# Patient Record
Sex: MALE | Race: WHITE | NOT HISPANIC OR LATINO | Employment: FULL TIME | URBAN - METROPOLITAN AREA
[De-identification: names, ages, dates, MRNs, and addresses within clinical notes are randomized per-mention and may not be internally consistent; named-entity substitution may affect disease eponyms.]

---

## 2017-03-14 RX ORDER — LEVOTHYROXINE SODIUM 112 UG/1
TABLET ORAL
Qty: 30 TABLET | Refills: 0 | Status: SHIPPED | OUTPATIENT
Start: 2017-03-14 | End: 2017-04-15 | Stop reason: SDUPTHER

## 2017-04-15 RX ORDER — LEVOTHYROXINE SODIUM 112 UG/1
TABLET ORAL
Qty: 30 TABLET | Refills: 1 | Status: SHIPPED | OUTPATIENT
Start: 2017-04-15 | End: 2017-05-09 | Stop reason: SDUPTHER

## 2017-05-09 ENCOUNTER — OFFICE VISIT (OUTPATIENT)
Dept: INTERNAL MEDICINE | Facility: CLINIC | Age: 47
End: 2017-05-09
Payer: COMMERCIAL

## 2017-05-09 VITALS
HEART RATE: 58 BPM | SYSTOLIC BLOOD PRESSURE: 115 MMHG | WEIGHT: 246 LBS | HEIGHT: 74 IN | BODY MASS INDEX: 31.57 KG/M2 | DIASTOLIC BLOOD PRESSURE: 79 MMHG

## 2017-05-09 DIAGNOSIS — E78.00 HIGH CHOLESTEROL: ICD-10-CM

## 2017-05-09 DIAGNOSIS — E03.4 HYPOTHYROIDISM DUE TO ACQUIRED ATROPHY OF THYROID: ICD-10-CM

## 2017-05-09 DIAGNOSIS — R73.9 HYPERGLYCEMIA: ICD-10-CM

## 2017-05-09 DIAGNOSIS — E55.9 MILD VITAMIN D DEFICIENCY: ICD-10-CM

## 2017-05-09 DIAGNOSIS — Z71.1 CONCERN ABOUT STD IN MALE WITHOUT DIAGNOSIS: ICD-10-CM

## 2017-05-09 DIAGNOSIS — Z12.5 SCREENING PSA (PROSTATE SPECIFIC ANTIGEN): ICD-10-CM

## 2017-05-09 DIAGNOSIS — M1A.00X0 IDIOPATHIC CHRONIC GOUT WITHOUT TOPHUS, UNSPECIFIED SITE: Primary | ICD-10-CM

## 2017-05-09 DIAGNOSIS — N63.0 BREAST MASS IN MALE: ICD-10-CM

## 2017-05-09 DIAGNOSIS — E29.1 HYPOGONADISM IN MALE: ICD-10-CM

## 2017-05-09 DIAGNOSIS — K62.5 RECTAL BLEEDING: ICD-10-CM

## 2017-05-09 PROCEDURE — 1160F RVW MEDS BY RX/DR IN RCRD: CPT | Mod: S$GLB,,, | Performed by: INTERNAL MEDICINE

## 2017-05-09 PROCEDURE — 3074F SYST BP LT 130 MM HG: CPT | Mod: S$GLB,,, | Performed by: INTERNAL MEDICINE

## 2017-05-09 PROCEDURE — 99999 PR PBB SHADOW E&M-EST. PATIENT-LVL III: CPT | Mod: PBBFAC,,, | Performed by: INTERNAL MEDICINE

## 2017-05-09 PROCEDURE — 99214 OFFICE O/P EST MOD 30 MIN: CPT | Mod: S$GLB,,, | Performed by: INTERNAL MEDICINE

## 2017-05-09 PROCEDURE — 3078F DIAST BP <80 MM HG: CPT | Mod: S$GLB,,, | Performed by: INTERNAL MEDICINE

## 2017-05-09 RX ORDER — LEVOTHYROXINE SODIUM 112 UG/1
TABLET ORAL
Qty: 90 TABLET | Refills: 3 | Status: SHIPPED | OUTPATIENT
Start: 2017-05-09 | End: 2018-06-03 | Stop reason: SDUPTHER

## 2017-05-09 RX ORDER — ATORVASTATIN CALCIUM 20 MG/1
20 TABLET, FILM COATED ORAL DAILY
Qty: 90 TABLET | Refills: 3 | Status: SHIPPED | OUTPATIENT
Start: 2017-05-09 | End: 2018-06-03 | Stop reason: SDUPTHER

## 2017-05-09 NOTE — MR AVS SNAPSHOT
[facility] [address]  [address]  [address]  [facility] [address]  Phone: [phone]                  Sukumar Beal   2017 1:00 PM   Office Visit    Description:  Male : 1970   Provider:  Katherine Turner MD   Department:  [facility] [address]           Reason for Visit     Follow-up           Diagnoses this Visit        Comments    Idiopathic chronic gout without tophus, unspecified site    -  Primary     High cholesterol         Hypothyroidism due to acquired atrophy of thyroid         Hypogonadism in male         Hyperglycemia         Mild vitamin D deficiency         Screening and evaluation for female sterilization         Screening PSA (prostate specific antigen)         Rectal bleeding         Concern about STD in male without diagnosis         Breast mass in male                To Do List           Future Appointments        Provider Department Dept Phone    5/10/2017 8:45 AM LAB, [place] [facility] [phone]    2017 2:30 PM Carolina Bryan MD [facility] Breast Surgery [phone]      Goals (5 Years of Data)     None       These Medications        Disp Refills Start End    atorvastatin (LIPITOR) 20 MG tablet 90 tablet 3 2017     Take 1 tablet (20 mg total) by mouth once daily. - Oral    [facility] [address] [place] [address] AT [facility] Ph #: [phone]       levothyroxine (SYNTHROID) 112 MCG tablet 90 tablet 3 2017     TAKE 1 TABLET(112 MCG) BY MOUTH EVERY DAY    [facility]: [facility] [employer] [address] [place] [address] [place] - [address] [facility] AT [facility] Ph #: [phone]         [facility]     [facility] Nurse Care Line - 24/ Assistance  Unless otherwise directed by your provider, please contact [facility], our nurse care line that is available for / assistance.     Registered nurses in the  "[facility] On Call Center provide: appointment scheduling, clinical advisement, health education, and other advisory services.  Call: [phone] (toll free)               Medications           Message regarding Medications     Verify the changes and/or additions to your medication regime listed below are the same as discussed with your clinician today.  If any of these changes or additions are incorrect, please notify your healthcare provider.             Verify that the below list of medications is an accurate representation of the medications you are currently taking.  If none reported, the list may be blank. If incorrect, please contact your healthcare provider. Carry this list with you in case of emergency.           Current Medications     atorvastatin (LIPITOR) 20 MG tablet Take 1 tablet (20 mg total) by mouth once daily.    levothyroxine (SYNTHROID) 112 MCG tablet TAKE 1 TABLET BY MOUTH ONCE DAILY    levothyroxine (SYNTHROID) 112 MCG tablet TAKE 1 TABLET(112 MCG) BY MOUTH EVERY DAY           Clinical Reference Information           Your Vitals Were     BP Pulse Height Weight BMI    115/79 58 6' 2" (1.88 m) 111.6 kg (246 lb) 31.58 kg/m2      Blood Pressure          Most Recent Value    BP  115/79      Allergies as of 5/9/2017     Codeine    Penicillins      Immunizations Administered on Date of Encounter - 5/9/2017     None      Orders Placed During Today's Visit      Normal Orders This Visit    Ambulatory Referral to Breast Surgery     Case request GI: COLONOSCOPY     Future Labs/Procedures Expected by Expires    CBC auto differential  5/9/2017 7/8/2018    Comprehensive metabolic panel  5/9/2017 5/9/2018    Hemoglobin A1c  5/9/2017 5/9/2018    Hepatitis C antibody  5/9/2017 7/8/2018    HIV-1 and HIV-2 antibodies  5/9/2017 7/8/2018    Lipid panel  5/9/2017 5/9/2018    PSA, Screening  5/9/2017 5/9/2018    RPR  5/9/2017 7/8/2018    Testosterone  5/9/2017 5/9/2018    TSH  5/9/2017 5/9/2018    Vitamin D  5/9/2017 " 5/9/2018      [facility] Sign-Up     Activating your [facility] account is as easy as 1-2-3!     1) Visit [email or web address], select Sign Up Now, enter this activation code and your date of birth, then select Next.  [email or web address]  Expires: 6/23/2017  3:17 PM      2) Create a username and password to use when you visit [facility] in the future and select a security question in case you lose your password and select Next.    3) Enter your e-mail address and click Sign Up!    Additional Information  If you have questions, please e-mail [email or web address] or call [phone] to talk to our [employer] staff. Remember, [facility] is NOT to be used for urgent needs. For medical emergencies, dial 911.         Language Assistance Services     ATTENTION: Language assistance services are available, free of charge. Please call [phone].      ATENCIÓN: Si habla abbycornelio, tiene a hobbs disposición servicios gratuitos de asistencia lingüística. Llame al [phone].     [facility] Ý: N?u b?n nói Ti?ng Vi?t, có các d?ch v? h? tr? ngôn ng? mi?n phí dành cho b?n. G?i s? [phone].         [facility] [address] complies with applicable Federal civil rights laws and does not discriminate on the basis of race, color, national origin, age, disability, or sex.

## 2017-05-10 ENCOUNTER — LAB VISIT (OUTPATIENT)
Dept: LAB | Facility: HOSPITAL | Age: 47
End: 2017-05-10
Attending: INTERNAL MEDICINE
Payer: COMMERCIAL

## 2017-05-10 DIAGNOSIS — E03.4 HYPOTHYROIDISM DUE TO ACQUIRED ATROPHY OF THYROID: ICD-10-CM

## 2017-05-10 DIAGNOSIS — E55.9 MILD VITAMIN D DEFICIENCY: ICD-10-CM

## 2017-05-10 DIAGNOSIS — E78.00 HIGH CHOLESTEROL: ICD-10-CM

## 2017-05-10 DIAGNOSIS — E29.1 HYPOGONADISM IN MALE: ICD-10-CM

## 2017-05-10 DIAGNOSIS — R73.9 HYPERGLYCEMIA: ICD-10-CM

## 2017-05-10 DIAGNOSIS — Z71.1 CONCERN ABOUT STD IN MALE WITHOUT DIAGNOSIS: ICD-10-CM

## 2017-05-10 DIAGNOSIS — Z12.5 SCREENING PSA (PROSTATE SPECIFIC ANTIGEN): ICD-10-CM

## 2017-05-10 LAB
25(OH)D3+25(OH)D2 SERPL-MCNC: 45 NG/ML
ALBUMIN SERPL BCP-MCNC: 4.3 G/DL
ALP SERPL-CCNC: 83 U/L
ALT SERPL W/O P-5'-P-CCNC: 28 U/L
ANION GAP SERPL CALC-SCNC: 15 MMOL/L
AST SERPL-CCNC: 30 U/L
BASOPHILS # BLD AUTO: 0.03 K/UL
BASOPHILS NFR BLD: 0.6 %
BILIRUB SERPL-MCNC: 0.8 MG/DL
BUN SERPL-MCNC: 23 MG/DL
CALCIUM SERPL-MCNC: 9.8 MG/DL
CHLORIDE SERPL-SCNC: 105 MMOL/L
CHOLEST/HDLC SERPL: 3.4 {RATIO}
CO2 SERPL-SCNC: 21 MMOL/L
COMPLEXED PSA SERPL-MCNC: 0.41 NG/ML
CREAT SERPL-MCNC: 1.2 MG/DL
DIFFERENTIAL METHOD: NORMAL
EOSINOPHIL # BLD AUTO: 0.2 K/UL
EOSINOPHIL NFR BLD: 3.2 %
ERYTHROCYTE [DISTWIDTH] IN BLOOD BY AUTOMATED COUNT: 13.1 %
EST. GFR  (AFRICAN AMERICAN): >60 ML/MIN/1.73 M^2
EST. GFR  (NON AFRICAN AMERICAN): >60 ML/MIN/1.73 M^2
GLUCOSE SERPL-MCNC: 102 MG/DL
HCT VFR BLD AUTO: 46.6 %
HCV AB SERPL QL IA: NEGATIVE
HDL/CHOLESTEROL RATIO: 29.1 %
HDLC SERPL-MCNC: 182 MG/DL
HDLC SERPL-MCNC: 53 MG/DL
HGB BLD-MCNC: 15.6 G/DL
HIV 1+2 AB+HIV1 P24 AG SERPL QL IA: NEGATIVE
LDLC SERPL CALC-MCNC: 110.2 MG/DL
LYMPHOCYTES # BLD AUTO: 1.7 K/UL
LYMPHOCYTES NFR BLD: 33.7 %
MCH RBC QN AUTO: 30.7 PG
MCHC RBC AUTO-ENTMCNC: 33.5 %
MCV RBC AUTO: 92 FL
MONOCYTES # BLD AUTO: 0.5 K/UL
MONOCYTES NFR BLD: 10.8 %
NEUTROPHILS # BLD AUTO: 2.6 K/UL
NEUTROPHILS NFR BLD: 51.7 %
NONHDLC SERPL-MCNC: 129 MG/DL
PLATELET # BLD AUTO: 177 K/UL
PMV BLD AUTO: 11.6 FL
POTASSIUM SERPL-SCNC: 4.2 MMOL/L
PROT SERPL-MCNC: 7.4 G/DL
RBC # BLD AUTO: 5.08 M/UL
RPR SER QL: NORMAL
SODIUM SERPL-SCNC: 141 MMOL/L
TESTOST SERPL-MCNC: 331 NG/DL
TRIGL SERPL-MCNC: 94 MG/DL
TSH SERPL DL<=0.005 MIU/L-ACNC: 3.76 UIU/ML
WBC # BLD AUTO: 4.98 K/UL

## 2017-05-10 PROCEDURE — 82306 VITAMIN D 25 HYDROXY: CPT

## 2017-05-10 PROCEDURE — 84153 ASSAY OF PSA TOTAL: CPT

## 2017-05-10 PROCEDURE — 80053 COMPREHEN METABOLIC PANEL: CPT

## 2017-05-10 PROCEDURE — 84443 ASSAY THYROID STIM HORMONE: CPT

## 2017-05-10 PROCEDURE — 80061 LIPID PANEL: CPT

## 2017-05-10 PROCEDURE — 84403 ASSAY OF TOTAL TESTOSTERONE: CPT

## 2017-05-10 PROCEDURE — 86803 HEPATITIS C AB TEST: CPT

## 2017-05-10 PROCEDURE — 86592 SYPHILIS TEST NON-TREP QUAL: CPT

## 2017-05-10 PROCEDURE — 83036 HEMOGLOBIN GLYCOSYLATED A1C: CPT

## 2017-05-10 PROCEDURE — 85025 COMPLETE CBC W/AUTO DIFF WBC: CPT | Mod: PO

## 2017-05-10 PROCEDURE — 86703 HIV-1/HIV-2 1 RESULT ANTBDY: CPT

## 2017-05-10 PROCEDURE — 36415 COLL VENOUS BLD VENIPUNCTURE: CPT | Mod: PO

## 2017-05-11 LAB
ESTIMATED AVG GLUCOSE: 108 MG/DL
HBA1C MFR BLD HPLC: 5.4 %

## 2017-05-16 ENCOUNTER — HOSPITAL ENCOUNTER (OUTPATIENT)
Dept: RADIOLOGY | Facility: HOSPITAL | Age: 47
Discharge: HOME | End: 2017-05-16
Attending: SURGERY
Payer: COMMERCIAL

## 2017-05-16 ENCOUNTER — OFFICE VISIT (OUTPATIENT)
Dept: SURGERY | Facility: CLINIC | Age: 47
End: 2017-05-16
Payer: COMMERCIAL

## 2017-05-16 VITALS
HEART RATE: 55 BPM | TEMPERATURE: 98 F | WEIGHT: 242.5 LBS | SYSTOLIC BLOOD PRESSURE: 169 MMHG | HEIGHT: 74 IN | DIASTOLIC BLOOD PRESSURE: 98 MMHG | BODY MASS INDEX: 31.12 KG/M2

## 2017-05-16 DIAGNOSIS — D17.1 LIPOMA OF BREAST: ICD-10-CM

## 2017-05-16 DIAGNOSIS — N60.01 BREAST CYST, RIGHT: ICD-10-CM

## 2017-05-16 DIAGNOSIS — N60.01 BREAST CYST, RIGHT: Primary | ICD-10-CM

## 2017-05-16 PROCEDURE — 1160F RVW MEDS BY RX/DR IN RCRD: CPT | Mod: S$GLB,,, | Performed by: SURGERY

## 2017-05-16 PROCEDURE — 3077F SYST BP >= 140 MM HG: CPT | Mod: S$GLB,,, | Performed by: SURGERY

## 2017-05-16 PROCEDURE — 99203 OFFICE O/P NEW LOW 30 MIN: CPT | Mod: S$GLB,,, | Performed by: SURGERY

## 2017-05-16 PROCEDURE — 76642 ULTRASOUND BREAST LIMITED: CPT | Mod: TC,RT

## 2017-05-16 PROCEDURE — 3080F DIAST BP >= 90 MM HG: CPT | Mod: S$GLB,,, | Performed by: SURGERY

## 2017-05-16 PROCEDURE — 76642 ULTRASOUND BREAST LIMITED: CPT | Mod: 26,RT,, | Performed by: RADIOLOGY

## 2017-05-16 PROCEDURE — 99999 PR PBB SHADOW E&M-EST. PATIENT-LVL III: CPT | Mod: PBBFAC,,, | Performed by: SURGERY

## 2017-05-16 NOTE — LETTER
May 18, 2017      Katherine Turner MD  [address]  [facility] [address]           [facility]  [address]  [facility] [address]  Phone: [phone]          Patient: Sukumar Beal   MR Number: [id number]   YOB: 1970   Date of Visit: 5/16/2017       Dear Dr. Katherine Turner:    Thank you for referring Sukumar Beal to me for evaluation. Attached you will find relevant portions of my assessment and plan of care.    If you have questions, please do not hesitate to call me. I look forward to following Sukumar Beal along with you.    Sincerely,    Carolina Bryan MD    Enclosure  CC:  No Recipients    If you would like to receive this communication electronically, please contact [email or web address] or [phone] to request more information on [employer] Link access.    For providers and/or their staff who would like to refer a patient to [facility], please contact us through our one-stop-shop provider referral line, [facility] , at [phone].    If you feel you have received this communication in error or would no longer like to receive these types of communications, please e-mail [email or web address]

## 2017-05-18 RX ORDER — ATORVASTATIN CALCIUM 20 MG/1
TABLET, FILM COATED ORAL
Qty: 30 TABLET | Refills: 6 | Status: SHIPPED | OUTPATIENT
Start: 2017-05-18 | End: 2018-06-11 | Stop reason: SDUPTHER

## 2017-05-18 NOTE — PROGRESS NOTES
Breast Surgery  [facility]  Department of Surgery      REFERRING PROVIDER: Katherine Turner MD  [address] [place] [address]    Chief Complaint: Consult (New Patient Male Breast  Mass)      Subjective:      Patient ID: Sukumar Beal is a 46 y.o. male who presents with palpable mass in the lower inner quadrant of the R breast.  He describes this as feeling like other small masses he has in various places consistent with lipomas.  He has never had one removed or biopsied.  He is concerned about this area.      Patient does not routinely do self breast exams.  Patient has noted a change on breast exam.  Patient denies nipple discharge. Patient denies to previous breast biopsy. Patient denies a personal history of breast cancer.      Past Medical History:   Diagnosis Date    Gout 7/2014    High cholesterol     Hypertension     Hypothyroid     Low testosterone     Staph aureus infection age 13    R leg     Past Surgical History:   Procedure Laterality Date    FRACTURE SURGERY Left 2004    wrist    Incision and drainage of tibia Right 1983    SINUS SURGERY      x2     Current Outpatient Prescriptions on File Prior to Visit   Medication Sig Dispense Refill    atorvastatin (LIPITOR) 20 MG tablet Take 1 tablet (20 mg total) by mouth once daily. 90 tablet 3    levothyroxine (SYNTHROID) 112 MCG tablet TAKE 1 TABLET BY MOUTH ONCE DAILY 90 tablet 0    levothyroxine (SYNTHROID) 112 MCG tablet TAKE 1 TABLET(112 MCG) BY MOUTH EVERY DAY 90 tablet 3     No current facility-administered medications on file prior to visit.      Social History     Social History    Marital status: Single     Spouse name: N/A    Number of children: N/A    Years of education: N/A     Occupational History    Not on file.     Social History Main Topics    Smoking status: Former Smoker     Packs/day: 0.25     Years: 15.00     Quit date: 1/3/2013    Smokeless tobacco: Never Used    Alcohol use 3.0 oz/week      "6 Standard drinks or equivalent per week      Comment: weekends    Drug use: No    Sexual activity: Yes     Other Topics Concern    Not on file     Social History Narrative     Family History   Problem Relation Age of Onset    Heart disease Father 73         Hypertension Sister     Hyperlipidemia Sister     Hypertension Brother     Hyperlipidemia Brother     Hypertension Mother     Cancer Maternal Grandfather     Cancer Maternal Aunt      lung - smoker        Review of Systems  Objective:   BP (!) 169/98 (BP Location: Left arm, Patient Position: Sitting, BP Method: Automatic)  Pulse (!) 55  Temp 97.7 °F (36.5 °C) (Oral)   Ht 6' 2" (1.88 m)  Wt 110 kg (242 lb 8 oz)  BMI 31.14 kg/m2    Physical Exam   Constitutional: He is oriented to person, place, and time. He appears well-developed and well-nourished.   HENT:   Head: Normocephalic and atraumatic.   Eyes: Right eye exhibits no discharge. Left eye exhibits no discharge. No scleral icterus.   Neck: Neck supple. No tracheal deviation present. No thyromegaly present.   Cardiovascular: Normal rate, regular rhythm, normal heart sounds and intact distal pulses.    Pulmonary/Chest: Effort normal and breath sounds normal. No respiratory distress. Right breast exhibits mass. Right breast exhibits no inverted nipple, no nipple discharge, no skin change and no tenderness. Left breast exhibits no inverted nipple, no mass, no nipple discharge, no skin change and no tenderness. Breasts are symmetrical.       Abdominal: Soft. He exhibits no distension. There is no tenderness.   Musculoskeletal: He exhibits no edema or deformity.   Lymphadenopathy:     He has no cervical adenopathy.   Neurological: He is alert and oriented to person, place, and time.   Skin: Skin is warm and dry.     Psychiatric: He has a normal mood and affect.       Radiology review: Will obtain US today to assure benign nature of this lesion    Assessment:           1. Lipoma of breast     "    Plan:     He was reassured of the benign nature of this lesion  Will obtain US today to assure there is no concern that biopsy is needed.  Advised that this could be excised if it becomes large enough to be painful or bothersome to him.  Follow up PRN    Total time spent with the patient: 45 minutes.  30 minutes of face to face consultation and 15 minutes of chart review and coordination of care.

## 2017-05-21 NOTE — PROGRESS NOTES
Subjective:       Patient ID: Sukumar Beal is a 46 y.o. male.    Chief Complaint: Follow-up    HPIPt is feeling well - coming in for a check up.  No CP or SOB.  Always worried about lipomas has one in R breast now.  Notes some slight red blood on the tissue.   Review of Systems   Respiratory: Negative for shortness of breath.    Cardiovascular: Negative for chest pain.   Gastrointestinal: Negative for abdominal pain, diarrhea, nausea and vomiting.   Genitourinary: Negative for dysuria.   Neurological: Negative for seizures, syncope and headaches.       Objective:      Physical Exam   Constitutional: He is oriented to person, place, and time. He appears well-developed and well-nourished. No distress.   HENT:   Mouth/Throat: Oropharynx is clear and moist.   Eyes: EOM are normal. Pupils are equal, round, and reactive to light.   Neck: Neck supple. No JVD present. No thyromegaly present.   Cardiovascular: Normal rate, regular rhythm, normal heart sounds and intact distal pulses.  Exam reveals no gallop and no friction rub.    No murmur heard.  Pulmonary/Chest: Effort normal and breath sounds normal. He has no wheezes. He has no rales.   R breast at lower ridge an approx 1cm ?lipoma slightly mor firm than the others and seems to be in the breast tissue   Abdominal: Soft. Bowel sounds are normal. He exhibits no distension and no mass. There is no tenderness. There is no rebound and no guarding.   Genitourinary: Rectum normal, prostate normal and penis normal. Rectal exam shows guaiac negative stool.   Musculoskeletal: He exhibits no edema.   Lymphadenopathy:     He has no cervical adenopathy.   Neurological: He is alert and oriented to person, place, and time.   Skin: Skin is warm and dry.   Psychiatric: He has a normal mood and affect. His behavior is normal. Judgment and thought content normal.       Assessment:       1. Idiopathic chronic gout without tophus, unspecified site    2. High cholesterol    3.  Hypothyroidism due to acquired atrophy of thyroid    4. Hypogonadism in male    5. Hyperglycemia    6. Mild vitamin D deficiency    7. Screening and evaluation for female sterilization    8. Screening PSA (prostate specific antigen)    9. Rectal bleeding    10. Concern about STD in male without diagnosis    11. Breast mass in male        Plan:   Idiopathic chronic gout without tophus, unspecified site  No recent sx  High cholesterol  -     Comprehensive metabolic panel; Future; Expected date: 05/09/2017  -     Lipid panel; Future; Expected date: 05/09/2017    Hypothyroidism due to acquired atrophy of thyroid  -     CBC auto differential; Future; Expected date: 05/09/2017  -     TSH; Future; Expected date: 05/09/2017    Hypogonadism in male  -     Testosterone; Future; Expected date: 05/09/2017    Hyperglycemia  -     Hemoglobin A1c; Future; Expected date: 05/09/2017    Mild vitamin D deficiency  -     Vitamin D; Future; Expected date: 05/09/2017    Screening PSA (prostate specific antigen)  -     PSA, Screening; Future; Expected date: 05/09/2017    Rectal bleeding  -     Case request GI: COLONOSCOPY    Concern about STD in male without diagnosis  -     HIV-1 and HIV-2 antibodies; Future; Expected date: 05/09/2017  -     RPR; Future; Expected date: 05/09/2017  -     Hepatitis C antibody; Future; Expected date: 05/09/2017    Breast mass in male  -     Ambulatory Referral to Breast Surgery to evaluate    Other orders  -     atorvastatin (LIPITOR) 20 MG tablet; Take 1 tablet (20 mg total) by mouth once daily.  Dispense: 90 tablet; Refill: 3  -     levothyroxine (SYNTHROID) 112 MCG tablet; TAKE 1 TABLET(112 MCG) BY MOUTH EVERY DAY  Dispense: 90 tablet; Refill: 3    Consider truvada will await lab and discuss with ID

## 2018-03-22 ENCOUNTER — TELEPHONE (OUTPATIENT)
Dept: INTERNAL MEDICINE | Facility: CLINIC | Age: 48
End: 2018-03-22

## 2018-03-22 DIAGNOSIS — Z00.00 WELLNESS EXAMINATION: Primary | ICD-10-CM

## 2018-03-22 NOTE — TELEPHONE ENCOUNTER
----- Message from Natalee Wilder sent at 3/22/2018  4:42 PM CDT -----  Contact: Patient [phone]  He said the [facility] advised him to ask you for a medical referral to lower his monthly fee fax it to [facility] at [phone].    He also need lab orders for his annual physical on 6/11/18.    Thank you

## 2018-03-27 NOTE — TELEPHONE ENCOUNTER
Called and scheduled labs.    Contact: Patient [phone]  He said the [facility] advised him to ask you for a medical referral to lower his monthly fee fax it to [facility] at [phone].    Please Advise  Thank You

## 2018-03-31 NOTE — TELEPHONE ENCOUNTER
Please inform order was placed for the fitness center- he just needs to call to activate the free month.

## 2018-03-31 NOTE — TELEPHONE ENCOUNTER
Left message informing patient order for fitness center was placed. He just needs to call to activate.    Please Advise  Thank You

## 2018-05-30 ENCOUNTER — TELEPHONE (OUTPATIENT)
Dept: INTERNAL MEDICINE | Facility: CLINIC | Age: 48
End: 2018-05-30

## 2018-05-30 DIAGNOSIS — E78.2 HYPERLIPIDEMIA, MIXED: Primary | ICD-10-CM

## 2018-05-30 NOTE — TELEPHONE ENCOUNTER
Faxed referral for nutrition to Supriya [phone]  Informed patient it was faxed. Left voice mail for Supriya to confirm if she received it    Please Advise  Thank You

## 2018-05-31 ENCOUNTER — LAB VISIT (OUTPATIENT)
Dept: LAB | Facility: HOSPITAL | Age: 48
End: 2018-05-31
Attending: INTERNAL MEDICINE
Payer: COMMERCIAL

## 2018-05-31 DIAGNOSIS — Z00.00 WELLNESS EXAMINATION: ICD-10-CM

## 2018-05-31 LAB
25(OH)D3+25(OH)D2 SERPL-MCNC: 27 NG/ML
ALBUMIN SERPL BCP-MCNC: 4.3 G/DL
ALP SERPL-CCNC: 75 U/L
ALT SERPL W/O P-5'-P-CCNC: 32 U/L
ANION GAP SERPL CALC-SCNC: 7 MMOL/L
AST SERPL-CCNC: 36 U/L
BASOPHILS # BLD AUTO: 0.05 K/UL
BASOPHILS NFR BLD: 1 %
BILIRUB SERPL-MCNC: 0.9 MG/DL
BUN SERPL-MCNC: 17 MG/DL
CALCIUM SERPL-MCNC: 9.5 MG/DL
CHLORIDE SERPL-SCNC: 103 MMOL/L
CHOLEST SERPL-MCNC: 181 MG/DL
CHOLEST/HDLC SERPL: 3.5 {RATIO}
CO2 SERPL-SCNC: 28 MMOL/L
CREAT SERPL-MCNC: 1 MG/DL
DIFFERENTIAL METHOD: NORMAL
EOSINOPHIL # BLD AUTO: 0.1 K/UL
EOSINOPHIL NFR BLD: 1.8 %
ERYTHROCYTE [DISTWIDTH] IN BLOOD BY AUTOMATED COUNT: 12.9 %
EST. GFR  (AFRICAN AMERICAN): >60 ML/MIN/1.73 M^2
EST. GFR  (NON AFRICAN AMERICAN): >60 ML/MIN/1.73 M^2
ESTIMATED AVG GLUCOSE: 103 MG/DL
GLUCOSE SERPL-MCNC: 96 MG/DL
HBA1C MFR BLD HPLC: 5.2 %
HCT VFR BLD AUTO: 45.6 %
HDLC SERPL-MCNC: 52 MG/DL
HDLC SERPL: 28.7 %
HGB BLD-MCNC: 15 G/DL
IMM GRANULOCYTES # BLD AUTO: 0.01 K/UL
IMM GRANULOCYTES NFR BLD AUTO: 0.2 %
LDLC SERPL CALC-MCNC: 114 MG/DL
LYMPHOCYTES # BLD AUTO: 1.9 K/UL
LYMPHOCYTES NFR BLD: 37 %
MCH RBC QN AUTO: 30.7 PG
MCHC RBC AUTO-ENTMCNC: 32.9 G/DL
MCV RBC AUTO: 93 FL
MONOCYTES # BLD AUTO: 0.6 K/UL
MONOCYTES NFR BLD: 11.6 %
NEUTROPHILS # BLD AUTO: 2.5 K/UL
NEUTROPHILS NFR BLD: 48.4 %
NONHDLC SERPL-MCNC: 129 MG/DL
NRBC BLD-RTO: 0 /100 WBC
PLATELET # BLD AUTO: 167 K/UL
PMV BLD AUTO: 11.6 FL
POTASSIUM SERPL-SCNC: 3.7 MMOL/L
PROT SERPL-MCNC: 7 G/DL
RBC # BLD AUTO: 4.88 M/UL
SODIUM SERPL-SCNC: 138 MMOL/L
TESTOST SERPL-MCNC: 216 NG/DL
TRIGL SERPL-MCNC: 75 MG/DL
TSH SERPL DL<=0.005 MIU/L-ACNC: 3.11 UIU/ML
WBC # BLD AUTO: 5.08 K/UL

## 2018-05-31 PROCEDURE — 80061 LIPID PANEL: CPT

## 2018-05-31 PROCEDURE — 80074 ACUTE HEPATITIS PANEL: CPT

## 2018-05-31 PROCEDURE — 83036 HEMOGLOBIN GLYCOSYLATED A1C: CPT

## 2018-05-31 PROCEDURE — 80053 COMPREHEN METABOLIC PANEL: CPT

## 2018-05-31 PROCEDURE — 85025 COMPLETE CBC W/AUTO DIFF WBC: CPT

## 2018-05-31 PROCEDURE — 86703 HIV-1/HIV-2 1 RESULT ANTBDY: CPT

## 2018-05-31 PROCEDURE — 84403 ASSAY OF TOTAL TESTOSTERONE: CPT

## 2018-05-31 PROCEDURE — 84443 ASSAY THYROID STIM HORMONE: CPT

## 2018-05-31 PROCEDURE — 36415 COLL VENOUS BLD VENIPUNCTURE: CPT | Mod: PO

## 2018-05-31 PROCEDURE — 86592 SYPHILIS TEST NON-TREP QUAL: CPT

## 2018-05-31 PROCEDURE — 82306 VITAMIN D 25 HYDROXY: CPT

## 2018-05-31 NOTE — TELEPHONE ENCOUNTER
Supriya with [facility] called saying the referral faxed over was the wrong one needed. Patient needs a medical referral to Nutrition. She stated it can say the same thing.  Her Extention is - [phone]  Fax number [phone]      Please advise when faxed

## 2018-06-01 LAB
HAV IGM SERPL QL IA: NEGATIVE
HBV CORE IGM SERPL QL IA: NEGATIVE
HBV SURFACE AG SERPL QL IA: NEGATIVE
HCV AB SERPL QL IA: NEGATIVE
HIV 1+2 AB+HIV1 P24 AG SERPL QL IA: NEGATIVE
RPR SER QL: NORMAL

## 2018-06-03 RX ORDER — LEVOTHYROXINE SODIUM 112 UG/1
TABLET ORAL
Qty: 90 TABLET | Refills: 0 | Status: SHIPPED | OUTPATIENT
Start: 2018-06-03 | End: 2018-07-23 | Stop reason: SDUPTHER

## 2018-06-03 RX ORDER — ATORVASTATIN CALCIUM 20 MG/1
TABLET, FILM COATED ORAL
Qty: 90 TABLET | Refills: 0 | Status: SHIPPED | OUTPATIENT
Start: 2018-06-03 | End: 2020-08-27 | Stop reason: SDUPTHER

## 2018-06-11 ENCOUNTER — HOSPITAL ENCOUNTER (OUTPATIENT)
Dept: RADIOLOGY | Facility: HOSPITAL | Age: 48
Discharge: HOME | End: 2018-06-11
Attending: INTERNAL MEDICINE
Payer: COMMERCIAL

## 2018-06-11 ENCOUNTER — OFFICE VISIT (OUTPATIENT)
Dept: INTERNAL MEDICINE | Facility: CLINIC | Age: 48
End: 2018-06-11
Payer: COMMERCIAL

## 2018-06-11 VITALS
DIASTOLIC BLOOD PRESSURE: 106 MMHG | BODY MASS INDEX: 31.94 KG/M2 | OXYGEN SATURATION: 99 % | SYSTOLIC BLOOD PRESSURE: 156 MMHG | TEMPERATURE: 98 F | HEIGHT: 74 IN | HEART RATE: 48 BPM | WEIGHT: 248.88 LBS

## 2018-06-11 DIAGNOSIS — Z12.11 SCREENING FOR MALIGNANT NEOPLASM OF COLON: ICD-10-CM

## 2018-06-11 DIAGNOSIS — E78.00 HIGH CHOLESTEROL: Primary | ICD-10-CM

## 2018-06-11 DIAGNOSIS — E29.1 HYPOGONADISM IN MALE: ICD-10-CM

## 2018-06-11 DIAGNOSIS — I10 HYPERTENSION, UNSPECIFIED TYPE: ICD-10-CM

## 2018-06-11 DIAGNOSIS — M25.569 RECURRENT KNEE PAIN, UNSPECIFIED LATERALITY: ICD-10-CM

## 2018-06-11 DIAGNOSIS — E03.4 HYPOTHYROIDISM DUE TO ACQUIRED ATROPHY OF THYROID: ICD-10-CM

## 2018-06-11 LAB
BILIRUB UR QL STRIP: NEGATIVE
CLARITY UR REFRACT.AUTO: CLEAR
COLOR UR AUTO: ABNORMAL
CREAT UR-MCNC: 27 MG/DL
GLUCOSE UR QL STRIP: NEGATIVE
HGB UR QL STRIP: ABNORMAL
KETONES UR QL STRIP: NEGATIVE
LEUKOCYTE ESTERASE UR QL STRIP: NEGATIVE
MICROALBUMIN UR DL<=1MG/L-MCNC: <2.5 UG/ML
MICROALBUMIN/CREATININE RATIO: NORMAL UG/MG
MICROSCOPIC COMMENT: NORMAL
NITRITE UR QL STRIP: NEGATIVE
PH UR STRIP: 6 [PH] (ref 5–8)
PROT UR QL STRIP: NEGATIVE
RBC #/AREA URNS AUTO: 2 /HPF (ref 0–4)
SP GR UR STRIP: 1 (ref 1–1.03)
URN SPEC COLLECT METH UR: ABNORMAL
UROBILINOGEN UR STRIP-ACNC: NEGATIVE EU/DL

## 2018-06-11 PROCEDURE — 99999 PR PBB SHADOW E&M-EST. PATIENT-LVL IV: CPT | Mod: PBBFAC,,, | Performed by: INTERNAL MEDICINE

## 2018-06-11 PROCEDURE — 73560 X-RAY EXAM OF KNEE 1 OR 2: CPT | Mod: TC,50,FY,PO

## 2018-06-11 PROCEDURE — 73560 X-RAY EXAM OF KNEE 1 OR 2: CPT | Mod: 26,LT,, | Performed by: RADIOLOGY

## 2018-06-11 PROCEDURE — 3008F BODY MASS INDEX DOCD: CPT | Mod: CPTII,S$GLB,, | Performed by: INTERNAL MEDICINE

## 2018-06-11 PROCEDURE — 82043 UR ALBUMIN QUANTITATIVE: CPT

## 2018-06-11 PROCEDURE — 3080F DIAST BP >= 90 MM HG: CPT | Mod: CPTII,S$GLB,, | Performed by: INTERNAL MEDICINE

## 2018-06-11 PROCEDURE — 81001 URINALYSIS AUTO W/SCOPE: CPT

## 2018-06-11 PROCEDURE — 3077F SYST BP >= 140 MM HG: CPT | Mod: CPTII,S$GLB,, | Performed by: INTERNAL MEDICINE

## 2018-06-11 PROCEDURE — 99214 OFFICE O/P EST MOD 30 MIN: CPT | Mod: S$GLB,,, | Performed by: INTERNAL MEDICINE

## 2018-06-11 PROCEDURE — 73560 X-RAY EXAM OF KNEE 1 OR 2: CPT | Mod: 26,RT,, | Performed by: RADIOLOGY

## 2018-06-11 RX ORDER — TRIAMTERENE/HYDROCHLOROTHIAZID 37.5-25 MG
1 TABLET ORAL DAILY
Qty: 30 TABLET | Refills: 6 | Status: SHIPPED | OUTPATIENT
Start: 2018-06-11 | End: 2018-06-11

## 2018-06-11 RX ORDER — ATORVASTATIN CALCIUM 20 MG/1
TABLET, FILM COATED ORAL
Qty: 90 TABLET | Refills: 3 | Status: SHIPPED | OUTPATIENT
Start: 2018-06-11 | End: 2018-07-19

## 2018-06-11 RX ORDER — LEVOTHYROXINE SODIUM 112 UG/1
112 TABLET ORAL DAILY
Qty: 90 TABLET | Refills: 3 | Status: SHIPPED | OUTPATIENT
Start: 2018-06-11 | End: 2019-08-30 | Stop reason: SDUPTHER

## 2018-06-11 RX ORDER — LOSARTAN POTASSIUM 25 MG/1
25 TABLET ORAL DAILY
Qty: 90 TABLET | Refills: 3 | Status: SHIPPED | OUTPATIENT
Start: 2018-06-11 | End: 2021-01-20 | Stop reason: SDUPTHER

## 2018-07-19 ENCOUNTER — OFFICE VISIT (OUTPATIENT)
Dept: ENDOCRINOLOGY | Facility: CLINIC | Age: 48
End: 2018-07-19
Payer: COMMERCIAL

## 2018-07-19 VITALS
WEIGHT: 247.56 LBS | DIASTOLIC BLOOD PRESSURE: 76 MMHG | HEIGHT: 74 IN | SYSTOLIC BLOOD PRESSURE: 121 MMHG | BODY MASS INDEX: 31.77 KG/M2 | HEART RATE: 82 BPM

## 2018-07-19 DIAGNOSIS — E29.1 HYPOGONADISM IN MALE: Primary | ICD-10-CM

## 2018-07-19 DIAGNOSIS — E55.9 VITAMIN D DEFICIENCY: ICD-10-CM

## 2018-07-19 DIAGNOSIS — E03.9 ACQUIRED HYPOTHYROIDISM: ICD-10-CM

## 2018-07-19 PROCEDURE — 3078F DIAST BP <80 MM HG: CPT | Mod: CPTII,S$GLB,, | Performed by: INTERNAL MEDICINE

## 2018-07-19 PROCEDURE — 3008F BODY MASS INDEX DOCD: CPT | Mod: CPTII,S$GLB,, | Performed by: INTERNAL MEDICINE

## 2018-07-19 PROCEDURE — 99214 OFFICE O/P EST MOD 30 MIN: CPT | Mod: S$GLB,,, | Performed by: INTERNAL MEDICINE

## 2018-07-19 PROCEDURE — 99999 PR PBB SHADOW E&M-EST. PATIENT-LVL IV: CPT | Mod: PBBFAC,,, | Performed by: INTERNAL MEDICINE

## 2018-07-19 PROCEDURE — 3074F SYST BP LT 130 MM HG: CPT | Mod: CPTII,S$GLB,, | Performed by: INTERNAL MEDICINE

## 2018-07-19 NOTE — LETTER
July 19, 2018      Katherine Turner MD  [address]  [facility] [address]           [facility]  [address]  [facility] [address]  Phone: [phone]          Patient: Sukumar Beal   MR Number: [id number]   YOB: 1970   Date of Visit: 7/19/2018       Dear Dr. Katherine Turner:    Thank you for referring Sukumar Beal to me for evaluation. Attached you will find relevant portions of my assessment and plan of care.    If you have questions, please do not hesitate to call me. I look forward to following Sukumar Beal along with you.    Sincerely,    Lala Pardo, NP    Enclosure  CC:  No Recipients    If you would like to receive this communication electronically, please contact [email or web address] or [phone] to request more information on [employer] Link access.    For providers and/or their staff who would like to refer a patient to [facility], please contact us through our one-stop-shop provider referral line, [facility], at [phone].    If you feel you have received this communication in error or would no longer like to receive these types of communications, please e-mail [email or web address]

## 2018-07-19 NOTE — PROGRESS NOTES
Subjective:       Patient ID: Sukumar Beal is a 47 y.o. male.    Chief Complaint: Other Misc    Mr. Beal returns to clinic today for Hypogonadism and Hypothyroidism follow up     He was last seen by Dr. Hankins in 08/2015     He is being seen by me for the first time today     With regards to Hypogonadism   He was diagnosed with low testosterone in 2012. Initial testosterone was 138 in 08/2012     He reports using topical testosterone replacement therapy up until 2 years ago ~ 2016     The patient states recently he was noted to have low testosterone levels again per labs ordered by his PCP     He endorses increasing fatigue, difficulty losing weight despite diet and increasing physical activity     He endorses morning erections and normal libido     He denies medications associated with low testosterone     Denies headaches   Denies vision changes   Denies changes in his ring or shoe size         With regards to Hypothyroidism :   Diagnosed in 2013, based on labs     Current regimen:   Levothyroxine 112 mcg once daily     He reports taking every morning with water along with his other medications as well     He denies constipation   Denies dry skin   Denies hair loss   Denies cold intolerance   + difficulty losing weight     Review of Systems   Constitutional: Positive for fatigue and unexpected weight change.   HENT: Negative.  Negative for sore throat, trouble swallowing and voice change.    Eyes: Negative.  Negative for visual disturbance.   Respiratory: Negative.  Negative for cough, choking, chest tightness, wheezing and stridor.    Cardiovascular: Negative for chest pain.   Gastrointestinal: Negative.  Negative for abdominal distention, abdominal pain, diarrhea, nausea and vomiting.   Endocrine: Negative for cold intolerance and heat intolerance.   Genitourinary: Negative for dysuria.   Musculoskeletal: Negative for joint swelling.   Skin: Negative.    Neurological: Negative.  Negative for tremors and  headaches.   Psychiatric/Behavioral: Negative.  Negative for confusion. The patient is not nervous/anxious.        Objective:      Physical Exam   Constitutional: He is oriented to person, place, and time. He appears well-developed and well-nourished. No distress.   HENT:   Mouth/Throat: No oropharyngeal exudate.   Eyes: Pupils are equal, round, and reactive to light.   Neck: Normal range of motion. Neck supple. No tracheal deviation present. No thyromegaly present.   Cardiovascular: Normal rate and regular rhythm.    No murmur heard.  Pulmonary/Chest: Effort normal and breath sounds normal. No respiratory distress.   Musculoskeletal: Normal range of motion. He exhibits no edema.   Lymphadenopathy:     He has no cervical adenopathy.   Neurological: He is alert and oriented to person, place, and time. He has normal reflexes. He displays normal reflexes.   Skin: Skin is warm and dry. No erythema.   Psychiatric: He has a normal mood and affect.       Labs:   Results for ANTHONY AMAYA (MRN [id number]) as of 7/19/2018 09:38   Ref. Range 5/31/2018 09:37   TSH Latest Ref Range: 0.400 - 4.000 uIU/mL 3.107     Results for ANTHONY AMAYA (MRN [id number]) as of 7/19/2018 09:38   Ref. Range 5/31/2018 09:37   Hemoglobin A1C Latest Ref Range: 4.0 - 5.6 % 5.2   Estimated Avg Glucose Latest Ref Range: 68 - 131 mg/dL 103     Results for ANTHONY AMAYA (MRN [id number]) as of 7/19/2018 09:38   Ref. Range 5/31/2018 09:37   Vit D, 25-Hydroxy Latest Ref Range: 30 - 96 ng/mL 27 (L)     Results for ANTHONY AMAYA (MRN [id number]) as of 7/19/2018 09:38   Ref. Range 12/2/2015 08:56 7/12/2016 10:57 5/10/2017 09:12 5/31/2018 09:37   FSH Latest Ref Range: 0.95 - 11.95 mIU/mL  2.90     LH Latest Ref Range: 0.6 - 12.1 mIU/mL  1.5     Prolactin Latest Ref Range: 3.5 - 19.4 ng/mL  4.2     Testosterone, Total Latest Ref Range: 195.0 - 1138.0 ng/dL 386 440 331 216     Results for ANTHONY AMAYA (MRN [id number]) as of 7/19/2018 09:38   Ref. Range  6/11/2018 10:52   PSA, SCREEN Latest Ref Range: 0.00 - 4.00 ng/mL 0.42     Assessment:       1. Hypogonadism in male    2. Acquired hypothyroidism    3. Vitamin D deficiency        Plan:       1. Hypogonadism   -- obtain complete testosterone panel paired with LH/FSH   -- will evaluate gonadatrophs since patient is no longer taking testosterone therapy at this time   -- if primary hypogonadism, will start testosterone replacement therapy   -- reviewed available options: topical, injectable, pellets  -- pt is interested in pellets, will refer to Urology for further evaluation   -- refer to sleep medicine for evaluation of sleep apnea as testosterone replacement therapy may worsen or induce sleep apnea   -- PSA and CBC up to date     2. Hypothyroidism   -- adjust Levothyroxine 112 mcg to 1.5 tabs on Sunday and 1 full tab the remainder of the week   -- suspect Hashimoto's thyroiditis, will check TPO   -- if TPO +, may use Selenium to help decrease inflammatory activity in patient's with autoimmune thyroid disease  -- reinforced to take LT4 on an empty stomach with water and to wait 30-45 minutes before eating or taking other medications     3. Vitamin d def   -- continue replacement therapy       Will call/message with results

## 2018-07-20 ENCOUNTER — LAB VISIT (OUTPATIENT)
Dept: LAB | Facility: HOSPITAL | Age: 48
End: 2018-07-20
Attending: INTERNAL MEDICINE
Payer: COMMERCIAL

## 2018-07-20 DIAGNOSIS — E55.9 VITAMIN D DEFICIENCY: ICD-10-CM

## 2018-07-20 DIAGNOSIS — E29.1 HYPOGONADISM IN MALE: ICD-10-CM

## 2018-07-20 DIAGNOSIS — E03.9 ACQUIRED HYPOTHYROIDISM: ICD-10-CM

## 2018-07-20 LAB
25(OH)D3+25(OH)D2 SERPL-MCNC: 29 NG/ML
FSH SERPL-ACNC: 3.1 MIU/ML
LH SERPL-ACNC: 1.9 MIU/ML
PROLACTIN SERPL IA-MCNC: 6.2 NG/ML
THYROPEROXIDASE IGG SERPL-ACNC: <6 IU/ML
TSH SERPL DL<=0.005 MIU/L-ACNC: 2.59 UIU/ML

## 2018-07-20 PROCEDURE — 83002 ASSAY OF GONADOTROPIN (LH): CPT

## 2018-07-20 PROCEDURE — 86376 MICROSOMAL ANTIBODY EACH: CPT

## 2018-07-20 PROCEDURE — 84146 ASSAY OF PROLACTIN: CPT

## 2018-07-20 PROCEDURE — 83001 ASSAY OF GONADOTROPIN (FSH): CPT

## 2018-07-20 PROCEDURE — 84270 ASSAY OF SEX HORMONE GLOBUL: CPT

## 2018-07-20 PROCEDURE — 82040 ASSAY OF SERUM ALBUMIN: CPT

## 2018-07-20 PROCEDURE — 82306 VITAMIN D 25 HYDROXY: CPT

## 2018-07-20 PROCEDURE — 84443 ASSAY THYROID STIM HORMONE: CPT

## 2018-07-20 PROCEDURE — 36415 COLL VENOUS BLD VENIPUNCTURE: CPT | Mod: PO

## 2018-07-23 ENCOUNTER — OFFICE VISIT (OUTPATIENT)
Dept: SLEEP MEDICINE | Facility: CLINIC | Age: 48
End: 2018-07-23
Payer: COMMERCIAL

## 2018-07-23 VITALS
HEART RATE: 55 BPM | OXYGEN SATURATION: 99 % | SYSTOLIC BLOOD PRESSURE: 136 MMHG | DIASTOLIC BLOOD PRESSURE: 96 MMHG | WEIGHT: 248.25 LBS | HEIGHT: 74 IN | BODY MASS INDEX: 31.86 KG/M2

## 2018-07-23 DIAGNOSIS — E66.9 OBESITY (BMI 30.0-34.9): ICD-10-CM

## 2018-07-23 DIAGNOSIS — G47.30 SLEEP APNEA, UNSPECIFIED TYPE: Primary | ICD-10-CM

## 2018-07-23 PROCEDURE — 99999 PR PBB SHADOW E&M-EST. PATIENT-LVL III: CPT | Mod: PBBFAC,,, | Performed by: NURSE PRACTITIONER

## 2018-07-23 PROCEDURE — 3008F BODY MASS INDEX DOCD: CPT | Mod: CPTII,S$GLB,, | Performed by: NURSE PRACTITIONER

## 2018-07-23 PROCEDURE — 3080F DIAST BP >= 90 MM HG: CPT | Mod: CPTII,S$GLB,, | Performed by: NURSE PRACTITIONER

## 2018-07-23 PROCEDURE — 3075F SYST BP GE 130 - 139MM HG: CPT | Mod: CPTII,S$GLB,, | Performed by: NURSE PRACTITIONER

## 2018-07-23 PROCEDURE — 99203 OFFICE O/P NEW LOW 30 MIN: CPT | Mod: S$GLB,,, | Performed by: NURSE PRACTITIONER

## 2018-07-23 NOTE — LETTER
July 23, 2018      Lala Pardo, DANE  [address]  [place] [address]           [facility]  [address]  [place] [address]  Phone: [phone]          Patient: Sukumar Beal   MR Number: [id number]   YOB: 1970   Date of Visit: 7/23/2018       Dear Lala Pardo:    Thank you for referring Sukumar Beal to me for evaluation. Attached you will find relevant portions of my assessment and plan of care.    If you have questions, please do not hesitate to call me. I look forward to following Sukumar Beal along with you.    Sincerely,    Basilia Longoria NP    Enclosure  CC:  No Recipients    If you would like to receive this communication electronically, please contact [email or web address] or [phone] to request more information on [employer] access.    For providers and/or their staff who would like to refer a patient to [facility], please contact us through our one-stop-shop provider referral line, [facility], at [phone].    If you feel you have received this communication in error or would no longer like to receive these types of communications, please e-mail [email or web address]

## 2018-07-23 NOTE — PATIENT INSTRUCTIONS
[facility] will contact you to schedule your sleep study. Their number is [phone] (ext 2). The [facility] is located on 7th floor of the [facility].    We will call you when the sleep study results are ready - if you have not heard from us by 2 weeks from the date of the study, please call [phone] (ext 1).    You are advised to abstain from driving should you feel sleepy or drowsy.

## 2018-07-23 NOTE — PROGRESS NOTES
"Sukumar Beal  was seen as a new patient at the request of  Chai Pardo* for the evaluation of obstructive sleep apnea.    CHIEF COMPLAINT:    Chief Complaint   Patient presents with    Sleep Apnea       07/23/2018 SANGEETHA Longoria NP: Initial HISTORY OF PRESENT ILLNESS: Sukumar Beal is a 47 y.o. male is here for sleep evaluation.       Seen for PRISCILLA eval in context of hypogonadism   Per PCP: "-- refer to sleep medicine for evaluation of sleep apnea as testosterone replacement therapy may worsen or induce sleep apnea"    Patient complaints include: snoring, interrupted sleep (up to 6 times per night), unrefreshing sleep, and daytime sleepiness.   Denies insomnia    Denies symptoms of restless legs or kicking during sleep.    Occupation: , days only     EPWORTH SLEEPINESS SCALE 7/22/2018   Sitting and reading 3   Watching TV 3   Sitting, inactive in a public place (e.g. a theatre or a meeting) 1   As a passenger in a car for an hour without a break 2   Lying down to rest in the afternoon when circumstances permit 2   Sitting and talking to someone 1   Sitting quietly after a lunch without alcohol 2   In a car, while stopped for a few minutes in traffic 1   Total score 15       SLEEP ROUTINE:  Bed partner: None   Sleep Clinic New Patient 7/22/2018   What time do you go to bed on a week day? (Give a range) 11:30 to 12:30   What time do you go to bed on a day off? (Give a range) 11:30 to 12:30   How long does it take you to fall asleep? (Give a range) 15 to 45 minutes   On average, how many times per night do you wake up? 3 - 6 times   How long does it take you to fall back into sleep? (Give a range) Few minutes to an hour   What time do you wake up to start your day on a week day? (Give a range) 7 to 7:30 a.m.   What time do you wake up to start your day on a day off? (Give a range) 7 to 9 a.m.   What time do you get out of bed? (Give a range) 7 to 7:30 a.m.   On average, how many hours do you " sleep? 5 hours   On average, how many naps do you take per day? Non   Rate your sleep quality from 0 to 5 (0-poor, 5-great). 2   Have you experienced:  Weight gain?   How much weight have you lost or gained (in lbs.) in the last year? 10 lb   On average, how many times per night do you go to the bathroom?  1- 2   Have you ever had a sleep study/CPAP machine/surgery for sleep apnea? No   Have you ever had a CPAP machine for sleep apnea? No   Have you ever had surgery for sleep apnea? No       PAST MEDICAL HISTORY:    Active Ambulatory Problems     Diagnosis Date Noted    High cholesterol     Hypothyroid     Gout 2015    Hypogonadism in male 2015    Vitamin D deficiency 2018     Resolved Ambulatory Problems     Diagnosis Date Noted    Hypertension      Past Medical History:   Diagnosis Date    Gout 2014    High cholesterol     Hypertension     Hypothyroid     Low testosterone     Staph aureus infection age 13                PAST SURGICAL HISTORY:    Past Surgical History:   Procedure Laterality Date    FRACTURE SURGERY Left     wrist    Incision and drainage of tibia Right     SINUS SURGERY      x2         FAMILY HISTORY:                 Family History   Problem Relation Age of Onset    Heart disease Father 73            Hypertension Sister     Hyperlipidemia Sister     Hypertension Brother     Hyperlipidemia Brother     Hypertension Mother     Cancer Maternal Grandfather     Cancer Maternal Aunt         lung - smoker       SOCIAL HISTORY:          Tobacco:   History   Smoking Status    Former Smoker    Packs/day: 0.25    Years: 15.00    Quit date: 1/3/2013   Smokeless Tobacco    Never Used       Alcohol use:    History   Alcohol Use    3.0 oz/week    6 Standard drinks or equivalent per week     Comment: weekends                 ALLERGIES:    Review of patient's allergies indicates:   Allergen Reactions    Codeine Rash    Penicillins Rash  "      CURRENT MEDICATIONS:    Current Outpatient Prescriptions   Medication Sig Dispense Refill    atorvastatin (LIPITOR) 20 MG tablet TAKE 1 TABLET(20 MG) BY MOUTH EVERY DAY 90 tablet 0    levothyroxine (SYNTHROID) 112 MCG tablet Take 1 tablet (112 mcg total) by mouth once daily. 90 tablet 3    losartan (COZAAR) 25 MG tablet Take 1 tablet (25 mg total) by mouth once daily. 90 tablet 3     No current facility-administered medications for this visit.                   REVIEW OF SYSTEMS:     Sleep related symptoms as per HPI.  Sleep Clinic ROS  7/22/2018   Difficulty breathing through the nose?  Yes   Sore throat or dry mouth in the morning? Sometimes   Irregular or very fast heart beat?  No   Shortness of breath?  No   Acid reflux? No   Body aches and pains?  Sometimes   Morning headaches? No   Dizziness? No   Mood changes?  Sometimes   Do you exercise?  Sometimes   Do you feel like moving your legs a lot?  Sometimes       Otherwise, a balance of systems reviewed is negative.          PHYSICAL EXAM:     BP (!) 136/96 (BP Location: Left arm, Patient Position: Sitting, BP Method: Large (Manual))   Pulse (!) 55   Ht 6' 2" (1.88 m)   Wt 112.6 kg (248 lb 3.8 oz)   SpO2 99%   BMI 31.87 kg/m²     GENERAL: Overweight development, well groomed  HEENT:  Conjunctivae are non-erythematous; Pupils equal, round, and reactive to light; Nose is symmetrical; Nasal mucosa is pink and moist; Septum is deviated to left; Inferior turbinates are normal; Nasal airflow is decreased, L > R; Posterior pharynx is pink; Modified Mallampati: III; Posterior palate is normal; Tonsils +1; Uvula is normal and pink;Tongue is normal; Dentition is fair; No TMJ tenderness; Jaw opening and protrusion without click and without discomfort.  NECK: Supple. Neck circumference is 15 inches. No thyromegaly. No palpable nodes.    SKIN: On face and neck: No abrasions, no rashes, no lesions.  No subcutaneous nodules are palpable.  RESPIRATORY: Chest is " clear to auscultation.  Normal chest expansion and non-labored breathing at rest.  CARDIOVASCULAR: Normal S1, S2.  No murmurs, gallops or rubs. No carotid bruits bilaterally.  EXTREMITIES: No edema. No clubbing. No cyanosis. Station normal. Gait normal.        NEURO/PSYCH: Oriented to time, place and person. Normal attention span and concentration. Affect is full. Mood is normal.                                              ASSESSMENT:    Sleep apnea, unspecified. The patient symptomatically has snoring, interrupted sleep, unrefreshing sleep, and daytime sleepiness with findings of crowded oral airway and elevated body mass index. Medical co-morbidities: HTN and obesity.  This warrants further investigation for possible obstructive sleep apnea.      Hx nasal surgery, x2,  for polyps removal, last surgery ~ 15 years ago     Obesity, BMI > 30, discussed relationship between PRISCILLA and weight; despite daily exercise pt unable to lose weight       PLAN:    Diagnostic: HST. The nature of this procedure and its indication was discussed with the patient. Discussed with patient that if HST is negative or depending on severity of positive HST, in-lab sleep study may be necessary.  Patient will be contacted after sleep study is done.  Email with results, RTC prn.     Education: During our discussion today, we talked about the etiology of obstructive sleep apnea as well as the potential ramifications of untreated sleep apnea, which could include daytime sleepiness, hypertension, heart disease and/or stroke. We discussed potential treatment options, which could include weight loss, body positioning, continuous positive airway pressure (CPAP), OA, EPAP, or referral for surgical consideration.     Precautions: The patient was advised to abstain from driving should they feel sleepy  or drowsy.     Thank you for allowing me the opportunity to participate in the care of your patient.

## 2018-07-25 LAB
ALBUMIN SERPL-MCNC: 4.3 G/DL (ref 3.6–5.1)
SHBG SERPL-SCNC: 19 NMOL/L (ref 10–50)
TESTOST FREE SERPL-MCNC: 56.2 PG/ML (ref 46–224)
TESTOST SERPL-MCNC: 289 NG/DL (ref 250–1100)
TESTOSTERONE.FREE+WB SERPL-MCNC: 110.7 NG/DL (ref 110–575)

## 2018-07-26 ENCOUNTER — OFFICE VISIT (OUTPATIENT)
Dept: UROLOGY | Facility: CLINIC | Age: 48
End: 2018-07-26
Payer: COMMERCIAL

## 2018-07-26 VITALS
BODY MASS INDEX: 32 KG/M2 | WEIGHT: 249.31 LBS | SYSTOLIC BLOOD PRESSURE: 146 MMHG | DIASTOLIC BLOOD PRESSURE: 83 MMHG | HEIGHT: 74 IN | HEART RATE: 58 BPM

## 2018-07-26 DIAGNOSIS — N40.1 BPH WITH URINARY OBSTRUCTION: ICD-10-CM

## 2018-07-26 DIAGNOSIS — N13.8 BPH WITH URINARY OBSTRUCTION: ICD-10-CM

## 2018-07-26 DIAGNOSIS — E29.1 MALE HYPOGONADISM: Primary | ICD-10-CM

## 2018-07-26 PROCEDURE — 99999 PR PBB SHADOW E&M-EST. PATIENT-LVL III: CPT | Mod: PBBFAC,,, | Performed by: UROLOGY

## 2018-07-26 PROCEDURE — 99244 OFF/OP CNSLTJ NEW/EST MOD 40: CPT | Mod: S$GLB,,, | Performed by: UROLOGY

## 2018-07-26 RX ORDER — TESTOSTERONE 20.25 MG/1.25G
GEL TOPICAL
Qty: 1 BOTTLE | Refills: 5 | Status: SHIPPED | OUTPATIENT
Start: 2018-07-26 | End: 2019-03-28 | Stop reason: SDUPTHER

## 2018-07-26 NOTE — PROGRESS NOTES
CHIEF COMPLAINT:    Mr. Beal is a 47 y.o. male presenting for a consultation at the request of DANE Garcia. Patient presents with hypogonadism.    PRESENTING ILLNESS:    Sukumar Beal is a 47 y.o. male who c/o hypogonadism.  His complaints are fatigue, loss of axillary hair.  A T was checked which is low.  He's been on TRT in the past and would like to go back on it.    He has nocturia x 2 and is pleased with how he voids.  No hematuria.  No dysuria.    He denies ED.    REVIEW OF SYSTEMS:    Sukumar Beal denies headache, blurred vision, fever, nausea, vomiting, chills, abdominal pain, bleeding per rectum, cough, SOB, recent loss of consciousness, recent mental status changes, seizures, dizziness, or upper or lower extremity weakness.    RAMÓN  1. 4  2. 4  3. 5  4. 5  5. 5      PATIENT HISTORY:    Past Medical History:   Diagnosis Date    Gout 2014    High cholesterol     Hypertension     Hypothyroid     Low testosterone     Staph aureus infection age 13    R leg       Past Surgical History:   Procedure Laterality Date    FRACTURE SURGERY Left     wrist    Incision and drainage of tibia Right     SINUS SURGERY      x2       Family History   Problem Relation Age of Onset    Heart disease Father 73            Hypertension Sister     Hyperlipidemia Sister     Hypertension Brother     Hyperlipidemia Brother     Hypertension Mother     Cancer Maternal Grandfather     Cancer Maternal Aunt         lung - smoker       Social History     Social History    Marital status: Single     Spouse name: N/A    Number of children: N/A    Years of education: N/A     Occupational History    Not on file.     Social History Main Topics    Smoking status: Former Smoker     Packs/day: 0.25     Years: 15.00     Quit date: 1/3/2013    Smokeless tobacco: Never Used    Alcohol use 3.0 oz/week     6 Standard drinks or equivalent per week      Comment: weekends    Drug use: No    Sexual  activity: Yes     Other Topics Concern    Not on file     Social History Narrative    No narrative on file       Allergies:  Codeine and Penicillins    Medications:    Current Outpatient Prescriptions:     atorvastatin (LIPITOR) 20 MG tablet, TAKE 1 TABLET(20 MG) BY MOUTH EVERY DAY, Disp: 90 tablet, Rfl: 0    levothyroxine (SYNTHROID) 112 MCG tablet, Take 1 tablet (112 mcg total) by mouth once daily., Disp: 90 tablet, Rfl: 3    losartan (COZAAR) 25 MG tablet, Take 1 tablet (25 mg total) by mouth once daily., Disp: 90 tablet, Rfl: 3    PHYSICAL EXAMINATION:    The patient generally appears in good health, is appropriately interactive, and is in no apparent distress.     Eyes: anicteric sclerae, moist conjunctivae; no lid-lag; PERRLA     HENT: Atraumatic; oropharynx clear with moist mucous membranes and no mucosal ulcerations;normal hard and soft palate.  No evidence of lymphadenopathy.    Neck: Trachea midline.  No thyromegaly.    Musculoskeletal: No abnormal gait.    Skin: No lesions.    Mental: Cooperative with normal affect.  Is oriented to time, place, and person.    Neuro: Grossly intact.    Chest: Normal inspiratory effort.   No accessory muscles.  No audible wheezes.  Respirations symmetric on inspiration and expiration.    Heart: Regular rhythm.      Abdomen:  Soft, non-tender. No masses or organomegaly. Bladder is not palpable. No evidence of flank discomfort. No evidence of inguinal hernia.    Genitourinary: The penis is circumcised with no evidence of plaques or induration. The urethral meatus is normal. The testes, epididymides, and cord structures are normal in size and contour bilaterally. The scrotum is normal in size and contour.    Normal anal sphincter tone. No rectal mass.    The prostate is 30 g. Normal landmarks. Lateral sulci. Median furrow intact.  No nodularity or induration. Seminal vesicles are normal.    Extremities: No clubbing, cyanosis, or edema      LABS:      Lab Results    Component Value Date    PSA 0.42 06/11/2018    PSA 0.41 05/10/2017    PSA 0.31 12/02/2015       IMPRESSION:    Encounter Diagnoses   Name Primary?    Male hypogonadism Yes         PLAN:    1. Discussed the risks and benefits of testosterone replacement today.  This included possible cardiac risks.  He would like to try this.  Will check a T in 2 weeks and adjust the dose of TRT if necessary.  He can then RTC 3 months with T, PSA, CBC, hepatic panel, lipid panel.  A new Rx for Androgel 1.62% was given today.   2. Will observe his LUTS as they don't bother him.      Copy to:

## 2018-07-26 NOTE — PATIENT INSTRUCTIONS
What is this medicine?  TESTOSTERONE (bradley TOS ter one) is the main male hormone. It supports normal male traits such as muscle growth, facial hair, and deep voice. This gel is used in males to treat low testosterone levels.  This medicine may be used for other purposes; ask your health care provider or pharmacist if you have questions.  What should I tell my health care provider before I take this medicine?  They need to know if you have any of these conditions:  · breast cancer  · diabetes  · heart disease  · if a female partner is pregnant or trying to get pregnant  · kidney disease  · liver disease  · lung disease  · prostate cancer, enlargement  · an unusual or allergic reaction to testosterone, soy proteins, other medicines, foods, dyes, or preservatives  · pregnant or trying to get pregnant  · breast-feeding  How should I use this medicine?  This medicine is for external use only. This medicine is applied at the same time every day (preferably in the morning) to clean, dry, intact skin. If you take a bath or shower in the morning, apply the gel after the bath or shower. Follow the directions on the prescription label. Make sure that you are using your testosterone gel product correctly and applying it only to the appropriate skin area (see below). Allow the skin to dry a few minutes then cover with clothing to prevent others from coming in contact with the medicine on your skin. The gel is flammable. Avoid fire, flame, or smoking until the gel has dried. Wash your hands with soap and water after use.  For AndroGel Packets: Open the packet(s) needed for your dose. You can put the entire dose into your palm all at once or just a little at a time to apply. If you prefer, you can instead squeeze the gel directly onto the area you are applying it to. Apply on the shoulders as directed. Do not apply to the scrotum or genitals. Be sure you use the correct total dose. It is best to wait 5 to 6 hours after application  of the gel before showering or swimming.  For AndroGel 1%: Pump the dose into the palm of your hand. You can put the entire dose into your palm all at once or just a little at a time to apply. If you prefer, you can instead pump the gel directly onto the area you are applying it to. Apply on the shoulders as directed. Do not apply to the scrotum or genitals. Be sure you use the correct total dose. It is best to wait for 5 to 6 hours after application of the gel before showering or swimming.  For AndroGel 1.62%: Pump the dose into the palm of your hand. Dispense one pump of gel at a time into the palm of your hand before applying it. If you prefer, you can instead pump the gel directly onto the area you are applying it to. Apply on the shoulders and upper arms as directed. Do not apply to other parts of the body including the abdomen or genitals. Be sure you use the correct total dose. It is best to wait 2 hours after application of the gel before washing, showering, or swimming.  For Testim: Open the tube(s) needed for your dose. Squeeze the gel from the tube into the palm of your hand. Apply on the shoulders or upper arms as directed. Do not apply to the scrotum, genitals, or abdomen. Be sure you use the correct total dose. Do not shower or swim for at least 2 hours after application of the gel.  For Fortesta: Use the multi-dose pump to pump the gel directly onto the area you are applying it to. Apply on the thighs as directed. Do not apply to the abdomen, penis, scrotum, shoulders or upper arms. Gently rub the gel onto the skin using your finger. Be sure you use the correct total dose. Do not shower or swim for at least 2 hours after application of the gel.  For Axiron: Use the multi-dose pump to pump the gel into the applicator.  Apply under the arm as directed.  Do not apply to other locations.  Do not shower or swim for at least 2 hours after application of the gel.  A special MedGuide will be given to you by  the pharmacist with each prescription and refill. Be sure to read this information carefully each time.  Talk to your pediatrician regarding the use of this medicine in children. Special care may be needed.  Overdosage: If you think you have taken too much of this medicine contact a poison control center or emergency room at once.  NOTE: This medicine is only for you. Do not share this medicine with others.  What if I miss a dose?  If you miss a dose, use it as soon as you can. If it is almost time for your next dose, use only that dose. Do not use double or extra doses.  What may interact with this medicine?  · medicines for diabetes  · medicines that treat or prevent blood clots like warfarin  · oxyphenbutazone  · propranolol  · steroid medicines like prednisone or cortisone  This list may not describe all possible interactions. Give your health care provider a list of all the medicines, herbs, non-prescription drugs, or dietary supplements you use. Also tell them if you smoke, drink alcohol, or use illegal drugs. Some items may interact with your medicine.  What should I watch for while using this medicine?  Visit your doctor or health care professional for regular checks on your progress. They will need to check the level of testosterone in your blood.  This medicine can transfer from your body to others. If a person or pet comes in contact with the area where this medicine was applied to your skin, they may have a serious risk of side effects. If you cannot avoid skin-to-skin contact with another person, make sure the site where this medicine was applied is covered with clothing. If accidental contact happens, the skin of the person or pet should be washed right away with soap and water. Also, a female partner who is pregnant or trying to get pregnant should avoid contact with the gel or treated skin.  This medicine may affect blood sugar levels. If you have diabetes, check with your doctor or health care  professional before you change your diet or the dose of your diabetic medicine.  This drug is banned from use in athletes by most athletic organizations.  What side effects may I notice from receiving this medicine?  Side effects that you should report to your doctor or health care professional as soon as possible:  · allergic reactions like skin rash, itching or hives, swelling of the face, lips, or tongue  · breast enlargement  · breathing problems  · changes in mood, especially anger, depression, or rage  · dark urine  · general ill feeling or flu-like symptoms  · light-colored stools  · loss of appetite, nausea  · nausea, vomiting  · right upper belly pain  · stomach pain  · swelling of ankles  · too frequent or persistent erections  · trouble passing urine or change in the amount of urine  · unusually weak or tired  · yellowing of the eyes or skin  Side effects that usually do not require medical attention (report to your doctor or health care professional if they continue or are bothersome):  · acne  · change in sex drive or performance  · hair loss  · headache  This list may not describe all possible side effects. Call your doctor for medical advice about side effects. You may report side effects to FDA at 4-312-FDA-9283.  Where should I keep my medicine?  Keep out of the reach of children. This medicine can be abused. Keep your medicine in a safe place to protect it from theft. Do not share this medicine with anyone. Selling or giving away this medicine is dangerous and against the law.  Store at room temperature between 15 to 30 degrees C (59 to 86 degrees F). Keep closed until use. Protect from heat and light. This medicine is flammable. Avoid exposure to heat, fire, flame, and smoking. Throw away any unused medicine after the expiration date.  NOTE:This sheet is a summary. It may not cover all possible information. If you have questions about this medicine, talk to your doctor, pharmacist, or health care  provider. Copyright© 2011 [employer]

## 2018-07-26 NOTE — LETTER
July 26, 2018      Lala Pardo, NP  [address]  [facility] [address]           [facility]  [address]  [facility] [address]  Phone: [phone]          Patient: Sukumar Beal   MR Number: [id number]   YOB: 1970   Date of Visit: 7/26/2018       Dear Lala Pardo:    Thank you for referring Sukumar Beal to me for evaluation. Attached you will find relevant portions of my assessment and plan of care.    If you have questions, please do not hesitate to call me. I look forward to following Sukumar Beal along with you.    Sincerely,    Alonso Carey MD    Enclosure  CC:  No Recipients    If you would like to receive this communication electronically, please contact [email or web address] or [phone] to request more information on [employer] access.    For providers and/or their staff who would like to refer a patient to [facility], please contact us through our one-stop-shop provider referral line, [facility] , at [phone].    If you feel you have received this communication in error or would no longer like to receive these types of communications, please e-mail [email or web address]

## 2018-07-30 ENCOUNTER — PATIENT MESSAGE (OUTPATIENT)
Dept: UROLOGY | Facility: CLINIC | Age: 48
End: 2018-07-30

## 2018-08-02 ENCOUNTER — TELEPHONE (OUTPATIENT)
Dept: UROLOGY | Facility: CLINIC | Age: 48
End: 2018-08-02

## 2018-08-02 NOTE — TELEPHONE ENCOUNTER
Prior auth for androgel done, approved for 8/2/18 through 8/2/19. Auth # [id number]. Information faxed to [facility].

## 2018-08-08 ENCOUNTER — TELEPHONE (OUTPATIENT)
Dept: SLEEP MEDICINE | Facility: OTHER | Age: 48
End: 2018-08-08

## 2018-08-22 ENCOUNTER — LAB VISIT (OUTPATIENT)
Dept: LAB | Facility: HOSPITAL | Age: 48
End: 2018-08-22
Attending: UROLOGY
Payer: COMMERCIAL

## 2018-08-22 DIAGNOSIS — E29.1 MALE HYPOGONADISM: ICD-10-CM

## 2018-08-22 LAB — TESTOST SERPL-MCNC: 170 NG/DL

## 2018-08-22 PROCEDURE — 84403 ASSAY OF TOTAL TESTOSTERONE: CPT

## 2018-08-22 PROCEDURE — 36415 COLL VENOUS BLD VENIPUNCTURE: CPT | Mod: PO

## 2018-08-23 ENCOUNTER — TELEPHONE (OUTPATIENT)
Dept: UROLOGY | Facility: CLINIC | Age: 48
End: 2018-08-23

## 2018-08-23 DIAGNOSIS — E29.1 MALE HYPOGONADISM: Primary | ICD-10-CM

## 2018-08-23 NOTE — TELEPHONE ENCOUNTER
Please notify T is still low.  Verify that he used androgel correctly and on the day of his draw.  If so, increase to 3 pumps/day.  Recheck T in 1 week.

## 2018-08-24 ENCOUNTER — PATIENT MESSAGE (OUTPATIENT)
Dept: UROLOGY | Facility: CLINIC | Age: 48
End: 2018-08-24

## 2018-08-24 NOTE — TELEPHONE ENCOUNTER
Patient notified of testosterone. He has used androgel appropriately. Will increase to 3 pumps per day and redraw testosterone level in 1 week. Lab scheduled at [place] per patient request.

## 2018-09-04 ENCOUNTER — PATIENT MESSAGE (OUTPATIENT)
Dept: UROLOGY | Facility: CLINIC | Age: 48
End: 2018-09-04

## 2018-09-04 ENCOUNTER — LAB VISIT (OUTPATIENT)
Dept: LAB | Facility: HOSPITAL | Age: 48
End: 2018-09-04
Attending: UROLOGY
Payer: COMMERCIAL

## 2018-09-04 DIAGNOSIS — E29.1 MALE HYPOGONADISM: ICD-10-CM

## 2018-09-04 LAB — TESTOST SERPL-MCNC: 338 NG/DL

## 2018-09-04 PROCEDURE — 36415 COLL VENOUS BLD VENIPUNCTURE: CPT | Mod: PO

## 2018-09-04 PROCEDURE — 84403 ASSAY OF TOTAL TESTOSTERONE: CPT

## 2018-09-05 ENCOUNTER — TELEPHONE (OUTPATIENT)
Dept: UROLOGY | Facility: CLINIC | Age: 48
End: 2018-09-05

## 2018-09-17 ENCOUNTER — IMMUNIZATION (OUTPATIENT)
Dept: INTERNAL MEDICINE | Facility: CLINIC | Age: 48
End: 2018-09-17
Payer: COMMERCIAL

## 2018-09-17 PROCEDURE — 90471 IMMUNIZATION ADMIN: CPT | Mod: S$GLB,,, | Performed by: INTERNAL MEDICINE

## 2018-09-17 PROCEDURE — 90686 IIV4 VACC NO PRSV 0.5 ML IM: CPT | Mod: S$GLB,,, | Performed by: INTERNAL MEDICINE

## 2018-09-24 ENCOUNTER — TELEPHONE (OUTPATIENT)
Dept: SLEEP MEDICINE | Facility: OTHER | Age: 48
End: 2018-09-24

## 2018-10-15 ENCOUNTER — TELEPHONE (OUTPATIENT)
Dept: SLEEP MEDICINE | Facility: OTHER | Age: 48
End: 2018-10-15

## 2018-10-26 ENCOUNTER — TELEPHONE (OUTPATIENT)
Dept: SLEEP MEDICINE | Facility: OTHER | Age: 48
End: 2018-10-26

## 2018-10-26 NOTE — TELEPHONE ENCOUNTER
Talked to also left messages to schedule the home sleep study,no response.  Sent out a message through my [facility] to schedule.

## 2018-11-15 ENCOUNTER — TELEPHONE (OUTPATIENT)
Dept: SLEEP MEDICINE | Facility: OTHER | Age: 48
End: 2018-11-15

## 2018-11-15 NOTE — TELEPHONE ENCOUNTER
Left messages,talked to patient and sent out a message through my [facility] to schedule the home sleep study.  No response.

## 2018-12-04 ENCOUNTER — LAB VISIT (OUTPATIENT)
Dept: LAB | Facility: HOSPITAL | Age: 48
End: 2018-12-04
Attending: UROLOGY
Payer: COMMERCIAL

## 2018-12-04 DIAGNOSIS — E29.1 MALE HYPOGONADISM: ICD-10-CM

## 2018-12-04 LAB
ALBUMIN SERPL BCP-MCNC: 4.2 G/DL
ALP SERPL-CCNC: 76 U/L
ALT SERPL W/O P-5'-P-CCNC: 32 U/L
AST SERPL-CCNC: 32 U/L
BASOPHILS # BLD AUTO: 0.05 K/UL
BASOPHILS NFR BLD: 1.2 %
BILIRUB DIRECT SERPL-MCNC: 0.3 MG/DL
BILIRUB SERPL-MCNC: 0.7 MG/DL
CHOLEST SERPL-MCNC: 181 MG/DL
CHOLEST/HDLC SERPL: 3.4 {RATIO}
COMPLEXED PSA SERPL-MCNC: 0.38 NG/ML
DIFFERENTIAL METHOD: NORMAL
EOSINOPHIL # BLD AUTO: 0.1 K/UL
EOSINOPHIL NFR BLD: 3 %
ERYTHROCYTE [DISTWIDTH] IN BLOOD BY AUTOMATED COUNT: 12.8 %
HCT VFR BLD AUTO: 46.5 %
HDLC SERPL-MCNC: 54 MG/DL
HDLC SERPL: 29.8 %
HGB BLD-MCNC: 14.9 G/DL
IMM GRANULOCYTES # BLD AUTO: 0 K/UL
IMM GRANULOCYTES NFR BLD AUTO: 0 %
LDLC SERPL CALC-MCNC: 103.6 MG/DL
LYMPHOCYTES # BLD AUTO: 1.7 K/UL
LYMPHOCYTES NFR BLD: 39.5 %
MCH RBC QN AUTO: 29.9 PG
MCHC RBC AUTO-ENTMCNC: 32 G/DL
MCV RBC AUTO: 93 FL
MONOCYTES # BLD AUTO: 0.4 K/UL
MONOCYTES NFR BLD: 9.5 %
NEUTROPHILS # BLD AUTO: 2 K/UL
NEUTROPHILS NFR BLD: 46.8 %
NONHDLC SERPL-MCNC: 127 MG/DL
NRBC BLD-RTO: 0 /100 WBC
PLATELET # BLD AUTO: 185 K/UL
PMV BLD AUTO: 11.6 FL
PROT SERPL-MCNC: 6.9 G/DL
RBC # BLD AUTO: 4.99 M/UL
TESTOST SERPL-MCNC: 453 NG/DL
TRIGL SERPL-MCNC: 117 MG/DL
WBC # BLD AUTO: 4.33 K/UL

## 2018-12-04 PROCEDURE — 80076 HEPATIC FUNCTION PANEL: CPT

## 2018-12-04 PROCEDURE — 84153 ASSAY OF PSA TOTAL: CPT

## 2018-12-04 PROCEDURE — 85025 COMPLETE CBC W/AUTO DIFF WBC: CPT

## 2018-12-04 PROCEDURE — 84403 ASSAY OF TOTAL TESTOSTERONE: CPT

## 2018-12-04 PROCEDURE — 36415 COLL VENOUS BLD VENIPUNCTURE: CPT | Mod: PO

## 2018-12-04 PROCEDURE — 80061 LIPID PANEL: CPT

## 2019-01-11 ENCOUNTER — PATIENT MESSAGE (OUTPATIENT)
Dept: ENDOSCOPY | Facility: HOSPITAL | Age: 49
End: 2019-01-11

## 2019-02-04 ENCOUNTER — PATIENT MESSAGE (OUTPATIENT)
Dept: UROLOGY | Facility: CLINIC | Age: 49
End: 2019-02-04

## 2019-02-04 RX ORDER — TESTOSTERONE 20.25 MG/1.25G
GEL TOPICAL
Qty: 75 G | Refills: 0 | OUTPATIENT
Start: 2019-02-04

## 2019-03-04 RX ORDER — TESTOSTERONE 20.25 MG/1.25G
GEL TOPICAL
Qty: 75 G | Refills: 0 | OUTPATIENT
Start: 2019-03-04

## 2019-03-12 ENCOUNTER — PATIENT MESSAGE (OUTPATIENT)
Dept: UROLOGY | Facility: CLINIC | Age: 49
End: 2019-03-12

## 2019-03-28 ENCOUNTER — OFFICE VISIT (OUTPATIENT)
Dept: UROLOGY | Facility: CLINIC | Age: 49
End: 2019-03-28
Payer: COMMERCIAL

## 2019-03-28 ENCOUNTER — PATIENT MESSAGE (OUTPATIENT)
Dept: UROLOGY | Facility: CLINIC | Age: 49
End: 2019-03-28

## 2019-03-28 VITALS
HEIGHT: 74 IN | BODY MASS INDEX: 31.12 KG/M2 | SYSTOLIC BLOOD PRESSURE: 165 MMHG | HEART RATE: 58 BPM | WEIGHT: 242.5 LBS | DIASTOLIC BLOOD PRESSURE: 101 MMHG

## 2019-03-28 DIAGNOSIS — N40.1 BPH WITH URINARY OBSTRUCTION: ICD-10-CM

## 2019-03-28 DIAGNOSIS — E29.1 MALE HYPOGONADISM: Primary | ICD-10-CM

## 2019-03-28 DIAGNOSIS — N13.8 BPH WITH URINARY OBSTRUCTION: ICD-10-CM

## 2019-03-28 PROCEDURE — 3008F PR BODY MASS INDEX (BMI) DOCUMENTED: ICD-10-PCS | Mod: CPTII,S$GLB,, | Performed by: UROLOGY

## 2019-03-28 PROCEDURE — 99999 PR PBB SHADOW E&M-EST. PATIENT-LVL III: ICD-10-PCS | Mod: PBBFAC,,, | Performed by: UROLOGY

## 2019-03-28 PROCEDURE — 3008F BODY MASS INDEX DOCD: CPT | Mod: CPTII,S$GLB,, | Performed by: UROLOGY

## 2019-03-28 PROCEDURE — 3080F DIAST BP >= 90 MM HG: CPT | Mod: CPTII,S$GLB,, | Performed by: UROLOGY

## 2019-03-28 PROCEDURE — 99213 PR OFFICE/OUTPT VISIT, EST, LEVL III, 20-29 MIN: ICD-10-PCS | Mod: S$GLB,,, | Performed by: UROLOGY

## 2019-03-28 PROCEDURE — 3077F PR MOST RECENT SYSTOLIC BLOOD PRESSURE >= 140 MM HG: ICD-10-PCS | Mod: CPTII,S$GLB,, | Performed by: UROLOGY

## 2019-03-28 PROCEDURE — 3080F PR MOST RECENT DIASTOLIC BLOOD PRESSURE >= 90 MM HG: ICD-10-PCS | Mod: CPTII,S$GLB,, | Performed by: UROLOGY

## 2019-03-28 PROCEDURE — 3077F SYST BP >= 140 MM HG: CPT | Mod: CPTII,S$GLB,, | Performed by: UROLOGY

## 2019-03-28 PROCEDURE — 99999 PR PBB SHADOW E&M-EST. PATIENT-LVL III: CPT | Mod: PBBFAC,,, | Performed by: UROLOGY

## 2019-03-28 PROCEDURE — 99213 OFFICE O/P EST LOW 20 MIN: CPT | Mod: S$GLB,,, | Performed by: UROLOGY

## 2019-03-28 RX ORDER — TESTOSTERONE 20.25 MG/1.25G
GEL TOPICAL
Qty: 1 BOTTLE | Refills: 5 | Status: SHIPPED | OUTPATIENT
Start: 2019-03-28 | End: 2019-09-26 | Stop reason: SDUPTHER

## 2019-03-28 NOTE — PROGRESS NOTES
CHIEF COMPLAINT:    Mr. Beal is a 48 y.o. male presenting  with hypogonadism.    PRESENTING ILLNESS:    Sukumar Beal is a 48 y.o. male who c/o hypogonadism.  His complaints are fatigue, loss of axillary hair.   He's on Androgel 1.62% using 3 pumps.  While on TRT, While on TRT, he has more energy.    He has nocturia x 2 and is pleased with how he voids.  No hematuria.  No dysuria.    He denies ED.    REVIEW OF SYSTEMS:    Sukumar Beal denies headache, blurred vision, fever, nausea, vomiting, chills, abdominal pain, bleeding per rectum, cough, SOB, recent loss of consciousness, recent mental status changes, seizures, dizziness, or upper or lower extremity weakness.    RAMÓN  1. 4  2. 5  3. 5  4. 5  5. 5      PATIENT HISTORY:    Past Medical History:   Diagnosis Date    Gout 2014    High cholesterol     Hypertension     Hypothyroid     Low testosterone     Staph aureus infection age 13    R leg       Past Surgical History:   Procedure Laterality Date    FRACTURE SURGERY Left     wrist    Incision and drainage of tibia Right     SINUS SURGERY      x2       Family History   Problem Relation Age of Onset    Heart disease Father 73            Hypertension Sister     Hyperlipidemia Sister     Hypertension Brother     Hyperlipidemia Brother     Hypertension Mother     Cancer Maternal Grandfather     Cancer Maternal Aunt         lung - smoker       Social History     Socioeconomic History    Marital status: Single     Spouse name: Not on file    Number of children: Not on file    Years of education: Not on file    Highest education level: Not on file   Occupational History    Not on file   Social Needs    Financial resource strain: Not on file    Food insecurity:     Worry: Not on file     Inability: Not on file    Transportation needs:     Medical: Not on file     Non-medical: Not on file   Tobacco Use    Smoking status: Former Smoker     Packs/day: 0.25     Years: 15.00      Pack years: 3.75     Last attempt to quit: 1/3/2013     Years since quittin.2    Smokeless tobacco: Never Used   Substance and Sexual Activity    Alcohol use: Yes     Alcohol/week: 3.0 oz     Types: 6 Standard drinks or equivalent per week     Comment: weekends    Drug use: No    Sexual activity: Yes   Lifestyle    Physical activity:     Days per week: Not on file     Minutes per session: Not on file    Stress: Not on file   Relationships    Social connections:     Talks on phone: Not on file     Gets together: Not on file     Attends [employer] service: Not on file     Active member of club or organization: Not on file     Attends meetings of clubs or organizations: Not on file     Relationship status: Not on file    Intimate partner violence:     Fear of current or ex partner: Not on file     Emotionally abused: Not on file     Physically abused: Not on file     Forced sexual activity: Not on file   Other Topics Concern    Not on file   Social History Narrative    Not on file       Allergies:  Codeine and Penicillins    Medications:    Current Outpatient Medications:     atorvastatin (LIPITOR) 20 MG tablet, TAKE 1 TABLET(20 MG) BY MOUTH EVERY DAY, Disp: 90 tablet, Rfl: 0    levothyroxine (SYNTHROID) 112 MCG tablet, Take 1 tablet (112 mcg total) by mouth once daily., Disp: 90 tablet, Rfl: 3    losartan (COZAAR) 25 MG tablet, Take 1 tablet (25 mg total) by mouth once daily., Disp: 90 tablet, Rfl: 3    testosterone (ANDROGEL) 20.25 mg/1.25 gram (1.62 %) GlPm, Apply 2 pumps to shoulders daily, Disp: 1 Bottle, Rfl: 5    PHYSICAL EXAMINATION:    The patient generally appears in good health, is appropriately interactive, and is in no apparent distress.     Eyes: anicteric sclerae, moist conjunctivae; no lid-lag; PERRLA     HENT: Atraumatic; oropharynx clear with moist mucous membranes and no mucosal ulcerations;normal hard and soft palate.  No evidence of lymphadenopathy.    Neck: Trachea midline.   No thyromegaly.    Musculoskeletal: No abnormal gait.    Skin: No lesions.    Mental: Cooperative with normal affect.  Is oriented to time, place, and person.    Neuro: Grossly intact.    Chest: Normal inspiratory effort.   No accessory muscles.  No audible wheezes.  Respirations symmetric on inspiration and expiration.    Heart: Regular rhythm.      Abdomen:  Soft, non-tender. No masses or organomegaly. Bladder is not palpable. No evidence of flank discomfort. No evidence of inguinal hernia.    Genitourinary: The penis is circumcised with no evidence of plaques or induration. The urethral meatus is normal. The testes, epididymides, and cord structures are normal in size and contour bilaterally. The scrotum is normal in size and contour.    Normal anal sphincter tone. No rectal mass.    The prostate is 30 g. Normal landmarks. Lateral sulci. Median furrow intact.  No nodularity or induration. Seminal vesicles are normal.    Extremities: No clubbing, cyanosis, or edema      LABS:      Lab Results   Component Value Date    PSA 0.42 06/11/2018    PSA 0.41 05/10/2017    PSA 0.31 12/02/2015    PSADIAG 0.38 12/04/2018       IMPRESSION:    Encounter Diagnoses   Name Primary?    Male hypogonadism Yes    BPH with urinary obstruction          PLAN:    1. Discussed the risks and benefits of testosterone replacement today.  This included possible cardiac risks.  He would like to try this.  Will check a T in 2 weeks and adjust the dose of TRT if necessary.  He can then RTC 3 months with T, PSA, CBC, hepatic panel, lipid panel.  A new Rx for Androgel 1.62% was given today.   2. Will observe his LUTS as they don't bother him.      Copy to:

## 2019-08-09 ENCOUNTER — PATIENT MESSAGE (OUTPATIENT)
Dept: UROLOGY | Facility: CLINIC | Age: 49
End: 2019-08-09

## 2019-08-19 ENCOUNTER — PATIENT MESSAGE (OUTPATIENT)
Dept: UROLOGY | Facility: CLINIC | Age: 49
End: 2019-08-19

## 2019-08-19 NOTE — TELEPHONE ENCOUNTER
[facility] is reviewing your PA request and will respond within 24 hours for Medicaid or up to 72 hours based on the required timeframe determined by state or federal regulations. To check for an update later, open this request from your dashboard.

## 2019-08-30 RX ORDER — LEVOTHYROXINE SODIUM 112 UG/1
TABLET ORAL
Qty: 90 TABLET | Refills: 1 | Status: SHIPPED | OUTPATIENT
Start: 2019-08-30 | End: 2020-02-27

## 2019-09-17 DIAGNOSIS — E29.1 MALE HYPOGONADISM: ICD-10-CM

## 2019-09-18 RX ORDER — TESTOSTERONE 20.25 MG/1.25G
GEL TOPICAL
Qty: 75 G | Refills: 0 | OUTPATIENT
Start: 2019-09-18

## 2019-09-20 ENCOUNTER — PATIENT MESSAGE (OUTPATIENT)
Dept: UROLOGY | Facility: CLINIC | Age: 49
End: 2019-09-20

## 2019-09-20 DIAGNOSIS — E29.1 MALE HYPOGONADISM: ICD-10-CM

## 2019-09-23 RX ORDER — TESTOSTERONE 20.25 MG/1.25G
GEL TOPICAL
Qty: 75 G | Refills: 0 | OUTPATIENT
Start: 2019-09-23

## 2019-09-25 ENCOUNTER — LAB VISIT (OUTPATIENT)
Dept: LAB | Facility: HOSPITAL | Age: 49
End: 2019-09-25
Attending: UROLOGY
Payer: COMMERCIAL

## 2019-09-25 DIAGNOSIS — E29.1 MALE HYPOGONADISM: ICD-10-CM

## 2019-09-25 LAB
ALBUMIN SERPL BCP-MCNC: 4.2 G/DL (ref 3.5–5.2)
ALP SERPL-CCNC: 75 U/L (ref 55–135)
ALT SERPL W/O P-5'-P-CCNC: 37 U/L (ref 10–44)
AST SERPL-CCNC: 77 U/L (ref 10–40)
BASOPHILS # BLD AUTO: 0.02 K/UL (ref 0–0.2)
BASOPHILS NFR BLD: 0.5 % (ref 0–1.9)
BILIRUB DIRECT SERPL-MCNC: 0.3 MG/DL (ref 0.1–0.3)
BILIRUB SERPL-MCNC: 0.7 MG/DL (ref 0.1–1)
CHOLEST SERPL-MCNC: 164 MG/DL (ref 120–199)
CHOLEST/HDLC SERPL: 2.8 {RATIO} (ref 2–5)
COMPLEXED PSA SERPL-MCNC: 0.47 NG/ML (ref 0–4)
DIFFERENTIAL METHOD: NORMAL
EOSINOPHIL # BLD AUTO: 0.1 K/UL (ref 0–0.5)
EOSINOPHIL NFR BLD: 2.3 % (ref 0–8)
ERYTHROCYTE [DISTWIDTH] IN BLOOD BY AUTOMATED COUNT: 13.3 % (ref 11.5–14.5)
HCT VFR BLD AUTO: 45 % (ref 40–54)
HDLC SERPL-MCNC: 59 MG/DL (ref 40–75)
HDLC SERPL: 36 % (ref 20–50)
HGB BLD-MCNC: 15.2 G/DL (ref 14–18)
LDLC SERPL CALC-MCNC: 88.2 MG/DL (ref 63–159)
LYMPHOCYTES # BLD AUTO: 1.7 K/UL (ref 1–4.8)
LYMPHOCYTES NFR BLD: 38.2 % (ref 18–48)
MCH RBC QN AUTO: 30.7 PG (ref 27–31)
MCHC RBC AUTO-ENTMCNC: 33.8 G/DL (ref 32–36)
MCV RBC AUTO: 91 FL (ref 82–98)
MONOCYTES # BLD AUTO: 0.4 K/UL (ref 0.3–1)
MONOCYTES NFR BLD: 9.7 % (ref 4–15)
NEUTROPHILS # BLD AUTO: 2.2 K/UL (ref 1.8–7.7)
NEUTROPHILS NFR BLD: 49.3 % (ref 38–73)
NONHDLC SERPL-MCNC: 105 MG/DL
PLATELET # BLD AUTO: 177 K/UL (ref 150–350)
PMV BLD AUTO: 11.4 FL (ref 9.2–12.9)
PROT SERPL-MCNC: 6.7 G/DL (ref 6–8.4)
RBC # BLD AUTO: 4.95 M/UL (ref 4.6–6.2)
TESTOST SERPL-MCNC: 369 NG/DL (ref 304–1227)
TRIGL SERPL-MCNC: 84 MG/DL (ref 30–150)
WBC # BLD AUTO: 4.42 K/UL (ref 3.9–12.7)

## 2019-09-25 PROCEDURE — 84403 ASSAY OF TOTAL TESTOSTERONE: CPT

## 2019-09-25 PROCEDURE — 84153 ASSAY OF PSA TOTAL: CPT

## 2019-09-25 PROCEDURE — 85025 COMPLETE CBC W/AUTO DIFF WBC: CPT

## 2019-09-25 PROCEDURE — 80061 LIPID PANEL: CPT

## 2019-09-25 PROCEDURE — 80076 HEPATIC FUNCTION PANEL: CPT

## 2019-09-25 PROCEDURE — 36415 COLL VENOUS BLD VENIPUNCTURE: CPT

## 2019-09-26 ENCOUNTER — OFFICE VISIT (OUTPATIENT)
Dept: UROLOGY | Facility: CLINIC | Age: 49
End: 2019-09-26
Payer: COMMERCIAL

## 2019-09-26 ENCOUNTER — TELEPHONE (OUTPATIENT)
Dept: UROLOGY | Facility: CLINIC | Age: 49
End: 2019-09-26

## 2019-09-26 VITALS
HEIGHT: 74 IN | DIASTOLIC BLOOD PRESSURE: 97 MMHG | HEART RATE: 55 BPM | BODY MASS INDEX: 29.42 KG/M2 | WEIGHT: 229.25 LBS | SYSTOLIC BLOOD PRESSURE: 155 MMHG

## 2019-09-26 DIAGNOSIS — N13.8 BPH WITH URINARY OBSTRUCTION: ICD-10-CM

## 2019-09-26 DIAGNOSIS — R74.8 ELEVATED LIVER ENZYMES: Primary | ICD-10-CM

## 2019-09-26 DIAGNOSIS — E29.1 MALE HYPOGONADISM: Primary | ICD-10-CM

## 2019-09-26 DIAGNOSIS — N40.1 BPH WITH URINARY OBSTRUCTION: ICD-10-CM

## 2019-09-26 PROCEDURE — 3077F PR MOST RECENT SYSTOLIC BLOOD PRESSURE >= 140 MM HG: ICD-10-PCS | Mod: CPTII,S$GLB,, | Performed by: UROLOGY

## 2019-09-26 PROCEDURE — 99999 PR PBB SHADOW E&M-EST. PATIENT-LVL III: ICD-10-PCS | Mod: PBBFAC,,, | Performed by: UROLOGY

## 2019-09-26 PROCEDURE — 3008F PR BODY MASS INDEX (BMI) DOCUMENTED: ICD-10-PCS | Mod: CPTII,S$GLB,, | Performed by: UROLOGY

## 2019-09-26 PROCEDURE — 99214 OFFICE O/P EST MOD 30 MIN: CPT | Mod: S$GLB,,, | Performed by: UROLOGY

## 2019-09-26 PROCEDURE — 3077F SYST BP >= 140 MM HG: CPT | Mod: CPTII,S$GLB,, | Performed by: UROLOGY

## 2019-09-26 PROCEDURE — 3080F PR MOST RECENT DIASTOLIC BLOOD PRESSURE >= 90 MM HG: ICD-10-PCS | Mod: CPTII,S$GLB,, | Performed by: UROLOGY

## 2019-09-26 PROCEDURE — 3008F BODY MASS INDEX DOCD: CPT | Mod: CPTII,S$GLB,, | Performed by: UROLOGY

## 2019-09-26 PROCEDURE — 3080F DIAST BP >= 90 MM HG: CPT | Mod: CPTII,S$GLB,, | Performed by: UROLOGY

## 2019-09-26 PROCEDURE — 99999 PR PBB SHADOW E&M-EST. PATIENT-LVL III: CPT | Mod: PBBFAC,,, | Performed by: UROLOGY

## 2019-09-26 PROCEDURE — 99214 PR OFFICE/OUTPT VISIT, EST, LEVL IV, 30-39 MIN: ICD-10-PCS | Mod: S$GLB,,, | Performed by: UROLOGY

## 2019-09-26 RX ORDER — TESTOSTERONE 20.25 MG/1.25G
GEL TOPICAL
Qty: 75 G | Refills: 5 | Status: SHIPPED | OUTPATIENT
Start: 2019-09-26 | End: 2020-03-05

## 2019-09-26 NOTE — TELEPHONE ENCOUNTER
Pt notified of results and orders for consult. appt scheduled with hepatology. Pt aware and verbalized understanding.

## 2019-09-26 NOTE — PROGRESS NOTES
CHIEF COMPLAINT:    Mr. Beal is a 48 y.o. male presenting  with hypogonadism.    PRESENTING ILLNESS:    Sukumar Beal is a 48 y.o. male who c/o hypogonadism.  His complaints are fatigue, loss of axillary hair.   He's on Androgel 1.62% using 3 pumps.  While on TRT, While on TRT, he has more energy.  He'd like testopel.    He has nocturia x 2 and is pleased with how he voids.  No hematuria.  No dysuria.    He denies ED.    REVIEW OF SYSTEMS:    Sukumar Beal denies headache, blurred vision, fever, nausea, vomiting, chills, abdominal pain, bleeding per rectum, cough, SOB, recent loss of consciousness, recent mental status changes, seizures, dizziness, or upper or lower extremity weakness.    RAMÓN  1. 5  2. 5  3. 5  4. 5  5. 5      PATIENT HISTORY:    Past Medical History:   Diagnosis Date    Gout 2014    High cholesterol     Hypertension     Hypothyroid     Low testosterone     Staph aureus infection age 13    R leg       Past Surgical History:   Procedure Laterality Date    FRACTURE SURGERY Left     wrist    Incision and drainage of tibia Right     SINUS SURGERY      x2       Family History   Problem Relation Age of Onset    Heart disease Father 73            Hypertension Sister     Hyperlipidemia Sister     Hypertension Brother     Hyperlipidemia Brother     Hypertension Mother     Cancer Maternal Grandfather     Cancer Maternal Aunt         lung - smoker       Social History     Socioeconomic History    Marital status: Single     Spouse name: Not on file    Number of children: Not on file    Years of education: Not on file    Highest education level: Not on file   Occupational History    Not on file   Social Needs    Financial resource strain: Not on file    Food insecurity:     Worry: Not on file     Inability: Not on file    Transportation needs:     Medical: Not on file     Non-medical: Not on file   Tobacco Use    Smoking status: Former Smoker     Packs/day:  0.25     Years: 15.00     Pack years: 3.75     Last attempt to quit: 1/3/2013     Years since quittin.7    Smokeless tobacco: Never Used   Substance and Sexual Activity    Alcohol use: Yes     Alcohol/week: 5.0 standard drinks     Types: 6 Standard drinks or equivalent per week     Comment: weekends    Drug use: No    Sexual activity: Yes   Lifestyle    Physical activity:     Days per week: Not on file     Minutes per session: Not on file    Stress: Not on file   Relationships    Social connections:     Talks on phone: Not on file     Gets together: Not on file     Attends [employer] service: Not on file     Active member of club or organization: Not on file     Attends meetings of clubs or organizations: Not on file     Relationship status: Not on file   Other Topics Concern    Not on file   Social History Narrative    Not on file       Allergies:  Codeine and Penicillins    Medications:    Current Outpatient Medications:     atorvastatin (LIPITOR) 20 MG tablet, TAKE 1 TABLET(20 MG) BY MOUTH EVERY DAY, Disp: 90 tablet, Rfl: 0    levothyroxine (SYNTHROID) 112 MCG tablet, TAKE 1 TABLET(112 MCG) BY MOUTH EVERY DAY, Disp: 90 tablet, Rfl: 1    testosterone (ANDROGEL) 20.25 mg/1.25 gram (1.62 %) GlPm, Apply 3 pumps to shoulders daily  Dispense 1 month supply, Disp: 1 Bottle, Rfl: 5    losartan (COZAAR) 25 MG tablet, Take 1 tablet (25 mg total) by mouth once daily., Disp: 90 tablet, Rfl: 3    PHYSICAL EXAMINATION:    The patient generally appears in good health, is appropriately interactive, and is in no apparent distress.     Eyes: anicteric sclerae, moist conjunctivae; no lid-lag; PERRLA     HENT: Atraumatic; oropharynx clear with moist mucous membranes and no mucosal ulcerations;normal hard and soft palate.  No evidence of lymphadenopathy.    Neck: Trachea midline.  No thyromegaly.    Musculoskeletal: No abnormal gait.    Skin: No lesions.    Mental: Cooperative with normal affect.  Is oriented to time,  place, and person.    Neuro: Grossly intact.    Chest: Normal inspiratory effort.   No accessory muscles.  No audible wheezes.  Respirations symmetric on inspiration and expiration.    Heart: Regular rhythm.      Abdomen:  Soft, non-tender. No masses or organomegaly. Bladder is not palpable. No evidence of flank discomfort. No evidence of inguinal hernia.    Genitourinary: The penis is circumcised with no evidence of plaques or induration. The urethral meatus is normal. The testes, epididymides, and cord structures are normal in size and contour bilaterally. The scrotum is normal in size and contour.    Normal anal sphincter tone. No rectal mass.    The prostate is 30 g. Normal landmarks. Lateral sulci. Median furrow intact.  No nodularity or induration. Seminal vesicles are normal.    Extremities: No clubbing, cyanosis, or edema      LABS:      Lab Results   Component Value Date    PSA 0.42 06/11/2018    PSA 0.41 05/10/2017    PSA 0.31 12/02/2015    PSADIAG 0.47 09/25/2019    PSADIAG 0.38 12/04/2018       IMPRESSION:    Encounter Diagnoses   Name Primary?    Male hypogonadism Yes    BPH with urinary obstruction          PLAN:    1. He'd like testopel. New order placed.  2. Risks and benefits of testopel were discussed today.  We discussed infection, pain, bleeding, pellet extrusion.  He was given the chance to ask questions.  3. Will observe his LUTS as they don't bother him.  4. RTC for testopel.      Copy to:

## 2019-09-26 NOTE — PATIENT INSTRUCTIONS
Testosterone Implant  What is this medicine?  TESTOSTERONE (bradley TOS ter one) is the main male hormone. It supports normal male traits such as muscle growth, facial hair, and deep voice. This medicine is used in males to treat low testosterone levels.  This medicine may be used for other purposes; ask your health care provider or pharmacist if you have questions.  What should I tell my health care provider before I take this medicine?  They need to know if you have any of these conditions:  · breast cancer  · diabetes  · heart disease  · kidney disease  · liver disease  · lung disease  · prostate cancer, enlargement  · an unusual or allergic reaction to testosterone, other medicines, foods, dyes, or preservatives  · this drug is not for use in females  How should I use this medicine?  This medicine will be inserted under your skin by your doctor or health care professional.  Contact your pediatrician or health care professional regarding the use of this medicine in children. While this medicine may be prescribed for children for selected conditions, precautions do apply.  Overdosage: If you think you have taken too much of this medicine contact a poison control center or emergency room at once.  NOTE: This medicine is only for you. Do not share this medicine with others.  What if I miss a dose?  Try not to miss a dose. Your doctor or health care professional will tell you when your next dose is due. Notify the office if you are unable to keep an appointment.  What may interact with this medicine?  · medicines for diabetes  · medicines that treat or prevent blood clots like warfarin  · oxyphenbutazone  · propranolol  · steroid medicines like prednisone or cortisone  This list may not describe all possible interactions. Give your health care provider a list of all the medicines, herbs, non-prescription drugs, or dietary supplements you use. Also tell them if you smoke, drink alcohol, or use illegal drugs. Some items  may interact with your medicine.  What should I watch for while using this medicine?  Visit your doctor or health care professional for regular checks on your progress. They will need to check the level of testosterone in your blood.  This medicine may affect blood sugar levels. If you have diabetes, check with your doctor or health care professional before you change your diet or the dose of your diabetic medicine.  Call your doctor or health care professional if you notice a change in the way this medicine is working. It is possible that the pellets may accidentally fall out.  This drug is banned from use in athletes by most athletic organizations.  What side effects may I notice from receiving this medicine?  Side effects that you should report to your doctor or health care professional as soon as possible:  · allergic reactions like skin rash, itching or hives, swelling of the face, lips, or tongue  · Infection  · breast enlargement  · breathing problems  · changes in mood, especially anger, depression, or rage  · dark urine  · general ill feeling or flu-like symptoms  · light-colored stools  · loss of appetite, nausea  · nausea, vomiting  · right upper belly pain  · stomach pain  · swelling of ankles  · too frequent or persistent erections  · trouble passing urine or change in the amount of urine  · unusually weak or tired  · yellowing of eyes or skin  Side effects that usually do not require medical attention (report to your doctor or health care professional if they continue or are bothersome):  · acne  · change in sex drive or performance  · hair loss  · headache  This list may not describe all possible side effects. Call your doctor for medical advice about side effects. You may report side effects to FDA at 5-865-FDA-8713.  Where should I keep my medicine?  This drug will be inserted under your skin by your doctor. It will not be stored at home.  NOTE:This sheet is a summary. It may not cover all possible  information. If you have questions about this medicine, talk to your doctor, pharmacist, or health care provider. Copyright© 2011 [employer]

## 2019-11-22 ENCOUNTER — PATIENT OUTREACH (OUTPATIENT)
Dept: ADMINISTRATIVE | Facility: HOSPITAL | Age: 49
End: 2019-11-22

## 2019-11-22 DIAGNOSIS — Z12.11 COLON CANCER SCREENING: Primary | ICD-10-CM

## 2020-02-27 DIAGNOSIS — E29.1 MALE HYPOGONADISM: ICD-10-CM

## 2020-02-27 RX ORDER — LEVOTHYROXINE SODIUM 112 UG/1
TABLET ORAL
Qty: 90 TABLET | Refills: 1 | Status: SHIPPED | OUTPATIENT
Start: 2020-02-27 | End: 2020-08-17 | Stop reason: SDUPTHER

## 2020-03-02 RX ORDER — TESTOSTERONE 20.25 MG/1.25G
GEL TOPICAL
Qty: 75 G | Refills: 5 | OUTPATIENT
Start: 2020-03-02

## 2020-03-05 ENCOUNTER — OFFICE VISIT (OUTPATIENT)
Dept: UROLOGY | Facility: CLINIC | Age: 50
End: 2020-03-05
Payer: COMMERCIAL

## 2020-03-05 VITALS
WEIGHT: 246.94 LBS | HEIGHT: 74 IN | SYSTOLIC BLOOD PRESSURE: 151 MMHG | BODY MASS INDEX: 31.69 KG/M2 | HEART RATE: 52 BPM | DIASTOLIC BLOOD PRESSURE: 91 MMHG

## 2020-03-05 DIAGNOSIS — N40.1 BPH WITH URINARY OBSTRUCTION: ICD-10-CM

## 2020-03-05 DIAGNOSIS — E29.1 MALE HYPOGONADISM: Primary | ICD-10-CM

## 2020-03-05 DIAGNOSIS — N13.8 BPH WITH URINARY OBSTRUCTION: ICD-10-CM

## 2020-03-05 PROCEDURE — 3077F SYST BP >= 140 MM HG: CPT | Mod: CPTII,S$GLB,, | Performed by: UROLOGY

## 2020-03-05 PROCEDURE — 3080F PR MOST RECENT DIASTOLIC BLOOD PRESSURE >= 90 MM HG: ICD-10-PCS | Mod: CPTII,S$GLB,, | Performed by: UROLOGY

## 2020-03-05 PROCEDURE — 3008F BODY MASS INDEX DOCD: CPT | Mod: CPTII,S$GLB,, | Performed by: UROLOGY

## 2020-03-05 PROCEDURE — 99999 PR PBB SHADOW E&M-EST. PATIENT-LVL III: ICD-10-PCS | Mod: PBBFAC,,, | Performed by: UROLOGY

## 2020-03-05 PROCEDURE — 99213 OFFICE O/P EST LOW 20 MIN: CPT | Mod: S$GLB,,, | Performed by: UROLOGY

## 2020-03-05 PROCEDURE — 3008F PR BODY MASS INDEX (BMI) DOCUMENTED: ICD-10-PCS | Mod: CPTII,S$GLB,, | Performed by: UROLOGY

## 2020-03-05 PROCEDURE — 99999 PR PBB SHADOW E&M-EST. PATIENT-LVL III: CPT | Mod: PBBFAC,,, | Performed by: UROLOGY

## 2020-03-05 PROCEDURE — 99213 PR OFFICE/OUTPT VISIT, EST, LEVL III, 20-29 MIN: ICD-10-PCS | Mod: S$GLB,,, | Performed by: UROLOGY

## 2020-03-05 PROCEDURE — 3077F PR MOST RECENT SYSTOLIC BLOOD PRESSURE >= 140 MM HG: ICD-10-PCS | Mod: CPTII,S$GLB,, | Performed by: UROLOGY

## 2020-03-05 PROCEDURE — 3080F DIAST BP >= 90 MM HG: CPT | Mod: CPTII,S$GLB,, | Performed by: UROLOGY

## 2020-03-05 RX ORDER — TESTOSTERONE 20.25 MG/1.25G
GEL TOPICAL
Qty: 150 G | Refills: 5 | Status: SHIPPED | OUTPATIENT
Start: 2020-03-05 | End: 2020-09-02 | Stop reason: SDUPTHER

## 2020-03-05 NOTE — PROGRESS NOTES
CHIEF COMPLAINT:    Mr. Beal is a 49 y.o. male presenting  with hypogonadism.    PRESENTING ILLNESS:    Sukumar Beal is a 49 y.o. male who c/o hypogonadism.  His complaints are fatigue, loss of axillary hair.   He's on Androgel 1.62% using 3 pumps.  While on TRT, While on TRT, he has more energy.  He wanted testopel, but he has [employer].    He has nocturia x 2 and is pleased with how he voids.  No hematuria.  No dysuria.    He denies ED.    REVIEW OF SYSTEMS:    Sukumar Beal denies headache, blurred vision, fever, nausea, vomiting, chills, abdominal pain, bleeding per rectum, cough, SOB, recent loss of consciousness, recent mental status changes, seizures, dizziness, or upper or lower extremity weakness.    RAMÓN  1. 4  2. 5  3. 5  4. 5  5. 5      PATIENT HISTORY:    Past Medical History:   Diagnosis Date    Gout 2014    High cholesterol     Hypertension     Hypothyroid     Low testosterone     Staph aureus infection age 13    R leg       Past Surgical History:   Procedure Laterality Date    FRACTURE SURGERY Left     wrist    Incision and drainage of tibia Right     SINUS SURGERY      x2       Family History   Problem Relation Age of Onset    Heart disease Father 73            Hypertension Sister     Hyperlipidemia Sister     Hypertension Brother     Hyperlipidemia Brother     Hypertension Mother     Cancer Maternal Grandfather     Cancer Maternal Aunt         lung - smoker       Social History     Socioeconomic History    Marital status: Single     Spouse name: Not on file    Number of children: Not on file    Years of education: Not on file    Highest education level: Not on file   Occupational History    Not on file   Social Needs    Financial resource strain: Not on file    Food insecurity:     Worry: Not on file     Inability: Not on file    Transportation needs:     Medical: Not on file     Non-medical: Not on file   Tobacco Use    Smoking status: Former  Smoker     Packs/day: 0.25     Years: 15.00     Pack years: 3.75     Last attempt to quit: 1/3/2013     Years since quittin.1    Smokeless tobacco: Never Used   Substance and Sexual Activity    Alcohol use: Yes     Alcohol/week: 5.0 standard drinks     Types: 6 Standard drinks or equivalent per week     Comment: weekends    Drug use: No    Sexual activity: Yes   Lifestyle    Physical activity:     Days per week: Not on file     Minutes per session: Not on file    Stress: Not on file   Relationships    Social connections:     Talks on phone: Not on file     Gets together: Not on file     Attends Anabaptism service: Not on file     Active member of club or organization: Not on file     Attends meetings of clubs or organizations: Not on file     Relationship status: Not on file   Other Topics Concern    Not on file   Social History Narrative    Not on file       Allergies:  Codeine and Penicillins    Medications:    Current Outpatient Medications:     atorvastatin (LIPITOR) 20 MG tablet, TAKE 1 TABLET(20 MG) BY MOUTH EVERY DAY, Disp: 90 tablet, Rfl: 0    levothyroxine (SYNTHROID) 112 MCG tablet, TAKE 1 TABLET(112 MCG) BY MOUTH EVERY DAY, Disp: 90 tablet, Rfl: 1    testosterone (ANDROGEL) 20.25 mg/1.25 gram (1.62 %) GlPm, Apply 3 pumps to shoulders daily, Disp: 75 g, Rfl: 5    losartan (COZAAR) 25 MG tablet, Take 1 tablet (25 mg total) by mouth once daily., Disp: 90 tablet, Rfl: 3    Current Facility-Administered Medications:     testosterone Pllt 12 Pellet, 12 Pellet, Implant, 1 time in Clinic/HOD, Alonso Carey MD    PHYSICAL EXAMINATION:    The patient generally appears in good health, is appropriately interactive, and is in no apparent distress.     Eyes: anicteric sclerae, moist conjunctivae; no lid-lag; PERRLA     HENT: Atraumatic; oropharynx clear with moist mucous membranes and no mucosal ulcerations;normal hard and soft palate.  No evidence of lymphadenopathy.    Neck: Trachea midline.  No  thyromegaly.    Musculoskeletal: No abnormal gait.    Skin: No lesions.    Mental: Cooperative with normal affect.  Is oriented to time, place, and person.    Neuro: Grossly intact.    Chest: Normal inspiratory effort.   No accessory muscles.  No audible wheezes.  Respirations symmetric on inspiration and expiration.    Heart: Regular rhythm.      Abdomen:  Soft, non-tender. No masses or organomegaly. Bladder is not palpable. No evidence of flank discomfort. No evidence of inguinal hernia.    Genitourinary: The penis is circumcised with no evidence of plaques or induration. The urethral meatus is normal. The testes, epididymides, and cord structures are normal in size and contour bilaterally. The scrotum is normal in size and contour.    Normal anal sphincter tone. No rectal mass.    The prostate is 30 g. Normal landmarks. Lateral sulci. Median furrow intact.  No nodularity or induration. Seminal vesicles are normal.    Extremities: No clubbing, cyanosis, or edema      LABS:      Lab Results   Component Value Date    PSA 0.42 06/11/2018    PSA 0.41 05/10/2017    PSA 0.31 12/02/2015    PSADIAG 0.47 09/25/2019    PSADIAG 0.38 12/04/2018       IMPRESSION:    Encounter Diagnoses   Name Primary?    Male hypogonadism Yes    BPH with urinary obstruction          PLAN:    1. Will observe his LUTS as they don't bother him.  2. Will go back to androgel 1.62%. Side effects discussed.  A new Rx was given   3. RTC 6 months with T, PSA, CBC, hepatic panel, lipid panel.      Copy to:

## 2020-05-07 ENCOUNTER — OFFICE VISIT (OUTPATIENT)
Dept: INTERNAL MEDICINE | Facility: CLINIC | Age: 50
End: 2020-05-07
Payer: COMMERCIAL

## 2020-05-07 VITALS
BODY MASS INDEX: 31.4 KG/M2 | SYSTOLIC BLOOD PRESSURE: 122 MMHG | OXYGEN SATURATION: 99 % | HEART RATE: 61 BPM | DIASTOLIC BLOOD PRESSURE: 84 MMHG | WEIGHT: 244.69 LBS | HEIGHT: 74 IN

## 2020-05-07 DIAGNOSIS — R20.2 PARESTHESIA: ICD-10-CM

## 2020-05-07 DIAGNOSIS — M1A.00X0 IDIOPATHIC CHRONIC GOUT WITHOUT TOPHUS, UNSPECIFIED SITE: ICD-10-CM

## 2020-05-07 DIAGNOSIS — Z00.00 WELLNESS EXAMINATION: ICD-10-CM

## 2020-05-07 DIAGNOSIS — E03.9 ACQUIRED HYPOTHYROIDISM: ICD-10-CM

## 2020-05-07 DIAGNOSIS — E78.00 HIGH CHOLESTEROL: Primary | ICD-10-CM

## 2020-05-07 LAB
ALBUMIN/CREAT UR: 7.7 UG/MG (ref 0–30)
BILIRUB UR QL STRIP: NEGATIVE
CLARITY UR REFRACT.AUTO: CLEAR
COLOR UR AUTO: YELLOW
CREAT UR-MCNC: 39 MG/DL (ref 23–375)
GLUCOSE UR QL STRIP: NEGATIVE
HGB UR QL STRIP: NEGATIVE
KETONES UR QL STRIP: NEGATIVE
LEUKOCYTE ESTERASE UR QL STRIP: NEGATIVE
MICROALBUMIN UR DL<=1MG/L-MCNC: 3 UG/ML
NITRITE UR QL STRIP: NEGATIVE
PH UR STRIP: 6 [PH] (ref 5–8)
PROT UR QL STRIP: NEGATIVE
SP GR UR STRIP: 1 (ref 1–1.03)
URN SPEC COLLECT METH UR: NORMAL

## 2020-05-07 PROCEDURE — 99396 PREV VISIT EST AGE 40-64: CPT | Mod: S$GLB,,, | Performed by: INTERNAL MEDICINE

## 2020-05-07 PROCEDURE — 3074F SYST BP LT 130 MM HG: CPT | Mod: CPTII,S$GLB,, | Performed by: INTERNAL MEDICINE

## 2020-05-07 PROCEDURE — 99396 PR PREVENTIVE VISIT,EST,40-64: ICD-10-PCS | Mod: S$GLB,,, | Performed by: INTERNAL MEDICINE

## 2020-05-07 PROCEDURE — 3079F PR MOST RECENT DIASTOLIC BLOOD PRESSURE 80-89 MM HG: ICD-10-PCS | Mod: CPTII,S$GLB,, | Performed by: INTERNAL MEDICINE

## 2020-05-07 PROCEDURE — 82043 UR ALBUMIN QUANTITATIVE: CPT

## 2020-05-07 PROCEDURE — 99999 PR PBB SHADOW E&M-EST. PATIENT-LVL IV: CPT | Mod: PBBFAC,,, | Performed by: INTERNAL MEDICINE

## 2020-05-07 PROCEDURE — 3074F PR MOST RECENT SYSTOLIC BLOOD PRESSURE < 130 MM HG: ICD-10-PCS | Mod: CPTII,S$GLB,, | Performed by: INTERNAL MEDICINE

## 2020-05-07 PROCEDURE — 99999 PR PBB SHADOW E&M-EST. PATIENT-LVL IV: ICD-10-PCS | Mod: PBBFAC,,, | Performed by: INTERNAL MEDICINE

## 2020-05-07 PROCEDURE — 3079F DIAST BP 80-89 MM HG: CPT | Mod: CPTII,S$GLB,, | Performed by: INTERNAL MEDICINE

## 2020-05-07 PROCEDURE — 81003 URINALYSIS AUTO W/O SCOPE: CPT

## 2020-05-11 ENCOUNTER — PATIENT OUTREACH (OUTPATIENT)
Dept: ADMINISTRATIVE | Facility: OTHER | Age: 50
End: 2020-05-11

## 2020-05-12 ENCOUNTER — OFFICE VISIT (OUTPATIENT)
Dept: DERMATOLOGY | Facility: CLINIC | Age: 50
End: 2020-05-12
Payer: COMMERCIAL

## 2020-05-12 ENCOUNTER — PROCEDURE VISIT (OUTPATIENT)
Dept: DERMATOLOGY | Facility: CLINIC | Age: 50
End: 2020-05-12
Payer: COMMERCIAL

## 2020-05-12 DIAGNOSIS — Z00.00 WELLNESS EXAMINATION: ICD-10-CM

## 2020-05-12 DIAGNOSIS — L82.1 SEBORRHEIC KERATOSIS: ICD-10-CM

## 2020-05-12 DIAGNOSIS — Z41.1 ELECTIVE PROCEDURE FOR UNACCEPTABLE COSMETIC APPEARANCE: Primary | ICD-10-CM

## 2020-05-12 DIAGNOSIS — L72.0 EIC (EPIDERMAL INCLUSION CYST): ICD-10-CM

## 2020-05-12 DIAGNOSIS — L57.0 ACTINIC KERATOSIS: Primary | ICD-10-CM

## 2020-05-12 PROCEDURE — 17000 DESTRUCT PREMALG LESION: CPT | Mod: S$GLB,,, | Performed by: DERMATOLOGY

## 2020-05-12 PROCEDURE — 99999 PR PBB SHADOW E&M-EST. PATIENT-LVL II: ICD-10-PCS | Mod: PBBFAC,,, | Performed by: DERMATOLOGY

## 2020-05-12 PROCEDURE — 99999 PR PBB SHADOW E&M-EST. PATIENT-LVL II: CPT | Mod: PBBFAC,,, | Performed by: DERMATOLOGY

## 2020-05-12 PROCEDURE — 99202 PR OFFICE/OUTPT VISIT, NEW, LEVL II, 15-29 MIN: ICD-10-PCS | Mod: 25,S$GLB,, | Performed by: DERMATOLOGY

## 2020-05-12 PROCEDURE — 17000 PR DESTRUCTION(LASER SURGERY,CRYOSURGERY,CHEMOSURGERY),PREMALIGNANT LESIONS,FIRST LESION: ICD-10-PCS | Mod: S$GLB,,, | Performed by: DERMATOLOGY

## 2020-05-12 PROCEDURE — 17110 PR DESTRUCTION BENIGN LESIONS UP TO 14: ICD-10-PCS | Mod: CSM,S$GLB,, | Performed by: DERMATOLOGY

## 2020-05-12 PROCEDURE — 17110 DESTRUCTION B9 LES UP TO 14: CPT | Mod: CSM,S$GLB,, | Performed by: DERMATOLOGY

## 2020-05-12 PROCEDURE — 99202 OFFICE O/P NEW SF 15 MIN: CPT | Mod: 25,S$GLB,, | Performed by: DERMATOLOGY

## 2020-05-12 PROCEDURE — 99499 NO LOS: ICD-10-PCS | Mod: CSM,S$GLB,, | Performed by: DERMATOLOGY

## 2020-05-12 PROCEDURE — 99499 UNLISTED E&M SERVICE: CPT | Mod: CSM,S$GLB,, | Performed by: DERMATOLOGY

## 2020-05-12 NOTE — PROGRESS NOTES
DESTRUCTION VIA HYFRECATION/CURETTAGE AND SCISSOR REMOVAL PROCEDURE NOTE    DIAGNOSIS:  seborrheic keratoses and acrochordons    LOCATION: chest and neck    Discussed with the patient that this procedure is not covered by insurance because treatment of these benign lesions is considered cosmetic. The patient would like to pursue treatment. He understands that he is responsible for the bill and agrees to pay.     Benefits, risks (scarring and hypo- or hyperpigmentation), and alternatives of treatment, including no treatment, are discussed with the patient who verbally agrees to the procedure. 11 acrochordons are identified for removal. The areas are cleansed with alcohol. Lidocaine 1% with epinephrine is injected beneath any larger acrochordons. Scissor removal is then performed using sterile, gradle surgical scissors. Given the characteristic appearance, the specimen(s) is(are) not sent for pathology. Hemostasis is obtained using aluminum chloride and/or monopolar hyfrecation if needed. Any larger excision sites are dressed and bandaged.     3 seborrheic keratoses were cleaned with alcohol and anesthetized with 1% lidocaine with epinephrine. Lesions were then lightly hyfrecated and curetted to remove gross lesion. Hemostasis achieved with aluminum chloride application.     The patient tolerated the procedure well without adverse event and with negligible blood loss. The patient is advised to keep the excision sites covered to minimize tenderness and to promote healing. Instructions on wound care are given to the patient, who is to call for any bleeding or signs of infection, such as redness or purulent discharge.    Follow up as needed.

## 2020-05-12 NOTE — PROGRESS NOTES
Subjective:       Patient ID:  Sukumar Beal is a 49 y.o. male who presents for   Chief Complaint   Patient presents with    Spot     face and neck     Spot  - Initial  Affected locations: nose and neck  Duration: several years.  Signs / symptoms: pain (one on neck was hurting after using a chemical peel on it)  Severity: mild  Timing: constant  Exacerbated by: chemical peel.  Relieving factors/Treatments tried: nothing    He also complains of a bump on the back of his neck. No change in size, shape, or color. Patient denies any itching, bleeding, pain, or rapid growth.    Review of Systems   Constitutional: Negative for fever.   Respiratory: Negative for cough and shortness of breath.    Skin: Negative for itching and rash.        Objective:    Physical Exam   Constitutional: He appears well-developed and well-nourished. No distress.   HENT:   Mouth/Throat: Lips normal.    Eyes: Lids are normal.  No conjunctival no injection.   Neurological: He is alert and oriented to person, place, and time. He is not disoriented.   Psychiatric: He has a normal mood and affect.   Skin:   Areas Examined (abnormalities noted in diagram):   Head / Face Inspection Performed  Neck Inspection Performed  Chest / Axilla Inspection Performed  Back Inspection Performed                   Diagram Legend     Erythematous scaling macule/papule c/w actinic keratosis       Vascular papule c/w angioma      Pigmented verrucoid papule/plaque c/w seborrheic keratosis      Yellow umbilicated papule c/w sebaceous hyperplasia      Irregularly shaped tan macule c/w lentigo     1-2 mm smooth white papules consistent with Milia      Movable subcutaneous cyst with punctum c/w epidermal inclusion cyst      Subcutaneous movable cyst c/w pilar cyst      Firm pink to brown papule c/w dermatofibroma      Pedunculated fleshy papule(s) c/w skin tag(s)      Evenly pigmented macule c/w junctional nevus     Mildly variegated pigmented, slightly  irregular-bordered macule c/w mildly atypical nevus      Flesh colored to evenly pigmented papule c/w intradermal nevus       Pink pearly papule/plaque c/w basal cell carcinoma      Erythematous hyperkeratotic cursted plaque c/w SCC      Surgical scar with no sign of skin cancer recurrence      Open and closed comedones      Inflammatory papules and pustules      Verrucoid papule consistent consistent with wart     Erythematous eczematous patches and plaques     Dystrophic onycholytic nail with subungual debris c/w onychomycosis     Umbilicated papule    Erythematous-base heme-crusted tan verrucoid plaque consistent with inflamed seborrheic keratosis     Erythematous Silvery Scaling Plaque c/w Psoriasis     See annotation      Assessment / Plan:        Actinic keratosis  Cryosurgery procedure note:  Risk, benefits, and alternatives of cryosurgery are discussed with the patient, including but not limited to the risks of hypopigmentation, hyperpigmentation, scar, infection, recurrence of lesion(s), development of new lesion(s), and need for additional treatment of the lesion(s). Verbal consent obtained from patient. Liquid nitrogen cryosurgery applied to 1 lesion(s) to produce a freeze injury. Counseled patient that blisters may form, and instructed patient on wound care with gentle cleansing and use of Vaseline ointment to keep moist until healed. Handout was provided, and patient was instructed to return to clinic in 1-2 months if lesions do not completely resolve.    Seborrheic keratosis  These are benign, inherited growths without a malignant potential. Reassurance given to patient. No treatment is necessary. Handout was provided.  Discussed with the patient that treatment of these benign lesions is not covered by insurance as it is considered cosmetic. Warned about risk of scarring and hypo- or hyperpigmentation with treatment, as well as risk of recurrence and/or development of more lesions.    EIC (epidermal  inclusion cyst)  This is a benign cyst of the hair follicle. Reassurance provided. No treatment is necessary unless it is symptomatic.   Discussed risks, benefits, and alternatives of excision, including scarring, bleeding, infection, and recurrence.     Follow up if symptoms worsen or fail to improve.

## 2020-05-12 NOTE — PROGRESS NOTES
Chart reviewed.   Immunizations: updated  Orders placed: n/a  Upcoming appts to satisfy TAMICA topics: n/a  Open case request for Colonoscopy, cancelled Fit kit orders.

## 2020-05-12 NOTE — LETTER
May 12, 2020      Katherine Turner MD  [address]  [facility] [address]           [facility] Dermatology  [address]  [facility] [address]  Phone: [phone]  Fax: [phone]          Patient: Sukumar Beal   MR Number: [id number]   YOB: 1970   Date of Visit: 5/12/2020       Dear Dr. Katherine Turner:    Thank you for referring Sukumar Beal to me for evaluation. Attached you will find relevant portions of my assessment and plan of care.    If you have questions, please do not hesitate to call me. I look forward to following Sukumar Beal along with you.    Sincerely,    Kimberlee Shine MD    Enclosure  CC:  No Recipients    If you would like to receive this communication electronically, please contact [email or web address] or [phone] to request more information on [employer] Link access.    For providers and/or their staff who would like to refer a patient to [facility], please contact us through our one-stop-shop provider referral line, [facility], at [phone].    If you feel you have received this communication in error or would no longer like to receive these types of communications, please e-mail [email or web address]

## 2020-05-13 ENCOUNTER — LAB VISIT (OUTPATIENT)
Dept: LAB | Facility: HOSPITAL | Age: 50
End: 2020-05-13
Attending: INTERNAL MEDICINE
Payer: COMMERCIAL

## 2020-05-13 DIAGNOSIS — Z00.00 WELLNESS EXAMINATION: ICD-10-CM

## 2020-05-13 LAB
25(OH)D3+25(OH)D2 SERPL-MCNC: 50 NG/ML (ref 30–96)
ALBUMIN SERPL BCP-MCNC: 4.2 G/DL (ref 3.5–5.2)
ALP SERPL-CCNC: 82 U/L (ref 55–135)
ALT SERPL W/O P-5'-P-CCNC: 27 U/L (ref 10–44)
ANION GAP SERPL CALC-SCNC: 7 MMOL/L (ref 8–16)
AST SERPL-CCNC: 33 U/L (ref 10–40)
BASOPHILS # BLD AUTO: 0.03 K/UL (ref 0–0.2)
BASOPHILS NFR BLD: 0.8 % (ref 0–1.9)
BILIRUB SERPL-MCNC: 0.4 MG/DL (ref 0.1–1)
BUN SERPL-MCNC: 24 MG/DL (ref 6–20)
CALCIUM SERPL-MCNC: 9.1 MG/DL (ref 8.7–10.5)
CHLORIDE SERPL-SCNC: 106 MMOL/L (ref 95–110)
CHOLEST SERPL-MCNC: 252 MG/DL (ref 120–199)
CHOLEST/HDLC SERPL: 5.1 {RATIO} (ref 2–5)
CO2 SERPL-SCNC: 26 MMOL/L (ref 23–29)
CREAT SERPL-MCNC: 1.1 MG/DL (ref 0.5–1.4)
CRP SERPL-MCNC: 0.6 MG/L (ref 0–8.2)
DIFFERENTIAL METHOD: ABNORMAL
EOSINOPHIL # BLD AUTO: 0.1 K/UL (ref 0–0.5)
EOSINOPHIL NFR BLD: 2.8 % (ref 0–8)
ERYTHROCYTE [DISTWIDTH] IN BLOOD BY AUTOMATED COUNT: 12.7 % (ref 11.5–14.5)
ERYTHROCYTE [SEDIMENTATION RATE] IN BLOOD BY WESTERGREN METHOD: <2 MM/HR (ref 0–23)
EST. GFR  (AFRICAN AMERICAN): >60 ML/MIN/1.73 M^2
EST. GFR  (NON AFRICAN AMERICAN): >60 ML/MIN/1.73 M^2
ESTIMATED AVG GLUCOSE: 100 MG/DL (ref 68–131)
GLUCOSE SERPL-MCNC: 106 MG/DL (ref 70–110)
HBA1C MFR BLD HPLC: 5.1 % (ref 4–5.6)
HCT VFR BLD AUTO: 48.2 % (ref 40–54)
HDLC SERPL-MCNC: 49 MG/DL (ref 40–75)
HDLC SERPL: 19.4 % (ref 20–50)
HGB BLD-MCNC: 15.4 G/DL (ref 14–18)
IMM GRANULOCYTES # BLD AUTO: 0.01 K/UL (ref 0–0.04)
IMM GRANULOCYTES NFR BLD AUTO: 0.3 % (ref 0–0.5)
LDLC SERPL CALC-MCNC: 180.2 MG/DL (ref 63–159)
LYMPHOCYTES # BLD AUTO: 1.3 K/UL (ref 1–4.8)
LYMPHOCYTES NFR BLD: 36.2 % (ref 18–48)
MCH RBC QN AUTO: 30.6 PG (ref 27–31)
MCHC RBC AUTO-ENTMCNC: 32 G/DL (ref 32–36)
MCV RBC AUTO: 96 FL (ref 82–98)
MONOCYTES # BLD AUTO: 0.4 K/UL (ref 0.3–1)
MONOCYTES NFR BLD: 10.3 % (ref 4–15)
NEUTROPHILS # BLD AUTO: 1.8 K/UL (ref 1.8–7.7)
NEUTROPHILS NFR BLD: 49.6 % (ref 38–73)
NONHDLC SERPL-MCNC: 203 MG/DL
NRBC BLD-RTO: 0 /100 WBC
PLATELET # BLD AUTO: 173 K/UL (ref 150–350)
PMV BLD AUTO: 11.6 FL (ref 9.2–12.9)
POTASSIUM SERPL-SCNC: 4.1 MMOL/L (ref 3.5–5.1)
PROT SERPL-MCNC: 7 G/DL (ref 6–8.4)
RBC # BLD AUTO: 5.03 M/UL (ref 4.6–6.2)
SODIUM SERPL-SCNC: 139 MMOL/L (ref 136–145)
TRIGL SERPL-MCNC: 114 MG/DL (ref 30–150)
TSH SERPL DL<=0.005 MIU/L-ACNC: 2.01 UIU/ML (ref 0.4–4)
URATE SERPL-MCNC: 7.5 MG/DL (ref 3.4–7)
VIT B12 SERPL-MCNC: 786 PG/ML (ref 210–950)
WBC # BLD AUTO: 3.59 K/UL (ref 3.9–12.7)

## 2020-05-13 PROCEDURE — 82607 VITAMIN B-12: CPT

## 2020-05-13 PROCEDURE — 84443 ASSAY THYROID STIM HORMONE: CPT

## 2020-05-13 PROCEDURE — 85652 RBC SED RATE AUTOMATED: CPT

## 2020-05-13 PROCEDURE — 86703 HIV-1/HIV-2 1 RESULT ANTBDY: CPT

## 2020-05-13 PROCEDURE — 86140 C-REACTIVE PROTEIN: CPT

## 2020-05-13 PROCEDURE — 80074 ACUTE HEPATITIS PANEL: CPT

## 2020-05-13 PROCEDURE — 85025 COMPLETE CBC W/AUTO DIFF WBC: CPT

## 2020-05-13 PROCEDURE — 83921 ORGANIC ACID SINGLE QUANT: CPT

## 2020-05-13 PROCEDURE — 84550 ASSAY OF BLOOD/URIC ACID: CPT

## 2020-05-13 PROCEDURE — 82306 VITAMIN D 25 HYDROXY: CPT

## 2020-05-13 PROCEDURE — 82300 ASSAY OF CADMIUM: CPT

## 2020-05-13 PROCEDURE — 80053 COMPREHEN METABOLIC PANEL: CPT

## 2020-05-13 PROCEDURE — 80061 LIPID PANEL: CPT

## 2020-05-13 PROCEDURE — 83036 HEMOGLOBIN GLYCOSYLATED A1C: CPT

## 2020-05-13 PROCEDURE — 86592 SYPHILIS TEST NON-TREP QUAL: CPT

## 2020-05-13 PROCEDURE — 36415 COLL VENOUS BLD VENIPUNCTURE: CPT

## 2020-05-14 LAB
ARSENIC BLD-MCNC: 5 NG/ML (ref 0–12)
CADMIUM BLD-MCNC: 0.3 NG/ML (ref 0–4.9)
CITY: NORMAL
COUNTY: NORMAL
GUARDIAN FIRST NAME: NORMAL
GUARDIAN LAST NAME: NORMAL
HAV IGM SERPL QL IA: NEGATIVE
HBV CORE IGM SERPL QL IA: NEGATIVE
HBV SURFACE AG SERPL QL IA: NEGATIVE
HCV AB SERPL QL IA: NEGATIVE
HIV 1+2 AB+HIV1 P24 AG SERPL QL IA: NEGATIVE
HOME PHONE: NORMAL
LEAD BLD-MCNC: <1 MCG/DL (ref 0–4.9)
MERCURY BLD-MCNC: 5 NG/ML (ref 0–9)
RACE: NORMAL
RPR SER QL: NORMAL
STATE: NORMAL
STREET ADDRESS: NORMAL
VENOUS/CAPILLARY: NORMAL
ZIP: NORMAL

## 2020-05-18 NOTE — PROGRESS NOTES
Subjective:       Patient ID: Sukumar Beal is a 49 y.o. male.    Chief Complaint: Annual Exam    HPIPt is feeling well - exercising - does boot camp- no CP or SOB.  Review of Systems   Constitutional: Negative for activity change and unexpected weight change.   HENT: Negative for hearing loss, rhinorrhea and trouble swallowing.    Eyes: Negative for discharge and visual disturbance.   Respiratory: Negative for chest tightness, shortness of breath and wheezing.    Cardiovascular: Negative for chest pain and palpitations.   Gastrointestinal: Negative for abdominal pain, blood in stool, constipation, diarrhea, nausea and vomiting.   Endocrine: Negative for polydipsia and polyuria.   Genitourinary: Negative for difficulty urinating, dysuria, hematuria and urgency.   Musculoskeletal: Negative for arthralgias, joint swelling and neck pain.   Neurological: Negative for seizures, syncope, weakness and headaches.   Psychiatric/Behavioral: Negative for confusion and dysphoric mood.       Objective:      Physical Exam   Constitutional: He is oriented to person, place, and time. He appears well-developed and well-nourished. No distress.   HENT:   Mouth/Throat: Oropharynx is clear and moist.   Neck: Neck supple. No JVD present. No thyromegaly present.   Cardiovascular: Normal rate, regular rhythm, normal heart sounds and intact distal pulses. Exam reveals no gallop and no friction rub.   No murmur heard.  Pulmonary/Chest: Effort normal and breath sounds normal. He has no wheezes. He has no rales.   Abdominal: Soft. Bowel sounds are normal. He exhibits no distension and no mass. There is no tenderness. There is no rebound and no guarding.   Musculoskeletal: He exhibits no edema.   Lymphadenopathy:     He has no cervical adenopathy.   Neurological: He is alert and oriented to person, place, and time.   Skin: Skin is warm and dry.   Psychiatric: He has a normal mood and affect. His behavior is normal. Judgment and thought  content normal.       Assessment:       1. High cholesterol    2. Acquired hypothyroidism    3. Idiopathic chronic gout without tophus, unspecified site    4. Paresthesia    5. Wellness examination        Plan:   High cholesterol  Check lab  Acquired hypothyroidism  Check lab  Idiopathic chronic gout without tophus, unspecified site    Paresthesia of feet  EMG  Wellness examination  -     Ambulatory referral/consult to Dermatology; Future; Expected date: 05/14/2020  -     CBC auto differential; Future; Expected date: 05/07/2020  -     Comprehensive metabolic panel; Future; Expected date: 05/07/2020  -     Lipid Panel; Future; Expected date: 05/07/2020  -     TSH; Future; Expected date: 05/07/2020  -     Hemoglobin A1C; Future; Expected date: 05/07/2020  -     Vitamin D; Future; Expected date: 05/07/2020  -     Hepatitis Panel, Acute; Future; Expected date: 05/07/2020  -     Vitamin B12; Future; Expected date: 06/07/2020  -     Methylmalonic Acid, Serum; Future; Expected date: 05/07/2020  -     Heavy Metals Screen, Blood (Quantitative); Future; Expected date: 05/07/2020  -     EMG W/ ULTRASOUND AND NERVE CONDUCTION TEST 2 Extremities; Future  -     HIV 1/2 Ag/Ab (4th Gen); Future; Expected date: 05/07/2020  -     RPR; Future; Expected date: 05/07/2020  -     Case request GI: COLONOSCOPY  -     Uric acid; Future; Expected date: 05/07/2020  -     Sedimentation rate; Future; Expected date: 05/07/2020  -     C-Reactive Protein; Future; Expected date: 05/07/2020  -     Urinalysis  -     Microalbumin/creatinine urine ratio    Pt not interested in PReP thereapy at this time - practices safe sex.

## 2020-05-19 LAB — METHYLMALONATE SERPL-SCNC: 0.19 UMOL/L

## 2020-06-26 ENCOUNTER — PROCEDURE VISIT (OUTPATIENT)
Dept: NEUROLOGY | Facility: CLINIC | Age: 50
End: 2020-06-26
Payer: COMMERCIAL

## 2020-06-26 DIAGNOSIS — Z00.00 WELLNESS EXAMINATION: ICD-10-CM

## 2020-06-26 PROCEDURE — 95912 NRV CNDJ TEST 11-12 STUDIES: CPT | Mod: S$GLB,,, | Performed by: PSYCHIATRY & NEUROLOGY

## 2020-06-26 PROCEDURE — 95912 PR NERVE CONDUCTION STUDY; 11 -12 STUDIES: ICD-10-PCS | Mod: S$GLB,,, | Performed by: PSYCHIATRY & NEUROLOGY

## 2020-08-04 DIAGNOSIS — E29.1 MALE HYPOGONADISM: ICD-10-CM

## 2020-08-04 RX ORDER — TESTOSTERONE 20.25 MG/1.25G
GEL TOPICAL
Qty: 150 G | Refills: 5 | OUTPATIENT
Start: 2020-08-04

## 2020-08-04 RX ORDER — TESTOSTERONE 20.25 MG/1.25G
GEL TOPICAL
Qty: 150 G | Refills: 5 | Status: CANCELLED | OUTPATIENT
Start: 2020-08-04

## 2020-08-11 ENCOUNTER — TELEPHONE (OUTPATIENT)
Dept: UROLOGY | Facility: CLINIC | Age: 50
End: 2020-08-11

## 2020-08-11 NOTE — TELEPHONE ENCOUNTER
ANTHONY AMAYA ([place]: [id number])     [employer] is reviewing your PA request and will respond within 24 hours for Medicaid or up to 72 hours for non-Medicaid plans, based on the required timeframe determined by state or federal regulations. To check for an update later, open this request from your dashboard.      Prior auth done through [employer] and is pending approval by insurance.

## 2020-08-26 ENCOUNTER — LAB VISIT (OUTPATIENT)
Dept: LAB | Facility: HOSPITAL | Age: 50
End: 2020-08-26
Attending: UROLOGY
Payer: COMMERCIAL

## 2020-08-26 DIAGNOSIS — E29.1 MALE HYPOGONADISM: ICD-10-CM

## 2020-08-26 LAB
ALBUMIN SERPL BCP-MCNC: 4.5 G/DL (ref 3.5–5.2)
ALP SERPL-CCNC: 77 U/L (ref 55–135)
ALT SERPL W/O P-5'-P-CCNC: 31 U/L (ref 10–44)
AST SERPL-CCNC: 35 U/L (ref 10–40)
BASOPHILS # BLD AUTO: 0.03 K/UL (ref 0–0.2)
BASOPHILS NFR BLD: 0.7 % (ref 0–1.9)
BILIRUB DIRECT SERPL-MCNC: 0.2 MG/DL (ref 0.1–0.3)
BILIRUB SERPL-MCNC: 0.7 MG/DL (ref 0.1–1)
CHOLEST SERPL-MCNC: 295 MG/DL (ref 120–199)
CHOLEST/HDLC SERPL: 6.4 {RATIO} (ref 2–5)
COMPLEXED PSA SERPL-MCNC: 0.88 NG/ML (ref 0–4)
DIFFERENTIAL METHOD: NORMAL
EOSINOPHIL # BLD AUTO: 0.1 K/UL (ref 0–0.5)
EOSINOPHIL NFR BLD: 3.2 % (ref 0–8)
ERYTHROCYTE [DISTWIDTH] IN BLOOD BY AUTOMATED COUNT: 12.6 % (ref 11.5–14.5)
HCT VFR BLD AUTO: 49.3 % (ref 40–54)
HDLC SERPL-MCNC: 46 MG/DL (ref 40–75)
HDLC SERPL: 15.6 % (ref 20–50)
HGB BLD-MCNC: 16 G/DL (ref 14–18)
IMM GRANULOCYTES # BLD AUTO: 0.01 K/UL (ref 0–0.04)
IMM GRANULOCYTES NFR BLD AUTO: 0.2 % (ref 0–0.5)
LDLC SERPL CALC-MCNC: 206 MG/DL (ref 63–159)
LYMPHOCYTES # BLD AUTO: 1.6 K/UL (ref 1–4.8)
LYMPHOCYTES NFR BLD: 39.1 % (ref 18–48)
MCH RBC QN AUTO: 30.9 PG (ref 27–31)
MCHC RBC AUTO-ENTMCNC: 32.5 G/DL (ref 32–36)
MCV RBC AUTO: 95 FL (ref 82–98)
MONOCYTES # BLD AUTO: 0.4 K/UL (ref 0.3–1)
MONOCYTES NFR BLD: 9.8 % (ref 4–15)
NEUTROPHILS # BLD AUTO: 1.9 K/UL (ref 1.8–7.7)
NEUTROPHILS NFR BLD: 47 % (ref 38–73)
NONHDLC SERPL-MCNC: 249 MG/DL
NRBC BLD-RTO: 0 /100 WBC
PLATELET # BLD AUTO: 166 K/UL (ref 150–350)
PMV BLD AUTO: 11.8 FL (ref 9.2–12.9)
PROT SERPL-MCNC: 7.3 G/DL (ref 6–8.4)
RBC # BLD AUTO: 5.18 M/UL (ref 4.6–6.2)
TESTOST SERPL-MCNC: 243 NG/DL (ref 304–1227)
TRIGL SERPL-MCNC: 215 MG/DL (ref 30–150)
WBC # BLD AUTO: 4.09 K/UL (ref 3.9–12.7)

## 2020-08-26 PROCEDURE — 84403 ASSAY OF TOTAL TESTOSTERONE: CPT

## 2020-08-26 PROCEDURE — 85025 COMPLETE CBC W/AUTO DIFF WBC: CPT

## 2020-08-26 PROCEDURE — 84153 ASSAY OF PSA TOTAL: CPT

## 2020-08-26 PROCEDURE — 80061 LIPID PANEL: CPT

## 2020-08-26 PROCEDURE — 80076 HEPATIC FUNCTION PANEL: CPT

## 2020-08-26 PROCEDURE — 36415 COLL VENOUS BLD VENIPUNCTURE: CPT

## 2020-08-27 ENCOUNTER — TELEPHONE (OUTPATIENT)
Dept: UROLOGY | Facility: CLINIC | Age: 50
End: 2020-08-27

## 2020-09-01 DIAGNOSIS — Z01.818 PRE-OP TESTING: ICD-10-CM

## 2020-09-01 DIAGNOSIS — Z12.11 SPECIAL SCREENING FOR MALIGNANT NEOPLASMS, COLON: Primary | ICD-10-CM

## 2020-09-01 RX ORDER — SODIUM, POTASSIUM,MAG SULFATES 17.5-3.13G
SOLUTION, RECONSTITUTED, ORAL ORAL ONCE
Qty: 354 ML | Refills: 0 | Status: SHIPPED | OUTPATIENT
Start: 2020-09-01 | End: 2020-09-04

## 2020-09-02 ENCOUNTER — OFFICE VISIT (OUTPATIENT)
Dept: UROLOGY | Facility: CLINIC | Age: 50
End: 2020-09-02
Payer: COMMERCIAL

## 2020-09-02 VITALS
WEIGHT: 249.13 LBS | HEART RATE: 55 BPM | BODY MASS INDEX: 31.97 KG/M2 | HEIGHT: 74 IN | DIASTOLIC BLOOD PRESSURE: 97 MMHG | SYSTOLIC BLOOD PRESSURE: 144 MMHG

## 2020-09-02 DIAGNOSIS — E29.1 MALE HYPOGONADISM: Primary | ICD-10-CM

## 2020-09-02 PROCEDURE — 99999 PR PBB SHADOW E&M-EST. PATIENT-LVL III: CPT | Mod: PBBFAC,,, | Performed by: UROLOGY

## 2020-09-02 PROCEDURE — 3008F BODY MASS INDEX DOCD: CPT | Mod: CPTII,S$GLB,, | Performed by: UROLOGY

## 2020-09-02 PROCEDURE — 3080F PR MOST RECENT DIASTOLIC BLOOD PRESSURE >= 90 MM HG: ICD-10-PCS | Mod: CPTII,S$GLB,, | Performed by: UROLOGY

## 2020-09-02 PROCEDURE — 3077F SYST BP >= 140 MM HG: CPT | Mod: CPTII,S$GLB,, | Performed by: UROLOGY

## 2020-09-02 PROCEDURE — 3077F PR MOST RECENT SYSTOLIC BLOOD PRESSURE >= 140 MM HG: ICD-10-PCS | Mod: CPTII,S$GLB,, | Performed by: UROLOGY

## 2020-09-02 PROCEDURE — 99213 PR OFFICE/OUTPT VISIT, EST, LEVL III, 20-29 MIN: ICD-10-PCS | Mod: S$GLB,,, | Performed by: UROLOGY

## 2020-09-02 PROCEDURE — 99999 PR PBB SHADOW E&M-EST. PATIENT-LVL III: ICD-10-PCS | Mod: PBBFAC,,, | Performed by: UROLOGY

## 2020-09-02 PROCEDURE — 3008F PR BODY MASS INDEX (BMI) DOCUMENTED: ICD-10-PCS | Mod: CPTII,S$GLB,, | Performed by: UROLOGY

## 2020-09-02 PROCEDURE — 99213 OFFICE O/P EST LOW 20 MIN: CPT | Mod: S$GLB,,, | Performed by: UROLOGY

## 2020-09-02 PROCEDURE — 3080F DIAST BP >= 90 MM HG: CPT | Mod: CPTII,S$GLB,, | Performed by: UROLOGY

## 2020-09-02 RX ORDER — TESTOSTERONE 20.25 MG/1.25G
GEL TOPICAL
Qty: 150 G | Refills: 5 | Status: SHIPPED | OUTPATIENT
Start: 2020-09-02 | End: 2021-02-22 | Stop reason: SDUPTHER

## 2020-09-02 NOTE — PROGRESS NOTES
CHIEF COMPLAINT:    Mr. Beal is a 49 y.o. male presenting  with hypogonadism.    PRESENTING ILLNESS:    Sukumar Beal is a 49 y.o. male who c/o hypogonadism.  His complaints are fatigue, loss of axillary hair.   He's on Androgel 1.62% using 3 pumps.  While on TRT, While on TRT, he has more energy.  He wanted testopel, but he has BCBS.    He has nocturia x 2 and is pleased with how he voids.  No hematuria.  No dysuria.    He denies ED.    REVIEW OF SYSTEMS:    Sukumar Beal denies chest pain, sore throat, headache, blurred vision, fever, nausea, vomiting, chills, abdominal pain, bleeding per rectum, cough, SOB, recent loss of consciousness, recent mental status changes, seizures, dizziness, or upper or lower extremity weakness.    RAMÓN  1. 5  2. 5  3. 5  4. 5  5. 5      PATIENT HISTORY:    Past Medical History:   Diagnosis Date    Gout 2014    High cholesterol     Hypertension     Hypothyroid     Low testosterone     Staph aureus infection age 13    R leg       Past Surgical History:   Procedure Laterality Date    FRACTURE SURGERY Left     wrist    Incision and drainage of tibia Right     SINUS SURGERY      x2       Family History   Problem Relation Age of Onset    Heart disease Father 73            Hypertension Sister     Hyperlipidemia Sister     Hypertension Brother     Hyperlipidemia Brother     Lupus Sister     Hypertension Mother     Cancer Maternal Grandfather     Cancer Maternal Aunt         lung - smoker    Melanoma Neg Hx        Social History     Socioeconomic History    Marital status: Single     Spouse name: Not on file    Number of children: Not on file    Years of education: Not on file    Highest education level: Not on file   Occupational History    Not on file   Social Needs    Financial resource strain: Not hard at all    Food insecurity     Worry: Never true     Inability: Never true    Transportation needs     Medical: No     Non-medical: No    Tobacco Use    Smoking status: Former Smoker     Packs/day: 0.25     Years: 15.00     Pack years: 3.75     Quit date: 1/3/2013     Years since quittin.6    Smokeless tobacco: Never Used   Substance and Sexual Activity    Alcohol use: Yes     Alcohol/week: 5.0 standard drinks     Types: 6 Standard drinks or equivalent per week     Frequency: 2-3 times a week     Drinks per session: 3 or 4     Binge frequency: Less than monthly     Comment: weekends    Drug use: No    Sexual activity: Yes   Lifestyle    Physical activity     Days per week: 4 days     Minutes per session: 60 min    Stress: To some extent   Relationships    Social connections     Talks on phone: More than three times a week     Gets together: Three times a week     Attends [employer] service: Not on file     Active member of club or organization: Yes     Attends meetings of clubs or organizations: More than 4 times per year     Relationship status: Never    Other Topics Concern    Not on file   Social History Narrative    Not on file       Allergies:  Codeine and Penicillins    Medications:    Current Outpatient Medications:     atorvastatin (LIPITOR) 20 MG tablet, Take 1 tablet (20 mg total) by mouth once daily., Disp: 90 tablet, Rfl: 0    levothyroxine (SYNTHROID) 112 MCG tablet, Take 1 tablet (112 mcg total) by mouth once daily., Disp: 90 tablet, Rfl: 1    losartan (COZAAR) 25 MG tablet, Take 1 tablet (25 mg total) by mouth once daily., Disp: 90 tablet, Rfl: 3    sodium,potassium,mag sulfates (SUPREP BOWEL PREP KIT) 17.5-3.13-1.6 gram SolR, MIX CONTENTS WITH FLUIDS AND DRINK BY MOUTH AS DIRECTED, Disp: 354 mL, Rfl: 0    testosterone (ANDROGEL) 20.25 mg/1.25 gram (1.62 %) GlPm, Apply 3 pumps to shoulders daily Disp 1 month supply, Disp: 150 g, Rfl: 5    PHYSICAL EXAMINATION:    The patient generally appears in good health, is appropriately interactive, and is in no apparent distress.     Eyes: anicteric sclerae, moist  conjunctivae; no lid-lag; PERRLA     HENT: Atraumatic; oropharynx clear with moist mucous membranes and no mucosal ulcerations;normal hard and soft palate.  No evidence of lymphadenopathy.    Neck: Trachea midline.  No thyromegaly.    Musculoskeletal: No abnormal gait.    Skin: No lesions.    Mental: Cooperative with normal affect.  Is oriented to time, place, and person.    Neuro: Grossly intact.    Chest: Normal inspiratory effort.   No accessory muscles.  No audible wheezes.  Respirations symmetric on inspiration and expiration.    Heart: Regular rhythm.      Abdomen:  Soft, non-tender. No masses or organomegaly. Bladder is not palpable. No evidence of flank discomfort. No evidence of inguinal hernia.    Genitourinary: The penis is circumcised with no evidence of plaques or induration. The urethral meatus is normal. The testes, epididymides, and cord structures are normal in size and contour bilaterally. The scrotum is normal in size and contour.    Normal anal sphincter tone. No rectal mass.    The prostate is 30 g. Normal landmarks. Lateral sulci. Median furrow intact.  No nodularity or induration. Seminal vesicles are normal.    Extremities: No clubbing, cyanosis, or edema      LABS:      Lab Results   Component Value Date    PSA 0.42 06/11/2018    PSA 0.41 05/10/2017    PSA 0.31 12/02/2015    PSADIAG 0.88 08/26/2020    PSADIAG 0.47 09/25/2019    PSADIAG 0.38 12/04/2018       IMPRESSION:    Encounter Diagnoses   Name Primary?    Male hypogonadism Yes         PLAN:    1. Will observe his LUTS as they don't bother him.  2. Will go back to androgel 1.62%. Side effects discussed.  A new Rx was given   3. RTC 6 months with T, PSA, CBC, hepatic panel, lipid panel to see an JONNY.      Copy to:

## 2020-09-03 ENCOUNTER — IMMUNIZATION (OUTPATIENT)
Dept: PHARMACY | Facility: CLINIC | Age: 50
End: 2020-09-03
Payer: COMMERCIAL

## 2020-09-18 ENCOUNTER — LAB VISIT (OUTPATIENT)
Dept: SURGERY | Facility: CLINIC | Age: 50
End: 2020-09-18
Payer: COMMERCIAL

## 2020-09-18 DIAGNOSIS — Z01.818 PRE-OP TESTING: ICD-10-CM

## 2020-09-18 PROCEDURE — U0003 INFECTIOUS AGENT DETECTION BY NUCLEIC ACID (DNA OR RNA); SEVERE ACUTE RESPIRATORY SYNDROME CORONAVIRUS 2 (SARS-COV-2) (CORONAVIRUS DISEASE [COVID-19]), AMPLIFIED PROBE TECHNIQUE, MAKING USE OF HIGH THROUGHPUT TECHNOLOGIES AS DESCRIBED BY CMS-2020-01-R: HCPCS

## 2020-09-19 LAB — SARS-COV-2 RNA RESP QL NAA+PROBE: NOT DETECTED

## 2020-09-21 ENCOUNTER — ANESTHESIA (OUTPATIENT)
Dept: ENDOSCOPY | Facility: HOSPITAL | Age: 50
End: 2020-09-21
Payer: COMMERCIAL

## 2020-09-21 ENCOUNTER — HOSPITAL ENCOUNTER (OUTPATIENT)
Facility: HOSPITAL | Age: 50
Discharge: HOME | End: 2020-09-21
Attending: COLON & RECTAL SURGERY | Admitting: COLON & RECTAL SURGERY
Payer: COMMERCIAL

## 2020-09-21 ENCOUNTER — ANESTHESIA EVENT (OUTPATIENT)
Dept: ENDOSCOPY | Facility: HOSPITAL | Age: 50
End: 2020-09-21
Payer: COMMERCIAL

## 2020-09-21 VITALS
BODY MASS INDEX: 31.44 KG/M2 | HEIGHT: 74 IN | DIASTOLIC BLOOD PRESSURE: 74 MMHG | WEIGHT: 245 LBS | TEMPERATURE: 98 F | RESPIRATION RATE: 18 BRPM | OXYGEN SATURATION: 98 % | HEART RATE: 55 BPM | SYSTOLIC BLOOD PRESSURE: 116 MMHG

## 2020-09-21 DIAGNOSIS — Z12.11 SCREEN FOR COLON CANCER: ICD-10-CM

## 2020-09-21 PROCEDURE — 88305 TISSUE EXAM BY PATHOLOGIST: CPT | Mod: 59 | Performed by: PATHOLOGY

## 2020-09-21 PROCEDURE — E9220 PRA ENDO ANESTHESIA: HCPCS | Mod: 33,,, | Performed by: NURSE ANESTHETIST, CERTIFIED REGISTERED

## 2020-09-21 PROCEDURE — 37000009 HC ANESTHESIA EA ADD 15 MINS: Performed by: COLON & RECTAL SURGERY

## 2020-09-21 PROCEDURE — 25000003 PHARM REV CODE 250: Performed by: COLON & RECTAL SURGERY

## 2020-09-21 PROCEDURE — 63600175 PHARM REV CODE 636 W HCPCS: Performed by: NURSE ANESTHETIST, CERTIFIED REGISTERED

## 2020-09-21 PROCEDURE — 88305 TISSUE EXAM BY PATHOLOGIST: ICD-10-PCS | Mod: 26,,, | Performed by: PATHOLOGY

## 2020-09-21 PROCEDURE — E9220 PRA ENDO ANESTHESIA: ICD-10-PCS | Mod: 33,,, | Performed by: NURSE ANESTHETIST, CERTIFIED REGISTERED

## 2020-09-21 PROCEDURE — 37000008 HC ANESTHESIA 1ST 15 MINUTES: Performed by: COLON & RECTAL SURGERY

## 2020-09-21 PROCEDURE — 45385 PR COLONOSCOPY,REMV LESN,SNARE: ICD-10-PCS | Mod: 33,,, | Performed by: COLON & RECTAL SURGERY

## 2020-09-21 PROCEDURE — 88305 TISSUE EXAM BY PATHOLOGIST: CPT | Mod: 26,,, | Performed by: PATHOLOGY

## 2020-09-21 PROCEDURE — 45385 COLONOSCOPY W/LESION REMOVAL: CPT | Mod: 33,,, | Performed by: COLON & RECTAL SURGERY

## 2020-09-21 PROCEDURE — 45385 COLONOSCOPY W/LESION REMOVAL: CPT | Performed by: COLON & RECTAL SURGERY

## 2020-09-21 PROCEDURE — 27201089 HC SNARE, DISP (ANY): Performed by: COLON & RECTAL SURGERY

## 2020-09-21 RX ORDER — LIDOCAINE HCL/PF 100 MG/5ML
SYRINGE (ML) INTRAVENOUS
Status: DISCONTINUED | OUTPATIENT
Start: 2020-09-21 | End: 2020-09-21

## 2020-09-21 RX ORDER — SODIUM CHLORIDE 9 MG/ML
INJECTION, SOLUTION INTRAVENOUS CONTINUOUS
Status: DISCONTINUED | OUTPATIENT
Start: 2020-09-21 | End: 2020-09-21 | Stop reason: HOSPADM

## 2020-09-21 RX ORDER — PROPOFOL 10 MG/ML
VIAL (ML) INTRAVENOUS CONTINUOUS PRN
Status: DISCONTINUED | OUTPATIENT
Start: 2020-09-21 | End: 2020-09-21

## 2020-09-21 RX ORDER — PROPOFOL 10 MG/ML
VIAL (ML) INTRAVENOUS
Status: DISCONTINUED | OUTPATIENT
Start: 2020-09-21 | End: 2020-09-21

## 2020-09-21 RX ADMIN — PROPOFOL 100 MG: 10 INJECTION, EMULSION INTRAVENOUS at 08:09

## 2020-09-21 RX ADMIN — SODIUM CHLORIDE: 0.9 INJECTION, SOLUTION INTRAVENOUS at 08:09

## 2020-09-21 RX ADMIN — PROPOFOL 175 MCG/KG/MIN: 10 INJECTION, EMULSION INTRAVENOUS at 08:09

## 2020-09-21 RX ADMIN — Medication 100 MG: at 08:09

## 2020-09-21 NOTE — [employer] PATIENT INSTRUCTIONS
Discharge Summary/Instructions after an Endoscopic Procedure  Patient Name: Sukumar Beal  Patient MRN: [id number]  Patient YOB: 1970  Monday, September 21, 2020  Luis Daniel Newell MD  RESTRICTIONS:  During your procedure today, you received medications for sedation.  These   medications may affect your judgment, balance and coordination.  Therefore,   for 24 hours, you have the following restrictions:   - DO NOT drive a car, operate machinery, make legal/financial decisions,   sign important papers or drink alcohol.    ACTIVITY:  Today: no heavy lifting, straining or running due to procedural   sedation/anesthesia.  The following day: return to full activity including work.  DIET:  Eat and drink normally unless instructed otherwise.     TREATMENT FOR COMMON SIDE EFFECTS:  - Mild abdominal pain, nausea, belching, bloating or excessive gas:  rest,   eat lightly and use a heating pad.  - Sore Throat: treat with throat lozenges and/or gargle with warm salt   water.  - Because air was used during the procedure, expelling large amounts of air   from your rectum or belching is normal.  - If a bowel prep was taken, you may not have a bowel movement for 1-3 days.    This is normal.  SYMPTOMS TO WATCH FOR AND REPORT TO YOUR PHYSICIAN:  1. Abdominal pain or bloating, other than gas cramps.  2. Chest pain.  3. Back pain.  4. Signs of infection such as: chills or fever occurring within 24 hours   after the procedure.  5. Rectal bleeding, which would show as bright red, maroon, or black stools.   (A tablespoon of blood from the rectum is not serious, especially if   hemorrhoids are present.)  6. Vomiting.  7. Weakness or dizziness.  GO DIRECTLY TO THE NEAREST EMERGENCY ROOM IF YOU HAVE ANY OF THE FOLLOWING:      Difficulty breathing              Chills and/or fever over 101 F   Persistent vomiting and/or vomiting blood   Severe abdominal pain   Severe chest pain   Black, tarry stools   Bleeding- more than one tablespoon   Any  other symptom or condition that you feel may need urgent attention  Your doctor recommends these additional instructions:  If any biopsies were taken, your doctors clinic will contact you in 1 to 2   weeks with any results.  - Discharge patient to home (ambulatory).   - Patient has a contact number available for emergencies.  The signs and   symptoms of potential delayed complications were discussed with the   patient.  Return to normal activities tomorrow.  Written discharge   instructions were provided to the patient.   - Resume previous diet.   - Continue present medications.   - Await pathology results.   - Repeat colonoscopy in 3 years for surveillance based on pathology   results.  For questions, problems or results please call your physician - Luis Daniel Newell MD at Work:  [phone].  [facility] [place] [facility], EMERGENCY ROOM PHONE NUMBER: [phone]  IF A COMPLICATION OR EMERGENCY SITUATION ARISES AND YOU ARE UNABLE TO REACH   YOUR PHYSICIAN - GO DIRECTLY TO THE EMERGENCY ROOM.  Luis Daniel Newell MD  9/21/2020 9:14:45 AM  This report has been verified and signed electronically.  [employer]

## 2020-09-21 NOTE — ANESTHESIA POSTPROCEDURE EVALUATION
Anesthesia Post Evaluation    Patient: Sukumar Beal    Procedure(s) Performed: Procedure(s) (LRB):  COLONOSCOPY (N/A)    Final Anesthesia Type: general    Patient location during evaluation: PACU  Patient participation: Yes- Able to Participate  Level of consciousness: awake and alert and oriented  Post-procedure vital signs: reviewed and stable  Pain management: adequate  Airway patency: patent    PONV status at discharge: No PONV  Anesthetic complications: no      Cardiovascular status: blood pressure returned to baseline, hemodynamically stable and stable  Respiratory status: unassisted, room air and spontaneous ventilation  Hydration status: euvolemic  Follow-up not needed.          Vitals Value Taken Time   /74 09/21/20 0937   Temp 36.7 °C (98 °F) 09/21/20 0924   Pulse 55 09/21/20 0937   Resp 18 09/21/20 0937   SpO2 98 % 09/21/20 0937         No case tracking events are documented in the log.      Pain/Renate Score: Renate Score: 10 (9/21/2020  9:28 AM)

## 2020-09-21 NOTE — ANESTHESIA PREPROCEDURE EVALUATION
09/21/2020  Sukumar Beal is a 49 y.o., male.    Past Medical History:   Diagnosis Date    Gout 7/2014    High cholesterol     Hypertension     Hypothyroid     Low testosterone     Staph aureus infection age 13    R leg       Past Surgical History:   Procedure Laterality Date    FRACTURE SURGERY Left 2004    wrist    Incision and drainage of tibia Right 1983    SINUS SURGERY      x2         Anesthesia Evaluation    I have reviewed the Patient Summary Reports.      I have reviewed the Medications.     Review of Systems  Anesthesia Hx:  Denies Hx of Anesthetic complications  Neg history of prior surgery. Denies Family Hx of Anesthesia complications.   Denies Personal Hx of Anesthesia complications.   Cardiovascular:   Exercise tolerance: good        Physical Exam  General:  Well nourished    Airway/Jaw/Neck:  Airway Findings: Mouth Opening: Normal Tongue: Normal  General Airway Assessment: Adult  Mallampati: II  TM Distance: Normal, at least 6 cm         Dental:  DENTAL FINDINGS: Normal   Chest/Lungs:  Chest/Lungs Clear    Heart/Vascular:  Heart Findings: Normal       Mental Status:  Mental Status Findings:  Alert and Oriented         Anesthesia Plan  Type of Anesthesia, risks & benefits discussed:  Anesthesia Type:  general  Patient's Preference:   Intra-op Monitoring Plan: standard ASA monitors  Intra-op Monitoring Plan Comments:   Post Op Pain Control Plan:   Post Op Pain Control Plan Comments:   Induction:   IV  Beta Blocker:  Patient is not currently on a Beta-Blocker (No further documentation required).       Informed Consent: Patient understands risks and agrees with Anesthesia plan.  Questions answered. Anesthesia consent signed with patient.  ASA Score: 2     Day of Surgery Review of History & Physical:    H&P update referred to the provider.         Ready For Surgery From Anesthesia  Perspective.

## 2020-09-21 NOTE — H&P
Colonoscopy History and Physical      Procedure : Colonoscopy    Indications:  asymptomatic screening exam    Family Hx of CRC: None    Last Colonoscopy:  None    Hx of sedation problems: none  FHX of sedation problems: none    Past Medical History:   Diagnosis Date    Gout 2014    High cholesterol     Hypertension     Hypothyroid     Low testosterone     Staph aureus infection age 13    R leg       Family History   Problem Relation Age of Onset    Heart disease Father 73            Hypertension Sister     Hyperlipidemia Sister     Hypertension Brother     Hyperlipidemia Brother     Lupus Sister     Hypertension Mother     Cancer Maternal Grandfather     Cancer Maternal Aunt         lung - smoker    Melanoma Neg Hx        Social History     Socioeconomic History    Marital status: Single     Spouse name: Not on file    Number of children: Not on file    Years of education: Not on file    Highest education level: Not on file   Occupational History    Not on file   Social Needs    Financial resource strain: Not hard at all    Food insecurity     Worry: Never true     Inability: Never true    Transportation needs     Medical: No     Non-medical: No   Tobacco Use    Smoking status: Former Smoker     Packs/day: 0.25     Years: 15.00     Pack years: 3.75     Quit date: 1/3/2013     Years since quittin.7    Smokeless tobacco: Never Used   Substance and Sexual Activity    Alcohol use: Yes     Alcohol/week: 5.0 standard drinks     Types: 6 Standard drinks or equivalent per week     Frequency: 2-3 times a week     Drinks per session: 3 or 4     Binge frequency: Less than monthly     Comment: weekends    Drug use: No    Sexual activity: Yes   Lifestyle    Physical activity     Days per week: 4 days     Minutes per session: 60 min    Stress: To some extent   Relationships    Social connections     Talks on phone: More than three times a week     Gets together: Three times a week      Attends [employer] service: Not on file     Active member of club or organization: Yes     Attends meetings of clubs or organizations: More than 4 times per year     Relationship status: Never    Other Topics Concern    Not on file   Social History Narrative    Not on file       Review of patient's allergies indicates:   Allergen Reactions    Codeine Rash    Penicillins Rash       No current facility-administered medications on file prior to encounter.      Current Outpatient Medications on File Prior to Encounter   Medication Sig Dispense Refill    losartan (COZAAR) 25 MG tablet Take 1 tablet (25 mg total) by mouth once daily. 90 tablet 3       Review of Systems -   Respiratory ROS: negative  Cardiovascular ROS: negative  Gastrointestinal ROS: negative  Musculoskeletal ROS: negative  Neurological ROS: negative        Physical Exam:  General: no distress  Head: normocephalic  Lungs:  normal respiratory effort  Heart: regular rate  Abdomen: soft,  Non-tender  Extremities: warm and well perfused       Deep Sedation: Mallampati Score per anesthesia    ASA: II      Patient cleared for Anesthesia:  General    Anesthesia/Surgery risks, benefits, and alternative options discussed and understood by patient/family.

## 2020-09-21 NOTE — TRANSFER OF CARE
"Anesthesia Transfer of Care Note    Patient: Sukumar Beal    Procedure(s) Performed: Procedure(s) (LRB):  COLONOSCOPY (N/A)    Patient location: GI    Anesthesia Type: general    Transport from OR: Transported from OR on room air with adequate spontaneous ventilation    Post pain: adequate analgesia    Post assessment: no apparent anesthetic complications    Post vital signs: stable    Level of consciousness: awake    Nausea/Vomiting: no nausea/vomiting    Complications: none    Transfer of care protocol was followed      Last vitals:   Visit Vitals  BP (!) 159/96 (BP Location: Left arm, Patient Position: Sitting)   Pulse 64   Temp 37.2 °C (99 °F) (Temporal)   Resp 16   Ht 6' 2" (1.88 m)   Wt 111.1 kg (245 lb)   SpO2 98%   BMI 31.46 kg/m²     "

## 2020-09-21 NOTE — DISCHARGE INSTRUCTIONS
Colorectal Cancer Screening    Colorectal cancer (cancer in the colon or rectum) is a leading cause of cancer deaths in the [place] But it doesnt have to be. When this cancer is found and removed early, the chances of a full recovery are very good. Because colorectal cancer rarely causes symptoms in its early stages, screening for the disease is important. Its even more crucial if you have risk factors for the disease. Learn more about colorectal cancer and its risk factors. Then talk to your healthcare provider about being screened. You could be saving your own life.  Risk factors for colorectal cancer  Your risk of having colorectal cancer increases if you:  · Are 50 years of age or older  · Have a family history or personal history of colorectal cancer or polyps  · Have a personal history of type 2 diabetes, Crohns disease, or ulcerative colitis  · Have an inherited genetic syndrome like Hendricks syndrome (also known as HNPCC) or familial adenomatous polyposis (FAP)  · Are very overweight  · Are not physically active  · Smoke  · Drink a lot of alcohol  · Eat a lot of red or processed meat  The colon and rectum  Waste from food you eat enters the colon from the small intestine. As it travels through the colon, the waste (stool) loses water and becomes more solid. Intestinal muscles push it toward the sigmoid--the last section of the colon. Stool then moves into the rectum, where its stored until its ready to leave the body during a bowel movement.  How cancer develops  Polyps are growths that form on the inner lining of the colon or rectum. Most are benign, which means they arent cancerous. But over time, some polyps can become cancer (malignant). This happens when cells in these polyps begin growing abnormally. In time, malignant cells invade more and more of the colon and rectum. The cancer may also spread to nearby organs or lymph nodes or to other parts of the body. Finding and removing polyps can help  prevent cancer from ever forming.  Your screening  Screening means looking for a health problem before you have symptoms. During screening for colorectal cancer, your healthcare provider will ask about your health history, examine you, and do one or more tests.  History and exam  The history and exam involve the following:  · Health history. Your healthcare provider will ask about your health history. Mention if a family member has had colon cancer or polyps. Also mention any health problems you have had in the past.  · Digital rectal exam (KERI). During a KERI, the healthcare provider inserts a lubricated gloved finger into the rectum. The test is painless and takes less than a minute. Healthcare providers agree that this test alone is not enough to screen for colorectal cancer.  Screening test choices  Fecal occult blood test (FOBT) or fecal immunochemical test (FIT)  These tests check for occult blood in stool (blood you cant see). Hidden blood may be a sign of colon polyps or cancer. A small sample of stool is tested for blood in a laboratory. Most often, you collect this sample at home using a kit your healthcare provider gives you. Follow the instructions carefully for using this kit. You might need to avoid certain foods and medicines before the test, as directed.  Barium enema with contrast (double-contrast barium enema)  This test uses X-rays to provide images of the entire colon and rectum. The day before this test, you will need to do a bowel prep to clean out the colon and rectum. A bowel prep is a liquid diet plus strong laxatives or enemas. You will be awake for the test, but you may be given medicine to help you relax. At the start of the test, a radiologist (a healthcare provider who specializes in imaging tests) places a soft tube into the rectum. The tube is used to fill the colon with a contrast liquid (barium) and air. This can be uncomfortable for some people. The liquid helps the colon show up  clearly on the X-rays. Because the test uses X-rays, it exposes you to a small amount of radiation.  Virtual colonoscopy  This exam is also called a CT colonography. It uses a series of X-ray photographs to create a 3-D view of the colon and rectum. The day before the test, you will need to do a bowel prep to clean out your colon. Your healthcare provider will give you instructions on how to do this. During the procedure, you will lie on a table that is part of a special X-ray machine called a CT scanner. A small tube will be placed into your rectum to fill the colon and rectum with air. This can be uncomfortable for some people. Then, the table will move into the machine and pictures will be taken of your colon and rectum. A computer will combine these photos to create a 3-D picture. Because the test uses X-rays, it exposes you to a small amount of radiation.  Scope exams  Here are two types of scope exams:  · Colonoscopy. This test can be used to find and remove polyps anywhere in the colon or rectum. The day before the test, you will do a bowel prep. This is a liquid diet plus a strong laxative solution or an enema. The bowel prep will cleanse your colon. You will be given instructions for this. Just before the test, you are given a medicine to make you sleepy. Then, a long, flexible, lighted tube called a colonoscope is gently inserted into the rectum and guided through the entire colon. Images of the colon are viewed on a video screen. Any polyps that are found are removed and sent to a lab for testing. If a polyp cant be removed, a sample of tissue is taken and the polyp might be removed later during surgery. You will need to bring someone with you to drive you home after this test.   · Sigmoidoscopy. This test is similar to colonoscopy, but focuses only on the sigmoid colon and rectum. As with colonoscopy, bowel prep must be done the day before this test. It might not need to be as complete as the bowel prep  for a colonoscopy. You are awake during the procedure, but you may be given medicine to help you relax. During the test, the healthcare provider guides a thin, flexible, lighted tube called a sigmoidoscope through your rectum and lower colon. The images are displayed on a video screen. Polyps are removed, if possible, and sent to a lab for testing.  Colonoscopy is the only screening test that lets your healthcare provider see the entire colon and rectum. This test also lets your healthcare provider remove any pieces of tissue that need to be looked at by a lab. If something suspicious is found using any other tests, you will likely need a colonoscopy.     When to call your healthcare provider after a test  Call your healthcare provider if you have any of the following after any screening test:  · Bleeding  · Fever of 100.4°F (38°C) or higher, or as directed by your healthcare provider  · Abdominal pain  · Vomiting   Date Last Reviewed: 11/4/2015  © 6535-3584 [email or web address] [address] [place] [address], [place] [address]. All rights reserved. This information is not intended as a substitute for professional medical care. Always follow your healthcare professional's instructions.

## 2020-09-22 ENCOUNTER — HOSPITAL ENCOUNTER (OUTPATIENT)
Facility: HOSPITAL | Age: 50
Discharge: HOME | End: 2020-09-23
Attending: EMERGENCY MEDICINE | Admitting: INTERNAL MEDICINE
Payer: COMMERCIAL

## 2020-09-22 DIAGNOSIS — K92.2 GI BLEED: ICD-10-CM

## 2020-09-22 DIAGNOSIS — K92.2 ACUTE LOWER GI BLEEDING: Primary | ICD-10-CM

## 2020-09-22 DIAGNOSIS — D62 ACUTE BLOOD LOSS ANEMIA: ICD-10-CM

## 2020-09-22 DIAGNOSIS — E03.9 ACQUIRED HYPOTHYROIDISM: ICD-10-CM

## 2020-09-22 DIAGNOSIS — R07.9 CHEST PAIN: ICD-10-CM

## 2020-09-22 PROBLEM — K92.1 HEMATOCHEZIA: Status: ACTIVE | Noted: 2020-09-22

## 2020-09-22 PROBLEM — K63.5 COLON POLYPS: Status: ACTIVE | Noted: 2020-09-22

## 2020-09-22 LAB
ABO + RH BLD: NORMAL
ALBUMIN SERPL BCP-MCNC: 3.7 G/DL (ref 3.5–5.2)
ALP SERPL-CCNC: 74 U/L (ref 55–135)
ALT SERPL W/O P-5'-P-CCNC: 41 U/L (ref 10–44)
ANION GAP SERPL CALC-SCNC: 12 MMOL/L (ref 8–16)
AST SERPL-CCNC: 40 U/L (ref 10–40)
BACTERIA #/AREA URNS AUTO: NORMAL /HPF
BASOPHILS # BLD AUTO: 0.02 K/UL (ref 0–0.2)
BASOPHILS # BLD AUTO: 0.02 K/UL (ref 0–0.2)
BASOPHILS # BLD AUTO: 0.03 K/UL (ref 0–0.2)
BASOPHILS # BLD AUTO: 0.03 K/UL (ref 0–0.2)
BASOPHILS NFR BLD: 0.4 % (ref 0–1.9)
BASOPHILS NFR BLD: 0.4 % (ref 0–1.9)
BASOPHILS NFR BLD: 0.6 % (ref 0–1.9)
BASOPHILS NFR BLD: 0.6 % (ref 0–1.9)
BILIRUB SERPL-MCNC: 0.4 MG/DL (ref 0.1–1)
BILIRUB UR QL STRIP: NEGATIVE
BLD GP AB SCN CELLS X3 SERPL QL: NORMAL
BUN SERPL-MCNC: 15 MG/DL (ref 6–30)
BUN SERPL-MCNC: 33 MG/DL (ref 6–30)
BUN SERPL-MCNC: 35 MG/DL (ref 6–20)
CALCIUM SERPL-MCNC: 8.1 MG/DL (ref 8.7–10.5)
CHLORIDE SERPL-SCNC: 101 MMOL/L (ref 95–110)
CHLORIDE SERPL-SCNC: 108 MMOL/L (ref 95–110)
CHLORIDE SERPL-SCNC: 109 MMOL/L (ref 95–110)
CLARITY UR REFRACT.AUTO: CLEAR
CO2 SERPL-SCNC: 18 MMOL/L (ref 23–29)
COLOR UR AUTO: YELLOW
CREAT SERPL-MCNC: 0.7 MG/DL (ref 0.5–1.4)
CREAT SERPL-MCNC: 1.2 MG/DL (ref 0.5–1.4)
CREAT SERPL-MCNC: 1.3 MG/DL (ref 0.5–1.4)
DIFFERENTIAL METHOD: ABNORMAL
EOSINOPHIL # BLD AUTO: 0 K/UL (ref 0–0.5)
EOSINOPHIL # BLD AUTO: 0.1 K/UL (ref 0–0.5)
EOSINOPHIL NFR BLD: 0.2 % (ref 0–8)
EOSINOPHIL NFR BLD: 0.4 % (ref 0–8)
EOSINOPHIL NFR BLD: 0.4 % (ref 0–8)
EOSINOPHIL NFR BLD: 1.7 % (ref 0–8)
ERYTHROCYTE [DISTWIDTH] IN BLOOD BY AUTOMATED COUNT: 12.6 % (ref 11.5–14.5)
ERYTHROCYTE [DISTWIDTH] IN BLOOD BY AUTOMATED COUNT: 12.8 % (ref 11.5–14.5)
EST. GFR  (AFRICAN AMERICAN): >60 ML/MIN/1.73 M^2
EST. GFR  (NON AFRICAN AMERICAN): >60 ML/MIN/1.73 M^2
GLUCOSE SERPL-MCNC: 119 MG/DL (ref 70–110)
GLUCOSE SERPL-MCNC: 139 MG/DL (ref 70–110)
GLUCOSE SERPL-MCNC: 141 MG/DL (ref 70–110)
GLUCOSE UR QL STRIP: NEGATIVE
HCT VFR BLD AUTO: 33 % (ref 40–54)
HCT VFR BLD AUTO: 33.4 % (ref 40–54)
HCT VFR BLD AUTO: 33.4 % (ref 40–54)
HCT VFR BLD AUTO: 37 % (ref 40–54)
HCT VFR BLD CALC: 35 %PCV (ref 36–54)
HCT VFR BLD CALC: 36 %PCV (ref 36–54)
HGB BLD-MCNC: 10.6 G/DL (ref 14–18)
HGB BLD-MCNC: 11.1 G/DL (ref 14–18)
HGB BLD-MCNC: 11.2 G/DL (ref 14–18)
HGB BLD-MCNC: 12.4 G/DL (ref 14–18)
HGB UR QL STRIP: NEGATIVE
IMM GRANULOCYTES # BLD AUTO: 0.01 K/UL (ref 0–0.04)
IMM GRANULOCYTES NFR BLD AUTO: 0.2 % (ref 0–0.5)
INR PPP: 0.9 (ref 0.8–1.2)
INR PPP: 1 (ref 0.8–1.2)
KETONES UR QL STRIP: NEGATIVE
LEUKOCYTE ESTERASE UR QL STRIP: NEGATIVE
LIPASE SERPL-CCNC: 28 U/L (ref 4–60)
LYMPHOCYTES # BLD AUTO: 1.2 K/UL (ref 1–4.8)
LYMPHOCYTES # BLD AUTO: 1.4 K/UL (ref 1–4.8)
LYMPHOCYTES # BLD AUTO: 1.5 K/UL (ref 1–4.8)
LYMPHOCYTES # BLD AUTO: 1.6 K/UL (ref 1–4.8)
LYMPHOCYTES NFR BLD: 23.3 % (ref 18–48)
LYMPHOCYTES NFR BLD: 27.4 % (ref 18–48)
LYMPHOCYTES NFR BLD: 32.3 % (ref 18–48)
LYMPHOCYTES NFR BLD: 34.2 % (ref 18–48)
MCH RBC QN AUTO: 30.1 PG (ref 27–31)
MCH RBC QN AUTO: 30.7 PG (ref 27–31)
MCH RBC QN AUTO: 30.9 PG (ref 27–31)
MCH RBC QN AUTO: 31.2 PG (ref 27–31)
MCHC RBC AUTO-ENTMCNC: 32.1 G/DL (ref 32–36)
MCHC RBC AUTO-ENTMCNC: 33.2 G/DL (ref 32–36)
MCHC RBC AUTO-ENTMCNC: 33.5 G/DL (ref 32–36)
MCHC RBC AUTO-ENTMCNC: 33.5 G/DL (ref 32–36)
MCV RBC AUTO: 92 FL (ref 82–98)
MCV RBC AUTO: 92 FL (ref 82–98)
MCV RBC AUTO: 93 FL (ref 82–98)
MCV RBC AUTO: 94 FL (ref 82–98)
MICROSCOPIC COMMENT: NORMAL
MONOCYTES # BLD AUTO: 0.5 K/UL (ref 0.3–1)
MONOCYTES # BLD AUTO: 0.7 K/UL (ref 0.3–1)
MONOCYTES NFR BLD: 10.6 % (ref 4–15)
MONOCYTES NFR BLD: 10.6 % (ref 4–15)
MONOCYTES NFR BLD: 14.3 % (ref 4–15)
MONOCYTES NFR BLD: 8.8 % (ref 4–15)
NEUTROPHILS # BLD AUTO: 2.3 K/UL (ref 1.8–7.7)
NEUTROPHILS # BLD AUTO: 2.6 K/UL (ref 1.8–7.7)
NEUTROPHILS # BLD AUTO: 3.1 K/UL (ref 1.8–7.7)
NEUTROPHILS # BLD AUTO: 3.5 K/UL (ref 1.8–7.7)
NEUTROPHILS NFR BLD: 49 % (ref 38–73)
NEUTROPHILS NFR BLD: 56.1 % (ref 38–73)
NEUTROPHILS NFR BLD: 60.8 % (ref 38–73)
NEUTROPHILS NFR BLD: 67.1 % (ref 38–73)
NITRITE UR QL STRIP: NEGATIVE
NRBC BLD-RTO: 0 /100 WBC
PH UR STRIP: 5 [PH] (ref 5–8)
PLATELET # BLD AUTO: 131 K/UL (ref 150–350)
PLATELET # BLD AUTO: 135 K/UL (ref 150–350)
PLATELET # BLD AUTO: 140 K/UL (ref 150–350)
PLATELET # BLD AUTO: 148 K/UL (ref 150–350)
PMV BLD AUTO: 11.4 FL (ref 9.2–12.9)
PMV BLD AUTO: 11.6 FL (ref 9.2–12.9)
PMV BLD AUTO: 11.9 FL (ref 9.2–12.9)
PMV BLD AUTO: 11.9 FL (ref 9.2–12.9)
POC IONIZED CALCIUM: 1.08 MMOL/L (ref 1.06–1.42)
POC IONIZED CALCIUM: 1.11 MMOL/L (ref 1.06–1.42)
POC TCO2 (MEASURED): 20 MMOL/L (ref 23–29)
POC TCO2 (MEASURED): 26 MMOL/L (ref 23–29)
POTASSIUM BLD-SCNC: 3.6 MMOL/L (ref 3.5–5.1)
POTASSIUM BLD-SCNC: 4.8 MMOL/L (ref 3.5–5.1)
POTASSIUM SERPL-SCNC: 3.7 MMOL/L (ref 3.5–5.1)
PROT SERPL-MCNC: 6 G/DL (ref 6–8.4)
PROT UR QL STRIP: NEGATIVE
PROTHROMBIN TIME: 10.5 SEC (ref 9–12.5)
PROTHROMBIN TIME: 10.9 SEC (ref 9–12.5)
RBC # BLD AUTO: 3.52 M/UL (ref 4.6–6.2)
RBC # BLD AUTO: 3.62 M/UL (ref 4.6–6.2)
RBC # BLD AUTO: 3.63 M/UL (ref 4.6–6.2)
RBC # BLD AUTO: 3.98 M/UL (ref 4.6–6.2)
RBC #/AREA URNS AUTO: 1 /HPF (ref 0–4)
SAMPLE: ABNORMAL
SAMPLE: ABNORMAL
SARS-COV-2 RDRP RESP QL NAA+PROBE: NEGATIVE
SODIUM BLD-SCNC: 134 MMOL/L (ref 136–145)
SODIUM BLD-SCNC: 138 MMOL/L (ref 136–145)
SODIUM SERPL-SCNC: 139 MMOL/L (ref 136–145)
SP GR UR STRIP: >=1.03 (ref 1–1.03)
URN SPEC COLLECT METH UR: ABNORMAL
WBC # BLD AUTO: 4.62 K/UL (ref 3.9–12.7)
WBC # BLD AUTO: 4.74 K/UL (ref 3.9–12.7)
WBC # BLD AUTO: 5.08 K/UL (ref 3.9–12.7)
WBC # BLD AUTO: 5.14 K/UL (ref 3.9–12.7)
WBC #/AREA URNS AUTO: 0 /HPF (ref 0–5)

## 2020-09-22 PROCEDURE — 82330 ASSAY OF CALCIUM: CPT

## 2020-09-22 PROCEDURE — 25500020 PHARM REV CODE 255: Performed by: INTERNAL MEDICINE

## 2020-09-22 PROCEDURE — 86920 COMPATIBILITY TEST SPIN: CPT

## 2020-09-22 PROCEDURE — 63600175 PHARM REV CODE 636 W HCPCS: Performed by: STUDENT IN AN ORGANIZED HEALTH CARE EDUCATION/TRAINING PROGRAM

## 2020-09-22 PROCEDURE — 80053 COMPREHEN METABOLIC PANEL: CPT

## 2020-09-22 PROCEDURE — 99220 PR INITIAL OBSERVATION CARE,LEVL III: ICD-10-PCS | Mod: ,,, | Performed by: HOSPITALIST

## 2020-09-22 PROCEDURE — 86901 BLOOD TYPING SEROLOGIC RH(D): CPT

## 2020-09-22 PROCEDURE — 82272 OCCULT BLD FECES 1-3 TESTS: CPT

## 2020-09-22 PROCEDURE — G0378 HOSPITAL OBSERVATION PER HR: HCPCS

## 2020-09-22 PROCEDURE — U0002 COVID-19 LAB TEST NON-CDC: HCPCS

## 2020-09-22 PROCEDURE — 36415 COLL VENOUS BLD VENIPUNCTURE: CPT

## 2020-09-22 PROCEDURE — 99285 EMERGENCY DEPT VISIT HI MDM: CPT | Mod: ,,, | Performed by: PHYSICIAN ASSISTANT

## 2020-09-22 PROCEDURE — 93005 ELECTROCARDIOGRAM TRACING: CPT

## 2020-09-22 PROCEDURE — 99285 PR EMERGENCY DEPT VISIT,LEVEL V: ICD-10-PCS | Mod: ,,, | Performed by: PHYSICIAN ASSISTANT

## 2020-09-22 PROCEDURE — 80047 BASIC METABLC PNL IONIZED CA: CPT

## 2020-09-22 PROCEDURE — 93010 EKG 12-LEAD: ICD-10-PCS | Mod: ,,, | Performed by: INTERNAL MEDICINE

## 2020-09-22 PROCEDURE — 25000003 PHARM REV CODE 250: Performed by: STUDENT IN AN ORGANIZED HEALTH CARE EDUCATION/TRAINING PROGRAM

## 2020-09-22 PROCEDURE — 81001 URINALYSIS AUTO W/SCOPE: CPT

## 2020-09-22 PROCEDURE — 25500020 PHARM REV CODE 255: Performed by: EMERGENCY MEDICINE

## 2020-09-22 PROCEDURE — 99285 EMERGENCY DEPT VISIT HI MDM: CPT | Mod: 25

## 2020-09-22 PROCEDURE — 83690 ASSAY OF LIPASE: CPT

## 2020-09-22 PROCEDURE — 99220 PR INITIAL OBSERVATION CARE,LEVL III: CPT | Mod: ,,, | Performed by: HOSPITALIST

## 2020-09-22 PROCEDURE — 85610 PROTHROMBIN TIME: CPT

## 2020-09-22 PROCEDURE — 96360 HYDRATION IV INFUSION INIT: CPT

## 2020-09-22 PROCEDURE — 85025 COMPLETE CBC W/AUTO DIFF WBC: CPT | Mod: 91

## 2020-09-22 PROCEDURE — 96361 HYDRATE IV INFUSION ADD-ON: CPT

## 2020-09-22 PROCEDURE — 85610 PROTHROMBIN TIME: CPT | Mod: 91

## 2020-09-22 PROCEDURE — 93010 ELECTROCARDIOGRAM REPORT: CPT | Mod: ,,, | Performed by: INTERNAL MEDICINE

## 2020-09-22 RX ORDER — SODIUM CHLORIDE 0.9 % (FLUSH) 0.9 %
10 SYRINGE (ML) INJECTION
Status: DISCONTINUED | OUTPATIENT
Start: 2020-09-22 | End: 2020-09-23 | Stop reason: HOSPADM

## 2020-09-22 RX ORDER — IBUPROFEN 200 MG
24 TABLET ORAL
Status: DISCONTINUED | OUTPATIENT
Start: 2020-09-22 | End: 2020-09-23 | Stop reason: HOSPADM

## 2020-09-22 RX ORDER — SODIUM CHLORIDE, SODIUM LACTATE, POTASSIUM CHLORIDE, CALCIUM CHLORIDE 600; 310; 30; 20 MG/100ML; MG/100ML; MG/100ML; MG/100ML
INJECTION, SOLUTION INTRAVENOUS CONTINUOUS
Status: DISCONTINUED | OUTPATIENT
Start: 2020-09-22 | End: 2020-09-23 | Stop reason: HOSPADM

## 2020-09-22 RX ORDER — SODIUM CHLORIDE 0.9 % (FLUSH) 0.9 %
10 SYRINGE (ML) INJECTION
Status: CANCELLED | OUTPATIENT
Start: 2020-09-22

## 2020-09-22 RX ORDER — IBUPROFEN 200 MG
16 TABLET ORAL
Status: DISCONTINUED | OUTPATIENT
Start: 2020-09-22 | End: 2020-09-23 | Stop reason: HOSPADM

## 2020-09-22 RX ORDER — B-COMPLEX WITH VITAMIN C
1 TABLET ORAL DAILY
COMMUNITY

## 2020-09-22 RX ORDER — LEVOTHYROXINE SODIUM 112 UG/1
112 TABLET ORAL DAILY
Status: DISCONTINUED | OUTPATIENT
Start: 2020-09-22 | End: 2020-09-23 | Stop reason: HOSPADM

## 2020-09-22 RX ORDER — GLUCAGON 1 MG
1 KIT INJECTION
Status: DISCONTINUED | OUTPATIENT
Start: 2020-09-22 | End: 2020-09-23 | Stop reason: HOSPADM

## 2020-09-22 RX ORDER — ATORVASTATIN CALCIUM 20 MG/1
20 TABLET, FILM COATED ORAL DAILY
Status: DISCONTINUED | OUTPATIENT
Start: 2020-09-22 | End: 2020-09-23 | Stop reason: HOSPADM

## 2020-09-22 RX ADMIN — ATORVASTATIN CALCIUM 20 MG: 20 TABLET, FILM COATED ORAL at 09:09

## 2020-09-22 RX ADMIN — IOHEXOL 100 ML: 350 INJECTION, SOLUTION INTRAVENOUS at 09:09

## 2020-09-22 RX ADMIN — SODIUM CHLORIDE, SODIUM LACTATE, POTASSIUM CHLORIDE, AND CALCIUM CHLORIDE: .6; .31; .03; .02 INJECTION, SOLUTION INTRAVENOUS at 07:09

## 2020-09-22 RX ADMIN — LEVOTHYROXINE SODIUM 112 MCG: 112 TABLET ORAL at 07:09

## 2020-09-22 RX ADMIN — IOHEXOL 100 ML: 350 INJECTION, SOLUTION INTRAVENOUS at 03:09

## 2020-09-22 NOTE — ASSESSMENT & PLAN NOTE
Mr Beal is a 49 year old male wt past medical history of hypertension, hyperlipidemia and hypothyroidism that comes to the ED due to bloody stools x 1 day. He had a screening colonoscopy performed and hours later he started having bloody bowel movements. He had approximately 7 BMs of bright red blood. He denies dizziness, weakness, blurry vision, N/V, abdominal distension or abdominal pain.     Patient hemodynamically stable. Hgb 12.4; Hct 37.0. BP: 104//96. MAP: 97-98. INR WNL.    CTA abdominopelvis: GI bleed in distal transverse colon. No associated perforation or extravasation    Colonoscopy procedure   - Two 8 to 15 mm polyps in the descending colon and at the splenic flexure, removed with a hot snare. One 3 mm polyp in the rectum, removed with a cold snare.                    Most likely diagnosis is hemorrhage after polypectomy. Due to timing, imaging and low hematocrit perforation is not likely.    Plan  CBC q 6 hrs  Start lactated ringers infusion  Consult to IR for possible embolization  Consult to colorectal surgery

## 2020-09-22 NOTE — SUBJECTIVE & OBJECTIVE
Past Medical History:   Diagnosis Date    Gout 2014    High cholesterol     Hypertension     Hypothyroid     Low testosterone     Staph aureus infection age 13    R leg       Past Surgical History:   Procedure Laterality Date    FRACTURE SURGERY Left     wrist    Incision and drainage of tibia Right     SINUS SURGERY      x2       Review of patient's allergies indicates:   Allergen Reactions    Codeine Rash    Penicillins Rash       Current Facility-Administered Medications on File Prior to Encounter   Medication    [DISCONTINUED] 0.9%  NaCl infusion    [DISCONTINUED] lidocaine (cardiac) injection    [DISCONTINUED] propofol (DIPRIVAN) 10 mg/mL infusion    [DISCONTINUED] propofol (DIPRIVAN) 10 mg/mL infusion     Current Outpatient Medications on File Prior to Encounter   Medication Sig    atorvastatin (LIPITOR) 20 MG tablet Take 1 tablet (20 mg total) by mouth once daily.    levothyroxine (SYNTHROID) 112 MCG tablet Take 1 tablet (112 mcg total) by mouth once daily.    losartan (COZAAR) 25 MG tablet Take 1 tablet (25 mg total) by mouth once daily.    testosterone (ANDROGEL) 20.25 mg/1.25 gram (1.62 %) GlPm Apply 3 pumps to shoulders daily  Disp 1 month supply     Family History     Problem Relation (Age of Onset)    Cancer Maternal Grandfather, Maternal Aunt    Heart disease Father (73)    Hyperlipidemia Sister, Brother    Hypertension Sister, Brother, Mother    Lupus Sister        Tobacco Use    Smoking status: Former Smoker     Packs/day: 0.25     Years: 15.00     Pack years: 3.75     Quit date: 1/3/2013     Years since quittin.7    Smokeless tobacco: Never Used   Substance and Sexual Activity    Alcohol use: Yes     Alcohol/week: 5.0 standard drinks     Types: 6 Standard drinks or equivalent per week     Frequency: 2-3 times a week     Drinks per session: 3 or 4     Binge frequency: Less than monthly     Comment: weekends    Drug use: No    Sexual activity: Yes     Review of  Systems   Constitutional: Negative for activity change, chills and fever.   HENT: Negative for sneezing and sore throat.    Respiratory: Negative for cough and shortness of breath.    Cardiovascular: Negative for chest pain and palpitations.   Gastrointestinal: Negative for abdominal distention and abdominal pain.   Genitourinary: Negative for difficulty urinating and flank pain.   Musculoskeletal: Negative for back pain and myalgias.   Skin: Negative for color change and rash.   Neurological: Negative for dizziness and headaches.   Psychiatric/Behavioral: Negative for agitation and self-injury.     Objective:     Vital Signs (Most Recent):  Temp: 97.7 °F (36.5 °C) (09/22/20 0135)  Pulse: 66 (09/22/20 0600)  Resp: 20 (09/22/20 0600)  BP: 125/76 (09/22/20 0600)  SpO2: 97 % (09/22/20 0600) Vital Signs (24h Range):  Temp:  [97.7 °F (36.5 °C)-99 °F (37.2 °C)] 97.7 °F (36.5 °C)  Pulse:  [55-79] 66  Resp:  [16-20] 20  SpO2:  [97 %-99 %] 97 %  BP: (104-159)/(56-96) 125/76        There is no height or weight on file to calculate BMI.    Physical Exam  Constitutional:       Appearance: Normal appearance.   HENT:      Head: Normocephalic and atraumatic.      Mouth/Throat:      Mouth: Mucous membranes are dry.   Cardiovascular:      Rate and Rhythm: Normal rate and regular rhythm.      Pulses: Normal pulses.      Heart sounds: Normal heart sounds.   Pulmonary:      Effort: Pulmonary effort is normal.      Breath sounds: Normal breath sounds.   Abdominal:      General: Abdomen is flat. Bowel sounds are normal. There is no distension.      Palpations: Abdomen is soft.      Tenderness: There is no abdominal tenderness.   Musculoskeletal:         General: No deformity.      Right lower leg: No edema.      Left lower leg: No edema.   Skin:     General: Skin is warm and dry.      Comments: No edema present   Neurological:      Mental Status: He is alert and oriented to person, place, and time.             Significant Labs:   CBC:    Recent Labs   Lab 09/22/20 0210 09/22/20 0220   WBC 4.74  --    HGB 12.4*  --    HCT 37.0* 35*   *  --      CMP:   Recent Labs   Lab 09/22/20 0210      K 3.7      CO2 18*   *   BUN 35*   CREATININE 1.2   CALCIUM 8.1*   PROT 6.0   ALBUMIN 3.7   BILITOT 0.4   ALKPHOS 74   AST 40   ALT 41   ANIONGAP 12   EGFRNONAA >60.0       Significant Imaging: I have reviewed and interpreted all pertinent imaging results/findings within the past 24 hours.

## 2020-09-22 NOTE — CONSULTS
Radiology Consult    Sukumar Beal is a 49 y.o. male who presented to the ED overnight after multiple episodes (~9) of bloody bowel movements s/p colonoscopy on 9/21 where 3 polyps were removed, 2 from the splenic flexure and 1 from rectal region.  Patient admitted to exercising after the procedure.  CTA revealed active GIB at the splenic flexure presumably where the polyps were removed.  Patient is not on any blood thinners and his INR is normal.  While in the ED, vital signs remained stable but Hb was found to be 12.4 (from 16.0 on 8/26).  Last bloody bowel movement was at 2 am on 9/22.      Past Medical History:   Diagnosis Date    Gout 7/2014    High cholesterol     Hypertension     Hypothyroid     Low testosterone     Staph aureus infection age 13    R leg     Past Surgical History:   Procedure Laterality Date    FRACTURE SURGERY Left 2004    wrist    Incision and drainage of tibia Right 1983    SINUS SURGERY      x2       Scheduled Meds:    atorvastatin  20 mg Oral Daily    levothyroxine  112 mcg Oral Daily     Continuous Infusions:    lactated ringers 75 mL/hr at 09/22/20 0721     PRN Meds:dextrose 50%, dextrose 50%, glucagon (human recombinant), glucose, glucose, sodium chloride 0.9%    Allergies:   Review of patient's allergies indicates:   Allergen Reactions    Codeine Rash    Penicillins Rash       Labs:  Recent Labs   Lab 09/22/20 0210   INR 0.9       Recent Labs   Lab 09/22/20 0210 09/22/20  0220   WBC 4.74  --    HGB 12.4*  --    HCT 37.0* 35*   MCV 93  --    *  --       Recent Labs   Lab 09/22/20 0210   *      K 3.7      CO2 18*   BUN 35*   CREATININE 1.2   CALCIUM 8.1*   ALT 41   AST 40   ALBUMIN 3.7   BILITOT 0.4         Vitals (Most Recent):  Temp: 97.7 °F (36.5 °C) (09/22/20 0135)  Pulse: 66 (09/22/20 0600)  Resp: 20 (09/22/20 0600)  BP: 125/76 (09/22/20 0600)  SpO2: 97 % (09/22/20 0600)    Plan:     Given the positive CTA findings, IR able to offer  angiography with possible embolization to treat active GIB.  Discussed the procedure with patient who wishes to hold off on any intervention at this time since he feels well and has not had a blood bowel movement in 5-6 hours.  His vital signs have remained stable throughout this time.  Patient is not on any blood thinners and his coagulation labs are normal.  It is reasonable to observe patient at this time while trending Hb and monitoring for any more bloody bowel movements.  If at any point patient should decompensate or have anymore episodes of bleeding, please contact IR immediately.       Roula Evans MD   Department of Radiology  PGY IV Resident  [facility] [id number]

## 2020-09-22 NOTE — ED NOTES
Pt moved to EDOU 11, care assumed.  Pt moved to hospital bed.  AAO x 4.  Updated on plan of care, states understanding.  Call button within reach.  Denies pain at present.  Will continue to monitor.

## 2020-09-22 NOTE — SUBJECTIVE & OBJECTIVE
(Not in a hospital admission)      Review of patient's allergies indicates:   Allergen Reactions    Codeine Rash    Penicillins Rash       Past Medical History:   Diagnosis Date    Gout 2014    High cholesterol     Hypertension     Hypothyroid     Low testosterone     Staph aureus infection age 13    R leg     Past Surgical History:   Procedure Laterality Date    FRACTURE SURGERY Left     wrist    Incision and drainage of tibia Right     SINUS SURGERY      x2     Family History     Problem Relation (Age of Onset)    Cancer Maternal Grandfather, Maternal Aunt    Heart disease Father (73)    Hyperlipidemia Sister, Brother    Hypertension Sister, Brother, Mother    Lupus Sister        Tobacco Use    Smoking status: Former Smoker     Packs/day: 0.25     Years: 15.00     Pack years: 3.75     Quit date: 1/3/2013     Years since quittin.7    Smokeless tobacco: Never Used   Substance and Sexual Activity    Alcohol use: Yes     Alcohol/week: 5.0 standard drinks     Types: 6 Standard drinks or equivalent per week     Frequency: 2-3 times a week     Drinks per session: 3 or 4     Binge frequency: Less than monthly     Comment: weekends    Drug use: No    Sexual activity: Yes     Review of Systems   Constitutional: Negative for activity change, appetite change, chills and fever.   HENT: Negative for sneezing and sore throat.    Respiratory: Negative for cough and shortness of breath.    Cardiovascular: Negative for chest pain and palpitations.   Gastrointestinal: Positive for blood in stool. Negative for abdominal distention and abdominal pain.   Genitourinary: Negative for difficulty urinating and flank pain.   Musculoskeletal: Negative for back pain and myalgias.   Skin: Negative for color change and pallor.   Neurological: Negative for dizziness and headaches.     Objective:     Vital Signs (Most Recent):  Temp: 97.7 °F (36.5 °C) (20 0135)  Pulse: 66 (20 0600)  Resp: 20 (20  0600)  BP: 125/76 (09/22/20 0600)  SpO2: 97 % (09/22/20 0600) Vital Signs (24h Range):  Temp:  [97.7 °F (36.5 °C)-99 °F (37.2 °C)] 97.7 °F (36.5 °C)  Pulse:  [55-79] 66  Resp:  [16-20] 20  SpO2:  [97 %-99 %] 97 %  BP: (104-159)/(56-96) 125/76        There is no height or weight on file to calculate BMI.    Physical Exam  Constitutional:       Appearance: Normal appearance.   HENT:      Head: Normocephalic and atraumatic.      Mouth/Throat:      Mouth: Mucous membranes are dry.   Cardiovascular:      Rate and Rhythm: Normal rate and regular rhythm.      Pulses: Normal pulses.      Heart sounds: Normal heart sounds.   Pulmonary:      Effort: Pulmonary effort is normal.      Breath sounds: Normal breath sounds.   Abdominal:      General: Abdomen is flat. Bowel sounds are normal. There is no distension.      Palpations: Abdomen is soft.      Tenderness: There is no abdominal tenderness.   Genitourinary:     Comments: KERI with dark blood  Musculoskeletal:         General: No deformity.      Right lower leg: No edema.      Left lower leg: No edema.   Skin:     General: Skin is warm and dry.      Comments: No edema present   Neurological:      Mental Status: He is alert and oriented to person, place, and time.         Significant Labs:  CBC (Last 3 Results):   Recent Labs   Lab 09/22/20  0210 09/22/20  0220   WBC 4.74  --    RBC 3.98*  --    HGB 12.4*  --    HCT 37.0* 35*   *  --    MCV 93  --    MCH 31.2*  --    MCHC 33.5  --      CMP (Last 3 Results):   Recent Labs   Lab 09/22/20  0210   *   CALCIUM 8.1*   ALBUMIN 3.7   PROT 6.0      K 3.7   CO2 18*      BUN 35*   CREATININE 1.2   ALKPHOS 74   ALT 41   AST 40   BILITOT 0.4       Significant Diagnostics:  CTA: Active GI bleed in the distal transverse colon as detailed above.  No associated perforation or extravasation.  Remainder of the CT angiogram is normal.     Cholelithiasis without CT evidence of acute cholecystitis.     Right renal cyst.

## 2020-09-22 NOTE — HPI
Mr Beal is a 49 year old male wt past medical history of hypertension, hyperlipidemia and hypothyroidism. He comes to the ED due to bloody stools x 1 day. He had a screening colonoscopy performed and hours later he started having bloody bowel movements. He had approximately 7 BMs of bright red blood. His last BM was 5 hours prior.     He denies dizziness, weakness, blurry vision, N/V, abdominal distension or abdominal pain. He reports that after the procedure he did a crossfit routine at the gym because he felt well and with energy. He does not have any GI history and no prior similar episodes. He denies using blood thinners, history  of  easy  bleeding , atherosclerotic  vascular  disease,  renal  disease,  aortic  valve disease, liver disease, alcoholism of family history of colon cancer.

## 2020-09-22 NOTE — ED NOTES
I-STAT Chem-8+ Results:   Value Reference Range   Sodium 128 136-145 mmol/L   Potassium  3.6 3.5-5.1 mmol/L   Chloride 108  mmol/L   Ionized Calcium 1.11 1.06-1.42 mmol/L   CO2 (measured) 20 23-29 mmol/L   Glucose 141  mg/dL   BUN 33 6-30 mg/dL   Creatinine 1.3 0.5-1.4 mg/dL   Hematocrit 35 36-54%

## 2020-09-22 NOTE — HPI
Mr. Beal is a 48yo M who presents with BRBPR. He had a screening colonoscopy with polypectomy yesterday with Dr. Newell. He felt good after and went to work. Last night he went to an excersize class at 6pm then returned home at 7:30pm. Once he was home he had 4 episodes of BRBPR with clots, each episode decreasing in amount. He then went to bed and woke up at midnight with more bleeding. He subsequently had 4-5 episodes without any decrease in the amount. This prompted him to present to the ED. Upon arrival he had another episode of bleeding. On arrival to the ED he is HDS with pulse in the 60's. CTA showed an active bleed in the distal transverse colon. He is admitted to hospital medicine and IR is consulted. Colorectal surgery is consulted for evaluation.

## 2020-09-22 NOTE — ED NOTES
"Pt ambulated to bathroom without difficulty.  Reports small BM with "mixed dark & light blood in toilet".  Returned to bed.  Denies any dizziness or weakness.    "

## 2020-09-22 NOTE — CONSULTS
[facility]  Colorectal Surgery  Consult Note    Patient Name: Sukumar Beal  MRN: [id number]  Admission Date: 9/22/2020  Hospital Length of Stay: 0 days  Attending Physician: Jose Ramon Frankel MD  Primary Care Provider: Katherine Turner MD    Inpatient consult to Colorectal Surgery  Consult performed by: Jocelyn Kramer MD  Consult ordered by: Samina Tapia PA-C        Subjective:     Chief Complaint/Reason for Admission: Lower GI bleed    History of Present Illness:  Mr. Beal is a 48yo M who presents with BRBPR. He had a screening colonoscopy with polypectomy yesterday with Dr. Newell. He felt good after and went to work. Last night he went to an excersize class at 6pm then returned home at 7:30pm. Once he was home he had 4 episodes of BRBPR with clots, each episode decreasing in amount. He then went to bed and woke up at midnight with more bleeding. He subsequently had 4-5 episodes without any decrease in the amount. This prompted him to present to the ED. Upon arrival he had another episode of bleeding. On arrival to the ED he is HDS with pulse in the 60's. CTA showed an active bleed in the distal transverse colon. He is admitted to hospital medicine and IR is consulted. Colorectal surgery is consulted for evaluation.    (Not in a hospital admission)      Review of patient's allergies indicates:   Allergen Reactions    Codeine Rash    Penicillins Rash       Past Medical History:   Diagnosis Date    Gout 7/2014    High cholesterol     Hypertension     Hypothyroid     Low testosterone     Staph aureus infection age 13    R leg     Past Surgical History:   Procedure Laterality Date    FRACTURE SURGERY Left 2004    wrist    Incision and drainage of tibia Right 1983    SINUS SURGERY      x2     Family History     Problem Relation (Age of Onset)    Cancer Maternal Grandfather, Maternal Aunt    Heart disease Father (73)    Hyperlipidemia Sister, Brother     Hypertension Sister, Brother, Mother    Lupus Sister        Tobacco Use    Smoking status: Former Smoker     Packs/day: 0.25     Years: 15.00     Pack years: 3.75     Quit date: 1/3/2013     Years since quittin.7    Smokeless tobacco: Never Used   Substance and Sexual Activity    Alcohol use: Yes     Alcohol/week: 5.0 standard drinks     Types: 6 Standard drinks or equivalent per week     Frequency: 2-3 times a week     Drinks per session: 3 or 4     Binge frequency: Less than monthly     Comment: weekends    Drug use: No    Sexual activity: Yes     Review of Systems   Constitutional: Negative for activity change, appetite change, chills and fever.   HENT: Negative for sneezing and sore throat.    Respiratory: Negative for cough and shortness of breath.    Cardiovascular: Negative for chest pain and palpitations.   Gastrointestinal: Positive for blood in stool. Negative for abdominal distention and abdominal pain.   Genitourinary: Negative for difficulty urinating and flank pain.   Musculoskeletal: Negative for back pain and myalgias.   Skin: Negative for color change and pallor.   Neurological: Negative for dizziness and headaches.     Objective:     Vital Signs (Most Recent):  Temp: 97.7 °F (36.5 °C) (20 0135)  Pulse: 66 (20 0600)  Resp: 20 (20 0600)  BP: 125/76 (20 0600)  SpO2: 97 % (20 0600) Vital Signs (24h Range):  Temp:  [97.7 °F (36.5 °C)-99 °F (37.2 °C)] 97.7 °F (36.5 °C)  Pulse:  [55-79] 66  Resp:  [16-20] 20  SpO2:  [97 %-99 %] 97 %  BP: (104-159)/(56-96) 125/76        There is no height or weight on file to calculate BMI.    Physical Exam  Constitutional:       Appearance: Normal appearance.   HENT:      Head: Normocephalic and atraumatic.      Mouth/Throat:      Mouth: Mucous membranes are dry.   Cardiovascular:      Rate and Rhythm: Normal rate and regular rhythm.      Pulses: Normal pulses.      Heart sounds: Normal heart sounds.   Pulmonary:      Effort:  Pulmonary effort is normal.      Breath sounds: Normal breath sounds.   Abdominal:      General: Abdomen is flat. Bowel sounds are normal. There is no distension.      Palpations: Abdomen is soft.      Tenderness: There is no abdominal tenderness.   Genitourinary:     Comments: KERI with dark blood  Musculoskeletal:         General: No deformity.      Right lower leg: No edema.      Left lower leg: No edema.   Skin:     General: Skin is warm and dry.      Comments: No edema present   Neurological:      Mental Status: He is alert and oriented to person, place, and time.         Significant Labs:  CBC (Last 3 Results):   Recent Labs   Lab 09/22/20 0210 09/22/20 0220   WBC 4.74  --    RBC 3.98*  --    HGB 12.4*  --    HCT 37.0* 35*   *  --    MCV 93  --    MCH 31.2*  --    MCHC 33.5  --      CMP (Last 3 Results):   Recent Labs   Lab 09/22/20 0210   *   CALCIUM 8.1*   ALBUMIN 3.7   PROT 6.0      K 3.7   CO2 18*      BUN 35*   CREATININE 1.2   ALKPHOS 74   ALT 41   AST 40   BILITOT 0.4       Significant Diagnostics:  CTA: Active GI bleed in the distal transverse colon as detailed above.  No associated perforation or extravasation.  Remainder of the CT angiogram is normal.     Cholelithiasis without CT evidence of acute cholecystitis.     Right renal cyst.    Assessment/Plan:     * Acute lower GI bleeding  50yo M with acute lower GI bleed s/p colonoscopy with polypectomy    - Admit to medicine  - IR consulted; pt currently refusing intervention he states he thinks it is getting better  - Serial H/H  - Montior vitals for signs of bleeding  - Should pt continue to have BRPBR or signs of bleeding; recommend IR for angio embolization  - Care per primary        Thank you for your consult. I will follow-up with patient. Please contact us if you have any additional questions.    Jocelyn Kramer MD  Colorectal Surgery  [facility]

## 2020-09-22 NOTE — ED NOTES
Sukumar Beal, a 49 y.o. male presents to the ED w/ complaint of blood in bowel movements (x9) after having a colonoscopy yesterday. Pt denies any CP, SOB, abdominal pain, n/v/d, or fevers. AAOx4. NADN. VSS.    Triage note:  Chief Complaint   Patient presents with    GI Bleeding     had colonoscopy yesterday. C/o excessive amounts of blood loss. Denies dizziness, weakness, blurred vision     Review of patient's allergies indicates:   Allergen Reactions    Codeine Rash    Penicillins Rash     Past Medical History:   Diagnosis Date    Gout 7/2014    High cholesterol     Hypertension     Hypothyroid     Low testosterone     Staph aureus infection age 13    R leg

## 2020-09-22 NOTE — ASSESSMENT & PLAN NOTE
48yo M with acute lower GI bleed s/p colonoscopy with polypectomy    - Admit to medicine  - IR consulted; pt currently refusing intervention he states he thinks it is getting better  - Serial H/H  - Montior vitals for signs of bleeding  - Should pt continue to have BRPBR or signs of bleeding; recommend IR for angio embolization  - Care per primary

## 2020-09-22 NOTE — H&P
STAFF PHYSICIAN NOTE                                   Attending Attestation for Rounds with Resident  I have reviewed and concur with the resident Dr. Apple's history, physical, assessment, and plan.  I have personally interviewed and examined the patient at bedside.  See below for my evaluation and additional findings.    Patient is a 49 year old male with PMH of HTN, HLD, hypothyroidism presents to ED for evaluation of BRBPR after having screening colonoscopy yesterday with 3 polypectomY by Dr. Newell from [facility] . Patient states he felt fine after colonoscopy and went to the gym. But around 7:30 pm he noticed rectal bleeding with red blood and clots. He had total 6 episodes at home and 1 in ED. Denies abdominal pain, dizziness, fatigue, chest pain, sob, palpitation, fever, chills, N/V/D. In ED Hgb 12.4 from recent baseline 16. INR and platelet wnl. CTA abdomen showed active bleeding over transverse colon without extravasation. IR and CRS consulted by ED. IR saw patient at bedside but patient reluctant to sign consent for procedure now given he did not have further bleeding for last 5 hours.     He appears comfortable, abdomen benign and remained hemodynamically stable. Keep NPO, serial H/H and notify IR if patient develops bleeding or significant drop in Hgb. Hold BP medication in setting of acute lower GI bleeding.                                    ________________________________________

## 2020-09-22 NOTE — ED PROVIDER NOTES
Encounter Date: 2020       History     Chief Complaint   Patient presents with    GI Bleeding     had colonoscopy yesterday. C/o excessive amounts of blood loss. Denies dizziness, weakness, blurred vision     Patient is a 49 year old male with PMHx of HTN, HLD, and hypothyroidism. He presents to the ED for rectal bleeding. Patient recently underwent colonoscopy on 2020 by Dr. Newell. He reports having approximately 9 episodes of bloody bowel movements since yesterday. Denies hx of anticoagulation. Denies hx of blood transfusions. He denies fever,chills, nausea, vomiting, sob, chest pain, abd pain, dysuria, diarrhea, or constipation. He is a former smoker and reports alcohol use.    The history is provided by the patient and medical records. No  was used.     Review of patient's allergies indicates:   Allergen Reactions    Codeine Rash    Penicillins Rash     Past Medical History:   Diagnosis Date    Gout 2014    High cholesterol     Hypertension     Hypothyroid     Low testosterone     Staph aureus infection age 13    R leg     Past Surgical History:   Procedure Laterality Date    FRACTURE SURGERY Left     wrist    Incision and drainage of tibia Right     SINUS SURGERY      x2     Family History   Problem Relation Age of Onset    Heart disease Father 73            Hypertension Sister     Hyperlipidemia Sister     Hypertension Brother     Hyperlipidemia Brother     Lupus Sister     Hypertension Mother     Cancer Maternal Grandfather     Cancer Maternal Aunt         lung - smoker    Melanoma Neg Hx      Social History     Tobacco Use    Smoking status: Former Smoker     Packs/day: 0.25     Years: 15.00     Pack years: 3.75     Quit date: 1/3/2013     Years since quittin.7    Smokeless tobacco: Never Used   Substance Use Topics    Alcohol use: Yes     Alcohol/week: 5.0 standard drinks     Types: 6 Standard drinks or equivalent per week      Frequency: 2-3 times a week     Drinks per session: 3 or 4     Binge frequency: Less than monthly     Comment: weekends    Drug use: No     Review of Systems   Constitutional: Negative for fever.   HENT: Negative for sore throat.    Respiratory: Negative for shortness of breath.    Cardiovascular: Negative for chest pain.   Gastrointestinal: Positive for anal bleeding. Negative for abdominal pain, nausea and vomiting.   Genitourinary: Negative for dysuria.   Musculoskeletal: Negative for back pain.   Skin: Negative for rash.   Neurological: Negative for weakness.   Hematological: Does not bruise/bleed easily.       Physical Exam     Initial Vitals [09/22/20 0135]   BP Pulse Resp Temp SpO2   (!) 144/91 79 18 97.7 °F (36.5 °C) 99 %      MAP       --         Physical Exam    Vitals reviewed.  Constitutional: He appears well-developed and well-nourished. No distress.   HENT:   Head: Normocephalic.   Eyes: Conjunctivae are normal.   Neck: Normal range of motion.   Cardiovascular: Normal rate and regular rhythm.   No murmur heard.  Pulmonary/Chest: Breath sounds normal. No respiratory distress. He has no wheezes. He has no rales.   Abdominal: Soft. Bowel sounds are normal. He exhibits no distension. There is no abdominal tenderness.   Genitourinary: Rectum:      Guaiac result positive.      No tenderness.   Guaiac positive stool. : Acceptable.   Genitourinary Comments: KERI performed, patient tolerated exam well. Maroon blood noted to gloved finger. Heme positive.     Musculoskeletal: Normal range of motion.   Neurological: He is alert and oriented to person, place, and time.   Skin: Skin is warm and dry.         ED Course   Procedures  Labs Reviewed   CBC W/ AUTO DIFFERENTIAL - Abnormal; Notable for the following components:       Result Value    RBC 3.98 (*)     Hemoglobin 12.4 (*)     Hematocrit 37.0 (*)     Mean Corpuscular Hemoglobin 31.2 (*)     Platelets 140 (*)     All other components within  normal limits   COMPREHENSIVE METABOLIC PANEL - Abnormal; Notable for the following components:    CO2 18 (*)     Glucose 139 (*)     BUN, Bld 35 (*)     Calcium 8.1 (*)     All other components within normal limits   ISTAT PROCEDURE - Abnormal; Notable for the following components:    POC Glucose 141 (*)     POC BUN 33 (*)     POC TCO2 (MEASURED) 20 (*)     POC Hematocrit 35 (*)     All other components within normal limits   PROTIME-INR   LIPASE   PROTIME-INR   URINALYSIS, REFLEX TO URINE CULTURE   CBC W/ AUTO DIFFERENTIAL   SARS-COV-2 RDRP GENE   TYPE & SCREEN   ISTAT CHEM8        ECG Results          EKG 12-lead (In process)  Result time 09/22/20 08:39:55    In process by Interface, Lab In [facility] (09/22/20 08:39:55)                 Narrative:    Test Reason : K92.2,    Vent. Rate : 072 BPM     Atrial Rate : 072 BPM     P-R Int : 180 ms          QRS Dur : 088 ms      QT Int : 390 ms       P-R-T Axes : 043 037 055 degrees     QTc Int : 427 ms    Normal sinus rhythm  Nonspecific T wave abnormality  Abnormal ECG  When compared with ECG of 23-OCT-2009 10:15,  Nonspecific T wave abnormality now evident in Anterior leads    Referred By: [email or web address]   SELF           Confirmed By:                             Imaging Results           CTA Abdomen and Pelvis (Final result)  Result time 09/22/20 04:46:56    Final result by Luigi Vuong MD (09/22/20 04:46:56)                 Impression:      Active GI bleed in the distal transverse colon as detailed above.  No associated perforation or extravasation.  Remainder of the CT angiogram is normal.    Cholelithiasis without CT evidence of acute cholecystitis.    Right renal cyst.    This report was flagged in Epic as abnormal.    Critical findings relayed to BRODY CRUZ by CYNTHIA Estrada MD on 09/22/2020 at 04:32.    Electronically signed by resident: Delta Estrada  Date:    09/22/2020  Time:    04:05    Electronically signed by: Luigi Vuong  MD  Date:    09/22/2020  Time:    04:46             Narrative:    EXAMINATION:  CTA ABDOMEN AND PELVIS    CLINICAL HISTORY:  GI bleed;    TECHNIQUE:  Using 100 cc of  Omnipaque 350 IV contrast, and multi-detector helical CT technique, axial CT angiogram images of the abdomen were obtained from the lung bases through the pelvis. Portal venous phase images of the abdomen and pelvis also done. 2D post-processing coronal and sagittal reconstructions of the abdominal aorta and visceral arteries performed.  Pre contrast images also obtained.    COMPARISON:  None available.    FINDINGS:  CTA:    In the distal transverse colon, there is high density material within the bowel lumen on arterial phase imaging (axial series 4 images 100-111) with increased pooling of contrast on the venous phase (axial series 5 images 7-10 and coronal series 9 images 20-23), consistent with active GI bleed.  No associated bowel perforation or extravasation of blood products outside of the bowel lumen.  Descending aorta maintains normal course and caliber, no fusiform dilatation.  There is mild aortoiliac calcific atherosclerosis.  Bilateral iliac arteries unremarkable in appearance.  Celiac artery, SMA and renal arteries are normal.    Lung bases:    Symmetrically well expanded and aerated, no pleural effusion or focal consolidation.  Visualized portion of the heart appears normal in size, no large pericardial effusion.    Abdomen:    Liver is normal in appearance, no focal lesions.  Punctate hyperdensity in the inferior aspect of the gallbladder, likely a stone.  No pericholecystic fluid or an adjacent inflammatory changes to suggest acute cholecystitis.  Spleen, pancreas and adrenal glands are unremarkable in appearance.  No mesenteric lymphadenopathy or free fluid.  Kidneys are normal in size and demonstrate appropriate cortical enhancement, no hydronephrosis, hydroureter or solid renal masses.  Hypodensity in the midpole of the right kidney  likely a simple cyst.  No bowel inflammation or obstructive process.  No pneumoperitoneum or organized fluid collection.    Pelvis    No free fluid in the pelvis, no pelvic lymphadenopathy.  Urinary bladder and reproductive structures are unremarkable in appearance.    Osseous structures: Mild degenerative changes of the spine, most pronounced in the lower lumbar spine at L4-L5 and L5-S1.  No acute fracture or lytic or destructive osseous lesion.                                 Medical Decision Making:   History:   Old Medical Records: I decided to obtain old medical records.  Old Records Summarized: records from previous admission(s).       <> Summary of Records: Patient colonoscopy found to have - Two 8 to 15 mm polyps in the descending colon and at the splenic flexure, removed with a hot snare. Resected and retrieved. One 3 mm polyp in the rectum, removed with a cold snare. Resected and retrieved. The examined portion of the ileum was normal. The distal rectum and anal verge are normal on retroflexion view.  Clinical Tests:   Lab Tests: Ordered and Reviewed  Radiological Study: Ordered and Reviewed  Medical Tests: Ordered and Reviewed  Other:   I discussed test(s) with the performing physician.       <> Summary of the Findings: 4:32 AM  Informed by radiology, Dr. Delta Estrada, patient CTA abdomen pelvis found to have distal transverse colon bleed. No evidence of bowel perforation.  I have discussed this case with another health care provider.       <> Summary of the Discussion: Case discussed with [facility] and hospital medicine for admission.        APC / Resident Notes:   Patient is a 49 year old male presents to the ED for emergent evaluation of rectal bleeding.     Will order labs and imaging. Will continue to monitor.     Differential diagnoses include, but are not limited to: GI bleed, bowel perforation, cardiac arrhythmia, or electrolyte imbalance.     COVID negative. No leukocytosis. Hemodynamically stable.  "Thrombocytopenia.     5:07 AM  Case discussed with [facility] for evaluation per hospital medicine request.     5:17 AM  Informed by radiology, Dr. Delta Estrada, patient refusing IR embolization at this time. Patient refusing to consent to procedure because "he feels fine and has not had serial episodes of bleeding." will continue to monitor.     Will admit to medicine for observation. UA pending.     I have discussed and reviewed with my supervising physician.         Attending Attestation:     Physician Attestation Statement for NP/PA:   I discussed this assessment and plan of this patient with the NP/PA, but I did not personally examine the patient. The face to face encounter was performed by the NP/PA.            Clinical Impression:       ICD-10-CM ICD-9-CM   1. GI bleed  K92.2 578.9   2. Chest pain  R07.9 786.50                      Disposition:   Disposition: Placed in Observation  Condition: Fair     ED Disposition Condition    Observation                             Samina Tapia PA-C  09/22/20 4527       Marylou Ramon MD  09/22/20 [id number]    "

## 2020-09-22 NOTE — H&P
[facility] Hospital Medicine  History & Physical    Patient Name: Sukumar Beal  MRN: [id number]  Admission Date: 9/22/2020  Attending Physician: VANI Newell MD   Primary Care Provider: Katherine Turner MD    [facility] Medicine Team: [facility] HOSP MED 5 Macy Newell MD     Patient information was obtained from patient and ER records.     Subjective:     Principal Problem:Acute lower GI bleeding    Chief Complaint:   Chief Complaint   Patient presents with    GI Bleeding     had colonoscopy yesterday. C/o excessive amounts of blood loss. Denies dizziness, weakness, blurred vision        HPI: Mr Beal is a 49 year old male wt past medical history of hypertension, hyperlipidemia and hypothyroidism. He comes to the ED due to bloody stools x 1 day. He had a screening colonoscopy performed and hours later he started having bloody bowel movements. He had approximately 7 BMs of bright red blood. His last BM was 5 hours prior.     He denies dizziness, weakness, blurry vision, N/V, abdominal distension or abdominal pain. He reports that after the procedure he did a crossfit routine at the gym because he felt well and with energy. He does not have any GI history and no prior similar episodes. He denies using blood thinners, history  of  easy  bleeding , atherosclerotic  vascular  disease,  renal  disease,  aortic  valve disease, liver disease, alcoholism of family history of colon cancer.    Past Medical History:   Diagnosis Date    Gout 7/2014    High cholesterol     Hypertension     Hypothyroid     Low testosterone     Staph aureus infection age 13    R leg       Past Surgical History:   Procedure Laterality Date    FRACTURE SURGERY Left 2004    wrist    Incision and drainage of tibia Right 1983    SINUS SURGERY      x2       Review of patient's allergies indicates:   Allergen Reactions    Codeine Rash    Penicillins Rash       Current Facility-Administered Medications on File  Prior to Encounter   Medication    [DISCONTINUED] 0.9%  NaCl infusion    [DISCONTINUED] lidocaine (cardiac) injection    [DISCONTINUED] propofol (DIPRIVAN) 10 mg/mL infusion    [DISCONTINUED] propofol (DIPRIVAN) 10 mg/mL infusion     Current Outpatient Medications on File Prior to Encounter   Medication Sig    atorvastatin (LIPITOR) 20 MG tablet Take 1 tablet (20 mg total) by mouth once daily.    levothyroxine (SYNTHROID) 112 MCG tablet Take 1 tablet (112 mcg total) by mouth once daily.    losartan (COZAAR) 25 MG tablet Take 1 tablet (25 mg total) by mouth once daily.    testosterone (ANDROGEL) 20.25 mg/1.25 gram (1.62 %) GlPm Apply 3 pumps to shoulders daily  Disp 1 month supply     Family History     Problem Relation (Age of Onset)    Cancer Maternal Grandfather, Maternal Aunt    Heart disease Father (73)    Hyperlipidemia Sister, Brother    Hypertension Sister, Brother, Mother    Lupus Sister        Tobacco Use    Smoking status: Former Smoker     Packs/day: 0.25     Years: 15.00     Pack years: 3.75     Quit date: 1/3/2013     Years since quittin.7    Smokeless tobacco: Never Used   Substance and Sexual Activity    Alcohol use: Yes     Alcohol/week: 5.0 standard drinks     Types: 6 Standard drinks or equivalent per week     Frequency: 2-3 times a week     Drinks per session: 3 or 4     Binge frequency: Less than monthly     Comment: weekends    Drug use: No    Sexual activity: Yes     Review of Systems   Constitutional: Negative for activity change, chills and fever.   HENT: Negative for sneezing and sore throat.    Respiratory: Negative for cough and shortness of breath.    Cardiovascular: Negative for chest pain and palpitations.   Gastrointestinal: Negative for abdominal distention and abdominal pain.   Genitourinary: Negative for difficulty urinating and flank pain.   Musculoskeletal: Negative for back pain and myalgias.   Skin: Negative for color change and rash.   Neurological: Negative  for dizziness and headaches.   Psychiatric/Behavioral: Negative for agitation and self-injury.     Objective:     Vital Signs (Most Recent):  Temp: 97.7 °F (36.5 °C) (09/22/20 0135)  Pulse: 66 (09/22/20 0600)  Resp: 20 (09/22/20 0600)  BP: 125/76 (09/22/20 0600)  SpO2: 97 % (09/22/20 0600) Vital Signs (24h Range):  Temp:  [97.7 °F (36.5 °C)-99 °F (37.2 °C)] 97.7 °F (36.5 °C)  Pulse:  [55-79] 66  Resp:  [16-20] 20  SpO2:  [97 %-99 %] 97 %  BP: (104-159)/(56-96) 125/76        There is no height or weight on file to calculate BMI.    Physical Exam  Constitutional:       Appearance: Normal appearance.   HENT:      Head: Normocephalic and atraumatic.      Mouth/Throat:      Mouth: Mucous membranes are dry.   Cardiovascular:      Rate and Rhythm: Normal rate and regular rhythm.      Pulses: Normal pulses.      Heart sounds: Normal heart sounds.   Pulmonary:      Effort: Pulmonary effort is normal.      Breath sounds: Normal breath sounds.   Abdominal:      General: Abdomen is flat. Bowel sounds are normal. There is no distension.      Palpations: Abdomen is soft.      Tenderness: There is no abdominal tenderness.   Musculoskeletal:         General: No deformity.      Right lower leg: No edema.      Left lower leg: No edema.   Skin:     General: Skin is warm and dry.      Comments: No edema present   Neurological:      Mental Status: He is alert and oriented to person, place, and time.             Significant Labs:   CBC:   Recent Labs   Lab 09/22/20 0210 09/22/20 0220   WBC 4.74  --    HGB 12.4*  --    HCT 37.0* 35*   *  --      CMP:   Recent Labs   Lab 09/22/20 0210      K 3.7      CO2 18*   *   BUN 35*   CREATININE 1.2   CALCIUM 8.1*   PROT 6.0   ALBUMIN 3.7   BILITOT 0.4   ALKPHOS 74   AST 40   ALT 41   ANIONGAP 12   EGFRNONAA >60.0       Significant Imaging: I have reviewed and interpreted all pertinent imaging results/findings within the past 24 hours.    Assessment/Plan:     * Acute  lower GI bleeding  Mr Beal is a 49 year old male wt past medical history of hypertension, hyperlipidemia and hypothyroidism that comes to the ED due to bloody stools x 1 day. He had a screening colonoscopy performed and hours later he started having bloody bowel movements. He had approximately 7 BMs of bright red blood. He denies dizziness, weakness, blurry vision, N/V, abdominal distension or abdominal pain. Last BM at 1:00 am (9/22) as per patient.    Patient hemodynamically stable. Hgb 12.4; Hct 37.0. BP: 104//96. MAP: 97-98. INR WNL. On physical exam: BS x4, soft, non tender and non distented.    CTA abdominopelvis: GI bleed in distal transverse colon. No associated perforation or extravasation    Colonoscopy procedure   - Two 8 to 15 mm polyps in the descending colon and at the splenic flexure, removed with a hot snare. One 3 mm polyp in the rectum, removed with a cold snare.                    Most likely diagnosis is hemorrhage after polypectomy. Due to timing, imaging and low hematocrit perforation is not likely.    Plan  CBC q 6 hrs  Start lactated ringers infusion at 125 ccs/hr  Consult to IR for possible embolization  Consult to colorectal surgery    Hypothyroidism  Plan  Continue home Levothyroxine    Hyperlipidemia  Plan  Continue home Atorvastatin          VTE Risk Mitigation (From admission, onward)         Ordered     IP VTE HIGH RISK PATIENT  Once      09/22/20 0626     Place sequential compression device  Until discontinued      09/22/20 0626                   Macy Newell MD  Department of Hospital Medicine   [facility]

## 2020-09-22 NOTE — CARE UPDATE
Patient still having bloody bowel movements. Colorectal surgery and Interventional radiology are aware of him. Colorectal surgery will take him to their service as his primary team for further management and repeat a colonoscopy given continued bloody bowel movements.           Alvaro Martino MD PGY-1  Internal Medicine

## 2020-09-23 VITALS
SYSTOLIC BLOOD PRESSURE: 113 MMHG | WEIGHT: 248.69 LBS | RESPIRATION RATE: 20 BRPM | BODY MASS INDEX: 31.91 KG/M2 | TEMPERATURE: 98 F | OXYGEN SATURATION: 98 % | HEART RATE: 60 BPM | DIASTOLIC BLOOD PRESSURE: 69 MMHG | HEIGHT: 74 IN

## 2020-09-23 LAB
ALBUMIN SERPL BCP-MCNC: 3.3 G/DL (ref 3.5–5.2)
ALP SERPL-CCNC: 61 U/L (ref 55–135)
ALT SERPL W/O P-5'-P-CCNC: 31 U/L (ref 10–44)
ANION GAP SERPL CALC-SCNC: 7 MMOL/L (ref 8–16)
AST SERPL-CCNC: 31 U/L (ref 10–40)
BASOPHILS # BLD AUTO: 0.03 K/UL (ref 0–0.2)
BASOPHILS NFR BLD: 0.7 % (ref 0–1.9)
BASOPHILS NFR BLD: 0.8 % (ref 0–1.9)
BASOPHILS NFR BLD: 0.9 % (ref 0–1.9)
BASOPHILS NFR BLD: 0.9 % (ref 0–1.9)
BILIRUB SERPL-MCNC: 0.5 MG/DL (ref 0.1–1)
BUN SERPL-MCNC: 18 MG/DL (ref 6–20)
CALCIUM SERPL-MCNC: 8.2 MG/DL (ref 8.7–10.5)
CHLORIDE SERPL-SCNC: 108 MMOL/L (ref 95–110)
CO2 SERPL-SCNC: 24 MMOL/L (ref 23–29)
CREAT SERPL-MCNC: 1 MG/DL (ref 0.5–1.4)
DIFFERENTIAL METHOD: ABNORMAL
EOSINOPHIL # BLD AUTO: 0 K/UL (ref 0–0.5)
EOSINOPHIL # BLD AUTO: 0 K/UL (ref 0–0.5)
EOSINOPHIL # BLD AUTO: 0.1 K/UL (ref 0–0.5)
EOSINOPHIL # BLD AUTO: 0.1 K/UL (ref 0–0.5)
EOSINOPHIL NFR BLD: 0.7 % (ref 0–8)
EOSINOPHIL NFR BLD: 1.2 % (ref 0–8)
EOSINOPHIL NFR BLD: 1.6 % (ref 0–8)
EOSINOPHIL NFR BLD: 1.9 % (ref 0–8)
ERYTHROCYTE [DISTWIDTH] IN BLOOD BY AUTOMATED COUNT: 12.7 % (ref 11.5–14.5)
ERYTHROCYTE [DISTWIDTH] IN BLOOD BY AUTOMATED COUNT: 12.7 % (ref 11.5–14.5)
ERYTHROCYTE [DISTWIDTH] IN BLOOD BY AUTOMATED COUNT: 12.8 % (ref 11.5–14.5)
ERYTHROCYTE [DISTWIDTH] IN BLOOD BY AUTOMATED COUNT: 12.9 % (ref 11.5–14.5)
EST. GFR  (AFRICAN AMERICAN): >60 ML/MIN/1.73 M^2
EST. GFR  (NON AFRICAN AMERICAN): >60 ML/MIN/1.73 M^2
GLUCOSE SERPL-MCNC: 100 MG/DL (ref 70–110)
HCT VFR BLD AUTO: 29.7 % (ref 40–54)
HCT VFR BLD AUTO: 29.8 % (ref 40–54)
HCT VFR BLD AUTO: 30.7 % (ref 40–54)
HCT VFR BLD AUTO: 31 % (ref 40–54)
HGB BLD-MCNC: 10 G/DL (ref 14–18)
HGB BLD-MCNC: 10.1 G/DL (ref 14–18)
HGB BLD-MCNC: 10.3 G/DL (ref 14–18)
HGB BLD-MCNC: 10.3 G/DL (ref 14–18)
IMM GRANULOCYTES # BLD AUTO: 0 K/UL (ref 0–0.04)
IMM GRANULOCYTES # BLD AUTO: 0.01 K/UL (ref 0–0.04)
IMM GRANULOCYTES NFR BLD AUTO: 0 % (ref 0–0.5)
IMM GRANULOCYTES NFR BLD AUTO: 0.3 % (ref 0–0.5)
LYMPHOCYTES # BLD AUTO: 1.4 K/UL (ref 1–4.8)
LYMPHOCYTES # BLD AUTO: 1.5 K/UL (ref 1–4.8)
LYMPHOCYTES NFR BLD: 37 % (ref 18–48)
LYMPHOCYTES NFR BLD: 38.1 % (ref 18–48)
LYMPHOCYTES NFR BLD: 41.2 % (ref 18–48)
LYMPHOCYTES NFR BLD: 42.5 % (ref 18–48)
MAGNESIUM SERPL-MCNC: 1.9 MG/DL (ref 1.6–2.6)
MCH RBC QN AUTO: 30.7 PG (ref 27–31)
MCH RBC QN AUTO: 30.9 PG (ref 27–31)
MCH RBC QN AUTO: 30.9 PG (ref 27–31)
MCH RBC QN AUTO: 31.3 PG (ref 27–31)
MCHC RBC AUTO-ENTMCNC: 33.2 G/DL (ref 32–36)
MCHC RBC AUTO-ENTMCNC: 33.6 G/DL (ref 32–36)
MCHC RBC AUTO-ENTMCNC: 33.7 G/DL (ref 32–36)
MCHC RBC AUTO-ENTMCNC: 33.9 G/DL (ref 32–36)
MCV RBC AUTO: 92 FL (ref 82–98)
MONOCYTES # BLD AUTO: 0.3 K/UL (ref 0.3–1)
MONOCYTES # BLD AUTO: 0.4 K/UL (ref 0.3–1)
MONOCYTES # BLD AUTO: 0.5 K/UL (ref 0.3–1)
MONOCYTES # BLD AUTO: 0.5 K/UL (ref 0.3–1)
MONOCYTES NFR BLD: 10.9 % (ref 4–15)
MONOCYTES NFR BLD: 11.3 % (ref 4–15)
MONOCYTES NFR BLD: 13.3 % (ref 4–15)
MONOCYTES NFR BLD: 9.3 % (ref 4–15)
NEUTROPHILS # BLD AUTO: 1.5 K/UL (ref 1.8–7.7)
NEUTROPHILS # BLD AUTO: 1.6 K/UL (ref 1.8–7.7)
NEUTROPHILS # BLD AUTO: 1.7 K/UL (ref 1.8–7.7)
NEUTROPHILS # BLD AUTO: 2.1 K/UL (ref 1.8–7.7)
NEUTROPHILS NFR BLD: 45.1 % (ref 38–73)
NEUTROPHILS NFR BLD: 45.1 % (ref 38–73)
NEUTROPHILS NFR BLD: 45.9 % (ref 38–73)
NEUTROPHILS NFR BLD: 50.7 % (ref 38–73)
NRBC BLD-RTO: 0 /100 WBC
PHOSPHATE SERPL-MCNC: 3.2 MG/DL (ref 2.7–4.5)
PLATELET # BLD AUTO: 132 K/UL (ref 150–350)
PLATELET # BLD AUTO: 133 K/UL (ref 150–350)
PLATELET # BLD AUTO: 144 K/UL (ref 150–350)
PLATELET # BLD AUTO: 146 K/UL (ref 150–350)
PMV BLD AUTO: 11 FL (ref 9.2–12.9)
PMV BLD AUTO: 11.3 FL (ref 9.2–12.9)
PMV BLD AUTO: 11.7 FL (ref 9.2–12.9)
PMV BLD AUTO: 11.7 FL (ref 9.2–12.9)
POTASSIUM SERPL-SCNC: 4.2 MMOL/L (ref 3.5–5.1)
PROT SERPL-MCNC: 5.2 G/DL (ref 6–8.4)
RBC # BLD AUTO: 3.23 M/UL (ref 4.6–6.2)
RBC # BLD AUTO: 3.24 M/UL (ref 4.6–6.2)
RBC # BLD AUTO: 3.33 M/UL (ref 4.6–6.2)
RBC # BLD AUTO: 3.36 M/UL (ref 4.6–6.2)
SODIUM SERPL-SCNC: 139 MMOL/L (ref 136–145)
WBC # BLD AUTO: 3.22 K/UL (ref 3.9–12.7)
WBC # BLD AUTO: 3.45 K/UL (ref 3.9–12.7)
WBC # BLD AUTO: 3.75 K/UL (ref 3.9–12.7)
WBC # BLD AUTO: 4.13 K/UL (ref 3.9–12.7)

## 2020-09-23 PROCEDURE — 96361 HYDRATE IV INFUSION ADD-ON: CPT

## 2020-09-23 PROCEDURE — 80053 COMPREHEN METABOLIC PANEL: CPT

## 2020-09-23 PROCEDURE — 36415 COLL VENOUS BLD VENIPUNCTURE: CPT

## 2020-09-23 PROCEDURE — 83735 ASSAY OF MAGNESIUM: CPT

## 2020-09-23 PROCEDURE — 84100 ASSAY OF PHOSPHORUS: CPT

## 2020-09-23 PROCEDURE — 25000003 PHARM REV CODE 250: Performed by: STUDENT IN AN ORGANIZED HEALTH CARE EDUCATION/TRAINING PROGRAM

## 2020-09-23 PROCEDURE — G0378 HOSPITAL OBSERVATION PER HR: HCPCS

## 2020-09-23 PROCEDURE — 85025 COMPLETE CBC W/AUTO DIFF WBC: CPT | Mod: 91

## 2020-09-23 PROCEDURE — 63600175 PHARM REV CODE 636 W HCPCS: Performed by: STUDENT IN AN ORGANIZED HEALTH CARE EDUCATION/TRAINING PROGRAM

## 2020-09-23 RX ADMIN — SODIUM CHLORIDE, SODIUM LACTATE, POTASSIUM CHLORIDE, AND CALCIUM CHLORIDE: .6; .31; .03; .02 INJECTION, SOLUTION INTRAVENOUS at 05:09

## 2020-09-23 RX ADMIN — SODIUM CHLORIDE, SODIUM LACTATE, POTASSIUM CHLORIDE, AND CALCIUM CHLORIDE: .6; .31; .03; .02 INJECTION, SOLUTION INTRAVENOUS at 01:09

## 2020-09-23 RX ADMIN — LEVOTHYROXINE SODIUM 112 MCG: 112 TABLET ORAL at 05:09

## 2020-09-23 RX ADMIN — ATORVASTATIN CALCIUM 20 MG: 20 TABLET, FILM COATED ORAL at 09:09

## 2020-09-23 NOTE — PLAN OF CARE
A&Ox4. VVS on RA. HR leonel 40's when sleeping 50's-60's when awake, asymptomatic.  LR @ 125. Adequate UOP per toilet. Independent ambulation in room. No bloody BM's this shift. Q6 CBC's maintained. Tolerating regular diet well. Pt currently resting comfortably, bed in lowest position, call light in reach. WCTM.    Problem: Adult Inpatient Plan of Care  Goal: Plan of Care Review  Outcome: Ongoing, Progressing  Goal: Patient-Specific Goal (Individualization)  Outcome: Ongoing, Progressing  Goal: Absence of Hospital-Acquired Illness or Injury  Outcome: Ongoing, Progressing  Goal: Optimal Comfort and Wellbeing  Outcome: Ongoing, Progressing  Goal: Readiness for Transition of Care  Outcome: Ongoing, Progressing  Goal: Rounds/Family Conference  Outcome: Ongoing, Progressing     Problem: Fall Injury Risk  Goal: Absence of Fall and Fall-Related Injury  Outcome: Ongoing, Progressing

## 2020-09-23 NOTE — PLAN OF CARE
09/23/20 1330   Discharge Assessment   Assessment Type Discharge Planning Assessment   Confirmed/corrected address and phone number on facesheet? Yes   Assessment information obtained from? Patient   Expected Length of Stay (days) 1   Communicated expected length of stay with patient/caregiver yes   Prior to hospitilization cognitive status: Alert/Oriented   Prior to hospitalization functional status: Independent   Current cognitive status: Alert/Oriented   Current Functional Status: Independent   Lives With alone   Able to Return to Prior Arrangements yes   Is patient able to care for self after discharge? Yes   Patient's perception of discharge disposition home or selfcare   Readmission Within the Last 30 Days no previous admission in last 30 days   Patient currently being followed by outpatient case management? No   Patient currently receives any other outside agency services? No   Equipment Currently Used at Home none   Do you have any problems affording any of your prescribed medications? No   Is the patient taking medications as prescribed? yes   Does the patient have transportation home? Yes   Transportation Anticipated family or friend will provide   Does the patient receive services at the [facility]madin Clinic? No   Discharge Plan A Home   Discharge Plan B Home   DME Needed Upon Discharge  none   Patient/Family in Agreement with Plan yes            PCP:  Katherine Turner MD  [phone]        Pharmacy:    [id number] [place] [address] [address] [facility] [address]  [address] [facility] [address]  Phone: [phone] Fax: [phone]    [facility]  [address] [facility] [address]  Phone: [phone] Fax: [phone]    [facility]  [address]  [facility] [address]  Phone: [phone] Fax: [phone]        Emergency Contacts:  Extended Emergency Contact Information  Primary Emergency  Contact: Lucía Barrow  Address: UNKNOWN   [place]  Home Phone: [phone]  Relation: Mother      Insurance:    Payor: [employer] / Plan: [place] [place]MO / Product Type: HMO /       09/23/2020  1:31 PM    Kirstie Rodriguez RN, CM   Ext: [phone]

## 2020-09-23 NOTE — ASSESSMENT & PLAN NOTE
48yo M with acute lower GI bleed s/p colonoscopy with polypectomy    - IR consulted; pt currently refusing intervention as bowel movements are becoming less bloody and less frequent  - Serial H/H  - Montior vitals for signs of bleeding  - Should pt continue to have BRPBR or signs of bleeding; recommend IR for angio embolization  - Plan for CLD today  - If patient remains stable, possible discharge later today.

## 2020-09-23 NOTE — NURSING
Received report from Smiley Reis RN from the 11th floor.  Initially when I received the call I was in the middle of a blood transfusion and took down the spectra link, but the patient was already on the floor.

## 2020-09-23 NOTE — DISCHARGE SUMMARY
[facility]  General Surgery  Discharge Summary      Patient Name: Sukumar Beal  MRN: [id number]  Admission Date: 9/22/2020  Hospital Length of Stay: 0 days  Discharge Date and Time:  09/23/2020 6:22 PM  Attending Physician: VANI Newell MD   Discharging Provider: Nicolás Kramer MD  Primary Care Provider: Katherine Turner MD     HPI: 48yo M had colonoscopy with polypectomy 9/21/20. That night he had bloody BMs and presented to the ED. He was found to have an acute lower GI bleed.    * No surgery found *     Hospital Course: CTA showed acute lower GI bleed, he refused IR intervention. His hgb stablized and he no longer had BRBPR. He is tolerating a regular diet without signs of bleeding.    Consults:   Consults (From admission, onward)        Status Ordering Provider     Inpatient consult to Colorectal Surgery  Once     Provider:  (Not yet assigned)    Completed LUKE SALGUERO     Inpatient consult to Interventional Radiology  Once     Provider:  (Not yet assigned)    Completed LUKE SALGUERO          Significant Diagnostic Studies: Labs:   CBC   Recent Labs   Lab 09/23/20  0005 09/23/20  0424 09/23/20  1143   WBC 4.13 3.75* 3.45*   HGB 10.3* 10.1* 10.3*   HCT 31.0* 29.8* 30.7*   * 132* 144*       Pending Diagnostic Studies:     Procedure Component Value Units Date/Time    CBC with Automated Differential [[id number]] Collected: 09/23/20 1752    Order Status: Sent Lab Status: In process Updated: 09/23/20 1752    Specimen: Blood         Final Active Diagnoses:    Diagnosis Date Noted POA    PRINCIPAL PROBLEM:  Acute lower GI bleeding [K92.2] 09/22/2020 Yes    Acute blood loss anemia [D62] 09/22/2020 Yes    Hypothyroid [E03.9]  Yes      Problems Resolved During this Admission:      Discharged Condition: good    Disposition: Home or Self Care    Follow Up:  Follow-up Information     GINNA Newell MD.    Specialty: Colon and Rectal Surgery  Why: As needed  Contact  information:  [address]  [facility] [address]  [phone]                 Patient Instructions:      Diet general     Call MD for:  severe uncontrolled pain     Call MD for:  persistent nausea and vomiting     No dressing needed     Activity as tolerated     Medications:  Reconciled Home Medications:      Medication List      CONTINUE taking these medications    atorvastatin 20 MG tablet  Commonly known as: LIPITOR  Take 1 tablet (20 mg total) by mouth once daily.     B-complex with vitamin C tablet  Commonly known as: Z-Bec or Equiv  Take 1 tablet by mouth once daily.     CYANOCOBALAMIN (VITAMIN B-12) ORAL  Take 1 tablet by mouth once daily.     levothyroxine 112 MCG tablet  Commonly known as: SYNTHROID  Take 1 tablet (112 mcg total) by mouth once daily.     losartan 25 MG tablet  Commonly known as: COZAAR  Take 1 tablet (25 mg total) by mouth once daily.     testosterone 20.25 mg/1.25 gram (1.62 %) Glpm  Commonly known as: AndroGeL  Apply 3 pumps to shoulders daily  Disp 1 month supply     VITAMIN D ORAL  Take 2 capsules by mouth once daily.     VITAMIN E ORAL  Take 1 tablet by mouth once daily.            Jocelyn Kramer MD  General Surgery  [facility]

## 2020-09-23 NOTE — SUBJECTIVE & OBJECTIVE
Subjective:     Interval History: Patient with NAEON. AFVSS. 1 bloody bowel movement around 8pm last night, dark maroon colored, and no bowel movements since. Patient resting comfortably in bed in no acute distress. Hgb slowly trending down.    Post-Op Info:  * No surgery found *          Medications:  Continuous Infusions:   lactated ringers 125 mL/hr at 09/23/20 0513     Scheduled Meds:   atorvastatin  20 mg Oral Daily    levothyroxine  112 mcg Oral Daily     PRN Meds:   dextrose 50%    dextrose 50%    glucagon (human recombinant)    glucose    glucose    sodium chloride 0.9%        Objective:     Vital Signs (Most Recent):  Temp: 97.9 °F (36.6 °C) (09/23/20 0721)  Pulse: 61 (09/23/20 0722)  Resp: 19 (09/23/20 0721)  BP: 129/75 (09/23/20 0721)  SpO2: 97 % (09/23/20 0721) Vital Signs (24h Range):  Temp:  [97.7 °F (36.5 °C)-98.7 °F (37.1 °C)] 97.9 °F (36.6 °C)  Pulse:  [55-77] 61  Resp:  [14-20] 19  SpO2:  [96 %-100 %] 97 %  BP: (107-133)/(59-84) 129/75     Intake/Output - Last 3 Shifts       09/21 0700 - 09/22 0659 09/22 0700 - 09/23 0659 09/23 0700 - 09/24 0659    P.O.  0     I.V. (mL/kg)  600 (5.3)     Other  0     Total Intake(mL/kg)  600 (5.3)     Urine (mL/kg/hr)  650 (0.2) 700 (2.4)    Emesis/NG output  0     Other  0     Stool  0 0    Blood  0     Total Output  650 700    Net  -50 -700           Urine Occurrence  0 x     Stool Occurrence  1 x 0 x    Emesis Occurrence  0 x           Physical Exam  Vitals signs reviewed.   Constitutional:       General: He is not in acute distress.     Appearance: Normal appearance.   HENT:      Head: Normocephalic and atraumatic.      Mouth/Throat:      Mouth: Mucous membranes are dry.   Eyes:      Extraocular Movements: Extraocular movements intact.   Neck:      Musculoskeletal: Normal range of motion.   Cardiovascular:      Rate and Rhythm: Normal rate.      Pulses: Normal pulses.   Pulmonary:      Effort: Pulmonary effort is normal. No respiratory distress.    Abdominal:      General: There is no distension.      Palpations: Abdomen is soft.   Musculoskeletal: Normal range of motion.   Skin:     General: Skin is warm and dry.      Findings: No erythema.   Neurological:      General: No focal deficit present.      Mental Status: He is alert and oriented to person, place, and time.   Psychiatric:         Behavior: Behavior normal.             Significant Labs:  CBC (Last 3 Results):   Recent Labs   Lab 09/22/20  1752 09/23/20  0005 09/23/20  0424   WBC 4.62 4.13 3.75*   RBC 3.52* 3.36* 3.23*   HGB 10.6* 10.3* 10.1*   HCT 33.0* 31.0* 29.8*   * 146* 132*   MCV 94 92 92   MCH 30.1 30.7 31.3*   MCHC 32.1 33.2 33.9     CBC:   Recent Labs   Lab 09/23/20  0424   WBC 3.75*   RBC 3.23*   HGB 10.1*   HCT 29.8*   *   MCV 92   MCH 31.3*   MCHC 33.9     CMP (Last 3 Results):   Recent Labs   Lab 09/22/20  0210 09/23/20  0424   * 100   CALCIUM 8.1* 8.2*   ALBUMIN 3.7 3.3*   PROT 6.0 5.2*    139   K 3.7 4.2   CO2 18* 24    108   BUN 35* 18   CREATININE 1.2 1.0   ALKPHOS 74 61   ALT 41 31   AST 40 31   BILITOT 0.4 0.5     CMP:   Recent Labs   Lab 09/23/20  0424      CALCIUM 8.2*   ALBUMIN 3.3*   PROT 5.2*      K 4.2   CO2 24      BUN 18   CREATININE 1.0   ALKPHOS 61   ALT 31   AST 31   BILITOT 0.5     CRP (Last 3 Results): No results for input(s): CRP in the last 168 hours.  CRP: No results for input(s): CRP in the last 168 hours.  All pertinent lab results within the last 24 hours have been reviewed.     Significant Diagnostics:  I have reviewed all pertinent imaging results/findings within the past 24 hours.

## 2020-09-23 NOTE — NURSING
Pt discharged to home, self ambulated out. Discharge orders reviewed including medications, follow up care, patient portal, and covid precautions. Pt verbalized understanding. No changes to medications were made. Pt left with all belongings including clothing, cell phone and .

## 2020-09-23 NOTE — PLAN OF CARE
Pt AAOx4. VS stable on RA. NSR-SB on tele. NPO, sips w/ meds. No complaints of nausea overnight. IVF infusing per MAR. Denies pain. Voiding per urinal, adequate UOP. Up to toilet independently. Bloody BM x1. Pt reports BM is less bloody and darker in color compared to previous BMs. Dr. Frias on-call made aware. CBC being monitored q6h, H&H trending down. No complaints of weakness, dizziness, or fatigue. Sleeping between care. Call light within reach, bed in lowest position. WCTM.

## 2020-09-23 NOTE — PROGRESS NOTES
[facility]  Colorectal Surgery  Progress Note    Patient Name: Sukumar Beal  MRN: [id number]  Admission Date: 9/22/2020  Hospital Length of Stay: 0 days  Attending Physician: VANI Newell MD    Subjective:     Interval History: Patient with NAEON. AFVSS. 1 bloody bowel movement around 8pm last night, dark maroon colored, and no bowel movements since. Patient resting comfortably in bed in no acute distress. Hgb slowly trending down.    Post-Op Info:  * No surgery found *          Medications:  Continuous Infusions:   lactated ringers 125 mL/hr at 09/23/20 0513     Scheduled Meds:   atorvastatin  20 mg Oral Daily    levothyroxine  112 mcg Oral Daily     PRN Meds:   dextrose 50%    dextrose 50%    glucagon (human recombinant)    glucose    glucose    sodium chloride 0.9%        Objective:     Vital Signs (Most Recent):  Temp: 97.9 °F (36.6 °C) (09/23/20 0721)  Pulse: 61 (09/23/20 0722)  Resp: 19 (09/23/20 0721)  BP: 129/75 (09/23/20 0721)  SpO2: 97 % (09/23/20 0721) Vital Signs (24h Range):  Temp:  [97.7 °F (36.5 °C)-98.7 °F (37.1 °C)] 97.9 °F (36.6 °C)  Pulse:  [55-77] 61  Resp:  [14-20] 19  SpO2:  [96 %-100 %] 97 %  BP: (107-133)/(59-84) 129/75     Intake/Output - Last 3 Shifts       09/21 0700 - 09/22 0659 09/22 0700 - 09/23 0659 09/23 0700 - 09/24 0659    P.O.  0     I.V. (mL/kg)  600 (5.3)     Other  0     Total Intake(mL/kg)  600 (5.3)     Urine (mL/kg/hr)  650 (0.2) 700 (2.4)    Emesis/NG output  0     Other  0     Stool  0 0    Blood  0     Total Output  650 700    Net  -50 -700           Urine Occurrence  0 x     Stool Occurrence  1 x 0 x    Emesis Occurrence  0 x           Physical Exam  Vitals signs reviewed.   Constitutional:       General: He is not in acute distress.     Appearance: Normal appearance.   HENT:      Head: Normocephalic and atraumatic.      Mouth/Throat:      Mouth: Mucous membranes are dry.   Eyes:      Extraocular Movements: Extraocular movements intact.    Neck:      Musculoskeletal: Normal range of motion.   Cardiovascular:      Rate and Rhythm: Normal rate.      Pulses: Normal pulses.   Pulmonary:      Effort: Pulmonary effort is normal. No respiratory distress.   Abdominal:      General: There is no distension.      Palpations: Abdomen is soft.   Musculoskeletal: Normal range of motion.   Skin:     General: Skin is warm and dry.      Findings: No erythema.   Neurological:      General: No focal deficit present.      Mental Status: He is alert and oriented to person, place, and time.   Psychiatric:         Behavior: Behavior normal.             Significant Labs:  CBC (Last 3 Results):   Recent Labs   Lab 09/22/20  1752 09/23/20  0005 09/23/20  0424   WBC 4.62 4.13 3.75*   RBC 3.52* 3.36* 3.23*   HGB 10.6* 10.3* 10.1*   HCT 33.0* 31.0* 29.8*   * 146* 132*   MCV 94 92 92   MCH 30.1 30.7 31.3*   MCHC 32.1 33.2 33.9     CBC:   Recent Labs   Lab 09/23/20  0424   WBC 3.75*   RBC 3.23*   HGB 10.1*   HCT 29.8*   *   MCV 92   MCH 31.3*   MCHC 33.9     CMP (Last 3 Results):   Recent Labs   Lab 09/22/20  0210 09/23/20  0424   * 100   CALCIUM 8.1* 8.2*   ALBUMIN 3.7 3.3*   PROT 6.0 5.2*    139   K 3.7 4.2   CO2 18* 24    108   BUN 35* 18   CREATININE 1.2 1.0   ALKPHOS 74 61   ALT 41 31   AST 40 31   BILITOT 0.4 0.5     CMP:   Recent Labs   Lab 09/23/20  0424      CALCIUM 8.2*   ALBUMIN 3.3*   PROT 5.2*      K 4.2   CO2 24      BUN 18   CREATININE 1.0   ALKPHOS 61   ALT 31   AST 31   BILITOT 0.5     CRP (Last 3 Results): No results for input(s): CRP in the last 168 hours.  CRP: No results for input(s): CRP in the last 168 hours.  All pertinent lab results within the last 24 hours have been reviewed.     Significant Diagnostics:  I have reviewed all pertinent imaging results/findings within the past 24 hours.    Assessment/Plan:     * Acute lower GI bleeding  48yo M with acute lower GI bleed s/p colonoscopy with  polypectomy    - IR consulted; pt currently refusing intervention as bowel movements are becoming less bloody and less frequent  - Serial H/H  - Montior vitals for signs of bleeding  - Should pt continue to have BRPBR or signs of bleeding; recommend IR for angio embolization  - Plan for CLD today  - If patient remains stable, possible discharge later today.          Jcarlos Frias MD  Colorectal Surgery  [facility]

## 2020-09-24 LAB
FINAL PATHOLOGIC DIAGNOSIS: NORMAL
GROSS: NORMAL

## 2020-09-24 NOTE — PLAN OF CARE
Patient was discharged home with no needs.      09/24/20 1455   Final Note   Assessment Type Final Discharge Note   Anticipated Discharge Disposition Home   Hospital Follow Up  Appt(s) scheduled? Yes   Discharge plans and expectations educations in teach back method with documentation complete? Yes   Right Care Referral Info   Post Acute Recommendation No Care   Post-Acute Status   Post-Acute Authorization Other   Other Status No Post-Acute Service Needs     Future Appointments   Date Time Provider Department Center   12/7/2020  1:00 PM Katherine Turenr MD [facility]   3/2/2021  7:30 AM LAB, APPOINTMENT [facility] LAB [facility]   3/2/2021  9:40 AM Kym Fallon PA-C [facility]   3/4/2021  9:00 AM Alonso Carey MD [facility]     Kirstie Rodriguez RN, CM   Ext: [phone]

## 2020-09-26 LAB
BLD PROD TYP BPU: NORMAL
BLOOD UNIT EXPIRATION DATE: NORMAL
BLOOD UNIT TYPE CODE: 6200
BLOOD UNIT TYPE: NORMAL
CODING SYSTEM: NORMAL
DISPENSE STATUS: NORMAL
TRANS ERYTHROCYTES VOL PATIENT: NORMAL ML

## 2020-09-28 DIAGNOSIS — K92.2 LOWER GI BLEED: Primary | ICD-10-CM

## 2020-10-14 ENCOUNTER — LAB VISIT (OUTPATIENT)
Dept: LAB | Facility: HOSPITAL | Age: 50
End: 2020-10-14
Attending: COLON & RECTAL SURGERY
Payer: COMMERCIAL

## 2020-10-14 ENCOUNTER — OFFICE VISIT (OUTPATIENT)
Dept: SURGERY | Facility: CLINIC | Age: 50
End: 2020-10-14
Payer: COMMERCIAL

## 2020-10-14 DIAGNOSIS — K92.2 LOWER GI BLEED: ICD-10-CM

## 2020-10-14 DIAGNOSIS — K92.2 LOWER GI BLEED: Primary | ICD-10-CM

## 2020-10-14 DIAGNOSIS — Z86.010 HISTORY OF COLONIC POLYPS: ICD-10-CM

## 2020-10-14 LAB
BASOPHILS # BLD AUTO: 0.02 K/UL (ref 0–0.2)
BASOPHILS NFR BLD: 0.6 % (ref 0–1.9)
DIFFERENTIAL METHOD: ABNORMAL
EOSINOPHIL # BLD AUTO: 0.1 K/UL (ref 0–0.5)
EOSINOPHIL NFR BLD: 1.7 % (ref 0–8)
ERYTHROCYTE [DISTWIDTH] IN BLOOD BY AUTOMATED COUNT: 14.2 % (ref 11.5–14.5)
HCT VFR BLD AUTO: 38.6 % (ref 40–54)
HGB BLD-MCNC: 12.3 G/DL (ref 14–18)
IMM GRANULOCYTES # BLD AUTO: 0.02 K/UL (ref 0–0.04)
IMM GRANULOCYTES NFR BLD AUTO: 0.6 % (ref 0–0.5)
LYMPHOCYTES # BLD AUTO: 1.3 K/UL (ref 1–4.8)
LYMPHOCYTES NFR BLD: 35 % (ref 18–48)
MCH RBC QN AUTO: 30.7 PG (ref 27–31)
MCHC RBC AUTO-ENTMCNC: 31.9 G/DL (ref 32–36)
MCV RBC AUTO: 96 FL (ref 82–98)
MONOCYTES # BLD AUTO: 0.4 K/UL (ref 0.3–1)
MONOCYTES NFR BLD: 10.9 % (ref 4–15)
NEUTROPHILS # BLD AUTO: 1.8 K/UL (ref 1.8–7.7)
NEUTROPHILS NFR BLD: 51.2 % (ref 38–73)
NRBC BLD-RTO: 0 /100 WBC
PLATELET # BLD AUTO: 168 K/UL (ref 150–350)
PMV BLD AUTO: 10.8 FL (ref 9.2–12.9)
RBC # BLD AUTO: 4.01 M/UL (ref 4.6–6.2)
WBC # BLD AUTO: 3.57 K/UL (ref 3.9–12.7)

## 2020-10-14 PROCEDURE — 85025 COMPLETE CBC W/AUTO DIFF WBC: CPT

## 2020-10-14 PROCEDURE — 99213 OFFICE O/P EST LOW 20 MIN: CPT | Mod: S$GLB,,, | Performed by: COLON & RECTAL SURGERY

## 2020-10-14 PROCEDURE — 36415 COLL VENOUS BLD VENIPUNCTURE: CPT

## 2020-10-14 PROCEDURE — 99213 PR OFFICE/OUTPT VISIT, EST, LEVL III, 20-29 MIN: ICD-10-PCS | Mod: S$GLB,,, | Performed by: COLON & RECTAL SURGERY

## 2020-10-14 NOTE — LETTER
October 19, 2020      Katherine Turner MD  [address]  [facility] [address]           [address]  [facility] [address]  [address]  [facility] [address]  Phone: [phone]          Patient: Sukumar Beal   MR Number: [id number]   YOB: 1970   Date of Visit: 10/14/2020       Dear Dr. Katherine Turner:    Thank you for referring Sukumar Beal to me for evaluation. Attached you will find relevant portions of my assessment and plan of care.    If you have questions, please do not hesitate to call me. I look forward to following Sukumar Beal along with you.    Sincerely,    VANI Newell MD    Enclosure  CC:  No Recipients    If you would like to receive this communication electronically, please contact [email or web address] or [phone] to request more information on [employer] access.    For providers and/or their staff who would like to refer a patient to [facility], please contact us through our one-stop-shop provider referral line, [facility] , at [phone].    If you feel you have received this communication in error or would no longer like to receive these types of communications, please e-mail [email or web address]

## 2020-10-19 ENCOUNTER — PATIENT MESSAGE (OUTPATIENT)
Dept: DERMATOLOGY | Facility: CLINIC | Age: 50
End: 2020-10-19

## 2020-10-19 NOTE — PROGRESS NOTES
HPI:  Sukumar Beal is a 50 y.o. male with history of post polypectomy hemorrhage requiring hospitalization.  He was not transfused.  He is doing well at this point.  Denies any further bleeding.  His appetite is good.  He is having regular formed stools.        Past Medical History:   Diagnosis Date    Gout 2014    High cholesterol     Hypertension     Hypothyroid     Low testosterone     Staph aureus infection age 13    R leg        Past Surgical History:   Procedure Laterality Date    COLONOSCOPY N/A 2020    Procedure: COLONOSCOPY;  Surgeon: VANI Newell MD;  [facility] ([address] [place] [address]);  Service: Endoscopy;  Laterality: N/A;  covid test 2nd floor surgery clinic     FRACTURE SURGERY Left     wrist    Incision and drainage of tibia Right     SINUS SURGERY      x2       Review of patient's allergies indicates:   Allergen Reactions    Codeine Rash    Penicillins Rash       Family History   Problem Relation Age of Onset    Heart disease Father 73            Hypertension Sister     Hyperlipidemia Sister     Hypertension Brother     Hyperlipidemia Brother     Lupus Sister     Hypertension Mother     Cancer Maternal Grandfather     Cancer Maternal Aunt         lung - smoker    Melanoma Neg Hx        Social History     Socioeconomic History    Marital status: Single     Spouse name: Not on file    Number of children: Not on file    Years of education: Not on file    Highest education level: Not on file   Occupational History    Not on file   Social Needs    Financial resource strain: Not hard at all    Food insecurity     Worry: Never true     Inability: Never true    Transportation needs     Medical: No     Non-medical: No   Tobacco Use    Smoking status: Former Smoker     Packs/day: 0.25     Years: 15.00     Pack years: 3.75     Quit date: 1/3/2013     Years since quittin.7    Smokeless tobacco: Never Used   Substance and Sexual Activity     Alcohol use: Yes     Alcohol/week: 5.0 standard drinks     Types: 6 Standard drinks or equivalent per week     Frequency: 2-3 times a week     Drinks per session: 3 or 4     Binge frequency: Less than monthly     Comment: weekends    Drug use: No    Sexual activity: Yes   Lifestyle    Physical activity     Days per week: 4 days     Minutes per session: 60 min    Stress: To some extent   Relationships    Social connections     Talks on phone: More than three times a week     Gets together: Three times a week     Attends Sikhism service: Not on file     Active member of club or organization: Yes     Attends meetings of clubs or organizations: More than 4 times per year     Relationship status: Never    Other Topics Concern    Not on file   Social History Narrative    Not on file       ROS:  GENERAL: No fever, chills, fatigability or weight loss.  Integument: No rashes, redness, icterus  CHEST: Denies BENITEZ, cyanosis, wheezing, cough and sputum production.  CARDIOVASCULAR: Denies chest pain, PND, orthopnea or reduced exercise tolerance.  GI: Denies abd pain, dysphagia, nausea, vomiting, no hematemesis   : Denies burning on urination, no hematuria, no bacteriuria  MSK: No deformities, swelling, joint pain swelling  Neurologic: No HAs, seizures, weakness, paresthesias, gait problems    PE:  General appearance healthy    Sclera/ Skin anicteric  AT NC EOMI  Neck supple trachea midline   Chest symmetric, nl excursion, no retractions, breathing comfortably  EXT - no CC  Neuro:  Mood/ affect nl, alert and oriented x 3, moves all ext's, gait nl        Assessment:  Post polypectomy hemorrhage, resolved    Plan:  Follow-up colonoscopy 3 years

## 2020-10-28 ENCOUNTER — PATIENT MESSAGE (OUTPATIENT)
Dept: DERMATOLOGY | Facility: CLINIC | Age: 50
End: 2020-10-28

## 2020-10-29 ENCOUNTER — PROCEDURE VISIT (OUTPATIENT)
Dept: DERMATOLOGY | Facility: CLINIC | Age: 50
End: 2020-10-29
Payer: COMMERCIAL

## 2020-10-29 DIAGNOSIS — Z41.1 ELECTIVE PROCEDURE FOR UNACCEPTABLE COSMETIC APPEARANCE: Primary | ICD-10-CM

## 2020-10-29 PROCEDURE — 17999 PR MICRONEEDLING 3 TREATMENTS: ICD-10-PCS | Mod: CSM,,, | Performed by: DERMATOLOGY

## 2020-10-29 PROCEDURE — 99499 UNLISTED E&M SERVICE: CPT | Mod: CSM,,, | Performed by: DERMATOLOGY

## 2020-10-29 PROCEDURE — 17999 UNLISTD PX SKN MUC MEMB SUBQ: CPT | Mod: CSM,,, | Performed by: DERMATOLOGY

## 2020-10-29 PROCEDURE — 99499 NO LOS: ICD-10-PCS | Mod: CSM,,, | Performed by: DERMATOLOGY

## 2020-10-29 NOTE — PROGRESS NOTES
Mr. Beal presents today for his first (#1 of 3) cosmetic treatment of fine lines and wrinkles on the face with the SkinPen microneedling device and kit.    Discussed the risks, benefits, and side effects of the microneedling procedure, including bruising, bleeding, redness, peeling, swelling, pain, tenderness, infection, scarring, and allergic reaction to stainless steel or topical anesthetic. Verbal and written consent were obtained from the patient.     Topical anesthesia with benzocaine 20% cream, lidocaine 4% cream, and tetracaine 2% cream was applied to the patient's clean face for 45-60 minutes prior to the procedure. Face was then cleansed and prepped with alcohol.    Microneedling of the face was performed with three passes to each area using the provided gliding agent.    There were no complications, and the patient tolerated the procedure well. Aftercare instructions were provided to the patient.

## 2020-11-13 ENCOUNTER — PATIENT MESSAGE (OUTPATIENT)
Dept: DERMATOLOGY | Facility: CLINIC | Age: 50
End: 2020-11-13

## 2020-11-16 ENCOUNTER — PROCEDURE VISIT (OUTPATIENT)
Dept: DERMATOLOGY | Facility: CLINIC | Age: 50
End: 2020-11-16
Payer: COMMERCIAL

## 2020-11-16 DIAGNOSIS — D48.5 NEOPLASM OF UNCERTAIN BEHAVIOR OF SKIN: Primary | ICD-10-CM

## 2020-11-16 PROCEDURE — 12031 INTMD RPR S/A/T/EXT 2.5 CM/<: CPT | Mod: 51,S$GLB,, | Performed by: DERMATOLOGY

## 2020-11-16 PROCEDURE — 99499 UNLISTED E&M SERVICE: CPT | Mod: S$GLB,,, | Performed by: DERMATOLOGY

## 2020-11-16 PROCEDURE — 88304 TISSUE EXAM BY PATHOLOGIST: CPT | Performed by: PATHOLOGY

## 2020-11-16 PROCEDURE — 88304 PR  SURG PATH,LEVEL III: ICD-10-PCS | Mod: 26,,, | Performed by: PATHOLOGY

## 2020-11-16 PROCEDURE — 11401 PR EXC SKIN BENIG 0.6-1 CM TRUNK,ARM,LEG: ICD-10-PCS | Mod: S$GLB,,, | Performed by: DERMATOLOGY

## 2020-11-16 PROCEDURE — 88304 TISSUE EXAM BY PATHOLOGIST: CPT | Mod: 26,,, | Performed by: PATHOLOGY

## 2020-11-16 PROCEDURE — 99499 NO LOS: ICD-10-PCS | Mod: S$GLB,,, | Performed by: DERMATOLOGY

## 2020-11-16 PROCEDURE — 11401 EXC TR-EXT B9+MARG 0.6-1 CM: CPT | Mod: S$GLB,,, | Performed by: DERMATOLOGY

## 2020-11-16 PROCEDURE — 12031 PR LAYR CLOS WND TRUNK,ARM,LEG <2.5 CM: ICD-10-PCS | Mod: 51,S$GLB,, | Performed by: DERMATOLOGY

## 2020-11-16 NOTE — PROGRESS NOTES
PROCEDURE NOTE    DIAGNOSIS: neoplasm of uncertain behavior, r/o epidermal inclusion cyst     LOCATION: right shoulder    ASSISTANT: Neeru Bower LPN    ANESTHESIA:  1% Lidocaine with Epinephrine and Sodium bicarbonate, 3 mL    PROCEDURE: Risks, benefits, and alternatives are discussed with the patient, and the patient agrees to the procedure by signing an informed consent. Patient is taken to the surgery suite and assumes a comfortable position. The area to be anesthetized is cleaned with Hibiclens solution. The area is injected with local anesthesia to produce a suitable field for surgery. Excision is performed through the skin to the underlying cystic lesion, and the cystic lesion is dissected from the surrounding normal skin and removed and sent to the pathology lab. Bleeding points are stopped with monopolar hyfrecation or tied with absorbable suture as needed throughout the procedure.     CLOSURE: The wound is undermined in all directions to mobilize tissue and to reduce tension on the sutured wound edges.  4-0 PDS II absorbable suture is used in a layered fashion closure through dermis and subcutaneous tissue to close the wound and to odilia the cutaneous wound edge. This allows for preservation of structure and function of surrounding anatomy. The epidermis and dermis are sutured with 3-0 Ethilon in an interrupted fashion.     SIZE OF EXCISION: 0.9 x 0.9 cm    LENGTH OF REPAIR: 1.4 cm     The area was then cleaned and a sterile pressure dressing applied. Procedure tolerated well without adverse event and negligible blood loss. The patient is discharged in stable condition.     Post op instructions: the patient is verbally (and in writing) educated on care of wound and told to call or return for bleeding, tenderness, redness, etc. Patient should use acetaminophen 1000mg every 8 hours for pain relief if needed.     Suture removal in 14 days.

## 2020-11-19 LAB
FINAL PATHOLOGIC DIAGNOSIS: NORMAL
GROSS: NORMAL
MICROSCOPIC EXAM: NORMAL

## 2020-11-25 NOTE — TELEPHONE ENCOUNTER
----- Message from Marika Morel sent at 11/25/2020 10:09 AM CST -----  Contact: [facility] [phone]  Requesting an RX refill or new RX.  Is this a refill or new RX: Refill  RX name and strength:atorvastatin (LIPITOR) 20 MG tablet  Is this a 30 day or 90 day RX: 90  Pharmacy name and phone # (copy/paste from chart):  [id number] - [place], [place] - [address] AT [facility] [address] [phone] (Phone)  [phone] (Fax)  Comments:

## 2020-11-29 RX ORDER — ATORVASTATIN CALCIUM 20 MG/1
20 TABLET, FILM COATED ORAL DAILY
Qty: 90 TABLET | Refills: 0 | Status: SHIPPED | OUTPATIENT
Start: 2020-11-29 | End: 2021-01-20 | Stop reason: SDUPTHER

## 2020-11-30 ENCOUNTER — CLINICAL SUPPORT (OUTPATIENT)
Dept: DERMATOLOGY | Facility: CLINIC | Age: 50
End: 2020-11-30
Payer: COMMERCIAL

## 2020-11-30 VITALS — TEMPERATURE: 98 F

## 2020-11-30 DIAGNOSIS — Z48.02 VISIT FOR SUTURE REMOVAL: Primary | ICD-10-CM

## 2020-11-30 PROCEDURE — 99024 PR POST-OP FOLLOW-UP VISIT: ICD-10-PCS | Mod: S$GLB,,, | Performed by: DERMATOLOGY

## 2020-11-30 PROCEDURE — 99024 POSTOP FOLLOW-UP VISIT: CPT | Mod: S$GLB,,, | Performed by: DERMATOLOGY

## 2020-11-30 NOTE — PROGRESS NOTES
Suture Removal note:  CC: 50 y.o. male patient is here for suture removal.         HPI: Patient is s/p excision of cyst from the right shoulder on 11/16/20.  Patient reports no problems.    WOUND PE:  Sutures intact.  Wound healing well.  Good approximation of skin edges.  No signs or symptoms of infection.    IMPRESSION:  The lesion is characterized by a keratin filled cyst lined by stratified squamous epithelium and containing a granular layer.     PLAN:  Sutures removed today.  Continue wound care.    RTC: In PRN months.

## 2020-12-09 ENCOUNTER — PATIENT MESSAGE (OUTPATIENT)
Dept: DERMATOLOGY | Facility: CLINIC | Age: 50
End: 2020-12-09

## 2020-12-11 ENCOUNTER — PROCEDURE VISIT (OUTPATIENT)
Dept: DERMATOLOGY | Facility: CLINIC | Age: 50
End: 2020-12-11
Payer: COMMERCIAL

## 2020-12-11 DIAGNOSIS — Z41.1 ELECTIVE PROCEDURE FOR UNACCEPTABLE COSMETIC APPEARANCE: Primary | ICD-10-CM

## 2020-12-11 PROCEDURE — 99499 NO LOS: ICD-10-PCS | Mod: S$GLB,,, | Performed by: DERMATOLOGY

## 2020-12-11 PROCEDURE — 99499 UNLISTED E&M SERVICE: CPT | Mod: S$GLB,,, | Performed by: DERMATOLOGY

## 2020-12-11 NOTE — PROGRESS NOTES
Mr. Beal presents today for his 2nd cosmetic treatment of fine lines and wrinkles on the face with the SkinPen microneedling device and kit.    Discussed the risks, benefits, and side effects of the microneedling procedure, including bruising, bleeding, redness, peeling, swelling, pain, tenderness, infection, scarring, and allergic reaction to stainless steel or topical anesthetic. Verbal and written consent were obtained from the patient.     Topical anesthesia with benzocaine 20% cream, lidocaine 4% cream, and tetracaine 2% cream was applied to the patient's clean face for 45-60 minutes prior to the procedure. Face was then cleansed and prepped with alcohol.    Microneedling of the face was performed with three passes to each area using the provided gliding agent.    There were no complications, and the patient tolerated the procedure well. Aftercare instructions were provided to the patient.

## 2021-01-20 RX ORDER — LOSARTAN POTASSIUM 25 MG/1
25 TABLET ORAL DAILY
Qty: 90 TABLET | Refills: 0 | Status: SHIPPED | OUTPATIENT
Start: 2021-01-20 | End: 2021-05-03 | Stop reason: SDUPTHER

## 2021-01-20 RX ORDER — ATORVASTATIN CALCIUM 20 MG/1
20 TABLET, FILM COATED ORAL DAILY
Qty: 90 TABLET | Refills: 0 | Status: SHIPPED | OUTPATIENT
Start: 2021-01-20 | End: 2021-07-18

## 2021-01-20 RX ORDER — LEVOTHYROXINE SODIUM 112 UG/1
112 TABLET ORAL DAILY
Qty: 90 TABLET | Refills: 0 | Status: SHIPPED | OUTPATIENT
Start: 2021-01-20 | End: 2021-10-06 | Stop reason: SDUPTHER

## 2021-02-18 ENCOUNTER — TELEPHONE (OUTPATIENT)
Dept: UROLOGY | Facility: CLINIC | Age: 51
End: 2021-02-18

## 2021-02-18 ENCOUNTER — PATIENT MESSAGE (OUTPATIENT)
Dept: UROLOGY | Facility: CLINIC | Age: 51
End: 2021-02-18

## 2021-02-22 ENCOUNTER — TELEPHONE (OUTPATIENT)
Dept: UROLOGY | Facility: CLINIC | Age: 51
End: 2021-02-22

## 2021-03-09 ENCOUNTER — LAB VISIT (OUTPATIENT)
Dept: LAB | Facility: HOSPITAL | Age: 51
End: 2021-03-09
Attending: UROLOGY
Payer: COMMERCIAL

## 2021-03-09 ENCOUNTER — OFFICE VISIT (OUTPATIENT)
Dept: UROLOGY | Facility: CLINIC | Age: 51
End: 2021-03-09
Payer: COMMERCIAL

## 2021-03-09 VITALS
DIASTOLIC BLOOD PRESSURE: 96 MMHG | SYSTOLIC BLOOD PRESSURE: 151 MMHG | HEIGHT: 74 IN | HEART RATE: 59 BPM | BODY MASS INDEX: 32.9 KG/M2 | WEIGHT: 256.38 LBS

## 2021-03-09 DIAGNOSIS — N13.8 BPH WITH URINARY OBSTRUCTION: ICD-10-CM

## 2021-03-09 DIAGNOSIS — E29.1 MALE HYPOGONADISM: ICD-10-CM

## 2021-03-09 DIAGNOSIS — N40.1 BPH WITH URINARY OBSTRUCTION: ICD-10-CM

## 2021-03-09 DIAGNOSIS — E29.1 MALE HYPOGONADISM: Primary | ICD-10-CM

## 2021-03-09 DIAGNOSIS — E78.5 DYSLIPIDEMIA: ICD-10-CM

## 2021-03-09 LAB
ALBUMIN SERPL BCP-MCNC: 4.1 G/DL (ref 3.5–5.2)
ALP SERPL-CCNC: 85 U/L (ref 55–135)
ALT SERPL W/O P-5'-P-CCNC: 43 U/L (ref 10–44)
AST SERPL-CCNC: 37 U/L (ref 10–40)
BASOPHILS # BLD AUTO: 0.04 K/UL (ref 0–0.2)
BASOPHILS NFR BLD: 0.8 % (ref 0–1.9)
BILIRUB DIRECT SERPL-MCNC: 0.2 MG/DL (ref 0.1–0.3)
BILIRUB SERPL-MCNC: 0.7 MG/DL (ref 0.1–1)
CHOLEST SERPL-MCNC: 170 MG/DL (ref 120–199)
CHOLEST/HDLC SERPL: 3.9 {RATIO} (ref 2–5)
COMPLEXED PSA SERPL-MCNC: 0.69 NG/ML (ref 0–4)
DIFFERENTIAL METHOD: NORMAL
EOSINOPHIL # BLD AUTO: 0.2 K/UL (ref 0–0.5)
EOSINOPHIL NFR BLD: 3.1 % (ref 0–8)
ERYTHROCYTE [DISTWIDTH] IN BLOOD BY AUTOMATED COUNT: 13.9 % (ref 11.5–14.5)
HCT VFR BLD AUTO: 44.5 % (ref 40–54)
HDLC SERPL-MCNC: 44 MG/DL (ref 40–75)
HDLC SERPL: 25.9 % (ref 20–50)
HGB BLD-MCNC: 14.5 G/DL (ref 14–18)
IMM GRANULOCYTES # BLD AUTO: 0.01 K/UL (ref 0–0.04)
IMM GRANULOCYTES NFR BLD AUTO: 0.2 % (ref 0–0.5)
LDLC SERPL CALC-MCNC: 84 MG/DL (ref 63–159)
LYMPHOCYTES # BLD AUTO: 1.8 K/UL (ref 1–4.8)
LYMPHOCYTES NFR BLD: 36.9 % (ref 18–48)
MCH RBC QN AUTO: 30 PG (ref 27–31)
MCHC RBC AUTO-ENTMCNC: 32.6 G/DL (ref 32–36)
MCV RBC AUTO: 92 FL (ref 82–98)
MONOCYTES # BLD AUTO: 0.6 K/UL (ref 0.3–1)
MONOCYTES NFR BLD: 12.3 % (ref 4–15)
NEUTROPHILS # BLD AUTO: 2.3 K/UL (ref 1.8–7.7)
NEUTROPHILS NFR BLD: 46.7 % (ref 38–73)
NONHDLC SERPL-MCNC: 126 MG/DL
NRBC BLD-RTO: 0 /100 WBC
PLATELET # BLD AUTO: 162 K/UL (ref 150–350)
PMV BLD AUTO: 11.1 FL (ref 9.2–12.9)
PROT SERPL-MCNC: 7 G/DL (ref 6–8.4)
RBC # BLD AUTO: 4.84 M/UL (ref 4.6–6.2)
TESTOST SERPL-MCNC: 324 NG/DL (ref 304–1227)
TRIGL SERPL-MCNC: 210 MG/DL (ref 30–150)
WBC # BLD AUTO: 4.88 K/UL (ref 3.9–12.7)

## 2021-03-09 PROCEDURE — 3077F SYST BP >= 140 MM HG: CPT | Mod: CPTII,S$GLB,, | Performed by: NURSE PRACTITIONER

## 2021-03-09 PROCEDURE — 84153 ASSAY OF PSA TOTAL: CPT | Performed by: UROLOGY

## 2021-03-09 PROCEDURE — 80076 HEPATIC FUNCTION PANEL: CPT | Performed by: UROLOGY

## 2021-03-09 PROCEDURE — 3077F PR MOST RECENT SYSTOLIC BLOOD PRESSURE >= 140 MM HG: ICD-10-PCS | Mod: CPTII,S$GLB,, | Performed by: NURSE PRACTITIONER

## 2021-03-09 PROCEDURE — 1126F AMNT PAIN NOTED NONE PRSNT: CPT | Mod: S$GLB,,, | Performed by: NURSE PRACTITIONER

## 2021-03-09 PROCEDURE — 1126F PR PAIN SEVERITY QUANTIFIED, NO PAIN PRESENT: ICD-10-PCS | Mod: S$GLB,,, | Performed by: NURSE PRACTITIONER

## 2021-03-09 PROCEDURE — 36415 COLL VENOUS BLD VENIPUNCTURE: CPT | Performed by: UROLOGY

## 2021-03-09 PROCEDURE — 84403 ASSAY OF TOTAL TESTOSTERONE: CPT | Performed by: UROLOGY

## 2021-03-09 PROCEDURE — 99999 PR PBB SHADOW E&M-EST. PATIENT-LVL III: CPT | Mod: PBBFAC,,, | Performed by: NURSE PRACTITIONER

## 2021-03-09 PROCEDURE — 3008F BODY MASS INDEX DOCD: CPT | Mod: CPTII,S$GLB,, | Performed by: NURSE PRACTITIONER

## 2021-03-09 PROCEDURE — 3080F PR MOST RECENT DIASTOLIC BLOOD PRESSURE >= 90 MM HG: ICD-10-PCS | Mod: CPTII,S$GLB,, | Performed by: NURSE PRACTITIONER

## 2021-03-09 PROCEDURE — 85025 COMPLETE CBC W/AUTO DIFF WBC: CPT | Performed by: UROLOGY

## 2021-03-09 PROCEDURE — 99999 PR PBB SHADOW E&M-EST. PATIENT-LVL III: ICD-10-PCS | Mod: PBBFAC,,, | Performed by: NURSE PRACTITIONER

## 2021-03-09 PROCEDURE — 80061 LIPID PANEL: CPT | Performed by: UROLOGY

## 2021-03-09 PROCEDURE — 3008F PR BODY MASS INDEX (BMI) DOCUMENTED: ICD-10-PCS | Mod: CPTII,S$GLB,, | Performed by: NURSE PRACTITIONER

## 2021-03-09 PROCEDURE — 99214 OFFICE O/P EST MOD 30 MIN: CPT | Mod: S$GLB,,, | Performed by: NURSE PRACTITIONER

## 2021-03-09 PROCEDURE — 99214 PR OFFICE/OUTPT VISIT, EST, LEVL IV, 30-39 MIN: ICD-10-PCS | Mod: S$GLB,,, | Performed by: NURSE PRACTITIONER

## 2021-03-09 PROCEDURE — 3080F DIAST BP >= 90 MM HG: CPT | Mod: CPTII,S$GLB,, | Performed by: NURSE PRACTITIONER

## 2021-03-10 ENCOUNTER — PATIENT MESSAGE (OUTPATIENT)
Dept: UROLOGY | Facility: CLINIC | Age: 51
End: 2021-03-10

## 2021-03-10 ENCOUNTER — TELEPHONE (OUTPATIENT)
Dept: UROLOGY | Facility: CLINIC | Age: 51
End: 2021-03-10

## 2021-03-11 ENCOUNTER — OFFICE VISIT (OUTPATIENT)
Dept: PODIATRY | Facility: CLINIC | Age: 51
End: 2021-03-11
Payer: COMMERCIAL

## 2021-03-11 VITALS
DIASTOLIC BLOOD PRESSURE: 93 MMHG | HEART RATE: 54 BPM | HEIGHT: 74 IN | BODY MASS INDEX: 32.92 KG/M2 | SYSTOLIC BLOOD PRESSURE: 136 MMHG

## 2021-03-11 DIAGNOSIS — G60.9 IDIOPATHIC PERIPHERAL NEUROPATHY: Primary | ICD-10-CM

## 2021-03-11 DIAGNOSIS — M20.11 VALGUS DEFORMITY OF BOTH GREAT TOES: ICD-10-CM

## 2021-03-11 DIAGNOSIS — M20.41 HAMMER TOES OF BOTH FEET: ICD-10-CM

## 2021-03-11 DIAGNOSIS — M20.42 HAMMER TOES OF BOTH FEET: ICD-10-CM

## 2021-03-11 DIAGNOSIS — M20.12 VALGUS DEFORMITY OF BOTH GREAT TOES: ICD-10-CM

## 2021-03-11 PROCEDURE — 3080F PR MOST RECENT DIASTOLIC BLOOD PRESSURE >= 90 MM HG: ICD-10-PCS | Mod: CPTII,S$GLB,, | Performed by: PODIATRIST

## 2021-03-11 PROCEDURE — 99999 PR PBB SHADOW E&M-EST. PATIENT-LVL III: ICD-10-PCS | Mod: PBBFAC,,, | Performed by: PODIATRIST

## 2021-03-11 PROCEDURE — 3080F DIAST BP >= 90 MM HG: CPT | Mod: CPTII,S$GLB,, | Performed by: PODIATRIST

## 2021-03-11 PROCEDURE — 3075F SYST BP GE 130 - 139MM HG: CPT | Mod: CPTII,S$GLB,, | Performed by: PODIATRIST

## 2021-03-11 PROCEDURE — 99203 OFFICE O/P NEW LOW 30 MIN: CPT | Mod: S$GLB,,, | Performed by: PODIATRIST

## 2021-03-11 PROCEDURE — 3008F BODY MASS INDEX DOCD: CPT | Mod: CPTII,S$GLB,, | Performed by: PODIATRIST

## 2021-03-11 PROCEDURE — 99203 PR OFFICE/OUTPT VISIT, NEW, LEVL III, 30-44 MIN: ICD-10-PCS | Mod: S$GLB,,, | Performed by: PODIATRIST

## 2021-03-11 PROCEDURE — 3075F PR MOST RECENT SYSTOLIC BLOOD PRESS GE 130-139MM HG: ICD-10-PCS | Mod: CPTII,S$GLB,, | Performed by: PODIATRIST

## 2021-03-11 PROCEDURE — 3008F PR BODY MASS INDEX (BMI) DOCUMENTED: ICD-10-PCS | Mod: CPTII,S$GLB,, | Performed by: PODIATRIST

## 2021-03-11 PROCEDURE — 99999 PR PBB SHADOW E&M-EST. PATIENT-LVL III: CPT | Mod: PBBFAC,,, | Performed by: PODIATRIST

## 2021-03-11 PROCEDURE — 1125F AMNT PAIN NOTED PAIN PRSNT: CPT | Mod: S$GLB,,, | Performed by: PODIATRIST

## 2021-03-11 PROCEDURE — 1125F PR PAIN SEVERITY QUANTIFIED, PAIN PRESENT: ICD-10-PCS | Mod: S$GLB,,, | Performed by: PODIATRIST

## 2021-03-11 RX ORDER — GABAPENTIN 300 MG/1
300 CAPSULE ORAL NIGHTLY
Qty: 20 CAPSULE | Refills: 0 | Status: SHIPPED | OUTPATIENT
Start: 2021-03-11 | End: 2022-03-10

## 2021-03-16 ENCOUNTER — PATIENT OUTREACH (OUTPATIENT)
Dept: ADMINISTRATIVE | Facility: HOSPITAL | Age: 51
End: 2021-03-16

## 2021-03-22 ENCOUNTER — PATIENT MESSAGE (OUTPATIENT)
Dept: UROLOGY | Facility: CLINIC | Age: 51
End: 2021-03-22

## 2021-03-23 ENCOUNTER — TELEPHONE (OUTPATIENT)
Dept: UROLOGY | Facility: CLINIC | Age: 51
End: 2021-03-23

## 2021-03-23 DIAGNOSIS — E29.1 MALE HYPOGONADISM: Primary | ICD-10-CM

## 2021-03-23 RX ORDER — TESTOSTERONE 20.25 MG/1.25G
GEL TOPICAL
Qty: 150 G | Refills: 5 | Status: SHIPPED | OUTPATIENT
Start: 2021-03-23 | End: 2021-09-23 | Stop reason: DRUGHIGH

## 2021-04-16 ENCOUNTER — PATIENT MESSAGE (OUTPATIENT)
Dept: RESEARCH | Facility: HOSPITAL | Age: 51
End: 2021-04-16

## 2021-05-03 RX ORDER — LOSARTAN POTASSIUM 25 MG/1
25 TABLET ORAL DAILY
Qty: 90 TABLET | Refills: 0 | Status: SHIPPED | OUTPATIENT
Start: 2021-05-03 | End: 2021-07-15

## 2021-06-16 ENCOUNTER — TELEPHONE (OUTPATIENT)
Dept: DERMATOLOGY | Facility: CLINIC | Age: 51
End: 2021-06-16

## 2021-06-17 ENCOUNTER — OFFICE VISIT (OUTPATIENT)
Dept: DERMATOLOGY | Facility: CLINIC | Age: 51
End: 2021-06-17
Payer: COMMERCIAL

## 2021-06-17 DIAGNOSIS — D18.01 ANGIOMA OF SKIN: ICD-10-CM

## 2021-06-17 DIAGNOSIS — L81.4 LENTIGINES: ICD-10-CM

## 2021-06-17 DIAGNOSIS — L72.0 EIC (EPIDERMAL INCLUSION CYST): ICD-10-CM

## 2021-06-17 DIAGNOSIS — L73.8 SEBACEOUS HYPERPLASIA: ICD-10-CM

## 2021-06-17 DIAGNOSIS — D23.9 BLUE NEVUS: ICD-10-CM

## 2021-06-17 DIAGNOSIS — D48.5 NEOPLASM OF UNCERTAIN BEHAVIOR OF SKIN: Primary | ICD-10-CM

## 2021-06-17 PROCEDURE — 88305 TISSUE EXAM BY PATHOLOGIST: ICD-10-PCS | Mod: 26,,, | Performed by: PATHOLOGY

## 2021-06-17 PROCEDURE — 11102 PR TANGENTIAL BIOPSY, SKIN, SINGLE LESION: ICD-10-PCS | Mod: S$GLB,,, | Performed by: DERMATOLOGY

## 2021-06-17 PROCEDURE — 1126F PR PAIN SEVERITY QUANTIFIED, NO PAIN PRESENT: ICD-10-PCS | Mod: S$GLB,,, | Performed by: DERMATOLOGY

## 2021-06-17 PROCEDURE — 11102 TANGNTL BX SKIN SINGLE LES: CPT | Mod: S$GLB,,, | Performed by: DERMATOLOGY

## 2021-06-17 PROCEDURE — 88305 TISSUE EXAM BY PATHOLOGIST: CPT | Mod: 26,,, | Performed by: PATHOLOGY

## 2021-06-17 PROCEDURE — 88305 TISSUE EXAM BY PATHOLOGIST: CPT | Performed by: PATHOLOGY

## 2021-06-17 PROCEDURE — 1126F AMNT PAIN NOTED NONE PRSNT: CPT | Mod: S$GLB,,, | Performed by: DERMATOLOGY

## 2021-06-17 PROCEDURE — 99213 OFFICE O/P EST LOW 20 MIN: CPT | Mod: 25,S$GLB,, | Performed by: DERMATOLOGY

## 2021-06-17 PROCEDURE — 99213 PR OFFICE/OUTPT VISIT, EST, LEVL III, 20-29 MIN: ICD-10-PCS | Mod: 25,S$GLB,, | Performed by: DERMATOLOGY

## 2021-06-21 ENCOUNTER — PATIENT MESSAGE (OUTPATIENT)
Dept: DERMATOLOGY | Facility: CLINIC | Age: 51
End: 2021-06-21

## 2021-06-22 ENCOUNTER — PROCEDURE VISIT (OUTPATIENT)
Dept: DERMATOLOGY | Facility: CLINIC | Age: 51
End: 2021-06-22
Payer: COMMERCIAL

## 2021-06-22 DIAGNOSIS — Z41.1 ENCOUNTER FOR COSMETIC LASER PROCEDURE: Primary | ICD-10-CM

## 2021-06-22 DIAGNOSIS — L73.8 SEBACEOUS HYPERPLASIA: ICD-10-CM

## 2021-06-22 DIAGNOSIS — L81.4 LENTIGINES: ICD-10-CM

## 2021-06-22 DIAGNOSIS — D18.01 ANGIOMA OF SKIN: ICD-10-CM

## 2021-06-22 PROCEDURE — 17999 UNLISTD PX SKN MUC MEMB SUBQ: CPT | Mod: CSM,S$GLB,, | Performed by: DERMATOLOGY

## 2021-06-22 PROCEDURE — 99499 NO LOS: ICD-10-PCS | Mod: CSM,S$GLB,, | Performed by: DERMATOLOGY

## 2021-06-22 PROCEDURE — 99499 UNLISTED E&M SERVICE: CPT | Mod: CSM,S$GLB,, | Performed by: DERMATOLOGY

## 2021-06-22 PROCEDURE — 17999 PR V BEAM, 51-150 PULSES: ICD-10-PCS | Mod: CSM,S$GLB,, | Performed by: DERMATOLOGY

## 2021-06-24 LAB
FINAL PATHOLOGIC DIAGNOSIS: NORMAL
GROSS: NORMAL
Lab: NORMAL
MICROSCOPIC EXAM: NORMAL

## 2021-07-01 ENCOUNTER — PATIENT MESSAGE (OUTPATIENT)
Dept: DERMATOLOGY | Facility: CLINIC | Age: 51
End: 2021-07-01

## 2021-07-02 ENCOUNTER — PROCEDURE VISIT (OUTPATIENT)
Dept: DERMATOLOGY | Facility: CLINIC | Age: 51
End: 2021-07-02
Payer: COMMERCIAL

## 2021-07-02 DIAGNOSIS — Z41.1 ELECTIVE PROCEDURE FOR UNACCEPTABLE COSMETIC APPEARANCE: Primary | ICD-10-CM

## 2021-07-02 PROCEDURE — 99499 UNLISTED E&M SERVICE: CPT | Mod: S$GLB,,, | Performed by: DERMATOLOGY

## 2021-07-02 PROCEDURE — 99499 NO LOS: ICD-10-PCS | Mod: S$GLB,,, | Performed by: DERMATOLOGY

## 2021-07-06 ENCOUNTER — PATIENT MESSAGE (OUTPATIENT)
Dept: ADMINISTRATIVE | Facility: HOSPITAL | Age: 51
End: 2021-07-06

## 2021-07-15 ENCOUNTER — PATIENT MESSAGE (OUTPATIENT)
Dept: INTERNAL MEDICINE | Facility: CLINIC | Age: 51
End: 2021-07-15

## 2021-07-16 ENCOUNTER — OFFICE VISIT (OUTPATIENT)
Dept: INTERNAL MEDICINE | Facility: CLINIC | Age: 51
End: 2021-07-16
Payer: COMMERCIAL

## 2021-07-16 VITALS
TEMPERATURE: 98 F | BODY MASS INDEX: 32.6 KG/M2 | WEIGHT: 254 LBS | SYSTOLIC BLOOD PRESSURE: 130 MMHG | DIASTOLIC BLOOD PRESSURE: 80 MMHG | OXYGEN SATURATION: 99 % | HEIGHT: 74 IN | HEART RATE: 60 BPM

## 2021-07-16 DIAGNOSIS — J02.9 PHARYNGITIS, UNSPECIFIED ETIOLOGY: Primary | ICD-10-CM

## 2021-07-16 DIAGNOSIS — J01.90 ACUTE SINUSITIS, RECURRENCE NOT SPECIFIED, UNSPECIFIED LOCATION: ICD-10-CM

## 2021-07-16 LAB
GROUP A STREP, MOLECULAR: NEGATIVE
SARS-COV-2 RNA RESP QL NAA+PROBE: NOT DETECTED
SARS-COV-2- CYCLE NUMBER: -1

## 2021-07-16 PROCEDURE — 99999 PR PBB SHADOW E&M-EST. PATIENT-LVL III: CPT | Mod: PBBFAC,,, | Performed by: FAMILY MEDICINE

## 2021-07-16 PROCEDURE — U0005 INFEC AGEN DETEC AMPLI PROBE: HCPCS | Performed by: FAMILY MEDICINE

## 2021-07-16 PROCEDURE — 87651 STREP A DNA AMP PROBE: CPT | Performed by: FAMILY MEDICINE

## 2021-07-16 PROCEDURE — 99214 OFFICE O/P EST MOD 30 MIN: CPT | Mod: S$GLB,,, | Performed by: FAMILY MEDICINE

## 2021-07-16 PROCEDURE — 99214 PR OFFICE/OUTPT VISIT, EST, LEVL IV, 30-39 MIN: ICD-10-PCS | Mod: S$GLB,,, | Performed by: FAMILY MEDICINE

## 2021-07-16 PROCEDURE — 99999 PR PBB SHADOW E&M-EST. PATIENT-LVL III: ICD-10-PCS | Mod: PBBFAC,,, | Performed by: FAMILY MEDICINE

## 2021-07-16 PROCEDURE — 3008F BODY MASS INDEX DOCD: CPT | Mod: CPTII,S$GLB,, | Performed by: FAMILY MEDICINE

## 2021-07-16 PROCEDURE — 1125F PR PAIN SEVERITY QUANTIFIED, PAIN PRESENT: ICD-10-PCS | Mod: S$GLB,,, | Performed by: FAMILY MEDICINE

## 2021-07-16 PROCEDURE — U0003 INFECTIOUS AGENT DETECTION BY NUCLEIC ACID (DNA OR RNA); SEVERE ACUTE RESPIRATORY SYNDROME CORONAVIRUS 2 (SARS-COV-2) (CORONAVIRUS DISEASE [COVID-19]), AMPLIFIED PROBE TECHNIQUE, MAKING USE OF HIGH THROUGHPUT TECHNOLOGIES AS DESCRIBED BY CMS-2020-01-R: HCPCS | Performed by: FAMILY MEDICINE

## 2021-07-16 PROCEDURE — 1125F AMNT PAIN NOTED PAIN PRSNT: CPT | Mod: S$GLB,,, | Performed by: FAMILY MEDICINE

## 2021-07-16 PROCEDURE — 3008F PR BODY MASS INDEX (BMI) DOCUMENTED: ICD-10-PCS | Mod: CPTII,S$GLB,, | Performed by: FAMILY MEDICINE

## 2021-07-16 RX ORDER — AZITHROMYCIN 250 MG/1
TABLET, FILM COATED ORAL
Qty: 6 TABLET | Refills: 0 | Status: SHIPPED | OUTPATIENT
Start: 2021-07-16 | End: 2021-07-21

## 2021-07-18 ENCOUNTER — TELEPHONE (OUTPATIENT)
Dept: INTERNAL MEDICINE | Facility: CLINIC | Age: 51
End: 2021-07-18

## 2021-07-18 DIAGNOSIS — Z00.00 WELLNESS EXAMINATION: Primary | ICD-10-CM

## 2021-09-20 DIAGNOSIS — E29.1 MALE HYPOGONADISM: ICD-10-CM

## 2021-09-21 DIAGNOSIS — E29.1 MALE HYPOGONADISM: ICD-10-CM

## 2021-09-21 RX ORDER — TESTOSTERONE 20.25 MG/1.25G
GEL TOPICAL
Qty: 150 G | Refills: 5 | OUTPATIENT
Start: 2021-09-21

## 2021-09-22 ENCOUNTER — LAB VISIT (OUTPATIENT)
Dept: LAB | Facility: HOSPITAL | Age: 51
End: 2021-09-22
Attending: INTERNAL MEDICINE
Payer: COMMERCIAL

## 2021-09-22 DIAGNOSIS — N13.8 BPH WITH URINARY OBSTRUCTION: ICD-10-CM

## 2021-09-22 DIAGNOSIS — E29.1 MALE HYPOGONADISM: ICD-10-CM

## 2021-09-22 DIAGNOSIS — E78.5 DYSLIPIDEMIA: ICD-10-CM

## 2021-09-22 DIAGNOSIS — N40.1 BPH WITH URINARY OBSTRUCTION: ICD-10-CM

## 2021-09-22 DIAGNOSIS — Z00.00 WELLNESS EXAMINATION: ICD-10-CM

## 2021-09-22 LAB
ALBUMIN SERPL BCP-MCNC: 4 G/DL (ref 3.5–5.2)
ALP SERPL-CCNC: 75 U/L (ref 55–135)
ALT SERPL W/O P-5'-P-CCNC: 37 U/L (ref 10–44)
ANION GAP SERPL CALC-SCNC: 10 MMOL/L (ref 8–16)
AST SERPL-CCNC: 33 U/L (ref 10–40)
BASOPHILS # BLD AUTO: 0.03 K/UL (ref 0–0.2)
BASOPHILS NFR BLD: 0.7 % (ref 0–1.9)
BILIRUB SERPL-MCNC: 0.7 MG/DL (ref 0.1–1)
BUN SERPL-MCNC: 17 MG/DL (ref 6–20)
CALCIUM SERPL-MCNC: 9.2 MG/DL (ref 8.7–10.5)
CHLORIDE SERPL-SCNC: 105 MMOL/L (ref 95–110)
CHOLEST SERPL-MCNC: 156 MG/DL (ref 120–199)
CHOLEST/HDLC SERPL: 3.2 {RATIO} (ref 2–5)
CO2 SERPL-SCNC: 24 MMOL/L (ref 23–29)
COMPLEXED PSA SERPL-MCNC: 0.81 NG/ML (ref 0–4)
CREAT SERPL-MCNC: 1 MG/DL (ref 0.5–1.4)
DIFFERENTIAL METHOD: NORMAL
EOSINOPHIL # BLD AUTO: 0.1 K/UL (ref 0–0.5)
EOSINOPHIL NFR BLD: 3.5 % (ref 0–8)
ERYTHROCYTE [DISTWIDTH] IN BLOOD BY AUTOMATED COUNT: 13.2 % (ref 11.5–14.5)
EST. GFR  (AFRICAN AMERICAN): >60 ML/MIN/1.73 M^2
EST. GFR  (NON AFRICAN AMERICAN): >60 ML/MIN/1.73 M^2
ESTIMATED AVG GLUCOSE: 105 MG/DL (ref 68–131)
GLUCOSE SERPL-MCNC: 106 MG/DL (ref 70–110)
HBA1C MFR BLD: 5.3 % (ref 4–5.6)
HCT VFR BLD AUTO: 44.6 % (ref 40–54)
HDLC SERPL-MCNC: 49 MG/DL (ref 40–75)
HDLC SERPL: 31.4 % (ref 20–50)
HGB BLD-MCNC: 14.8 G/DL (ref 14–18)
IMM GRANULOCYTES # BLD AUTO: 0.01 K/UL (ref 0–0.04)
IMM GRANULOCYTES NFR BLD AUTO: 0.2 % (ref 0–0.5)
LDLC SERPL CALC-MCNC: 87.8 MG/DL (ref 63–159)
LYMPHOCYTES # BLD AUTO: 1.4 K/UL (ref 1–4.8)
LYMPHOCYTES NFR BLD: 34.7 % (ref 18–48)
MCH RBC QN AUTO: 30.9 PG (ref 27–31)
MCHC RBC AUTO-ENTMCNC: 33.2 G/DL (ref 32–36)
MCV RBC AUTO: 93 FL (ref 82–98)
MONOCYTES # BLD AUTO: 0.5 K/UL (ref 0.3–1)
MONOCYTES NFR BLD: 12.2 % (ref 4–15)
NEUTROPHILS # BLD AUTO: 2 K/UL (ref 1.8–7.7)
NEUTROPHILS NFR BLD: 48.7 % (ref 38–73)
NONHDLC SERPL-MCNC: 107 MG/DL
NRBC BLD-RTO: 0 /100 WBC
PLATELET # BLD AUTO: 156 K/UL (ref 150–450)
PMV BLD AUTO: 11.7 FL (ref 9.2–12.9)
POTASSIUM SERPL-SCNC: 4.5 MMOL/L (ref 3.5–5.1)
PROT SERPL-MCNC: 6.5 G/DL (ref 6–8.4)
RBC # BLD AUTO: 4.79 M/UL (ref 4.6–6.2)
SODIUM SERPL-SCNC: 139 MMOL/L (ref 136–145)
TESTOST SERPL-MCNC: 295 NG/DL (ref 304–1227)
TRIGL SERPL-MCNC: 96 MG/DL (ref 30–150)
TSH SERPL DL<=0.005 MIU/L-ACNC: 2.15 UIU/ML (ref 0.4–4)
WBC # BLD AUTO: 4.01 K/UL (ref 3.9–12.7)

## 2021-09-22 PROCEDURE — 80053 COMPREHEN METABOLIC PANEL: CPT | Performed by: INTERNAL MEDICINE

## 2021-09-22 PROCEDURE — 84443 ASSAY THYROID STIM HORMONE: CPT | Performed by: INTERNAL MEDICINE

## 2021-09-22 PROCEDURE — 80061 LIPID PANEL: CPT | Performed by: INTERNAL MEDICINE

## 2021-09-22 PROCEDURE — 84153 ASSAY OF PSA TOTAL: CPT | Performed by: INTERNAL MEDICINE

## 2021-09-22 PROCEDURE — 36415 COLL VENOUS BLD VENIPUNCTURE: CPT | Performed by: NURSE PRACTITIONER

## 2021-09-22 PROCEDURE — 84403 ASSAY OF TOTAL TESTOSTERONE: CPT | Performed by: NURSE PRACTITIONER

## 2021-09-22 PROCEDURE — 83036 HEMOGLOBIN GLYCOSYLATED A1C: CPT | Performed by: INTERNAL MEDICINE

## 2021-09-22 PROCEDURE — 85025 COMPLETE CBC W/AUTO DIFF WBC: CPT | Performed by: INTERNAL MEDICINE

## 2021-09-23 ENCOUNTER — OFFICE VISIT (OUTPATIENT)
Dept: UROLOGY | Facility: CLINIC | Age: 51
End: 2021-09-23
Payer: COMMERCIAL

## 2021-09-23 VITALS
DIASTOLIC BLOOD PRESSURE: 94 MMHG | HEART RATE: 64 BPM | SYSTOLIC BLOOD PRESSURE: 144 MMHG | HEIGHT: 74 IN | WEIGHT: 249.19 LBS | BODY MASS INDEX: 31.98 KG/M2

## 2021-09-23 DIAGNOSIS — R53.83 FATIGUE, UNSPECIFIED TYPE: ICD-10-CM

## 2021-09-23 DIAGNOSIS — E29.1 PRIMARY MALE HYPOGONADISM: Primary | ICD-10-CM

## 2021-09-23 DIAGNOSIS — N40.0 BENIGN PROSTATIC HYPERPLASIA WITHOUT LOWER URINARY TRACT SYMPTOMS: ICD-10-CM

## 2021-09-23 DIAGNOSIS — E78.5 DYSLIPIDEMIA: ICD-10-CM

## 2021-09-23 LAB
BILIRUB SERPL-MCNC: NORMAL MG/DL
BLOOD URINE, POC: NORMAL
CLARITY, POC UA: CLEAR
COLOR, POC UA: YELLOW
GLUCOSE UR QL STRIP: NORMAL
KETONES UR QL STRIP: NORMAL
LEUKOCYTE ESTERASE URINE, POC: NORMAL
NITRITE, POC UA: NORMAL
PH, POC UA: 5
PROTEIN, POC: NORMAL
SPECIFIC GRAVITY, POC UA: 1.02
UROBILINOGEN, POC UA: NORMAL

## 2021-09-23 PROCEDURE — 99999 PR PBB SHADOW E&M-EST. PATIENT-LVL III: ICD-10-PCS | Mod: PBBFAC,,, | Performed by: NURSE PRACTITIONER

## 2021-09-23 PROCEDURE — 99214 OFFICE O/P EST MOD 30 MIN: CPT | Mod: S$GLB,,, | Performed by: NURSE PRACTITIONER

## 2021-09-23 PROCEDURE — 4010F PR ACE/ARB THEARPY RXD/TAKEN: ICD-10-PCS | Mod: CPTII,S$GLB,, | Performed by: NURSE PRACTITIONER

## 2021-09-23 PROCEDURE — 3008F PR BODY MASS INDEX (BMI) DOCUMENTED: ICD-10-PCS | Mod: CPTII,S$GLB,, | Performed by: NURSE PRACTITIONER

## 2021-09-23 PROCEDURE — 3077F PR MOST RECENT SYSTOLIC BLOOD PRESSURE >= 140 MM HG: ICD-10-PCS | Mod: CPTII,S$GLB,, | Performed by: NURSE PRACTITIONER

## 2021-09-23 PROCEDURE — 3044F HG A1C LEVEL LT 7.0%: CPT | Mod: CPTII,S$GLB,, | Performed by: NURSE PRACTITIONER

## 2021-09-23 PROCEDURE — 1160F RVW MEDS BY RX/DR IN RCRD: CPT | Mod: CPTII,S$GLB,, | Performed by: NURSE PRACTITIONER

## 2021-09-23 PROCEDURE — 4010F ACE/ARB THERAPY RXD/TAKEN: CPT | Mod: CPTII,S$GLB,, | Performed by: NURSE PRACTITIONER

## 2021-09-23 PROCEDURE — 81002 POCT URINE DIPSTICK WITHOUT MICROSCOPE: ICD-10-PCS | Mod: S$GLB,,, | Performed by: NURSE PRACTITIONER

## 2021-09-23 PROCEDURE — 3077F SYST BP >= 140 MM HG: CPT | Mod: CPTII,S$GLB,, | Performed by: NURSE PRACTITIONER

## 2021-09-23 PROCEDURE — 1159F MED LIST DOCD IN RCRD: CPT | Mod: CPTII,S$GLB,, | Performed by: NURSE PRACTITIONER

## 2021-09-23 PROCEDURE — 3080F PR MOST RECENT DIASTOLIC BLOOD PRESSURE >= 90 MM HG: ICD-10-PCS | Mod: CPTII,S$GLB,, | Performed by: NURSE PRACTITIONER

## 2021-09-23 PROCEDURE — 81002 URINALYSIS NONAUTO W/O SCOPE: CPT | Mod: S$GLB,,, | Performed by: NURSE PRACTITIONER

## 2021-09-23 PROCEDURE — 3044F PR MOST RECENT HEMOGLOBIN A1C LEVEL <7.0%: ICD-10-PCS | Mod: CPTII,S$GLB,, | Performed by: NURSE PRACTITIONER

## 2021-09-23 PROCEDURE — 1159F PR MEDICATION LIST DOCUMENTED IN MEDICAL RECORD: ICD-10-PCS | Mod: CPTII,S$GLB,, | Performed by: NURSE PRACTITIONER

## 2021-09-23 PROCEDURE — 99214 PR OFFICE/OUTPT VISIT, EST, LEVL IV, 30-39 MIN: ICD-10-PCS | Mod: S$GLB,,, | Performed by: NURSE PRACTITIONER

## 2021-09-23 PROCEDURE — 1160F PR REVIEW ALL MEDS BY PRESCRIBER/CLIN PHARMACIST DOCUMENTED: ICD-10-PCS | Mod: CPTII,S$GLB,, | Performed by: NURSE PRACTITIONER

## 2021-09-23 PROCEDURE — 99999 PR PBB SHADOW E&M-EST. PATIENT-LVL III: CPT | Mod: PBBFAC,,, | Performed by: NURSE PRACTITIONER

## 2021-09-23 PROCEDURE — 3008F BODY MASS INDEX DOCD: CPT | Mod: CPTII,S$GLB,, | Performed by: NURSE PRACTITIONER

## 2021-09-23 PROCEDURE — 3080F DIAST BP >= 90 MM HG: CPT | Mod: CPTII,S$GLB,, | Performed by: NURSE PRACTITIONER

## 2021-09-23 RX ORDER — TESTOSTERONE 20.25 MG/1.25G
GEL TOPICAL DAILY
Qty: 75 G | Refills: 5 | Status: SHIPPED | OUTPATIENT
Start: 2021-09-23 | End: 2022-03-08 | Stop reason: SDUPTHER

## 2021-11-04 RX ORDER — LOSARTAN POTASSIUM 25 MG/1
25 TABLET ORAL DAILY
Qty: 90 TABLET | Refills: 0 | Status: CANCELLED | OUTPATIENT
Start: 2021-11-04

## 2021-12-13 ENCOUNTER — PATIENT MESSAGE (OUTPATIENT)
Dept: DERMATOLOGY | Facility: CLINIC | Age: 51
End: 2021-12-13
Payer: COMMERCIAL

## 2022-01-08 RX ORDER — LEVOTHYROXINE SODIUM 112 UG/1
TABLET ORAL
Qty: 90 TABLET | Refills: 0 | OUTPATIENT
Start: 2022-01-08

## 2022-01-08 NOTE — TELEPHONE ENCOUNTER
No new care gaps identified.  Powered by [employer]. Reference number: [id number].   1/08/2022 9:14:00 AM CST

## 2022-01-08 NOTE — TELEPHONE ENCOUNTER
Quick DC. Request already responded to by other means (e.g. phone or fax)   Refill Authorization Note   Sukumar Beal  is requesting a refill authorization.  Brief Assessment and Rationale for Refill:  Quick Discontinue  Medication Therapy Plan:       Medication Reconciliation Completed:  No      Comments:   Pended Medication(s)       Requested Prescriptions     Refused Prescriptions Disp Refills    levothyroxine (SYNTHROID) 112 MCG tablet [Pharmacy Med Name: LEVOTHYROXINE 0.112MG (112MCG) TABS] 90 tablet 0     Sig: TAKE 1 TABLET(112 MCG) BY MOUTH EVERY DAY     Refused By: BERNARDINO SAUCEDO     Reason for Refusal: Request already responded to by other means (e.g. phone or fax)        Duplicate Pended Encounter(s)/ Last Prescribed Details: (includes pharmacy & prescriber details)   Ordering Encounter Report    Associated Reports   View Encounter                Note composed:4:24 PM 01/08/2022

## 2022-02-04 ENCOUNTER — PROCEDURE VISIT (OUTPATIENT)
Dept: DERMATOLOGY | Facility: CLINIC | Age: 52
End: 2022-02-04
Payer: COMMERCIAL

## 2022-02-04 DIAGNOSIS — Z41.1 ELECTIVE PROCEDURE FOR UNACCEPTABLE COSMETIC APPEARANCE: Primary | ICD-10-CM

## 2022-02-04 PROCEDURE — 99499 UNLISTED E&M SERVICE: CPT | Mod: CSM,S$GLB,, | Performed by: DERMATOLOGY

## 2022-02-04 PROCEDURE — 99499 NO LOS: ICD-10-PCS | Mod: CSM,S$GLB,, | Performed by: DERMATOLOGY

## 2022-02-04 NOTE — PROGRESS NOTES
Patient is here today for cosmetic Botox treatment.   He denies any new medical problems or medications.   He denies any history of adverse reaction to Botox or history of neuromuscular disease.     Discussed benefits and risks of Botox injections, including headache, weakness/paralysis of muscles, asymmetry, eyebrow/lid drooping, pain, bruising, swelling, infection, and rare risk of systemic botulism. Verbal and written consent was obtained.    Patient agreed to proceed with treatment. 47 units total were injected today:  10 in frontalis  25 in glabella (4-6-5-6-4)  12 in bilateral periorbital region    Patient tolerated well with no complications. He was instructed not to rub or massage the treated areas and that he should avoid lying down, bending upside down, and strenuous exercise for the rest of the day.    Botox dilution: 10u / 0.2mL (10u / 0.4mL for frontalis)  Lot #: [id number]  Exp date: 06/2024

## 2022-02-15 NOTE — TELEPHONE ENCOUNTER
No new care gaps identified.  Powered by [employer]. Reference number: [id number].   2/15/2022 10:27:51 AM CST

## 2022-02-16 ENCOUNTER — PATIENT OUTREACH (OUTPATIENT)
Dept: ADMINISTRATIVE | Facility: OTHER | Age: 52
End: 2022-02-16
Payer: COMMERCIAL

## 2022-02-16 RX ORDER — ATORVASTATIN CALCIUM 20 MG/1
20 TABLET, FILM COATED ORAL DAILY
Qty: 90 TABLET | Refills: 1 | Status: SHIPPED | OUTPATIENT
Start: 2022-02-16 | End: 2022-03-10 | Stop reason: SDUPTHER

## 2022-02-18 ENCOUNTER — PROCEDURE VISIT (OUTPATIENT)
Dept: DERMATOLOGY | Facility: CLINIC | Age: 52
End: 2022-02-18
Payer: COMMERCIAL

## 2022-02-18 ENCOUNTER — TELEPHONE (OUTPATIENT)
Dept: DERMATOLOGY | Facility: CLINIC | Age: 52
End: 2022-02-18
Payer: COMMERCIAL

## 2022-02-18 DIAGNOSIS — Z41.1 ELECTIVE PROCEDURE FOR UNACCEPTABLE COSMETIC APPEARANCE: Primary | ICD-10-CM

## 2022-02-18 PROCEDURE — 17999 UNLISTD PX SKN MUC MEMB SUBQ: CPT | Mod: CSM,S$GLB,, | Performed by: DERMATOLOGY

## 2022-02-18 PROCEDURE — 99499 UNLISTED E&M SERVICE: CPT | Mod: CSM,S$GLB,, | Performed by: DERMATOLOGY

## 2022-02-18 PROCEDURE — 17999 PR MICRONEEDLING 3 TREATMENTS: ICD-10-PCS | Mod: CSM,S$GLB,, | Performed by: DERMATOLOGY

## 2022-02-18 PROCEDURE — 99499 NO LOS: ICD-10-PCS | Mod: CSM,S$GLB,, | Performed by: DERMATOLOGY

## 2022-02-18 NOTE — PROGRESS NOTES
Mr. Beal presents today for cosmetic treatment of fine lines and wrinkles on the face with the SkinPen microneedling device and kit (#1 of 3 package purchased).    Discussed the risks, benefits, and side effects of the microneedling procedure, including bruising, bleeding, redness, peeling, swelling, pain, tenderness, infection, scarring, and allergic reaction to stainless steel or topical anesthetic. Verbal and written consent were obtained from the patient.     Topical anesthesia with lidocaine 4% cream was applied to the patient's clean face for 45-60 minutes prior to the procedure. Face was then cleansed and prepped with alcohol.    Microneedling of the face was performed with three passes to each area using the provided gliding agent.    There were no complications, and the patient tolerated the procedure well. Aftercare instructions were reviewed with the patient.

## 2022-03-07 DIAGNOSIS — N40.0 BENIGN PROSTATIC HYPERPLASIA WITHOUT LOWER URINARY TRACT SYMPTOMS: ICD-10-CM

## 2022-03-07 DIAGNOSIS — E78.5 DYSLIPIDEMIA: ICD-10-CM

## 2022-03-07 DIAGNOSIS — E29.1 PRIMARY MALE HYPOGONADISM: ICD-10-CM

## 2022-03-07 DIAGNOSIS — R53.83 FATIGUE, UNSPECIFIED TYPE: ICD-10-CM

## 2022-03-07 RX ORDER — TESTOSTERONE 20.25 MG/1.25G
GEL TOPICAL DAILY
Qty: 75 G | Refills: 5 | OUTPATIENT
Start: 2022-03-07 | End: 2022-09-03

## 2022-03-08 ENCOUNTER — OFFICE VISIT (OUTPATIENT)
Dept: UROLOGY | Facility: CLINIC | Age: 52
End: 2022-03-08
Payer: COMMERCIAL

## 2022-03-08 ENCOUNTER — LAB VISIT (OUTPATIENT)
Dept: LAB | Facility: HOSPITAL | Age: 52
End: 2022-03-08
Payer: COMMERCIAL

## 2022-03-08 VITALS
HEART RATE: 58 BPM | BODY MASS INDEX: 32.08 KG/M2 | HEIGHT: 74 IN | DIASTOLIC BLOOD PRESSURE: 96 MMHG | WEIGHT: 250 LBS | SYSTOLIC BLOOD PRESSURE: 142 MMHG

## 2022-03-08 DIAGNOSIS — E29.1 MALE HYPOGONADISM: ICD-10-CM

## 2022-03-08 DIAGNOSIS — R53.83 FATIGUE, UNSPECIFIED TYPE: ICD-10-CM

## 2022-03-08 DIAGNOSIS — E29.1 PRIMARY MALE HYPOGONADISM: ICD-10-CM

## 2022-03-08 DIAGNOSIS — N52.9 ED (ERECTILE DYSFUNCTION) OF ORGANIC ORIGIN: ICD-10-CM

## 2022-03-08 DIAGNOSIS — N40.0 BENIGN PROSTATIC HYPERPLASIA WITHOUT LOWER URINARY TRACT SYMPTOMS: ICD-10-CM

## 2022-03-08 DIAGNOSIS — E29.1 PRIMARY MALE HYPOGONADISM: Primary | ICD-10-CM

## 2022-03-08 DIAGNOSIS — N13.8 BPH WITH URINARY OBSTRUCTION: ICD-10-CM

## 2022-03-08 DIAGNOSIS — E78.5 DYSLIPIDEMIA: ICD-10-CM

## 2022-03-08 DIAGNOSIS — N40.1 BPH WITH URINARY OBSTRUCTION: ICD-10-CM

## 2022-03-08 LAB
ALBUMIN SERPL BCP-MCNC: 4.2 G/DL (ref 3.5–5.2)
ALBUMIN SERPL BCP-MCNC: 4.2 G/DL (ref 3.5–5.2)
ALP SERPL-CCNC: 78 U/L (ref 55–135)
ALP SERPL-CCNC: 78 U/L (ref 55–135)
ALT SERPL W/O P-5'-P-CCNC: 39 U/L (ref 10–44)
ALT SERPL W/O P-5'-P-CCNC: 39 U/L (ref 10–44)
ANION GAP SERPL CALC-SCNC: 10 MMOL/L (ref 8–16)
AST SERPL-CCNC: 33 U/L (ref 10–40)
AST SERPL-CCNC: 33 U/L (ref 10–40)
BILIRUB DIRECT SERPL-MCNC: 0.3 MG/DL (ref 0.1–0.3)
BILIRUB SERPL-MCNC: 1 MG/DL (ref 0.1–1)
BILIRUB SERPL-MCNC: 1 MG/DL (ref 0.1–1)
BUN SERPL-MCNC: 20 MG/DL (ref 6–20)
CALCIUM SERPL-MCNC: 9.6 MG/DL (ref 8.7–10.5)
CHLORIDE SERPL-SCNC: 102 MMOL/L (ref 95–110)
CO2 SERPL-SCNC: 29 MMOL/L (ref 23–29)
CREAT SERPL-MCNC: 0.9 MG/DL (ref 0.5–1.4)
CREAT SERPL-MCNC: 0.9 MG/DL (ref 0.5–1.4)
EST. GFR  (AFRICAN AMERICAN): >60 ML/MIN/1.73 M^2
EST. GFR  (AFRICAN AMERICAN): >60 ML/MIN/1.73 M^2
EST. GFR  (NON AFRICAN AMERICAN): >60 ML/MIN/1.73 M^2
EST. GFR  (NON AFRICAN AMERICAN): >60 ML/MIN/1.73 M^2
GLUCOSE SERPL-MCNC: 107 MG/DL (ref 70–110)
POTASSIUM SERPL-SCNC: 4 MMOL/L (ref 3.5–5.1)
PROT SERPL-MCNC: 6.9 G/DL (ref 6–8.4)
PROT SERPL-MCNC: 6.9 G/DL (ref 6–8.4)
SODIUM SERPL-SCNC: 141 MMOL/L (ref 136–145)
TESTOST SERPL-MCNC: 399 NG/DL (ref 304–1227)

## 2022-03-08 PROCEDURE — 99999 PR PBB SHADOW E&M-EST. PATIENT-LVL IV: ICD-10-PCS | Mod: PBBFAC,,, | Performed by: NURSE PRACTITIONER

## 2022-03-08 PROCEDURE — 99999 PR PBB SHADOW E&M-EST. PATIENT-LVL IV: CPT | Mod: PBBFAC,,, | Performed by: NURSE PRACTITIONER

## 2022-03-08 PROCEDURE — 99214 OFFICE O/P EST MOD 30 MIN: CPT | Mod: S$GLB,,, | Performed by: NURSE PRACTITIONER

## 2022-03-08 PROCEDURE — 3077F SYST BP >= 140 MM HG: CPT | Mod: CPTII,S$GLB,, | Performed by: NURSE PRACTITIONER

## 2022-03-08 PROCEDURE — 3008F BODY MASS INDEX DOCD: CPT | Mod: CPTII,S$GLB,, | Performed by: NURSE PRACTITIONER

## 2022-03-08 PROCEDURE — 84403 ASSAY OF TOTAL TESTOSTERONE: CPT | Performed by: NURSE PRACTITIONER

## 2022-03-08 PROCEDURE — 1160F PR REVIEW ALL MEDS BY PRESCRIBER/CLIN PHARMACIST DOCUMENTED: ICD-10-PCS | Mod: CPTII,S$GLB,, | Performed by: NURSE PRACTITIONER

## 2022-03-08 PROCEDURE — 99214 PR OFFICE/OUTPT VISIT, EST, LEVL IV, 30-39 MIN: ICD-10-PCS | Mod: S$GLB,,, | Performed by: NURSE PRACTITIONER

## 2022-03-08 PROCEDURE — 1159F PR MEDICATION LIST DOCUMENTED IN MEDICAL RECORD: ICD-10-PCS | Mod: CPTII,S$GLB,, | Performed by: NURSE PRACTITIONER

## 2022-03-08 PROCEDURE — 4010F ACE/ARB THERAPY RXD/TAKEN: CPT | Mod: CPTII,S$GLB,, | Performed by: NURSE PRACTITIONER

## 2022-03-08 PROCEDURE — 3077F PR MOST RECENT SYSTOLIC BLOOD PRESSURE >= 140 MM HG: ICD-10-PCS | Mod: CPTII,S$GLB,, | Performed by: NURSE PRACTITIONER

## 2022-03-08 PROCEDURE — 4010F PR ACE/ARB THEARPY RXD/TAKEN: ICD-10-PCS | Mod: CPTII,S$GLB,, | Performed by: NURSE PRACTITIONER

## 2022-03-08 PROCEDURE — 1160F RVW MEDS BY RX/DR IN RCRD: CPT | Mod: CPTII,S$GLB,, | Performed by: NURSE PRACTITIONER

## 2022-03-08 PROCEDURE — 1159F MED LIST DOCD IN RCRD: CPT | Mod: CPTII,S$GLB,, | Performed by: NURSE PRACTITIONER

## 2022-03-08 PROCEDURE — 80053 COMPREHEN METABOLIC PANEL: CPT | Performed by: NURSE PRACTITIONER

## 2022-03-08 PROCEDURE — 36415 COLL VENOUS BLD VENIPUNCTURE: CPT | Performed by: NURSE PRACTITIONER

## 2022-03-08 PROCEDURE — 3008F PR BODY MASS INDEX (BMI) DOCUMENTED: ICD-10-PCS | Mod: CPTII,S$GLB,, | Performed by: NURSE PRACTITIONER

## 2022-03-08 PROCEDURE — 3080F PR MOST RECENT DIASTOLIC BLOOD PRESSURE >= 90 MM HG: ICD-10-PCS | Mod: CPTII,S$GLB,, | Performed by: NURSE PRACTITIONER

## 2022-03-08 PROCEDURE — 3080F DIAST BP >= 90 MM HG: CPT | Mod: CPTII,S$GLB,, | Performed by: NURSE PRACTITIONER

## 2022-03-08 RX ORDER — TESTOSTERONE 20.25 MG/1.25G
GEL TOPICAL DAILY
Qty: 75 G | Refills: 5 | Status: SHIPPED | OUTPATIENT
Start: 2022-03-08 | End: 2022-08-29 | Stop reason: SDUPTHER

## 2022-03-08 RX ORDER — SILDENAFIL 100 MG/1
100 TABLET, FILM COATED ORAL DAILY PRN
Qty: 10 TABLET | Refills: 12 | Status: SHIPPED | OUTPATIENT
Start: 2022-03-08 | End: 2023-03-09 | Stop reason: SDUPTHER

## 2022-03-08 NOTE — PROGRESS NOTES
CHIEF COMPLAINT:    Sukumar Beal is a 51 y.o. male presents today for Low Testosterone.    HISTORY OF PRESENTING ILLINESS:    Sukumar Beal is a 51 y.o. male who has a history of Low Testosterone. He has complaints are fatigue, loss of axillary hair.     He's on Androgel 1.62% (1.25 gms) using 3 pumps.  While on TRT, While on TRT, he has more energy.    He wanted testopel, but he has BCBS.    Last clinic visit was 2021.      He is here today for 6 month TRT.   TRT labs were completed this morning.    T levels average around 300's; has been feeling well.   Has not been to compliant with dosing due to limited supply    Today he reports some changes in his erections.  He has had some mild ED issues.   Some issues with achieving and maintaining.           REVIEW OF SYSTEMS:  Review of Systems   Constitutional: Negative.  Negative for chills and fever.   Eyes: Negative for double vision.   Respiratory: Negative for cough and shortness of breath.    Cardiovascular: Negative for chest pain and palpitations.   Gastrointestinal: Negative for nausea and vomiting.   Genitourinary: Negative.  Negative for dysuria, flank pain and hematuria.        Ok with how he urinates.   He has nocturia: 1-2x      Neurological: Negative for dizziness.         PATIENT HISTORY:    Past Medical History:   Diagnosis Date    Gout 2014    High cholesterol     Hypertension     Hypothyroid     Low testosterone     Staph aureus infection age 13    R leg       Past Surgical History:   Procedure Laterality Date    COLONOSCOPY N/A 2020    Procedure: COLONOSCOPY;  Surgeon: VANI Newell MD;  Location: [address];  Service: Endoscopy;  Laterality: N/A;  covid test 2nd floor surgery clinic     FRACTURE SURGERY Left     wrist    Incision and drainage of tibia Right     SINUS SURGERY      x2       Family History   Problem Relation Age of Onset    Heart disease Father 73            Hypertension  Sister     Hyperlipidemia Sister     Hypertension Brother     Hyperlipidemia Brother     Lupus Sister     Hypertension Mother     Cancer Maternal Grandfather     Cancer Maternal Aunt         lung - smoker    Melanoma Neg Hx        Social History     Socioeconomic History    Marital status: Single   Tobacco Use    Smoking status: Former Smoker     Packs/day: 0.25     Years: 15.00     Pack years: 3.75     Quit date: 1/3/2013     Years since quittin.1    Smokeless tobacco: Never Used   Substance and Sexual Activity    Alcohol use: Yes     Alcohol/week: 5.0 standard drinks     Types: 6 Standard drinks or equivalent per week     Comment: weekends    Drug use: No    Sexual activity: Yes       Allergies:  Codeine and Penicillins    Medications:    Current Outpatient Medications:     atorvastatin (LIPITOR) 20 MG tablet, Take 1 tablet (20 mg total) by mouth once daily., Disp: 90 tablet, Rfl: 1    B-complex with vitamin C (Z-BEC OR EQUIV) tablet, Take 1 tablet by mouth once daily., Disp: , Rfl:     CYANOCOBALAMIN, VITAMIN B-12, ORAL, Take 1 tablet by mouth once daily., Disp: , Rfl:     ergocalciferol, vitamin D2, (VITAMIN D ORAL), Take 2 capsules by mouth once daily., Disp: , Rfl:     gabapentin (NEURONTIN) 300 MG capsule, Take 1 capsule (300 mg total) by mouth every evening for 20 days., Disp: 20 capsule, Rfl: 0    levothyroxine (SYNTHROID) 112 MCG tablet, Take 1 tablet (112 mcg total) by mouth once daily., Disp: 90 tablet, Rfl: 0    losartan (COZAAR) 25 MG tablet, TAKE 1 TABLET(25 MG) BY MOUTH EVERY DAY, Disp: 90 tablet, Rfl: 1    sildenafiL (VIAGRA) 100 MG tablet, Take 1 tablet (100 mg total) by mouth daily as needed for Erectile Dysfunction (take on an empty stomach 30-60 minutes before)., Disp: 10 tablet, Rfl: 12    testosterone (ANDROGEL) 20.25 mg/1.25 gram (1.62 %) GlPm, Place 3 pumps onto the skin once daily., Disp: 75 g, Rfl: 5    vitamin E acetate (VITAMIN E ORAL), Take 1 tablet by mouth  once daily., Disp: , Rfl:     PHYSICAL EXAMINATION:  Physical Exam  Vitals and nursing note reviewed.   Constitutional:       General: He is awake.      Appearance: Normal appearance.   HENT:      Head: Normocephalic.      Right Ear: External ear normal.      Left Ear: External ear normal.      Nose: Nose normal.   Cardiovascular:      Rate and Rhythm: Normal rate.   Pulmonary:      Effort: Pulmonary effort is normal. No respiratory distress.   Abdominal:      Tenderness: There is no abdominal tenderness. There is no right CVA tenderness or left CVA tenderness.   Genitourinary:     Penis: Normal.       Testes: Normal.   Musculoskeletal:         General: Normal range of motion.      Cervical back: Normal range of motion.   Skin:     General: Skin is warm and dry.   Neurological:      General: No focal deficit present.      Mental Status: He is alert and oriented to person, place, and time.   Psychiatric:         Mood and Affect: Mood normal.         Behavior: Behavior is cooperative.           LABS:          Lab Results   Component Value Date    PSA 0.81 09/22/2021    PSA 0.42 06/11/2018    PSA 0.41 05/10/2017    PSADIAG 0.69 03/09/2021    PSADIAG 0.88 08/26/2020    PSADIAG 0.47 09/25/2019             IMPRESSION:    Encounter Diagnoses   Name Primary?    Primary male hypogonadism Yes    Benign prostatic hyperplasia without lower urinary tract symptoms     Fatigue, unspecified type     Other male erectile dysfunction          Assessment:       1. Primary male hypogonadism    2. Benign prostatic hyperplasia without lower urinary tract symptoms    3. Fatigue, unspecified type    4. Other male erectile dysfunction        Plan:         I spent 30 minutes with the patient of which more than half was spent in direct consultation with the patient in regards to our treatment and plan.    We addressed that his labs are pending; f/u depending on results.   Education and recommendations of today's plan of care including home  remedies and needed follow up with PCP.    Discussed the benefits of TRT and the possible risks; TRT is not life saving but life improving. If not feeling better no need to continue; risks outweigh the benefits.   Discussed risks with cholesterol, hepatitis, lower sperm production and reduced testicular size  To prevent risks the risks we will do labs every 6 months.   TRT does not cause Prostate Cancer but the risks for Prostate Cancer as well as polycythemia and other CAD are monitored.  Failure to comply will results in stopping TRT.  Diet and exercise; donating blood every 6 months strongly encouraged to prevent polycythemia.  We discussed his ED and the contributory factors.  Follow up with PCP for underlying medical conditions causing ED.   We discussed the physiological as well as his psychological aspects that contribute to ED.  Reviewed the 1st line therapies for ED.  The benefits, expectations risks, se of the different therapies.  Encouraged to take on an empty stomach 30-60 minutes prior to interaction.   Rx for Sildenafil 100mg given;  Refilled his Androgel  Daily MVI and aminoacid.  RTC 6 months with full labs

## 2022-03-10 ENCOUNTER — OFFICE VISIT (OUTPATIENT)
Dept: INTERNAL MEDICINE | Facility: CLINIC | Age: 52
End: 2022-03-10
Payer: COMMERCIAL

## 2022-03-10 ENCOUNTER — HOSPITAL ENCOUNTER (OUTPATIENT)
Dept: RADIOLOGY | Facility: HOSPITAL | Age: 52
Discharge: HOME | End: 2022-03-10
Attending: INTERNAL MEDICINE
Payer: COMMERCIAL

## 2022-03-10 VITALS
BODY MASS INDEX: 32.47 KG/M2 | HEART RATE: 64 BPM | WEIGHT: 253 LBS | SYSTOLIC BLOOD PRESSURE: 132 MMHG | DIASTOLIC BLOOD PRESSURE: 80 MMHG | HEIGHT: 74 IN | OXYGEN SATURATION: 99 %

## 2022-03-10 DIAGNOSIS — M79.672 PAIN IN BOTH FEET: Primary | ICD-10-CM

## 2022-03-10 DIAGNOSIS — I10 ESSENTIAL HYPERTENSION: ICD-10-CM

## 2022-03-10 DIAGNOSIS — Z87.891 HX OF TOBACCO USE, PRESENTING HAZARDS TO HEALTH: ICD-10-CM

## 2022-03-10 DIAGNOSIS — M79.671 PAIN IN BOTH FEET: Primary | ICD-10-CM

## 2022-03-10 LAB
ALBUMIN/CREAT UR: NORMAL UG/MG (ref 0–30)
BACTERIA #/AREA URNS AUTO: NORMAL /HPF
BILIRUB UR QL STRIP: NEGATIVE
CLARITY UR REFRACT.AUTO: CLEAR
COLOR UR AUTO: YELLOW
CREAT UR-MCNC: 55 MG/DL (ref 23–375)
GLUCOSE UR QL STRIP: NEGATIVE
HGB UR QL STRIP: NEGATIVE
KETONES UR QL STRIP: NEGATIVE
LEUKOCYTE ESTERASE UR QL STRIP: NEGATIVE
MICROALBUMIN UR DL<=1MG/L-MCNC: <5 UG/ML
MICROSCOPIC COMMENT: NORMAL
NITRITE UR QL STRIP: NEGATIVE
PH UR STRIP: 6 [PH] (ref 5–8)
PROT UR QL STRIP: NEGATIVE
RBC #/AREA URNS AUTO: 2 /HPF (ref 0–4)
SP GR UR STRIP: 1.01 (ref 1–1.03)
URN SPEC COLLECT METH UR: NORMAL
WBC #/AREA URNS AUTO: 0 /HPF (ref 0–5)

## 2022-03-10 PROCEDURE — 3079F PR MOST RECENT DIASTOLIC BLOOD PRESSURE 80-89 MM HG: ICD-10-PCS | Mod: CPTII,S$GLB,, | Performed by: INTERNAL MEDICINE

## 2022-03-10 PROCEDURE — 81001 URINALYSIS AUTO W/SCOPE: CPT | Performed by: INTERNAL MEDICINE

## 2022-03-10 PROCEDURE — 1159F PR MEDICATION LIST DOCUMENTED IN MEDICAL RECORD: ICD-10-PCS | Mod: CPTII,S$GLB,, | Performed by: INTERNAL MEDICINE

## 2022-03-10 PROCEDURE — 99999 PR PBB SHADOW E&M-EST. PATIENT-LVL V: ICD-10-PCS | Mod: PBBFAC,,, | Performed by: INTERNAL MEDICINE

## 2022-03-10 PROCEDURE — 99999 PR PBB SHADOW E&M-EST. PATIENT-LVL V: CPT | Mod: PBBFAC,,, | Performed by: INTERNAL MEDICINE

## 2022-03-10 PROCEDURE — 1159F MED LIST DOCD IN RCRD: CPT | Mod: CPTII,S$GLB,, | Performed by: INTERNAL MEDICINE

## 2022-03-10 PROCEDURE — 71046 X-RAY EXAM CHEST 2 VIEWS: CPT | Mod: 26,,, | Performed by: RADIOLOGY

## 2022-03-10 PROCEDURE — 3066F NEPHROPATHY DOC TX: CPT | Mod: CPTII,S$GLB,, | Performed by: INTERNAL MEDICINE

## 2022-03-10 PROCEDURE — 3061F NEG MICROALBUMINURIA REV: CPT | Mod: CPTII,S$GLB,, | Performed by: INTERNAL MEDICINE

## 2022-03-10 PROCEDURE — 99396 PR PREVENTIVE VISIT,EST,40-64: ICD-10-PCS | Mod: S$GLB,,, | Performed by: INTERNAL MEDICINE

## 2022-03-10 PROCEDURE — 3008F PR BODY MASS INDEX (BMI) DOCUMENTED: ICD-10-PCS | Mod: CPTII,S$GLB,, | Performed by: INTERNAL MEDICINE

## 2022-03-10 PROCEDURE — 4010F ACE/ARB THERAPY RXD/TAKEN: CPT | Mod: CPTII,S$GLB,, | Performed by: INTERNAL MEDICINE

## 2022-03-10 PROCEDURE — 3061F PR NEG MICROALBUMINURIA RESULT DOCUMENTED/REVIEW: ICD-10-PCS | Mod: CPTII,S$GLB,, | Performed by: INTERNAL MEDICINE

## 2022-03-10 PROCEDURE — 3075F PR MOST RECENT SYSTOLIC BLOOD PRESS GE 130-139MM HG: ICD-10-PCS | Mod: CPTII,S$GLB,, | Performed by: INTERNAL MEDICINE

## 2022-03-10 PROCEDURE — 3066F PR DOCUMENTATION OF TREATMENT FOR NEPHROPATHY: ICD-10-PCS | Mod: CPTII,S$GLB,, | Performed by: INTERNAL MEDICINE

## 2022-03-10 PROCEDURE — 3008F BODY MASS INDEX DOCD: CPT | Mod: CPTII,S$GLB,, | Performed by: INTERNAL MEDICINE

## 2022-03-10 PROCEDURE — 71046 XR CHEST PA AND LATERAL: ICD-10-PCS | Mod: 26,,, | Performed by: RADIOLOGY

## 2022-03-10 PROCEDURE — 71046 X-RAY EXAM CHEST 2 VIEWS: CPT | Mod: TC

## 2022-03-10 PROCEDURE — 4010F PR ACE/ARB THEARPY RXD/TAKEN: ICD-10-PCS | Mod: CPTII,S$GLB,, | Performed by: INTERNAL MEDICINE

## 2022-03-10 PROCEDURE — 82043 UR ALBUMIN QUANTITATIVE: CPT | Performed by: INTERNAL MEDICINE

## 2022-03-10 PROCEDURE — 3075F SYST BP GE 130 - 139MM HG: CPT | Mod: CPTII,S$GLB,, | Performed by: INTERNAL MEDICINE

## 2022-03-10 PROCEDURE — 99396 PREV VISIT EST AGE 40-64: CPT | Mod: S$GLB,,, | Performed by: INTERNAL MEDICINE

## 2022-03-10 PROCEDURE — 3079F DIAST BP 80-89 MM HG: CPT | Mod: CPTII,S$GLB,, | Performed by: INTERNAL MEDICINE

## 2022-03-10 PROCEDURE — 82570 ASSAY OF URINE CREATININE: CPT | Performed by: INTERNAL MEDICINE

## 2022-03-10 RX ORDER — LEVOTHYROXINE SODIUM 112 UG/1
112 TABLET ORAL DAILY
Qty: 90 TABLET | Refills: 3 | Status: SHIPPED | OUTPATIENT
Start: 2022-03-10 | End: 2022-06-02 | Stop reason: SDUPTHER

## 2022-03-10 RX ORDER — ATORVASTATIN CALCIUM 20 MG/1
20 TABLET, FILM COATED ORAL DAILY
Qty: 90 TABLET | Refills: 3 | Status: SHIPPED | OUTPATIENT
Start: 2022-03-10 | End: 2022-06-02 | Stop reason: SDUPTHER

## 2022-03-10 RX ORDER — LOSARTAN POTASSIUM 50 MG/1
50 TABLET ORAL DAILY
Qty: 90 TABLET | Refills: 3 | Status: SHIPPED | OUTPATIENT
Start: 2022-03-10 | End: 2023-04-15

## 2022-03-10 NOTE — PROGRESS NOTES
Subjective:       Patient ID: Sukumar Beal is a 51 y.o. male.    Chief Complaint: Annual Exam    HPIPt is feeling well and exercising.  No CP or SOB. Has bilateral foot pain ?neuropathy.  Feels he limps (had osteomyelitis in his leg as a teenager and had extensive surgery) this leg seems to be the one that is causing the limp.  Review of Systems   Constitutional: Negative for activity change.   HENT: Negative for hearing loss and trouble swallowing.    Eyes: Negative for discharge.   Respiratory: Negative for chest tightness, shortness of breath and wheezing.    Cardiovascular: Negative for chest pain and palpitations.   Gastrointestinal: Negative for abdominal pain, constipation, diarrhea, nausea and vomiting.   Genitourinary: Negative for difficulty urinating, dysuria and hematuria.   Neurological: Negative for seizures, syncope and headaches.   Psychiatric/Behavioral: Negative for dysphoric mood.       Objective:      Physical Exam  Constitutional:       General: He is not in acute distress.     Appearance: He is well-developed.   Neck:      Thyroid: No thyromegaly.      Vascular: No JVD.   Cardiovascular:      Rate and Rhythm: Normal rate and regular rhythm.      Heart sounds: Normal heart sounds. No murmur heard.    No friction rub. No gallop.   Pulmonary:      Effort: Pulmonary effort is normal.      Breath sounds: Normal breath sounds. No wheezing or rales.   Abdominal:      General: Bowel sounds are normal. There is no distension.      Palpations: Abdomen is soft. There is no mass.      Tenderness: There is no abdominal tenderness. There is no guarding or rebound.   Genitourinary:     Comments: Per  as he is on testosterone  Musculoskeletal:      Cervical back: Neck supple.   Lymphadenopathy:      Cervical: No cervical adenopathy.   Skin:     General: Skin is warm and dry.   Neurological:      Mental Status: He is alert and oriented to person, place, and time.   Psychiatric:         Behavior: Behavior  normal.         Thought Content: Thought content normal.         Judgment: Judgment normal.         Assessment:       1. Pain in both feet    2. Hx of tobacco use, presenting hazards to health    3. Essential hypertension        Plan:   Pain in both feet  -     Ambulatory referral/consult to Orthopedics; Future; Expected date: 03/17/2022  -     Ambulatory referral/consult to Physical/Occupational Therapy; Future; Expected date: 03/17/2022    Hx of tobacco use, presenting hazards to health  -     X-Ray Chest PA And Lateral; Future; Expected date: 03/10/2022    Essential hypertension  -     Urinalysis  -     Microalbumin/Creatinine Ratio, Urine  Uncontrolled increase losartan  Other orders  -     losartan (COZAAR) 50 MG tablet; Take 1 tablet (50 mg total) by mouth once daily.  Dispense: 90 tablet; Refill: 3  -     levothyroxine (SYNTHROID) 112 MCG tablet; Take 1 tablet (112 mcg total) by mouth once daily.  Dispense: 90 tablet; Refill: 3  -     atorvastatin (LIPITOR) 20 MG tablet; Take 1 tablet (20 mg total) by mouth once daily.  Dispense: 90 tablet; Refill: 3  -     Urinalysis Microscopic        Answers for HPI/ROS submitted by the patient on 3/7/2022  activity change: No  hearing loss: No  trouble swallowing: No  eye discharge: No  chest tightness: No  wheezing: No  chest pain: No  palpitations: No  constipation: No  vomiting: No  diarrhea: No  difficulty urinating: No  hematuria: No  headaches: No  dysphoric mood: No

## 2022-03-15 ENCOUNTER — HOSPITAL ENCOUNTER (OUTPATIENT)
Dept: RADIOLOGY | Facility: HOSPITAL | Age: 52
Discharge: HOME | End: 2022-03-15
Attending: ORTHOPAEDIC SURGERY
Payer: COMMERCIAL

## 2022-03-15 ENCOUNTER — OFFICE VISIT (OUTPATIENT)
Dept: ORTHOPEDICS | Facility: CLINIC | Age: 52
End: 2022-03-15
Payer: COMMERCIAL

## 2022-03-15 VITALS — BODY MASS INDEX: 32.48 KG/M2 | HEIGHT: 74 IN | WEIGHT: 253.06 LBS

## 2022-03-15 DIAGNOSIS — M79.671 PAIN IN BOTH FEET: ICD-10-CM

## 2022-03-15 DIAGNOSIS — M79.672 PAIN IN BOTH FEET: ICD-10-CM

## 2022-03-15 DIAGNOSIS — G60.9 IDIOPATHIC NEUROPATHY: Primary | ICD-10-CM

## 2022-03-15 PROCEDURE — 3066F PR DOCUMENTATION OF TREATMENT FOR NEPHROPATHY: ICD-10-PCS | Mod: CPTII,S$GLB,, | Performed by: ORTHOPAEDIC SURGERY

## 2022-03-15 PROCEDURE — 3008F PR BODY MASS INDEX (BMI) DOCUMENTED: ICD-10-PCS | Mod: CPTII,S$GLB,, | Performed by: ORTHOPAEDIC SURGERY

## 2022-03-15 PROCEDURE — 1159F PR MEDICATION LIST DOCUMENTED IN MEDICAL RECORD: ICD-10-PCS | Mod: CPTII,S$GLB,, | Performed by: ORTHOPAEDIC SURGERY

## 2022-03-15 PROCEDURE — 1160F PR REVIEW ALL MEDS BY PRESCRIBER/CLIN PHARMACIST DOCUMENTED: ICD-10-PCS | Mod: CPTII,S$GLB,, | Performed by: ORTHOPAEDIC SURGERY

## 2022-03-15 PROCEDURE — 73630 XR FOOT COMPLETE 3 VIEW BILATERAL: ICD-10-PCS | Mod: 26,,, | Performed by: RADIOLOGY

## 2022-03-15 PROCEDURE — 73630 X-RAY EXAM OF FOOT: CPT | Mod: 26,,, | Performed by: RADIOLOGY

## 2022-03-15 PROCEDURE — 99204 PR OFFICE/OUTPT VISIT, NEW, LEVL IV, 45-59 MIN: ICD-10-PCS | Mod: S$GLB,,, | Performed by: ORTHOPAEDIC SURGERY

## 2022-03-15 PROCEDURE — 3008F BODY MASS INDEX DOCD: CPT | Mod: CPTII,S$GLB,, | Performed by: ORTHOPAEDIC SURGERY

## 2022-03-15 PROCEDURE — 3061F PR NEG MICROALBUMINURIA RESULT DOCUMENTED/REVIEW: ICD-10-PCS | Mod: CPTII,S$GLB,, | Performed by: ORTHOPAEDIC SURGERY

## 2022-03-15 PROCEDURE — 4010F ACE/ARB THERAPY RXD/TAKEN: CPT | Mod: CPTII,S$GLB,, | Performed by: ORTHOPAEDIC SURGERY

## 2022-03-15 PROCEDURE — 99204 OFFICE O/P NEW MOD 45 MIN: CPT | Mod: S$GLB,,, | Performed by: ORTHOPAEDIC SURGERY

## 2022-03-15 PROCEDURE — 99999 PR PBB SHADOW E&M-EST. PATIENT-LVL III: CPT | Mod: PBBFAC,,, | Performed by: ORTHOPAEDIC SURGERY

## 2022-03-15 PROCEDURE — 1159F MED LIST DOCD IN RCRD: CPT | Mod: CPTII,S$GLB,, | Performed by: ORTHOPAEDIC SURGERY

## 2022-03-15 PROCEDURE — 99999 PR PBB SHADOW E&M-EST. PATIENT-LVL III: ICD-10-PCS | Mod: PBBFAC,,, | Performed by: ORTHOPAEDIC SURGERY

## 2022-03-15 PROCEDURE — 1160F RVW MEDS BY RX/DR IN RCRD: CPT | Mod: CPTII,S$GLB,, | Performed by: ORTHOPAEDIC SURGERY

## 2022-03-15 PROCEDURE — 73630 X-RAY EXAM OF FOOT: CPT | Mod: TC,50

## 2022-03-15 PROCEDURE — 3061F NEG MICROALBUMINURIA REV: CPT | Mod: CPTII,S$GLB,, | Performed by: ORTHOPAEDIC SURGERY

## 2022-03-15 PROCEDURE — 4010F PR ACE/ARB THEARPY RXD/TAKEN: ICD-10-PCS | Mod: CPTII,S$GLB,, | Performed by: ORTHOPAEDIC SURGERY

## 2022-03-15 PROCEDURE — 3066F NEPHROPATHY DOC TX: CPT | Mod: CPTII,S$GLB,, | Performed by: ORTHOPAEDIC SURGERY

## 2022-03-15 NOTE — PROGRESS NOTES
This is a new patient.     CHIEF COMPLAINT: Bilateral foot pain      SUMMARY OF PRESENTING COMPLAINT: Sukumar Beal is a 51 y.o. male presenting with roughly 5 years of bilateral foot pain (R > L). He reports burning along the pads of both feet that is bothersome. He also reports that his right foot turns outward when he walks. He has been worked up by his PCP for neuropathy and EMG in 2020 was normal. He reports the symptoms are present on most days and have overall worsened over the past few years. Reports previous right calf partial tear vs strain roughly 5 years ago after workout class that was treated conservatively in a boot. He has a previous history of RLE S. Aureus infection at age 13 that required multiple surgical debridements. He endorses 5 standard drinks per week. Formal smoker, quit 9 years ago.       PAST MEDICAL HISTORY:  Past Medical History:   Diagnosis Date    Gout 7/2014    High cholesterol     Hypertension     Hypothyroid     Low testosterone     Staph aureus infection age 13    R leg          SOCIAL HISTORY:    reports that he quit smoking about 9 years ago. He has a 3.75 pack-year smoking history. He has never used smokeless tobacco. He reports current alcohol use of about 5.0 standard drinks of alcohol per week. He reports that he does not use drugs.       MEDICATIONS:     Current Outpatient Medications:     atorvastatin (LIPITOR) 20 MG tablet, Take 1 tablet (20 mg total) by mouth once daily., Disp: 90 tablet, Rfl: 3    B-complex with vitamin C (Z-BEC OR EQUIV) tablet, Take 1 tablet by mouth once daily., Disp: , Rfl:     CYANOCOBALAMIN, VITAMIN B-12, ORAL, Take 1 tablet by mouth once daily., Disp: , Rfl:     ergocalciferol, vitamin D2, (VITAMIN D ORAL), Take 2 capsules by mouth once daily., Disp: , Rfl:     levothyroxine (SYNTHROID) 112 MCG tablet, Take 1 tablet (112 mcg total) by mouth once daily., Disp: 90 tablet, Rfl: 3    losartan (COZAAR) 50 MG tablet, Take 1 tablet (50  "mg total) by mouth once daily., Disp: 90 tablet, Rfl: 3    sildenafiL (VIAGRA) 100 MG tablet, Take 1 tablet (100 mg total) by mouth daily as needed for Erectile Dysfunction (take on an empty stomach 30-60 minutes before)., Disp: 10 tablet, Rfl: 12    testosterone (ANDROGEL) 20.25 mg/1.25 gram (1.62 %) GlPm, Place 3 pumps onto the skin once daily., Disp: 75 g, Rfl: 5    vitamin E acetate (VITAMIN E ORAL), Take 1 tablet by mouth once daily., Disp: , Rfl:        PREVIOUS SURGERIES:  Past Surgical History:   Procedure Laterality Date    COLONOSCOPY N/A 9/21/2020    Procedure: COLONOSCOPY;  Surgeon: VANI Newell MD;  Location: [facility] ([address] [place] [address]);  Service: Endoscopy;  Laterality: N/A;  covid test 2nd floor surgery clinic 9/18    FRACTURE SURGERY Left 2004    wrist    Incision and drainage of tibia Right 1983    SINUS SURGERY      x2          ALLERGIES:   Review of patient's allergies indicates:   Allergen Reactions    Codeine Rash    Penicillins Rash          REVIEW OF SYSTEMS: Negative for any chest pain, shortness of breath, fever or weight loss.      PHYSICAL EXAM:    General exam  Well-developed, well-nourished   Height: 6' 2", Weight: 253  General: No acute distress. No increased work of breathing.   Standing posture: mild flexible flatfoot noted bilaterally (knee, ankle, foot)    BLE  Skin closed, no fracture blisters or tenting of skin, no callus  Previous surgical incision to RLE and right arthrotomy well healed   Deformity: Mild flexible flatfoot deformity (valgus, varus, equinus)  Ecchymoses: none  Swelling: none  TTP: none ankle joint line; no TTP over bones or over PT insertion sites  Compartments soft and compressible  ROM: full painless dorsiflexion & plantarflexion 5/5 strength; full painless inversion & eversion   Ligaments: Neg anterior drawer test; Neg varus stress test  SILT Sa/Leung/SP/DP; intact to 2-point discrimination over plantar aspect of foot   Motor intact EHL/FHL/Gastroc/TA  Brisk " cap refill  Warm well perfused extremities  DP pulse 2+ palpable       IMAGING:  Bilateral foot XR ordered and reviewed today which shows some mild flatfoot deformity, still within normal limits. No forefoot, midfoot, or hindfoot arthritis. He an os trigonum on the right side and bilateral metatarsus adductus.       IMPRESSION:   1. Pain in both feet  - Ambulatory referral/consult to Orthopedics  - X-Ray Foot Complete Bilateral; Future  - HME - OTHER    2. Idiopathic neuropathy  - HME - OTHER         PLAN:   Plan    Sukumar Beal is a 51 y.o. male presenting with 5 years of idiopathic neuropathy in bilateral feet with some associated right sided calf weakness after partial tear 5 years ago treated conservatively. Today his bilateral foot XR, physical exam, and history do not suggest any structural issues that would be causing his neuropathy. He has very mild flexible flatfoot worse on the R that would benefit from custom orthotics. Orthotics prescription sent today. Agree that physical therapy for right sided calf weakness may help to improve some of this symptoms but that he may not experience full resolution. Recommended activity modification and avoiding consistent high-impact activity. Follow up PRN.       Jayant Simeon MD - PGY-2  Department of Orthopaedic Surgery  [facility]     I have personally taken the history and examined this patient and agree with the residents note as stated above.  I do not appreciate any obvious structural abnormality by physical exam or plain x-ray.  The etiology of his symptoms is unclear to me but appears to be some type of idiopathic neuropathy.  I understand that he had normal nerve conduction studies in the past.  I agree with physical therapy as ordered by Dr. Turner and I would also recommend custom orthotics.    Follow-up as needed

## 2022-03-22 ENCOUNTER — CLINICAL SUPPORT (OUTPATIENT)
Dept: REHABILITATION | Facility: HOSPITAL | Age: 52
End: 2022-03-22
Attending: INTERNAL MEDICINE
Payer: COMMERCIAL

## 2022-03-22 DIAGNOSIS — M79.672 PAIN IN BOTH FEET: ICD-10-CM

## 2022-03-22 DIAGNOSIS — M79.671 PAIN IN BOTH FEET: ICD-10-CM

## 2022-03-22 DIAGNOSIS — M25.671 DECREASED RANGE OF MOTION OF RIGHT ANKLE: Primary | ICD-10-CM

## 2022-03-22 PROCEDURE — 97110 THERAPEUTIC EXERCISES: CPT

## 2022-03-22 PROCEDURE — 97162 PT EVAL MOD COMPLEX 30 MIN: CPT

## 2022-03-22 NOTE — PLAN OF CARE
[facility]  Physical Therapy Initial Evaluation    Date: 3/22/2022   Name: Sukumar Beal  Clinic Number: [id number]    Therapy Diagnosis:   Encounter Diagnoses   Name Primary?    Pain in both feet     Decreased range of motion of right ankle Yes     Physician: Katherine Turner MD    Physician Orders: PT Eval and Treat   Medical Diagnosis from Referral: M79.671,M79.672 (ICD-10-CM) - Pain in both feet  Evaluation Date: 3/22/2022  Authorization Period Expiration: 12/31/2022  Plan of Care Expiration: 4/22/2022  Visit # / Visits authorized: 1/1    Time In: 900am  Time Out: 1000am  Total Appointment Time (timed & untimed codes): 60 minutes    Precautions: Standard     Subjective   Date of onset: 5 years ago   History of current condition - Sukumar reports: he has been having R foot pain for several years. Pt states he gets the pain in his L foot as well but not as often. Pt describes the pain as burning. Pt states the pain can happen at anytime. Pt is a poor historian and unsure of when it exactly occurs. Pt states he does get it with walking and with jumping during his exercises classes. Pt states he is active and has been doing crossfit classes but has just been dealing with the pain. Easing factors include none. Pt states it is intermittent. Pt states he might be getting an orthotic soon for see if that helps. Pt states he had debridement surgeries when he was a kid on the R lower leg for an infection. Pt also states has a partially torn calf on R from 3 years ago. Pt states he smoked 9 years ago but only on the weekends. Pt is largely unsure but states that the pain may have increased since his calf injury.      Medical History:   Past Medical History:   Diagnosis Date    Gout 7/2014    High cholesterol     Hypertension     Hypothyroid     Low testosterone     Staph aureus infection age 13    R leg       Surgical History:   Sukumar Beal  has a past surgical history that  includes Incision and drainage of tibia (Right, 1983); Fracture surgery (Left, 2004); Sinus surgery; and Colonoscopy (N/A, 9/21/2020).    Medications:   Sukumar has a current medication list which includes the following prescription(s): atorvastatin, b-complex with vitamin c, cyanocobalamin (vitamin b-12), ergocalciferol (vitamin d2), levothyroxine, losartan, sildenafil, testosterone, and vitamin e acetate.    Allergies:   Review of patient's allergies indicates:   Allergen Reactions    Codeine Rash    Penicillins Rash        Imaging, X-ray: Feet: Impression:  No acute osseous abnormality identified    Prior Therapy: N  Occupation: real estate - stands and sit   Prior Level of Function: I  Current Level of Function: I, increased pain and difficulty with exercise activities and walking    Pain:  Current 0/10, worst 6/10,     Pt's goals: decrease pain.     Objective   Observation:    Decreased arch height on the R    Decreased muscle bulk on R calf              Gait:    Increased toe extension                 Range of Motion: AROM/PROM:    Lumbar ROM with overpressure: full and pain-free  Hip/Knee: WNL ROM and pain-free    Ankle Left Right   Dorsiflexion  20 degrees 15 degrees   Plantarflexion 50 degrees 40 degrees   Inversion WNL WNL   Eversion WNL WNL     Foot Left Right   Big toe EXT 25% limited 50% limited     Strength  Hip Left Right   Supine flexion 4+/5 4/5   Abduction 4+/5 4/5   Extension 4-/5 4-/5     Ankle Left Right   Dorsiflexion 5/5 5/5   Plantarflexion 5/5 4/5   Inversion 5/5 4/5   Eversion 5/5 5/5     Special Tests:              Quadrant Test: (-)   Slump: (-)   SLR Tibial Bias (+)     Joint Mobility:               1st MTP                          (R) - decreased dorsal glide                                        Talocrural                          (R) - decreased posterior glide                                         Subtalar                           (R) - WNL                             Palpation:  no tenderness     Sensation: intact light touch to BLE's     Flexibility:   Gastroc/Soleus                           (R) - tightness    Treadmill Test:    Walking on flat surface at 2.0mph - increased R plantar pain after 2 minutes    Increased incline to Level 10 with trunk lean added - pain worsened.                  Limitation/Restriction for FOTO Foot Survey    Therapist reviewed FOTO scores for Sukumar Beal on 3/22/2022.   FOTO documents entered into [employer] - see Media section.    Limitation Score: 1%  Predicted Limitation Score: 5%         TREATMENT   Treatment Time In: 935a  Treatment Time Out: 1000a  Total Treatment time (time-based codes) separate from Evaluation: 25 minutes    Sukumar received therapeutic exercises to develop strength, endurance, and ROM for 23 minutes including:   Seated Arch Lifts 2 x 10   Half Kneel DF x30   Seated Toe curls 2 x 10   Education - Keep a log of when pain occurs / HEP / POC    Sukumar received the following manual therapy techniques: Joint mobilizations, Myofacial release, and Soft tissue Mobilization were applied to the: R ankle for 2 minutes, including:      TC manipulation - manipulation explained and pt agreeable prior to manip being performed   TC Post Glide, grade 3 (SLR test negative following manual)     Home Exercises and Patient Education Provided    Education provided:   - HEP  - POC/prognosis     Written Home Exercises Provided: yes.  Exercises were reviewed and Sukumar was able to demonstrate them prior to the end of the session.  Sukumar demonstrated good  understanding of the education provided.     See EMR under Patient Instructions for exercises provided 3/22/2022.    Assessment   Sukumar is a 51 y.o. male referred to outpatient Physical Therapy with a medical diagnosis of Pain in both feet. Patient's signs and symptoms are consistent with either vascular claudication, peripheral nerve entrapment from PMHx/joint hypomobility, poorly conditioned plantar foot that is  unable to handle the current load being placed on it. He presents with pain during walking/jumping exercises; limited ankle ROM; LE weakness; and functional limitations of poor tolerance to jumping/walking. Patient would benefit from skilled physical therapy to address these limitations and begin return to higher functions. Future visits will continue to assess his symptoms and see if we can get a better history of current condition. Pt educated to keep a log of when/where pain occurs.     Pt to be seen 1x/week for 4x weeks     Pt prognosis is Good.   Pt will benefit from skilled outpatient Physical Therapy to address the deficits stated above and in the chart below, provide pt/family education, and to maximize pt's level of independence.     Plan of care discussed with patient: Yes  Pt's spiritual, cultural and educational needs considered and patient is agreeable to the plan of care and goals as stated below:     Anticipated Barriers for therapy: hx of smoking    Medical Necessity is demonstrated by the following  History  Co-morbidities and personal factors that may impact the plan of care Co-morbidities:   gout, high BMI and HTN    Personal Factors:   5 drinks/day ; former smoker      high   Examination  Body Structures and Functions, activity limitations and participation restrictions that may impact the plan of care Body Regions:   lower extremities  trunk    Body Systems:    gross symmetry  ROM  strength  gross coordinated movement  balance  gait  transfers  motor control    Participation Restrictions:   Difficulty with crossfit classes    Activity limitations:   no deficits    General Tasks and Commands  no deficits    Communication  no deficits    Mobility  walking    Self care  no deficits    Domestic Life  no deficits    Interactions/Relationships  no deficits    Life Areas  employment    Community and Social Life  community life  recreation and leisure         moderate   Clinical Presentation evolving  clinical presentation with changing clinical characteristics moderate   Decision Making/ Complexity Score: moderate     Goals:  Long Term Goals: 4 weeks   1. Maintain compliance and understanding of HEP. - PROGRESSING, NOT MET  2. Decrease FOTO limitation score to </= 5% limitation for better functional outcomes. - PROGRESSING, NOT MET  3. Increase ankle dorsiflexion and plantar flexion active range of motion to within normal limits in order to offload plantar fascia. - PROGRESSING, NOT MET  4. Patient will be able to complete crossfit class and know how to manage symptoms along plantar (R) heel pain. - PROGRESSING, NOT MET      Plan   Plan of care Certification: 3/22/2022 to 4/22/2022.    Outpatient Physical Therapy 1 times weekly for 4 weeks to include the following interventions: Gait Training, Manual Therapy, Moist Heat/ Ice, Neuromuscular Re-ed, Patient Education, Therapeutic Activities and Therapeutic Exercise.     Joao Carrilol, PT

## 2022-04-21 ENCOUNTER — TELEPHONE (OUTPATIENT)
Dept: REHABILITATION | Facility: HOSPITAL | Age: 52
End: 2022-04-21
Payer: COMMERCIAL

## 2022-04-21 NOTE — TELEPHONE ENCOUNTER
Pt states he has been out of town and will have to call to reschedule. I called the pt back and left a voicemail informing the pt to get a new referral when he is back into town.

## 2022-05-13 RX ORDER — LOSARTAN POTASSIUM 25 MG/1
TABLET ORAL
Qty: 90 TABLET | Refills: 1 | OUTPATIENT
Start: 2022-05-13

## 2022-05-13 NOTE — TELEPHONE ENCOUNTER
No new care gaps identified.  [facility] Embedded Care Gaps. Reference number: [id number]. 5/13/2022   7:02:27 AM ADRIANT

## 2022-05-13 NOTE — TELEPHONE ENCOUNTER
Quick DC. Inappropriate Request    Refill Authorization Note   Sukumar Beal  is requesting a refill authorization.  Brief Assessment and Rationale for Refill:  Quick Discontinue  Medication Therapy Plan:  pcp increaed to 50mg o 3/10/22    Medication Reconciliation Completed:  No      Comments:     Note composed:11:12 AM 05/13/2022

## 2022-07-07 ENCOUNTER — HOSPITAL ENCOUNTER (OUTPATIENT)
Dept: RADIOLOGY | Facility: HOSPITAL | Age: 52
Discharge: HOME | End: 2022-07-07
Attending: ORTHOPAEDIC SURGERY
Payer: COMMERCIAL

## 2022-07-07 ENCOUNTER — OFFICE VISIT (OUTPATIENT)
Dept: SPORTS MEDICINE | Facility: CLINIC | Age: 52
End: 2022-07-07
Payer: COMMERCIAL

## 2022-07-07 VITALS
HEART RATE: 58 BPM | SYSTOLIC BLOOD PRESSURE: 132 MMHG | DIASTOLIC BLOOD PRESSURE: 89 MMHG | BODY MASS INDEX: 31.7 KG/M2 | WEIGHT: 247 LBS | HEIGHT: 74 IN

## 2022-07-07 DIAGNOSIS — M19.011 ARTHRITIS OF RIGHT ACROMIOCLAVICULAR JOINT: Primary | ICD-10-CM

## 2022-07-07 DIAGNOSIS — M25.511 CHRONIC RIGHT SHOULDER PAIN: ICD-10-CM

## 2022-07-07 DIAGNOSIS — G89.29 CHRONIC RIGHT SHOULDER PAIN: ICD-10-CM

## 2022-07-07 DIAGNOSIS — M25.511 RIGHT SHOULDER PAIN, UNSPECIFIED CHRONICITY: ICD-10-CM

## 2022-07-07 PROCEDURE — 99999 PR PBB SHADOW E&M-EST. PATIENT-LVL IV: ICD-10-PCS | Mod: PBBFAC,,, | Performed by: ORTHOPAEDIC SURGERY

## 2022-07-07 PROCEDURE — 3061F NEG MICROALBUMINURIA REV: CPT | Mod: CPTII,S$GLB,, | Performed by: ORTHOPAEDIC SURGERY

## 2022-07-07 PROCEDURE — 3079F PR MOST RECENT DIASTOLIC BLOOD PRESSURE 80-89 MM HG: ICD-10-PCS | Mod: CPTII,S$GLB,, | Performed by: ORTHOPAEDIC SURGERY

## 2022-07-07 PROCEDURE — 99204 PR OFFICE/OUTPT VISIT, NEW, LEVL IV, 45-59 MIN: ICD-10-PCS | Mod: S$GLB,,, | Performed by: ORTHOPAEDIC SURGERY

## 2022-07-07 PROCEDURE — 73030 X-RAY EXAM OF SHOULDER: CPT | Mod: 26,RT,, | Performed by: RADIOLOGY

## 2022-07-07 PROCEDURE — 3079F DIAST BP 80-89 MM HG: CPT | Mod: CPTII,S$GLB,, | Performed by: ORTHOPAEDIC SURGERY

## 2022-07-07 PROCEDURE — 4010F ACE/ARB THERAPY RXD/TAKEN: CPT | Mod: CPTII,S$GLB,, | Performed by: ORTHOPAEDIC SURGERY

## 2022-07-07 PROCEDURE — 1159F PR MEDICATION LIST DOCUMENTED IN MEDICAL RECORD: ICD-10-PCS | Mod: CPTII,S$GLB,, | Performed by: ORTHOPAEDIC SURGERY

## 2022-07-07 PROCEDURE — 3008F BODY MASS INDEX DOCD: CPT | Mod: CPTII,S$GLB,, | Performed by: ORTHOPAEDIC SURGERY

## 2022-07-07 PROCEDURE — 3075F PR MOST RECENT SYSTOLIC BLOOD PRESS GE 130-139MM HG: ICD-10-PCS | Mod: CPTII,S$GLB,, | Performed by: ORTHOPAEDIC SURGERY

## 2022-07-07 PROCEDURE — 73030 X-RAY EXAM OF SHOULDER: CPT | Mod: TC,RT

## 2022-07-07 PROCEDURE — 3066F NEPHROPATHY DOC TX: CPT | Mod: CPTII,S$GLB,, | Performed by: ORTHOPAEDIC SURGERY

## 2022-07-07 PROCEDURE — 3075F SYST BP GE 130 - 139MM HG: CPT | Mod: CPTII,S$GLB,, | Performed by: ORTHOPAEDIC SURGERY

## 2022-07-07 PROCEDURE — 3008F PR BODY MASS INDEX (BMI) DOCUMENTED: ICD-10-PCS | Mod: CPTII,S$GLB,, | Performed by: ORTHOPAEDIC SURGERY

## 2022-07-07 PROCEDURE — 99204 OFFICE O/P NEW MOD 45 MIN: CPT | Mod: S$GLB,,, | Performed by: ORTHOPAEDIC SURGERY

## 2022-07-07 PROCEDURE — 1159F MED LIST DOCD IN RCRD: CPT | Mod: CPTII,S$GLB,, | Performed by: ORTHOPAEDIC SURGERY

## 2022-07-07 PROCEDURE — 99999 PR PBB SHADOW E&M-EST. PATIENT-LVL IV: CPT | Mod: PBBFAC,,, | Performed by: ORTHOPAEDIC SURGERY

## 2022-07-07 PROCEDURE — 4010F PR ACE/ARB THEARPY RXD/TAKEN: ICD-10-PCS | Mod: CPTII,S$GLB,, | Performed by: ORTHOPAEDIC SURGERY

## 2022-07-07 PROCEDURE — 3061F PR NEG MICROALBUMINURIA RESULT DOCUMENTED/REVIEW: ICD-10-PCS | Mod: CPTII,S$GLB,, | Performed by: ORTHOPAEDIC SURGERY

## 2022-07-07 PROCEDURE — 73030 XR SHOULDER COMPLETE 2 OR MORE VIEWS RIGHT: ICD-10-PCS | Mod: 26,RT,, | Performed by: RADIOLOGY

## 2022-07-07 PROCEDURE — 3066F PR DOCUMENTATION OF TREATMENT FOR NEPHROPATHY: ICD-10-PCS | Mod: CPTII,S$GLB,, | Performed by: ORTHOPAEDIC SURGERY

## 2022-07-07 RX ORDER — CELECOXIB 200 MG/1
200 CAPSULE ORAL DAILY
Qty: 30 CAPSULE | Refills: 3 | Status: SHIPPED | OUTPATIENT
Start: 2022-07-07 | End: 2022-09-02

## 2022-07-07 NOTE — PROGRESS NOTES
CC: Right shoulder pain     51 y.o. Male presents as a new patient to me. He  works as an . Complaint is right shoulder pain x 1mos. Atraumatic onset but associated with workout activity.  Particularly overhead lifting. Pain localizes to lateral upper arm/subdeltoid recess. He does bootcamp style training 3x per week. Pain is disruptive to sleep at night.  Bothers him with day-to-day activities.  He does describe painful intermittent popping and clicking in the shoulder.  No prior instability issues.  Better with rest.  No prior injections or therapy.  Denies neck pain or radicular symptoms. Treatment thus far has included activity modifications, rest, and oral medication.  Here today to discuss diagnosis and treatment options.     PMHx notable for gout.   Negative for tobacco.   Negative for diabetes.    Pain Score:   2    PAST MEDICAL HISTORY:   Past Medical History:   Diagnosis Date    Gout 2014    High cholesterol     Hypertension     Hypothyroid     Low testosterone     Staph aureus infection age 13    R leg       PAST SURGICAL HISTORY:  Past Surgical History:   Procedure Laterality Date    COLONOSCOPY N/A 2020    Procedure: COLONOSCOPY;  Surgeon: VANI Newell MD;  Location: [address]);  Service: Endoscopy;  Laterality: N/A;  covid test 2nd floor surgery clinic     FRACTURE SURGERY Left     wrist    Incision and drainage of tibia Right     SINUS SURGERY      x2       FAMILY HISTORY:  Family History   Problem Relation Age of Onset    Heart disease Father 73            Hypertension Sister     Hyperlipidemia Sister     Hypertension Brother     Hyperlipidemia Brother     Lupus Sister     Hypertension Mother     Cancer Maternal Grandfather     Cancer Maternal Aunt         lung - smoker    Melanoma Neg Hx        MEDICATIONS:    Current Outpatient Medications:     atorvastatin (LIPITOR) 20 MG tablet, Take 1 tablet (20 mg total) by mouth once  "daily., Disp: 90 tablet, Rfl: 3    B-complex with vitamin C (Z-BEC OR EQUIV) tablet, Take 1 tablet by mouth once daily., Disp: , Rfl:     CYANOCOBALAMIN, VITAMIN B-12, ORAL, Take 1 tablet by mouth once daily., Disp: , Rfl:     ergocalciferol, vitamin D2, (VITAMIN D ORAL), Take 2 capsules by mouth once daily., Disp: , Rfl:     levothyroxine (SYNTHROID) 112 MCG tablet, Take 1 tablet (112 mcg total) by mouth once daily., Disp: 90 tablet, Rfl: 3    losartan (COZAAR) 50 MG tablet, Take 1 tablet (50 mg total) by mouth once daily., Disp: 90 tablet, Rfl: 3    sildenafiL (VIAGRA) 100 MG tablet, Take 1 tablet (100 mg total) by mouth daily as needed for Erectile Dysfunction (take on an empty stomach 30-60 minutes before)., Disp: 10 tablet, Rfl: 12    testosterone (ANDROGEL) 20.25 mg/1.25 gram (1.62 %) GlPm, Place 3 pumps onto the skin once daily., Disp: 75 g, Rfl: 5    vitamin E acetate (VITAMIN E ORAL), Take 1 tablet by mouth once daily., Disp: , Rfl:     celecoxib (CELEBREX) 200 MG capsule, Take 1 capsule (200 mg total) by mouth once daily., Disp: 30 capsule, Rfl: 3    ALLERGIES:  Review of patient's allergies indicates:   Allergen Reactions    Codeine Rash    Penicillins Rash     REVIEW OF SYSTEMS:  Constitution: Negative. Negative for chills, fever and night sweats.    Hematologic/Lymphatic: Negative for bleeding problem. Does not bruise/bleed easily.   Skin: Negative for dry skin, itching and rash.   Musculoskeletal: Negative for falls. Positive for right shoulder pain and muscle weakness.     All other review of symptoms were reviewed and found to be noncontributory.     PHYSICAL EXAMINATION:  Vitals:  /89   Pulse (!) 58   Ht 6' 2" (1.88 m)   Wt 112 kg (247 lb)   BMI 31.71 kg/m²    General: Well-developed well-nourished 51 y.o. malein no acute distress   Cardiovascular: Regular rhythm by palpation of distal pulse, normal color and temperature, no concerning varicosities on symptomatic side   Lungs: " No labored breathing or wheezing appreciated   Neuro: Alert and oriented ×3   Psychiatric: well oriented to person, place and time, demonstrates normal mood and affect   Skin: No rashes, lesions or ulcers, normal temperature, turgor, and texture on uninvolved extremity    Ortho/SPM Exam  Examination of the right shoulder demonstrates active forward elevation to 180, ER with arm at side to 60, IR to T12. Passive FE to 180, ER to 60. Mild tenderness along the proximal biceps tendon. Mild AC tenderness.  Palpable crepitus and popping over the AC joint with compression rotation and circumduction.  The patient notably has no significant pain with cross chest adduction maneuver.  5/5 resisted supraspinatus testing. 5/5 resisted infraspinatus testing. Negative belly press test. Stable shoulder.  The patient feels that he has a mass over the lateral aspect of his shoulder with history of lipomas elsewhere on his body.  I do feel a little bit of a soft tissue prominence over the lateral upper arm.  Could be consistent with lipoma.  This appears to be predominantly nontender.  No midline neck tenderness. Negative Spurling's maneuver.     IMAGING:  Xrays including AP, Outlet and Axillary Lateral of RIGHT shoulder are ordered / images reviewed by me:   Prominent AC arthropathy    ASSESSMENT:      ICD-10-CM ICD-9-CM   1. Arthritis of right acromioclavicular joint  M19.011 716.91   2. Chronic right shoulder pain  M25.511 719.41    G89.29 338.29       PLAN:     -Findings and treatment options were discussed with the patient.  Not much pain on exam today.  Predominant finding is AC arthropathy which I think has been bothersome to him particularly with overhead lifting.  Pain referred down the lateral subdeltoid recess.  This has been a long-standing issue for him.  He also has concern regarding a possible soft tissue mass over the deltoid region.  Known history of lipomas elsewhere in the body.  He would like to pursue MRI  evaluation.  I think that is reasonable given the extent duration of his complaints.  -Celebrex prescribed today  -MRI R shoulder with expanded field of view down to the mid humerus  -RTC to review results and treatment options.  Possible AC joint steroid injection.  Hold off on overhead lifting for now.  -All questions answered    Procedures

## 2022-07-26 ENCOUNTER — HOSPITAL ENCOUNTER (OUTPATIENT)
Dept: RADIOLOGY | Facility: HOSPITAL | Age: 52
Discharge: HOME | End: 2022-07-26
Attending: STUDENT IN AN ORGANIZED HEALTH CARE EDUCATION/TRAINING PROGRAM
Payer: COMMERCIAL

## 2022-07-26 DIAGNOSIS — G89.29 CHRONIC RIGHT SHOULDER PAIN: ICD-10-CM

## 2022-07-26 DIAGNOSIS — M25.511 CHRONIC RIGHT SHOULDER PAIN: ICD-10-CM

## 2022-07-26 PROCEDURE — 73221 MRI JOINT UPR EXTREM W/O DYE: CPT | Mod: TC,RT

## 2022-07-26 PROCEDURE — 73221 MRI JOINT UPR EXTREM W/O DYE: CPT | Mod: 26,RT,, | Performed by: RADIOLOGY

## 2022-07-26 PROCEDURE — 73221 MRI SHOULDER WITHOUT CONTRAST RIGHT: ICD-10-PCS | Mod: 26,RT,, | Performed by: RADIOLOGY

## 2022-07-27 ENCOUNTER — TELEPHONE (OUTPATIENT)
Dept: SPORTS MEDICINE | Facility: CLINIC | Age: 52
End: 2022-07-27
Payer: COMMERCIAL

## 2022-07-27 NOTE — TELEPHONE ENCOUNTER
Patient is scheduled with Dr. Carr to discuss surgery, appointment 7/28.    Tahira Becerril MS, OTC [facility]    ----- Message from LEONIDAS Carr MD sent at 7/27/2022  7:06 AM CDT -----  Lets set up the patient to see me tomorrow to discuss surgery. Virtual fine if he wants.     SC

## 2022-07-28 ENCOUNTER — OFFICE VISIT (OUTPATIENT)
Dept: SPORTS MEDICINE | Facility: CLINIC | Age: 52
End: 2022-07-28
Payer: COMMERCIAL

## 2022-07-28 DIAGNOSIS — G89.29 CHRONIC RIGHT SHOULDER PAIN: Primary | ICD-10-CM

## 2022-07-28 DIAGNOSIS — M25.511 CHRONIC RIGHT SHOULDER PAIN: Primary | ICD-10-CM

## 2022-07-28 PROCEDURE — 3061F PR NEG MICROALBUMINURIA RESULT DOCUMENTED/REVIEW: ICD-10-PCS | Mod: CPTII,95,, | Performed by: ORTHOPAEDIC SURGERY

## 2022-07-28 PROCEDURE — 4010F ACE/ARB THERAPY RXD/TAKEN: CPT | Mod: CPTII,95,, | Performed by: ORTHOPAEDIC SURGERY

## 2022-07-28 PROCEDURE — 3066F PR DOCUMENTATION OF TREATMENT FOR NEPHROPATHY: ICD-10-PCS | Mod: CPTII,95,, | Performed by: ORTHOPAEDIC SURGERY

## 2022-07-28 PROCEDURE — 3066F NEPHROPATHY DOC TX: CPT | Mod: CPTII,95,, | Performed by: ORTHOPAEDIC SURGERY

## 2022-07-28 PROCEDURE — 99499 UNLISTED E&M SERVICE: CPT | Mod: 95,,, | Performed by: ORTHOPAEDIC SURGERY

## 2022-07-28 PROCEDURE — 3061F NEG MICROALBUMINURIA REV: CPT | Mod: CPTII,95,, | Performed by: ORTHOPAEDIC SURGERY

## 2022-07-28 PROCEDURE — 4010F PR ACE/ARB THEARPY RXD/TAKEN: ICD-10-PCS | Mod: CPTII,95,, | Performed by: ORTHOPAEDIC SURGERY

## 2022-07-28 PROCEDURE — 99499 NO LOS: ICD-10-PCS | Mod: 95,,, | Performed by: ORTHOPAEDIC SURGERY

## 2022-08-16 ENCOUNTER — TELEPHONE (OUTPATIENT)
Dept: INTERNAL MEDICINE | Facility: CLINIC | Age: 52
End: 2022-08-16

## 2022-08-16 ENCOUNTER — OFFICE VISIT (OUTPATIENT)
Dept: SPORTS MEDICINE | Facility: CLINIC | Age: 52
End: 2022-08-16
Payer: COMMERCIAL

## 2022-08-16 VITALS
WEIGHT: 249.88 LBS | DIASTOLIC BLOOD PRESSURE: 93 MMHG | BODY MASS INDEX: 32.07 KG/M2 | HEART RATE: 54 BPM | HEIGHT: 74 IN | SYSTOLIC BLOOD PRESSURE: 141 MMHG

## 2022-08-16 DIAGNOSIS — Z00.00 WELLNESS EXAMINATION: Primary | ICD-10-CM

## 2022-08-16 DIAGNOSIS — M75.121 NONTRAUMATIC COMPLETE TEAR OF RIGHT ROTATOR CUFF: Primary | ICD-10-CM

## 2022-08-16 PROCEDURE — 3061F NEG MICROALBUMINURIA REV: CPT | Mod: CPTII,S$GLB,, | Performed by: ORTHOPAEDIC SURGERY

## 2022-08-16 PROCEDURE — 99999 PR PBB SHADOW E&M-EST. PATIENT-LVL III: CPT | Mod: PBBFAC,,, | Performed by: ORTHOPAEDIC SURGERY

## 2022-08-16 PROCEDURE — 3061F PR NEG MICROALBUMINURIA RESULT DOCUMENTED/REVIEW: ICD-10-PCS | Mod: CPTII,S$GLB,, | Performed by: ORTHOPAEDIC SURGERY

## 2022-08-16 PROCEDURE — 3066F NEPHROPATHY DOC TX: CPT | Mod: CPTII,S$GLB,, | Performed by: ORTHOPAEDIC SURGERY

## 2022-08-16 PROCEDURE — 99214 OFFICE O/P EST MOD 30 MIN: CPT | Mod: S$GLB,,, | Performed by: ORTHOPAEDIC SURGERY

## 2022-08-16 PROCEDURE — 3080F DIAST BP >= 90 MM HG: CPT | Mod: CPTII,S$GLB,, | Performed by: ORTHOPAEDIC SURGERY

## 2022-08-16 PROCEDURE — 1159F MED LIST DOCD IN RCRD: CPT | Mod: CPTII,S$GLB,, | Performed by: ORTHOPAEDIC SURGERY

## 2022-08-16 PROCEDURE — 3066F PR DOCUMENTATION OF TREATMENT FOR NEPHROPATHY: ICD-10-PCS | Mod: CPTII,S$GLB,, | Performed by: ORTHOPAEDIC SURGERY

## 2022-08-16 PROCEDURE — 4010F PR ACE/ARB THEARPY RXD/TAKEN: ICD-10-PCS | Mod: CPTII,S$GLB,, | Performed by: ORTHOPAEDIC SURGERY

## 2022-08-16 PROCEDURE — 3008F PR BODY MASS INDEX (BMI) DOCUMENTED: ICD-10-PCS | Mod: CPTII,S$GLB,, | Performed by: ORTHOPAEDIC SURGERY

## 2022-08-16 PROCEDURE — 4010F ACE/ARB THERAPY RXD/TAKEN: CPT | Mod: CPTII,S$GLB,, | Performed by: ORTHOPAEDIC SURGERY

## 2022-08-16 PROCEDURE — 3080F PR MOST RECENT DIASTOLIC BLOOD PRESSURE >= 90 MM HG: ICD-10-PCS | Mod: CPTII,S$GLB,, | Performed by: ORTHOPAEDIC SURGERY

## 2022-08-16 PROCEDURE — 3077F SYST BP >= 140 MM HG: CPT | Mod: CPTII,S$GLB,, | Performed by: ORTHOPAEDIC SURGERY

## 2022-08-16 PROCEDURE — 3077F PR MOST RECENT SYSTOLIC BLOOD PRESSURE >= 140 MM HG: ICD-10-PCS | Mod: CPTII,S$GLB,, | Performed by: ORTHOPAEDIC SURGERY

## 2022-08-16 PROCEDURE — 3008F BODY MASS INDEX DOCD: CPT | Mod: CPTII,S$GLB,, | Performed by: ORTHOPAEDIC SURGERY

## 2022-08-16 PROCEDURE — 1159F PR MEDICATION LIST DOCUMENTED IN MEDICAL RECORD: ICD-10-PCS | Mod: CPTII,S$GLB,, | Performed by: ORTHOPAEDIC SURGERY

## 2022-08-16 PROCEDURE — 99214 PR OFFICE/OUTPT VISIT, EST, LEVL IV, 30-39 MIN: ICD-10-PCS | Mod: S$GLB,,, | Performed by: ORTHOPAEDIC SURGERY

## 2022-08-16 PROCEDURE — 99999 PR PBB SHADOW E&M-EST. PATIENT-LVL III: ICD-10-PCS | Mod: PBBFAC,,, | Performed by: ORTHOPAEDIC SURGERY

## 2022-08-16 NOTE — PROGRESS NOTES
CC: Right shoulder pain    51 y.o. male who presents for MRI review of right shoulder.  Concern for rotator cuff tear. Patient does not report any new incidents or injuries since their last appointment. Pain and symptoms remain unchanged since his last appointment.  He has had pain now for several months. Here today to discuss treatment options.     Prior history:   51 y.o. Male works as an . Complaint is right shoulder pain x 1 mos. Atraumatic onset but associated with workout activity.  Particularly overhead lifting. Pain localizes to lateral upper arm/subdeltoid recess. He does bootcamp style training 3x per week. Pain is disruptive to sleep at night.  Bothers him with day-to-day activities.  He does describe painful intermittent popping and clicking in the shoulder.  No prior instability issues.  Better with rest.  No prior injections or therapy.  Denies neck pain or radicular symptoms. Treatment thus far has included activity modifications, rest, and oral medication.  Here today to discuss diagnosis and treatment options.     PMHx notable for gout.   Negative for tobacco.   Negative for diabetes.    Pain Score:   4    PAST MEDICAL HISTORY:   Past Medical History:   Diagnosis Date    Gout 2014    High cholesterol     Hypertension     Hypothyroid     Low testosterone     Staph aureus infection age 13    R leg       PAST SURGICAL HISTORY:  Past Surgical History:   Procedure Laterality Date    COLONOSCOPY N/A 2020    Procedure: COLONOSCOPY;  Surgeon: VAIN Newell MD;  Location: [facility] ([address] [place] [address]);  Service: Endoscopy;  Laterality: N/A;  covid test 2nd floor surgery clinic     FRACTURE SURGERY Left     wrist    Incision and drainage of tibia Right     SINUS SURGERY      x2       FAMILY HISTORY:  Family History   Problem Relation Age of Onset    Heart disease Father 73            Hypertension Sister     Hyperlipidemia Sister     Hypertension Brother      Hyperlipidemia Brother     Lupus Sister     Hypertension Mother     Cancer Maternal Grandfather     Cancer Maternal Aunt         lung - smoker    Melanoma Neg Hx        MEDICATIONS:    Current Outpatient Medications:     atorvastatin (LIPITOR) 20 MG tablet, Take 1 tablet (20 mg total) by mouth once daily., Disp: 90 tablet, Rfl: 3    B-complex with vitamin C (Z-BEC OR EQUIV) tablet, Take 1 tablet by mouth once daily., Disp: , Rfl:     celecoxib (CELEBREX) 200 MG capsule, Take 1 capsule (200 mg total) by mouth once daily., Disp: 30 capsule, Rfl: 3    CYANOCOBALAMIN, VITAMIN B-12, ORAL, Take 1 tablet by mouth once daily., Disp: , Rfl:     ergocalciferol, vitamin D2, (VITAMIN D ORAL), Take 2 capsules by mouth once daily., Disp: , Rfl:     levothyroxine (SYNTHROID) 112 MCG tablet, Take 1 tablet (112 mcg total) by mouth once daily., Disp: 90 tablet, Rfl: 3    losartan (COZAAR) 50 MG tablet, Take 1 tablet (50 mg total) by mouth once daily., Disp: 90 tablet, Rfl: 3    sildenafiL (VIAGRA) 100 MG tablet, Take 1 tablet (100 mg total) by mouth daily as needed for Erectile Dysfunction (take on an empty stomach 30-60 minutes before)., Disp: 10 tablet, Rfl: 12    testosterone (ANDROGEL) 20.25 mg/1.25 gram (1.62 %) GlPm, Place 3 pumps onto the skin once daily., Disp: 75 g, Rfl: 5    vitamin E acetate (VITAMIN E ORAL), Take 1 tablet by mouth once daily., Disp: , Rfl:     ALLERGIES:  Review of patient's allergies indicates:   Allergen Reactions    Codeine Rash    Penicillins Rash     REVIEW OF SYSTEMS:  Constitution: Negative. Negative for chills, fever and night sweats.    Hematologic/Lymphatic: Negative for bleeding problem. Does not bruise/bleed easily.   Skin: Negative for dry skin, itching and rash.   Musculoskeletal: Negative for falls. Positive for right shoulder pain and muscle weakness.     All other review of symptoms were reviewed and found to be noncontributory.     PHYSICAL EXAMINATION:  Vitals:  BP (!)  "141/93   Pulse (!) 54   Ht 6' 2" (1.88 m)   Wt 113.4 kg (249 lb 14.4 oz)   BMI 32.09 kg/m²    General: Well-developed well-nourished 51 y.o. malein no acute distress   Cardiovascular: Regular rhythm by palpation of distal pulse, normal color and temperature, no concerning varicosities on symptomatic side   Lungs: No labored breathing or wheezing appreciated   Neuro: Alert and oriented ×3   Psychiatric: well oriented to person, place and time, demonstrates normal mood and affect   Skin: No rashes, lesions or ulcers, normal temperature, turgor, and texture on uninvolved extremity    Ortho/SPM Exam  Examination of the right shoulder demonstrates active forward elevation to 150, ER with arm at side to 60, IR to T12.  Pain on resisted scaption.  Passive FE to 170, ER to 60. Mild tenderness along the proximal biceps tendon. Mild AC tenderness.  Palpable crepitus and popping over the AC joint with compression rotation and circumduction.  The patient notably has no significant pain with cross chest adduction maneuver.  4/5 resisted supraspinatus testing with pain. 5/5 resisted infraspinatus testing. Negative belly press test. Stable shoulder.  The patient feels that he has a mass over the lateral aspect of his shoulder with history of lipomas elsewhere on his body.  I do feel a little bit of a soft tissue prominence over the lateral upper arm.  Suspect lipoma.  The patient states it has been there for a while and has not changed size.  Same for the other soft tissue masses around his body.  Nontender.  No midline neck tenderness. Negative Spurling's maneuver.     IMAGING:  Xrays including AP, Outlet and Axillary Lateral of RIGHT shoulder are ordered / images reviewed by me:   Prominent AC arthropathy    MRI Right Shoulder  Moderate-sized rotator cuff tear, involving the supraspinatus tendon, as above.  Additional tendinosis of the infraspinatus noted.     Prominent intra-articular biceps tendinosis.     Moderate joint " effusion.     AC joint arthropathy.    On my read:  Full-thickness rotator cuff tear involving the supraspinatus medium to large size with medial tissue retraction.  Will maintained musculature.    ASSESSMENT:      ICD-10-CM ICD-9-CM   1. Nontraumatic complete tear of right rotator cuff  M75.121 727.61       PLAN:     Findings discussed with the patient.  Primary complaint of pain.  Exam findings correlate with imaging.  He has a symptomatic full-thickness rotator cuff tear.  Medial tissue retraction.  The patient is active and young.  Arthroscopic repair as indicated.  The details of such were discussed include the expected postop rehab and recovery course.  I have counseled him on the risks of surgery which included but are not limited to tissue nonhealing or retear, stiffness, weakness, prolonged recovery, biceps deformity among others.  He will coordinate things at home and at work and will let us know when he wants to proceed.    Plan is right shoulder arthroscopic rotator cuff repair, biceps tenodesis, possible subacromial decompression, possible open versus arthroscopic distal clavicle excision.  Plan to reassess the AC joint pre operatively.    Medical clearance per his primary care physician who I have messaged.    Informed Consent:    The details of the surgical procedure were explained, including the location of probable incisions and a description of possible hardware and/or grafts to be used. Alternatives to both operative and non-operative options with associated risks and benefits were discussed. The patient understands the likely convalescence after surgery and, in particular, the expected postop rehab and recovery course. The outlined risks and potential complications of the proposed procedure include but are not limited to: infection, poor wound healing, scarring, deformity, stiffness, swelling, continued or recurrent pain, instability, hardware or prosthetic failure if implanted, symptomatic  hardware requiring removal, dislocation, weakness, neurovascular injury, numbness, chronic regional pain disorder, tissue nonhealing/irreparability/retear, subsequent contralateral limb injury or pathology, chondral injury, arthritis, fracture, blood clot formation, inability to return to previous level of activity, anesthetic or regional block complication up to death, need for additional procedure as indicated intraoperatively, and potential need for further surgery.    The patient was also informed and understands that the risks of surgery are greater for patients with a current condition or history of heart disease, obesity, clotting disorders, recurrent infections, steroid use, current or past smoking, and factors such as sedentary lifestyle and noncompliance with medications, therapy or follow-up. The degree of the increased risk is hard to estimate with any degree of precision. If applicable, smoking cessation was discussed.     All questions were answered. The patient has verbalized understanding of these issues and wishes to proceed with the surgery as discussed.    Procedures

## 2022-08-18 ENCOUNTER — PROCEDURE VISIT (OUTPATIENT)
Dept: DERMATOLOGY | Facility: CLINIC | Age: 52
End: 2022-08-18
Payer: COMMERCIAL

## 2022-08-18 DIAGNOSIS — Z41.1 ELECTIVE PROCEDURE FOR UNACCEPTABLE COSMETIC APPEARANCE: Primary | ICD-10-CM

## 2022-08-18 PROCEDURE — 99499 UNLISTED E&M SERVICE: CPT | Mod: CSM,S$GLB,, | Performed by: DERMATOLOGY

## 2022-08-18 PROCEDURE — 99499 NO LOS: ICD-10-PCS | Mod: CSM,S$GLB,, | Performed by: DERMATOLOGY

## 2022-08-18 NOTE — PROGRESS NOTES
Patient is here today for cosmetic Botox treatment.   He denies any history of adverse reaction to Botox or history of neuromuscular disease.     Discussed benefits and risks of Botox injections, including headache, weakness/paralysis of muscles, asymmetry, eyebrow/lid drooping, pain, bruising, swelling, infection, and rare risk of systemic botulism. Verbal and written consent was obtained.    Patient agreed to proceed with treatment. 50 units total were injected today:  13 in frontalis  25 in glabella (4-6-5-6-4)  12 in bilateral periorbital region    Patient tolerated well with no complications. He was instructed not to rub or massage the treated areas and that he should avoid lying down, bending upside down, and strenuous exercise for the rest of the day.  Instructed to wait two weeks to assess response before presenting for a touch up injection, if needed.      Botox dilution: 10u / 0.2mL (10u / 0.4mL for frontalis)  Lot #: [id number]  Exp date: 10/2023

## 2022-08-29 DIAGNOSIS — N52.9 ED (ERECTILE DYSFUNCTION) OF ORGANIC ORIGIN: ICD-10-CM

## 2022-08-29 DIAGNOSIS — R53.83 FATIGUE, UNSPECIFIED TYPE: ICD-10-CM

## 2022-08-29 DIAGNOSIS — N40.0 BENIGN PROSTATIC HYPERPLASIA WITHOUT LOWER URINARY TRACT SYMPTOMS: ICD-10-CM

## 2022-08-29 DIAGNOSIS — E29.1 PRIMARY MALE HYPOGONADISM: ICD-10-CM

## 2022-08-31 ENCOUNTER — LAB VISIT (OUTPATIENT)
Dept: LAB | Facility: HOSPITAL | Age: 52
End: 2022-08-31
Attending: INTERNAL MEDICINE
Payer: COMMERCIAL

## 2022-08-31 DIAGNOSIS — Z00.00 WELLNESS EXAMINATION: ICD-10-CM

## 2022-08-31 LAB
25(OH)D3+25(OH)D2 SERPL-MCNC: 43 NG/ML (ref 30–96)
ALBUMIN SERPL BCP-MCNC: 4.3 G/DL (ref 3.5–5.2)
ALP SERPL-CCNC: 86 U/L (ref 55–135)
ALT SERPL W/O P-5'-P-CCNC: 38 U/L (ref 10–44)
ANION GAP SERPL CALC-SCNC: 11 MMOL/L (ref 8–16)
AST SERPL-CCNC: 31 U/L (ref 10–40)
BASOPHILS # BLD AUTO: 0.03 K/UL (ref 0–0.2)
BASOPHILS NFR BLD: 0.7 % (ref 0–1.9)
BILIRUB SERPL-MCNC: 0.9 MG/DL (ref 0.1–1)
BUN SERPL-MCNC: 19 MG/DL (ref 6–20)
CALCIUM SERPL-MCNC: 9.6 MG/DL (ref 8.7–10.5)
CHLORIDE SERPL-SCNC: 102 MMOL/L (ref 95–110)
CHOLEST SERPL-MCNC: 190 MG/DL (ref 120–199)
CHOLEST/HDLC SERPL: 4 {RATIO} (ref 2–5)
CO2 SERPL-SCNC: 26 MMOL/L (ref 23–29)
COMPLEXED PSA SERPL-MCNC: 0.76 NG/ML (ref 0–4)
CREAT SERPL-MCNC: 1.1 MG/DL (ref 0.5–1.4)
DIFFERENTIAL METHOD: ABNORMAL
EOSINOPHIL # BLD AUTO: 0.1 K/UL (ref 0–0.5)
EOSINOPHIL NFR BLD: 3 % (ref 0–8)
ERYTHROCYTE [DISTWIDTH] IN BLOOD BY AUTOMATED COUNT: 12.4 % (ref 11.5–14.5)
EST. GFR  (NO RACE VARIABLE): >60 ML/MIN/1.73 M^2
ESTIMATED AVG GLUCOSE: 103 MG/DL (ref 68–131)
GLUCOSE SERPL-MCNC: 111 MG/DL (ref 70–110)
HBA1C MFR BLD: 5.2 % (ref 4–5.6)
HCT VFR BLD AUTO: 47.5 % (ref 40–54)
HDLC SERPL-MCNC: 47 MG/DL (ref 40–75)
HDLC SERPL: 24.7 % (ref 20–50)
HGB BLD-MCNC: 15.7 G/DL (ref 14–18)
IMM GRANULOCYTES # BLD AUTO: 0.01 K/UL (ref 0–0.04)
IMM GRANULOCYTES NFR BLD AUTO: 0.2 % (ref 0–0.5)
LDLC SERPL CALC-MCNC: 105 MG/DL (ref 63–159)
LYMPHOCYTES # BLD AUTO: 1.6 K/UL (ref 1–4.8)
LYMPHOCYTES NFR BLD: 36.8 % (ref 18–48)
MCH RBC QN AUTO: 31.5 PG (ref 27–31)
MCHC RBC AUTO-ENTMCNC: 33.1 G/DL (ref 32–36)
MCV RBC AUTO: 95 FL (ref 82–98)
MONOCYTES # BLD AUTO: 0.4 K/UL (ref 0.3–1)
MONOCYTES NFR BLD: 9.8 % (ref 4–15)
NEUTROPHILS # BLD AUTO: 2.2 K/UL (ref 1.8–7.7)
NEUTROPHILS NFR BLD: 49.5 % (ref 38–73)
NONHDLC SERPL-MCNC: 143 MG/DL
NRBC BLD-RTO: 0 /100 WBC
PLATELET # BLD AUTO: 187 K/UL (ref 150–450)
PMV BLD AUTO: 11.4 FL (ref 9.2–12.9)
POTASSIUM SERPL-SCNC: 4.3 MMOL/L (ref 3.5–5.1)
PROT SERPL-MCNC: 7.1 G/DL (ref 6–8.4)
RBC # BLD AUTO: 4.99 M/UL (ref 4.6–6.2)
SODIUM SERPL-SCNC: 139 MMOL/L (ref 136–145)
TESTOST SERPL-MCNC: 130 NG/DL (ref 304–1227)
TRIGL SERPL-MCNC: 190 MG/DL (ref 30–150)
TSH SERPL DL<=0.005 MIU/L-ACNC: 2.85 UIU/ML (ref 0.4–4)
WBC # BLD AUTO: 4.4 K/UL (ref 3.9–12.7)

## 2022-08-31 PROCEDURE — 80061 LIPID PANEL: CPT | Performed by: INTERNAL MEDICINE

## 2022-08-31 PROCEDURE — 80053 COMPREHEN METABOLIC PANEL: CPT | Performed by: INTERNAL MEDICINE

## 2022-08-31 PROCEDURE — 84403 ASSAY OF TOTAL TESTOSTERONE: CPT | Performed by: INTERNAL MEDICINE

## 2022-08-31 PROCEDURE — 84153 ASSAY OF PSA TOTAL: CPT | Performed by: INTERNAL MEDICINE

## 2022-08-31 PROCEDURE — 83036 HEMOGLOBIN GLYCOSYLATED A1C: CPT | Performed by: INTERNAL MEDICINE

## 2022-08-31 PROCEDURE — 84443 ASSAY THYROID STIM HORMONE: CPT | Performed by: INTERNAL MEDICINE

## 2022-08-31 PROCEDURE — 85025 COMPLETE CBC W/AUTO DIFF WBC: CPT | Performed by: INTERNAL MEDICINE

## 2022-08-31 PROCEDURE — 82306 VITAMIN D 25 HYDROXY: CPT | Performed by: INTERNAL MEDICINE

## 2022-08-31 PROCEDURE — 36415 COLL VENOUS BLD VENIPUNCTURE: CPT | Performed by: INTERNAL MEDICINE

## 2022-09-01 RX ORDER — TESTOSTERONE 20.25 MG/1.25G
GEL TOPICAL DAILY
Qty: 150 G | Refills: 5 | Status: SHIPPED | OUTPATIENT
Start: 2022-09-01 | End: 2023-03-05

## 2022-09-02 ENCOUNTER — IMMUNIZATION (OUTPATIENT)
Dept: PHARMACY | Facility: CLINIC | Age: 52
End: 2022-09-02
Payer: COMMERCIAL

## 2022-09-02 ENCOUNTER — OFFICE VISIT (OUTPATIENT)
Dept: INTERNAL MEDICINE | Facility: CLINIC | Age: 52
End: 2022-09-02
Payer: COMMERCIAL

## 2022-09-02 ENCOUNTER — TELEPHONE (OUTPATIENT)
Dept: PHARMACY | Facility: CLINIC | Age: 52
End: 2022-09-02
Payer: COMMERCIAL

## 2022-09-02 VITALS
WEIGHT: 250.25 LBS | HEART RATE: 58 BPM | OXYGEN SATURATION: 98 % | SYSTOLIC BLOOD PRESSURE: 138 MMHG | BODY MASS INDEX: 32.12 KG/M2 | HEIGHT: 74 IN | DIASTOLIC BLOOD PRESSURE: 84 MMHG

## 2022-09-02 DIAGNOSIS — E29.1 MALE HYPOGONADISM: ICD-10-CM

## 2022-09-02 DIAGNOSIS — G60.9 IDIOPATHIC NEUROPATHY: ICD-10-CM

## 2022-09-02 DIAGNOSIS — E78.00 HIGH CHOLESTEROL: Primary | ICD-10-CM

## 2022-09-02 DIAGNOSIS — E03.9 ACQUIRED HYPOTHYROIDISM: ICD-10-CM

## 2022-09-02 DIAGNOSIS — I10 ESSENTIAL HYPERTENSION: ICD-10-CM

## 2022-09-02 PROCEDURE — 99396 PREV VISIT EST AGE 40-64: CPT | Mod: S$GLB,,, | Performed by: INTERNAL MEDICINE

## 2022-09-02 PROCEDURE — 3008F PR BODY MASS INDEX (BMI) DOCUMENTED: ICD-10-PCS | Mod: CPTII,S$GLB,, | Performed by: INTERNAL MEDICINE

## 2022-09-02 PROCEDURE — 99999 PR PBB SHADOW E&M-EST. PATIENT-LVL IV: ICD-10-PCS | Mod: PBBFAC,,, | Performed by: INTERNAL MEDICINE

## 2022-09-02 PROCEDURE — 3061F NEG MICROALBUMINURIA REV: CPT | Mod: CPTII,S$GLB,, | Performed by: INTERNAL MEDICINE

## 2022-09-02 PROCEDURE — 3066F NEPHROPATHY DOC TX: CPT | Mod: CPTII,S$GLB,, | Performed by: INTERNAL MEDICINE

## 2022-09-02 PROCEDURE — 4010F ACE/ARB THERAPY RXD/TAKEN: CPT | Mod: CPTII,S$GLB,, | Performed by: INTERNAL MEDICINE

## 2022-09-02 PROCEDURE — 3044F PR MOST RECENT HEMOGLOBIN A1C LEVEL <7.0%: ICD-10-PCS | Mod: CPTII,S$GLB,, | Performed by: INTERNAL MEDICINE

## 2022-09-02 PROCEDURE — 3075F SYST BP GE 130 - 139MM HG: CPT | Mod: CPTII,S$GLB,, | Performed by: INTERNAL MEDICINE

## 2022-09-02 PROCEDURE — 3079F DIAST BP 80-89 MM HG: CPT | Mod: CPTII,S$GLB,, | Performed by: INTERNAL MEDICINE

## 2022-09-02 PROCEDURE — 3061F PR NEG MICROALBUMINURIA RESULT DOCUMENTED/REVIEW: ICD-10-PCS | Mod: CPTII,S$GLB,, | Performed by: INTERNAL MEDICINE

## 2022-09-02 PROCEDURE — 3008F BODY MASS INDEX DOCD: CPT | Mod: CPTII,S$GLB,, | Performed by: INTERNAL MEDICINE

## 2022-09-02 PROCEDURE — 99396 PR PREVENTIVE VISIT,EST,40-64: ICD-10-PCS | Mod: S$GLB,,, | Performed by: INTERNAL MEDICINE

## 2022-09-02 PROCEDURE — 4010F PR ACE/ARB THEARPY RXD/TAKEN: ICD-10-PCS | Mod: CPTII,S$GLB,, | Performed by: INTERNAL MEDICINE

## 2022-09-02 PROCEDURE — 3075F PR MOST RECENT SYSTOLIC BLOOD PRESS GE 130-139MM HG: ICD-10-PCS | Mod: CPTII,S$GLB,, | Performed by: INTERNAL MEDICINE

## 2022-09-02 PROCEDURE — 3066F PR DOCUMENTATION OF TREATMENT FOR NEPHROPATHY: ICD-10-PCS | Mod: CPTII,S$GLB,, | Performed by: INTERNAL MEDICINE

## 2022-09-02 PROCEDURE — 99999 PR PBB SHADOW E&M-EST. PATIENT-LVL IV: CPT | Mod: PBBFAC,,, | Performed by: INTERNAL MEDICINE

## 2022-09-02 PROCEDURE — 3044F HG A1C LEVEL LT 7.0%: CPT | Mod: CPTII,S$GLB,, | Performed by: INTERNAL MEDICINE

## 2022-09-02 PROCEDURE — 3079F PR MOST RECENT DIASTOLIC BLOOD PRESSURE 80-89 MM HG: ICD-10-PCS | Mod: CPTII,S$GLB,, | Performed by: INTERNAL MEDICINE

## 2022-09-02 NOTE — PROGRESS NOTES
Subjective:       Patient ID: Sukumar Beal is a 51 y.o. male.    Chief Complaint: Pre-op Exam    HPIPt plans shoulder surgery soon.  He is feeling well. No CP or SOB.  Pt denies any problems with anesthesia in the past. Pt reports no history of bleeding diathesis, no significant neck DJD, and no steroids in the last year.    Review of Systems   Respiratory:  Negative for shortness of breath.    Cardiovascular:  Negative for chest pain.   Gastrointestinal:  Negative for abdominal pain, diarrhea, nausea and vomiting.   Genitourinary:  Negative for dysuria.   Neurological:  Negative for seizures, syncope and headaches.     Objective:      Physical Exam  Constitutional:       General: He is not in acute distress.     Appearance: He is well-developed.   Neck:      Thyroid: No thyromegaly.      Vascular: No JVD.   Cardiovascular:      Rate and Rhythm: Normal rate and regular rhythm.      Heart sounds: Normal heart sounds. No murmur heard.    No friction rub. No gallop.   Pulmonary:      Effort: Pulmonary effort is normal.      Breath sounds: Normal breath sounds. No wheezing or rales.   Abdominal:      General: Bowel sounds are normal. There is no distension.      Palpations: Abdomen is soft. There is no mass.      Tenderness: There is no abdominal tenderness. There is no guarding or rebound.   Musculoskeletal:      Cervical back: Neck supple.   Lymphadenopathy:      Cervical: No cervical adenopathy.   Skin:     General: Skin is warm and dry.   Neurological:      Mental Status: He is alert and oriented to person, place, and time.   Psychiatric:         Behavior: Behavior normal.         Thought Content: Thought content normal.         Judgment: Judgment normal.       Assessment:       No diagnosis found.    Plan:   High cholesterol  Continue atorvastatin perioperatively  Acquired hypothyroidism  Continue levothyroxine perioeratively  Male hypogonadism  On testosterone replacement  Essential hypertension  Controlled -  continue current meds  Continue meds perioperatively  Idiopathic neuropathy   stable    CBC, CMP WNL  CXR 3/22 - WNL  EKG - sinus leonel, normal EKG    Pt is at acceptable risk for anesthesia and surgery and at low risk for cardio-pulmonary complications.

## 2022-09-08 ENCOUNTER — OFFICE VISIT (OUTPATIENT)
Dept: UROLOGY | Facility: CLINIC | Age: 52
End: 2022-09-08
Payer: COMMERCIAL

## 2022-09-08 ENCOUNTER — PATIENT MESSAGE (OUTPATIENT)
Dept: UROLOGY | Facility: CLINIC | Age: 52
End: 2022-09-08

## 2022-09-08 VITALS
HEIGHT: 74 IN | SYSTOLIC BLOOD PRESSURE: 134 MMHG | DIASTOLIC BLOOD PRESSURE: 95 MMHG | BODY MASS INDEX: 31.7 KG/M2 | HEART RATE: 71 BPM | WEIGHT: 247 LBS

## 2022-09-08 DIAGNOSIS — E78.5 DYSLIPIDEMIA: ICD-10-CM

## 2022-09-08 DIAGNOSIS — R53.83 FATIGUE, UNSPECIFIED TYPE: ICD-10-CM

## 2022-09-08 DIAGNOSIS — N52.9 ED (ERECTILE DYSFUNCTION) OF ORGANIC ORIGIN: ICD-10-CM

## 2022-09-08 DIAGNOSIS — E29.1 PRIMARY MALE HYPOGONADISM: Primary | ICD-10-CM

## 2022-09-08 PROCEDURE — 1159F PR MEDICATION LIST DOCUMENTED IN MEDICAL RECORD: ICD-10-PCS | Mod: CPTII,S$GLB,, | Performed by: NURSE PRACTITIONER

## 2022-09-08 PROCEDURE — 4010F PR ACE/ARB THEARPY RXD/TAKEN: ICD-10-PCS | Mod: CPTII,S$GLB,, | Performed by: NURSE PRACTITIONER

## 2022-09-08 PROCEDURE — 99999 PR PBB SHADOW E&M-EST. PATIENT-LVL III: ICD-10-PCS | Mod: PBBFAC,,, | Performed by: NURSE PRACTITIONER

## 2022-09-08 PROCEDURE — 4010F ACE/ARB THERAPY RXD/TAKEN: CPT | Mod: CPTII,S$GLB,, | Performed by: NURSE PRACTITIONER

## 2022-09-08 PROCEDURE — 99214 PR OFFICE/OUTPT VISIT, EST, LEVL IV, 30-39 MIN: ICD-10-PCS | Mod: S$GLB,,, | Performed by: NURSE PRACTITIONER

## 2022-09-08 PROCEDURE — 3008F PR BODY MASS INDEX (BMI) DOCUMENTED: ICD-10-PCS | Mod: CPTII,S$GLB,, | Performed by: NURSE PRACTITIONER

## 2022-09-08 PROCEDURE — 3066F PR DOCUMENTATION OF TREATMENT FOR NEPHROPATHY: ICD-10-PCS | Mod: CPTII,S$GLB,, | Performed by: NURSE PRACTITIONER

## 2022-09-08 PROCEDURE — 3044F HG A1C LEVEL LT 7.0%: CPT | Mod: CPTII,S$GLB,, | Performed by: NURSE PRACTITIONER

## 2022-09-08 PROCEDURE — 3075F PR MOST RECENT SYSTOLIC BLOOD PRESS GE 130-139MM HG: ICD-10-PCS | Mod: CPTII,S$GLB,, | Performed by: NURSE PRACTITIONER

## 2022-09-08 PROCEDURE — 3008F BODY MASS INDEX DOCD: CPT | Mod: CPTII,S$GLB,, | Performed by: NURSE PRACTITIONER

## 2022-09-08 PROCEDURE — 3075F SYST BP GE 130 - 139MM HG: CPT | Mod: CPTII,S$GLB,, | Performed by: NURSE PRACTITIONER

## 2022-09-08 PROCEDURE — 3066F NEPHROPATHY DOC TX: CPT | Mod: CPTII,S$GLB,, | Performed by: NURSE PRACTITIONER

## 2022-09-08 PROCEDURE — 99214 OFFICE O/P EST MOD 30 MIN: CPT | Mod: S$GLB,,, | Performed by: NURSE PRACTITIONER

## 2022-09-08 PROCEDURE — 3061F NEG MICROALBUMINURIA REV: CPT | Mod: CPTII,S$GLB,, | Performed by: NURSE PRACTITIONER

## 2022-09-08 PROCEDURE — 3080F PR MOST RECENT DIASTOLIC BLOOD PRESSURE >= 90 MM HG: ICD-10-PCS | Mod: CPTII,S$GLB,, | Performed by: NURSE PRACTITIONER

## 2022-09-08 PROCEDURE — 3080F DIAST BP >= 90 MM HG: CPT | Mod: CPTII,S$GLB,, | Performed by: NURSE PRACTITIONER

## 2022-09-08 PROCEDURE — 1159F MED LIST DOCD IN RCRD: CPT | Mod: CPTII,S$GLB,, | Performed by: NURSE PRACTITIONER

## 2022-09-08 PROCEDURE — 3044F PR MOST RECENT HEMOGLOBIN A1C LEVEL <7.0%: ICD-10-PCS | Mod: CPTII,S$GLB,, | Performed by: NURSE PRACTITIONER

## 2022-09-08 PROCEDURE — 3061F PR NEG MICROALBUMINURIA RESULT DOCUMENTED/REVIEW: ICD-10-PCS | Mod: CPTII,S$GLB,, | Performed by: NURSE PRACTITIONER

## 2022-09-08 PROCEDURE — 99999 PR PBB SHADOW E&M-EST. PATIENT-LVL III: CPT | Mod: PBBFAC,,, | Performed by: NURSE PRACTITIONER

## 2022-09-08 NOTE — PROGRESS NOTES
CHIEF COMPLAINT:    Sukumar Beal is a 51 y.o. male presents today for Low Testosterone.     HISTORY OF PRESENTING ILLINESS:    Sukumar Beal is a 51 y.o. male who has a history of Low Testosterone. He has complaints are fatigue, loss of axillary hair.     He's on Androgel 1.62% (1.25 gms) using 3 pumps.  While on TRT, While on TRT, he has more energy.    He wanted testopel, but he has [employer].     Last clinic visit was 03/08/2022.      He is here today for 6 month TRT.   TRT labs were completed 08/31/2022 by his PCP    T levels < 300 but he had ran out.   States that he never has enough to last him a month.   Has not been to compliant with dosing due to limited supply    He has definitely feeling more fatigued, run done since running out of his Testosterone.               REVIEW OF SYSTEMS:  Review of Systems   Constitutional:  Positive for malaise/fatigue. Negative for chills and fever.   Eyes:  Negative for double vision.   Respiratory:  Negative for cough and shortness of breath.    Cardiovascular:  Negative for chest pain and palpitations.   Gastrointestinal:  Negative for nausea and vomiting.   Genitourinary: Negative.  Negative for dysuria, flank pain and hematuria.        Ok with urination     Musculoskeletal:  Positive for joint pain.        Right shoulder pain; torn rotator cuff; planning to have surgery.     Neurological:  Negative for dizziness.       PATIENT HISTORY:    Past Medical History:   Diagnosis Date    Gout 7/2014    High cholesterol     Hypertension     Hypothyroid     Low testosterone     Staph aureus infection age 13    R leg       Past Surgical History:   Procedure Laterality Date    COLONOSCOPY N/A 9/21/2020    Procedure: COLONOSCOPY;  Surgeon: VANI Newell MD;  Location: [address];  Service: Endoscopy;  Laterality: N/A;  covid test 2nd floor surgery clinic 9/18    FRACTURE SURGERY Left 2004    wrist    Incision and drainage of tibia Right 1983    SINUS SURGERY      x2        Family History   Problem Relation Age of Onset    Heart disease Father 73            Hypertension Sister     Hyperlipidemia Sister     Hypertension Brother     Hyperlipidemia Brother     Lupus Sister     Hypertension Mother     Cancer Maternal Grandfather     Cancer Maternal Aunt         lung - smoker    Melanoma Neg Hx        Social History     Socioeconomic History    Marital status: Single   Tobacco Use    Smoking status: Former     Packs/day: 0.25     Years: 15.00     Pack years: 3.75     Types: Cigarettes     Quit date: 1/3/2013     Years since quittin.6    Smokeless tobacco: Never   Substance and Sexual Activity    Alcohol use: Yes     Alcohol/week: 5.0 standard drinks     Types: 6 Standard drinks or equivalent per week     Comment: weekends    Drug use: No    Sexual activity: Yes       Allergies:  Codeine and Penicillins    Medications:    Current Outpatient Medications:     atorvastatin (LIPITOR) 20 MG tablet, Take 1 tablet (20 mg total) by mouth once daily., Disp: 90 tablet, Rfl: 3    B-complex with vitamin C (Z-BEC OR EQUIV) tablet, Take 1 tablet by mouth once daily., Disp: , Rfl:     CYANOCOBALAMIN, VITAMIN B-12, ORAL, Take 1 tablet by mouth once daily., Disp: , Rfl:     ergocalciferol, vitamin D2, (VITAMIN D ORAL), Take 2 capsules by mouth once daily., Disp: , Rfl:     levothyroxine (SYNTHROID) 112 MCG tablet, Take 1 tablet (112 mcg total) by mouth once daily., Disp: 90 tablet, Rfl: 3    losartan (COZAAR) 50 MG tablet, Take 1 tablet (50 mg total) by mouth once daily., Disp: 90 tablet, Rfl: 3    sildenafiL (VIAGRA) 100 MG tablet, Take 1 tablet (100 mg total) by mouth daily as needed for Erectile Dysfunction (take on an empty stomach 30-60 minutes before)., Disp: 10 tablet, Rfl: 12    testosterone (ANDROGEL) 20.25 mg/1.25 gram (1.62 %) GlPm, Place 3 pumps onto the skin once daily., Disp: 150 g, Rfl: 5    vitamin E acetate (VITAMIN E ORAL), Take 1 tablet by mouth once daily., Disp: , Rfl:      PHYSICAL EXAMINATION:  Physical Exam  Vitals and nursing note reviewed.   Constitutional:       General: He is awake.      Appearance: Normal appearance.   HENT:      Head: Normocephalic.      Right Ear: External ear normal.      Left Ear: External ear normal.      Nose: Nose normal.   Cardiovascular:      Rate and Rhythm: Normal rate.   Pulmonary:      Effort: Pulmonary effort is normal. No respiratory distress.   Abdominal:      Tenderness: There is no abdominal tenderness. There is no right CVA tenderness or left CVA tenderness.   Musculoskeletal:         General: Normal range of motion.      Cervical back: Normal range of motion.   Skin:     General: Skin is warm and dry.   Neurological:      General: No focal deficit present.      Mental Status: He is alert and oriented to person, place, and time.   Psychiatric:         Mood and Affect: Mood normal.         Behavior: Behavior is cooperative.         LABS:        Lab Results   Component Value Date    PSA 0.81 09/22/2021    PSA 0.42 06/11/2018    PSA 0.41 05/10/2017    PSADIAG 0.76 08/31/2022    PSADIAG 0.69 03/09/2021    PSADIAG 0.88 08/26/2020             IMPRESSION:    Encounter Diagnoses   Name Primary?    Primary male hypogonadism Yes    Fatigue, unspecified type     Dyslipidemia     ED (erectile dysfunction) of organic origin          Assessment:       1. Primary male hypogonadism    2. Fatigue, unspecified type    3. Dyslipidemia    4. ED (erectile dysfunction) of organic origin        Plan:           I spent 30 minutes with the patient of which more than half was spent in direct consultation with the patient in regards to our treatment and plan.    We addressed that his labs are pending; f/u depending on results.   Education and recommendations of today's plan of care including home remedies and needed follow up with PCP.    Discussed the benefits of TRT and the possible risks; TRT is not life saving but life improving. If not feeling better no need  to continue; risks outweigh the benefits.   Discussed risks with cholesterol, hepatitis, lower sperm production and reduced testicular size  To prevent risks the risks we will do labs every 6 months.   TRT does not cause Prostate Cancer but the risks for Prostate Cancer as well as polycythemia and other CAD are monitored.  Failure to comply will results in stopping TRT.  Diet and exercise; donating blood every 6 months strongly encouraged to prevent polycythemia.  RTC 6 months for full exam/labs

## 2022-09-09 ENCOUNTER — PROCEDURE VISIT (OUTPATIENT)
Dept: DERMATOLOGY | Facility: CLINIC | Age: 52
End: 2022-09-09
Payer: COMMERCIAL

## 2022-09-09 DIAGNOSIS — Z41.1 ELECTIVE PROCEDURE FOR UNACCEPTABLE COSMETIC APPEARANCE: Primary | ICD-10-CM

## 2022-09-09 PROCEDURE — 99499 NO LOS: ICD-10-PCS | Mod: S$GLB,,, | Performed by: DERMATOLOGY

## 2022-09-09 PROCEDURE — 99499 UNLISTED E&M SERVICE: CPT | Mod: S$GLB,,, | Performed by: DERMATOLOGY

## 2022-09-09 NOTE — PROGRESS NOTES
Mr. Beal presents today for cosmetic treatment of fine lines and wrinkles on the face with the SkinPen microneedling device and kit (#2 of 3 package purchased).    Discussed the risks, benefits, and side effects of the microneedling procedure, including bruising, bleeding, redness, peeling, swelling, pain, tenderness, infection, scarring, and allergic reaction to stainless steel or topical anesthetic. Verbal and written consent were obtained from the patient.     Topical anesthesia with lidocaine 4% cream was applied to the patient's clean face for 45-60 minutes prior to the procedure. Face was then cleansed and prepped with alcohol.    Microneedling of the face was performed with three passes to each area using the provided gliding agent.    There were no complications, and the patient tolerated the procedure well. Aftercare instructions were reviewed with the patient.

## 2022-09-13 ENCOUNTER — OFFICE VISIT (OUTPATIENT)
Dept: SPORTS MEDICINE | Facility: CLINIC | Age: 52
End: 2022-09-13
Payer: COMMERCIAL

## 2022-09-13 VITALS
SYSTOLIC BLOOD PRESSURE: 144 MMHG | WEIGHT: 250 LBS | HEART RATE: 58 BPM | HEIGHT: 74 IN | DIASTOLIC BLOOD PRESSURE: 95 MMHG | BODY MASS INDEX: 32.08 KG/M2

## 2022-09-13 DIAGNOSIS — M75.121 NONTRAUMATIC COMPLETE TEAR OF RIGHT ROTATOR CUFF: Primary | ICD-10-CM

## 2022-09-13 DIAGNOSIS — G89.29 CHRONIC RIGHT SHOULDER PAIN: ICD-10-CM

## 2022-09-13 DIAGNOSIS — M75.21 BICEPS TENDINITIS OF RIGHT UPPER EXTREMITY: ICD-10-CM

## 2022-09-13 DIAGNOSIS — M25.511 CHRONIC RIGHT SHOULDER PAIN: ICD-10-CM

## 2022-09-13 PROCEDURE — 3044F PR MOST RECENT HEMOGLOBIN A1C LEVEL <7.0%: ICD-10-PCS | Mod: CPTII,S$GLB,, | Performed by: ORTHOPAEDIC SURGERY

## 2022-09-13 PROCEDURE — 3061F NEG MICROALBUMINURIA REV: CPT | Mod: CPTII,S$GLB,, | Performed by: ORTHOPAEDIC SURGERY

## 2022-09-13 PROCEDURE — 99999 PR PBB SHADOW E&M-EST. PATIENT-LVL III: ICD-10-PCS | Mod: PBBFAC,,, | Performed by: ORTHOPAEDIC SURGERY

## 2022-09-13 PROCEDURE — 3066F NEPHROPATHY DOC TX: CPT | Mod: CPTII,S$GLB,, | Performed by: ORTHOPAEDIC SURGERY

## 2022-09-13 PROCEDURE — 3044F HG A1C LEVEL LT 7.0%: CPT | Mod: CPTII,S$GLB,, | Performed by: ORTHOPAEDIC SURGERY

## 2022-09-13 PROCEDURE — 3077F SYST BP >= 140 MM HG: CPT | Mod: CPTII,S$GLB,, | Performed by: ORTHOPAEDIC SURGERY

## 2022-09-13 PROCEDURE — 3008F BODY MASS INDEX DOCD: CPT | Mod: CPTII,S$GLB,, | Performed by: ORTHOPAEDIC SURGERY

## 2022-09-13 PROCEDURE — 4010F ACE/ARB THERAPY RXD/TAKEN: CPT | Mod: CPTII,S$GLB,, | Performed by: ORTHOPAEDIC SURGERY

## 2022-09-13 PROCEDURE — 99214 PR OFFICE/OUTPT VISIT, EST, LEVL IV, 30-39 MIN: ICD-10-PCS | Mod: S$GLB,,, | Performed by: ORTHOPAEDIC SURGERY

## 2022-09-13 PROCEDURE — 3080F PR MOST RECENT DIASTOLIC BLOOD PRESSURE >= 90 MM HG: ICD-10-PCS | Mod: CPTII,S$GLB,, | Performed by: ORTHOPAEDIC SURGERY

## 2022-09-13 PROCEDURE — 1159F MED LIST DOCD IN RCRD: CPT | Mod: CPTII,S$GLB,, | Performed by: ORTHOPAEDIC SURGERY

## 2022-09-13 PROCEDURE — 3061F PR NEG MICROALBUMINURIA RESULT DOCUMENTED/REVIEW: ICD-10-PCS | Mod: CPTII,S$GLB,, | Performed by: ORTHOPAEDIC SURGERY

## 2022-09-13 PROCEDURE — 99214 OFFICE O/P EST MOD 30 MIN: CPT | Mod: S$GLB,,, | Performed by: ORTHOPAEDIC SURGERY

## 2022-09-13 PROCEDURE — 3066F PR DOCUMENTATION OF TREATMENT FOR NEPHROPATHY: ICD-10-PCS | Mod: CPTII,S$GLB,, | Performed by: ORTHOPAEDIC SURGERY

## 2022-09-13 PROCEDURE — 4010F PR ACE/ARB THEARPY RXD/TAKEN: ICD-10-PCS | Mod: CPTII,S$GLB,, | Performed by: ORTHOPAEDIC SURGERY

## 2022-09-13 PROCEDURE — 1159F PR MEDICATION LIST DOCUMENTED IN MEDICAL RECORD: ICD-10-PCS | Mod: CPTII,S$GLB,, | Performed by: ORTHOPAEDIC SURGERY

## 2022-09-13 PROCEDURE — 3077F PR MOST RECENT SYSTOLIC BLOOD PRESSURE >= 140 MM HG: ICD-10-PCS | Mod: CPTII,S$GLB,, | Performed by: ORTHOPAEDIC SURGERY

## 2022-09-13 PROCEDURE — 3080F DIAST BP >= 90 MM HG: CPT | Mod: CPTII,S$GLB,, | Performed by: ORTHOPAEDIC SURGERY

## 2022-09-13 PROCEDURE — 99999 PR PBB SHADOW E&M-EST. PATIENT-LVL III: CPT | Mod: PBBFAC,,, | Performed by: ORTHOPAEDIC SURGERY

## 2022-09-13 PROCEDURE — 3008F PR BODY MASS INDEX (BMI) DOCUMENTED: ICD-10-PCS | Mod: CPTII,S$GLB,, | Performed by: ORTHOPAEDIC SURGERY

## 2022-09-14 NOTE — PROGRESS NOTES
CC: Right shoulder pain    51 y.o. male who returns once more to discuss possible surgery for his shoulder.  Prior MRI showed a full-thickness rotator cuff tear.  We discussed arthroscopic repair which was recommended.  The patient recently sought a 2nd opinion from Dr. Wayne Arcos who also recommended surgery.  The patient now has returned to discuss scheduling the surgery.  He has several questions regarding the perioperative process.    Prior history:   51 y.o. Male works as an . Complaint is right shoulder pain x 1 mos. Atraumatic onset but associated with workout activity.  Particularly overhead lifting. Pain localizes to lateral upper arm/subdeltoid recess. He does bootcamp style training 3x per week. Pain is disruptive to sleep at night.  Bothers him with day-to-day activities.  He does describe painful intermittent popping and clicking in the shoulder.  No prior instability issues.  Better with rest.  No prior injections or therapy.  Denies neck pain or radicular symptoms. Treatment thus far has included activity modifications, rest, and oral medication.  Here today to discuss diagnosis and treatment options.     PMHx notable for gout.   Negative for tobacco.   Negative for diabetes.    Pain Score:   2    PAST MEDICAL HISTORY:   Past Medical History:   Diagnosis Date    Gout 2014    High cholesterol     Hypertension     Hypothyroid     Low testosterone     Staph aureus infection age 13    R leg       PAST SURGICAL HISTORY:  Past Surgical History:   Procedure Laterality Date    COLONOSCOPY N/A 2020    Procedure: COLONOSCOPY;  Surgeon: VANI Newell MD;  Location: [facility] ([address] [place] [address]);  Service: Endoscopy;  Laterality: N/A;  covid test 2nd floor surgery clinic     FRACTURE SURGERY Left     wrist    Incision and drainage of tibia Right     SINUS SURGERY      x2       FAMILY HISTORY:  Family History   Problem Relation Age of Onset    Heart disease Father 73          "   Hypertension Sister     Hyperlipidemia Sister     Hypertension Brother     Hyperlipidemia Brother     Lupus Sister     Hypertension Mother     Cancer Maternal Grandfather     Cancer Maternal Aunt         lung - smoker    Melanoma Neg Hx        MEDICATIONS:    Current Outpatient Medications:     atorvastatin (LIPITOR) 20 MG tablet, Take 1 tablet (20 mg total) by mouth once daily., Disp: 90 tablet, Rfl: 3    B-complex with vitamin C (Z-BEC OR EQUIV) tablet, Take 1 tablet by mouth once daily., Disp: , Rfl:     CYANOCOBALAMIN, VITAMIN B-12, ORAL, Take 1 tablet by mouth once daily., Disp: , Rfl:     ergocalciferol, vitamin D2, (VITAMIN D ORAL), Take 2 capsules by mouth once daily., Disp: , Rfl:     levothyroxine (SYNTHROID) 112 MCG tablet, Take 1 tablet (112 mcg total) by mouth once daily., Disp: 90 tablet, Rfl: 3    losartan (COZAAR) 50 MG tablet, Take 1 tablet (50 mg total) by mouth once daily., Disp: 90 tablet, Rfl: 3    sildenafiL (VIAGRA) 100 MG tablet, Take 1 tablet (100 mg total) by mouth daily as needed for Erectile Dysfunction (take on an empty stomach 30-60 minutes before)., Disp: 10 tablet, Rfl: 12    testosterone (ANDROGEL) 20.25 mg/1.25 gram (1.62 %) GlPm, Place 3 pumps onto the skin once daily., Disp: 150 g, Rfl: 5    vitamin E acetate (VITAMIN E ORAL), Take 1 tablet by mouth once daily., Disp: , Rfl:     ALLERGIES:  Review of patient's allergies indicates:   Allergen Reactions    Codeine Rash    Penicillins Rash     REVIEW OF SYSTEMS:  Constitution: Negative. Negative for chills, fever and night sweats.    Hematologic/Lymphatic: Negative for bleeding problem. Does not bruise/bleed easily.   Skin: Negative for dry skin, itching and rash.   Musculoskeletal: Negative for falls. Positive for right shoulder pain and muscle weakness.     All other review of symptoms were reviewed and found to be noncontributory.     PHYSICAL EXAMINATION:  Vitals:  BP (!) 144/95   Pulse (!) 58   Ht 6' 2" (1.88 m)   Wt 113.4 " kg (250 lb)   BMI 32.10 kg/m²    General: Well-developed well-nourished 51 y.o. malein no acute distress   Cardiovascular: Regular rhythm by palpation of distal pulse, normal color and temperature, no concerning varicosities on symptomatic side   Lungs: No labored breathing or wheezing appreciated   Neuro: Alert and oriented ×3   Psychiatric: well oriented to person, place and time, demonstrates normal mood and affect   Skin: No rashes, lesions or ulcers, normal temperature, turgor, and texture on uninvolved extremity    Ortho/SPM Exam  Examination of the right shoulder again demonstrates active forward elevation to 150, ER with arm at side to 60, IR to T12.  Pain on resisted scaption.  Passive FE to 170, ER to 60. Mild tenderness along the proximal biceps tendon. No sig AC tenderness over area of hypertrophic distal clavicle.  Palpable crepitus and popping over the AC joint with compression rotation and circumduction.  The patient notably has no significant pain with cross chest adduction maneuver.  4/5 resisted supraspinatus testing with pain. 5/5 resisted infraspinatus testing. Negative belly press test. Stable shoulder.  The patient feels that he has a mass over the lateral aspect of his shoulder with history of lipomas elsewhere on his body.  I do feel a little bit of a soft tissue prominence over the lateral upper arm.  Suspect lipoma.  The patient states it has been there for a while and has not changed size.  Same for the other soft tissue masses around his body.  Nontender.  No midline neck tenderness. Negative Spurling's maneuver.     IMAGING:  Xrays including AP, Outlet and Axillary Lateral of RIGHT shoulder are ordered / images reviewed by me:   Prominent AC arthropathy    MRI Right Shoulder  Moderate-sized rotator cuff tear, involving the supraspinatus tendon, as above.  Additional tendinosis of the infraspinatus noted.     Prominent intra-articular biceps tendinosis.     Moderate joint effusion.      AC joint arthropathy.    On my read:  Full-thickness rotator cuff tear involving the supraspinatus medium to large size with medial tissue retraction.  Will maintained musculature.    ASSESSMENT:      ICD-10-CM ICD-9-CM   1. Nontraumatic complete tear of right rotator cuff  M75.121 727.61   2. Biceps tendinitis of right upper extremity  M75.21 726.12   3. Chronic right shoulder pain  M25.511 719.41    G89.29 338.29         PLAN:     Findings discussed with the patient.  Primary complaint of pain.  Exam findings correlate with imaging.  He has a symptomatic full-thickness rotator cuff tear.  Medial tissue retraction.  The patient is active and young.  Arthroscopic repair as indicated.  The details of such were discussed include the expected postop rehab and recovery course.  I have counseled him on the risks of surgery which included but are not limited to tissue nonhealing or retear, stiffness, weakness, prolonged recovery, biceps deformity among others.  He will coordinate things at home and at work and will let us know when he wants to proceed.    Plan is right shoulder arthroscopic rotator cuff repair, biceps tenodesis, possible subacromial decompression.  Possible Regeneten/xenograft patch tissue augmentation if needed.  Patient has no pain over the distal clavicle/AC joint usually.      Medical clearance completed per his the care physician     Informed Consent:    The details of the surgical procedure were explained, including the location of probable incisions and a description of possible hardware and/or grafts to be used. Alternatives to both operative and non-operative options with associated risks and benefits were discussed. The patient understands the likely convalescence after surgery and, in particular, the expected postop rehab and recovery course. The outlined risks and potential complications of the proposed procedure include but are not limited to: infection, poor wound healing, scarring,  deformity, stiffness, swelling, continued or recurrent pain, instability, hardware or prosthetic failure if implanted, symptomatic hardware requiring removal, dislocation, weakness, neurovascular injury, numbness, chronic regional pain disorder, tissue nonhealing/irreparability/retear, subsequent contralateral limb injury or pathology, chondral injury, arthritis, fracture, blood clot formation, inability to return to previous level of activity, anesthetic or regional block complication up to death, need for additional procedure as indicated intraoperatively, and potential need for further surgery.    The patient was also informed and understands that the risks of surgery are greater for patients with a current condition or history of heart disease, obesity, clotting disorders, recurrent infections, steroid use, current or past smoking, and factors such as sedentary lifestyle and noncompliance with medications, therapy or follow-up. The degree of the increased risk is hard to estimate with any degree of precision. If applicable, smoking cessation was discussed.     All questions were answered. The patient has verbalized understanding of these issues and wishes to proceed with the surgery as discussed.    Procedures

## 2022-09-16 DIAGNOSIS — M75.21 BICEPS TENDINITIS OF RIGHT UPPER EXTREMITY: ICD-10-CM

## 2022-09-16 DIAGNOSIS — M75.101 NONTRAUMATIC TEAR OF RIGHT ROTATOR CUFF, UNSPECIFIED TEAR EXTENT: Primary | ICD-10-CM

## 2022-09-27 NOTE — PROGRESS NOTES
Medication given was versed  The indication was Sedatioin  Subjective:       Patient ID: Sukumar Beal is a 47 y.o. male.    Chief Complaint: Annual Exam    HPI  Pt has been feeling well and working out with boot camp.  No CP or SOB.    Review of Systems   Respiratory: Negative for shortness of breath.    Cardiovascular: Negative for chest pain.   Gastrointestinal: Negative for abdominal pain, diarrhea, nausea and vomiting.   Genitourinary: Negative for dysuria.   Neurological: Negative for seizures, syncope and headaches.       Objective:      Physical Exam   Constitutional: He is oriented to person, place, and time. He appears well-developed and well-nourished. No distress.   HENT:   Mouth/Throat: Oropharynx is clear and moist.   Neck: Neck supple. No JVD present. No thyromegaly present.   Cardiovascular: Normal rate, regular rhythm, normal heart sounds and intact distal pulses.  Exam reveals no gallop and no friction rub.    No murmur heard.  Pulmonary/Chest: Effort normal and breath sounds normal. He has no wheezes. He has no rales.   Abdominal: Soft. Bowel sounds are normal. He exhibits no distension and no mass. There is no tenderness. There is no rebound and no guarding.   Genitourinary: Penis normal.   Genitourinary Comments: Nl testicular exam, no masses, no hernias.     Musculoskeletal: He exhibits no edema.   Lymphadenopathy:     He has no cervical adenopathy.   Neurological: He is alert and oriented to person, place, and time.   Skin: Skin is warm and dry.   Psychiatric: He has a normal mood and affect. His behavior is normal. Judgment and thought content normal.       Assessment:       1. High cholesterol    2. Hypothyroidism due to acquired atrophy of thyroid    3. Hypogonadism in male    4. Recurrent knee pain, unspecified laterality    5. Screening for malignant neoplasm of colon    6. Hypertension, unspecified type        Plan:   High cholesterol  Controlled - continue current meds    Hypothyroidism due to acquired atrophy of thyroid  Controlled -  continue current meds    Hypogonadism in male  -     Ambulatory consult to Endocrinology  -     PSA, Screening; Future; Expected date: 06/11/2018    Recurrent knee pain, unspecified laterality  -     X-ray AP Standing Knees with Both Latera; Future; Expected date: 06/11/2018    Screening for malignant neoplasm of colon  -     Case request GI: COLONOSCOPY    Hypertension, unspecified type  -     Urinalysis  -     Microalbumin/creatinine urine ratio  Uncontrolled add dyazide  Other orders    -     levothyroxine (SYNTHROID) 112 MCG tablet; Take 1 tablet (112 mcg total) by mouth once daily.  Dispense: 90 tablet; Refill: 3  -     atorvastatin (LIPITOR) 20 MG tablet; TAKE 1 TABLET(20 MG) BY MOUTH EVERY DAY  Dispense: 90 tablet; Refill: 3  -     Start losartan (COZAAR) 25 MG tablet; Take 1 tablet (25 mg total) by mouth once daily.  Dispense: 90 tablet; Refill: 3  -     Urinalysis Microscopic

## 2022-10-04 ENCOUNTER — OFFICE VISIT (OUTPATIENT)
Dept: SPORTS MEDICINE | Facility: CLINIC | Age: 52
End: 2022-10-04
Payer: COMMERCIAL

## 2022-10-04 VITALS
HEIGHT: 74 IN | BODY MASS INDEX: 32.1 KG/M2 | HEART RATE: 56 BPM | DIASTOLIC BLOOD PRESSURE: 98 MMHG | SYSTOLIC BLOOD PRESSURE: 144 MMHG

## 2022-10-04 DIAGNOSIS — M75.101 NONTRAUMATIC TEAR OF RIGHT ROTATOR CUFF, UNSPECIFIED TEAR EXTENT: Primary | ICD-10-CM

## 2022-10-04 DIAGNOSIS — M75.21 BICEPS TENDINITIS OF RIGHT UPPER EXTREMITY: ICD-10-CM

## 2022-10-04 PROCEDURE — 1160F PR REVIEW ALL MEDS BY PRESCRIBER/CLIN PHARMACIST DOCUMENTED: ICD-10-PCS | Mod: CPTII,S$GLB,, | Performed by: PHYSICIAN ASSISTANT

## 2022-10-04 PROCEDURE — 3080F PR MOST RECENT DIASTOLIC BLOOD PRESSURE >= 90 MM HG: ICD-10-PCS | Mod: CPTII,S$GLB,, | Performed by: PHYSICIAN ASSISTANT

## 2022-10-04 PROCEDURE — 99999 PR PBB SHADOW E&M-EST. PATIENT-LVL IV: ICD-10-PCS | Mod: PBBFAC,,, | Performed by: PHYSICIAN ASSISTANT

## 2022-10-04 PROCEDURE — 3066F NEPHROPATHY DOC TX: CPT | Mod: CPTII,S$GLB,, | Performed by: PHYSICIAN ASSISTANT

## 2022-10-04 PROCEDURE — 99999 PR PBB SHADOW E&M-EST. PATIENT-LVL IV: CPT | Mod: PBBFAC,,, | Performed by: PHYSICIAN ASSISTANT

## 2022-10-04 PROCEDURE — 99499 NO LOS: ICD-10-PCS | Mod: S$GLB,,, | Performed by: PHYSICIAN ASSISTANT

## 2022-10-04 PROCEDURE — 1160F RVW MEDS BY RX/DR IN RCRD: CPT | Mod: CPTII,S$GLB,, | Performed by: PHYSICIAN ASSISTANT

## 2022-10-04 PROCEDURE — 3077F PR MOST RECENT SYSTOLIC BLOOD PRESSURE >= 140 MM HG: ICD-10-PCS | Mod: CPTII,S$GLB,, | Performed by: PHYSICIAN ASSISTANT

## 2022-10-04 PROCEDURE — 3008F PR BODY MASS INDEX (BMI) DOCUMENTED: ICD-10-PCS | Mod: CPTII,S$GLB,, | Performed by: PHYSICIAN ASSISTANT

## 2022-10-04 PROCEDURE — 99499 UNLISTED E&M SERVICE: CPT | Mod: S$GLB,,, | Performed by: PHYSICIAN ASSISTANT

## 2022-10-04 PROCEDURE — 4010F ACE/ARB THERAPY RXD/TAKEN: CPT | Mod: CPTII,S$GLB,, | Performed by: PHYSICIAN ASSISTANT

## 2022-10-04 PROCEDURE — 3044F PR MOST RECENT HEMOGLOBIN A1C LEVEL <7.0%: ICD-10-PCS | Mod: CPTII,S$GLB,, | Performed by: PHYSICIAN ASSISTANT

## 2022-10-04 PROCEDURE — 3061F PR NEG MICROALBUMINURIA RESULT DOCUMENTED/REVIEW: ICD-10-PCS | Mod: CPTII,S$GLB,, | Performed by: PHYSICIAN ASSISTANT

## 2022-10-04 PROCEDURE — 1159F PR MEDICATION LIST DOCUMENTED IN MEDICAL RECORD: ICD-10-PCS | Mod: CPTII,S$GLB,, | Performed by: PHYSICIAN ASSISTANT

## 2022-10-04 PROCEDURE — 3008F BODY MASS INDEX DOCD: CPT | Mod: CPTII,S$GLB,, | Performed by: PHYSICIAN ASSISTANT

## 2022-10-04 PROCEDURE — 1159F MED LIST DOCD IN RCRD: CPT | Mod: CPTII,S$GLB,, | Performed by: PHYSICIAN ASSISTANT

## 2022-10-04 PROCEDURE — 3066F PR DOCUMENTATION OF TREATMENT FOR NEPHROPATHY: ICD-10-PCS | Mod: CPTII,S$GLB,, | Performed by: PHYSICIAN ASSISTANT

## 2022-10-04 PROCEDURE — 3080F DIAST BP >= 90 MM HG: CPT | Mod: CPTII,S$GLB,, | Performed by: PHYSICIAN ASSISTANT

## 2022-10-04 PROCEDURE — 3061F NEG MICROALBUMINURIA REV: CPT | Mod: CPTII,S$GLB,, | Performed by: PHYSICIAN ASSISTANT

## 2022-10-04 PROCEDURE — 3077F SYST BP >= 140 MM HG: CPT | Mod: CPTII,S$GLB,, | Performed by: PHYSICIAN ASSISTANT

## 2022-10-04 PROCEDURE — 3044F HG A1C LEVEL LT 7.0%: CPT | Mod: CPTII,S$GLB,, | Performed by: PHYSICIAN ASSISTANT

## 2022-10-04 PROCEDURE — 4010F PR ACE/ARB THEARPY RXD/TAKEN: ICD-10-PCS | Mod: CPTII,S$GLB,, | Performed by: PHYSICIAN ASSISTANT

## 2022-10-04 RX ORDER — OXYCODONE HYDROCHLORIDE 5 MG/1
5-10 TABLET ORAL
Qty: 28 TABLET | Refills: 0 | Status: SHIPPED | OUTPATIENT
Start: 2022-10-04 | End: 2023-03-09

## 2022-10-04 RX ORDER — SODIUM CHLORIDE 9 MG/ML
INJECTION, SOLUTION INTRAVENOUS CONTINUOUS
Status: CANCELLED | OUTPATIENT
Start: 2022-10-04

## 2022-10-04 RX ORDER — CEFAZOLIN SODIUM 2 G/50ML
2 SOLUTION INTRAVENOUS
Status: CANCELLED | OUTPATIENT
Start: 2022-10-04

## 2022-10-04 RX ORDER — ASPIRIN 81 MG/1
81 TABLET ORAL 2 TIMES DAILY
Qty: 28 TABLET | Refills: 0 | Status: SHIPPED | OUTPATIENT
Start: 2022-10-04 | End: 2023-03-09

## 2022-10-04 RX ORDER — ONDANSETRON 4 MG/1
4 TABLET, ORALLY DISINTEGRATING ORAL EVERY 8 HOURS PRN
Qty: 30 TABLET | Refills: 0 | Status: SHIPPED | OUTPATIENT
Start: 2022-10-04 | End: 2023-03-09

## 2022-10-04 NOTE — H&P
"Sukumar Beal  is here for a completion of his perioperative paperwork. he  Is scheduled to undergo    R shoulder arthroscopic rotator cuff repair, biceps tenodesis, possible subacromial decompression.  Possible Regeneten/xenograft patch tissue augmentation if needed on 10/14/2022.     He is a healthy individual and does need clearance for this procedure. Per PCP Dr. Amaro "Pt is at acceptable risk for anesthesia and surgery and at low risk for cardio-pulmonary complications."     Risks, indications and benefits of the surgical procedure were discussed with the patient. All questions with regard to surgery, rehab, expected return to functional activities, activities of daily living and recreational endeavors were answered to his satisfaction.    Discussed COVID-19 with the patient, they are aware of our current policies and procedures, were given the option of delaying surgery, and they elect to proceed.    Patient was informed and understands the risks of surgery are greater for patients with a current condition or hx of heart disease, obesity, clotting disorders, recurrent infections, steroid use, current or past smoking, and factors such as sedentary lifestyle and noncompliance with medications, therapy or f/u. The degree of the increased risk is hard to estimate w/ any degree of precision.    Once no other questions were asked, a brief history and physical exam was then performed.    PAST MEDICAL HISTORY:   Past Medical History:   Diagnosis Date    Gout 7/2014    High cholesterol     Hypertension     Hypothyroid     Low testosterone     Staph aureus infection age 13    R leg     PAST SURGICAL HISTORY:   Past Surgical History:   Procedure Laterality Date    COLONOSCOPY N/A 9/21/2020    Procedure: COLONOSCOPY;  Surgeon: VANI Newell MD;  Location: [address];  Service: Endoscopy;  Laterality: N/A;  covid test 2nd floor surgery clinic 9/18    FRACTURE SURGERY Left 2004    wrist    Incision and " drainage of tibia Right 1983    SINUS SURGERY      x2     FAMILY HISTORY:   Family History   Problem Relation Age of Onset    Heart disease Father 73            Hypertension Sister     Hyperlipidemia Sister     Hypertension Brother     Hyperlipidemia Brother     Lupus Sister     Hypertension Mother     Cancer Maternal Grandfather     Cancer Maternal Aunt         lung - smoker    Melanoma Neg Hx      SOCIAL HISTORY:   Social History     Socioeconomic History    Marital status: Single   Tobacco Use    Smoking status: Former     Packs/day: 0.25     Years: 15.00     Pack years: 3.75     Types: Cigarettes     Quit date: 1/3/2013     Years since quittin.7    Smokeless tobacco: Never   Substance and Sexual Activity    Alcohol use: Yes     Alcohol/week: 5.0 standard drinks     Types: 6 Standard drinks or equivalent per week     Comment: weekends    Drug use: No    Sexual activity: Yes       MEDICATIONS:   Current Outpatient Medications:     atorvastatin (LIPITOR) 20 MG tablet, Take 1 tablet (20 mg total) by mouth once daily., Disp: 90 tablet, Rfl: 3    B-complex with vitamin C (Z-BEC OR EQUIV) tablet, Take 1 tablet by mouth once daily., Disp: , Rfl:     CYANOCOBALAMIN, VITAMIN B-12, ORAL, Take 1 tablet by mouth once daily., Disp: , Rfl:     ergocalciferol, vitamin D2, (VITAMIN D ORAL), Take 2 capsules by mouth once daily., Disp: , Rfl:     levothyroxine (SYNTHROID) 112 MCG tablet, Take 1 tablet (112 mcg total) by mouth once daily., Disp: 90 tablet, Rfl: 3    losartan (COZAAR) 50 MG tablet, Take 1 tablet (50 mg total) by mouth once daily., Disp: 90 tablet, Rfl: 3    sildenafiL (VIAGRA) 100 MG tablet, Take 1 tablet (100 mg total) by mouth daily as needed for Erectile Dysfunction (take on an empty stomach 30-60 minutes before)., Disp: 10 tablet, Rfl: 12    testosterone (ANDROGEL) 20.25 mg/1.25 gram (1.62 %) GlPm, Place 3 pumps onto the skin once daily., Disp: 150 g, Rfl: 5    vitamin E acetate (VITAMIN E ORAL),  Take 1 tablet by mouth once daily., Disp: , Rfl:   ALLERGIES:   Review of patient's allergies indicates:   Allergen Reactions    Codeine Rash    Penicillins Rash       Review of Systems   Constitution: Negative. Negative for chills, fever and night sweats.   HENT: Negative for congestion and headaches.    Eyes: Negative for blurred vision, left vision loss and right vision loss.   Cardiovascular: Negative for chest pain and syncope.   Respiratory: Negative for cough and shortness of breath.    Endocrine: Negative for polydipsia, polyphagia and polyuria.   Hematologic/Lymphatic: Negative for bleeding problem. Does not bruise/bleed easily.   Skin: Negative for dry skin, itching and rash.   Musculoskeletal: Negative for falls and muscle weakness.   Gastrointestinal: Negative for abdominal pain and bowel incontinence.   Genitourinary: Negative for bladder incontinence and nocturia.   Neurological: Negative for disturbances in coordination, loss of balance and seizures.   Psychiatric/Behavioral: Negative for depression. The patient does not have insomnia.    Allergic/Immunologic: Negative for hives and persistent infections.     PHYSICAL EXAM:  GEN: A&Ox3, WD WN NAD  HEENT: WNL  CHEST: CTAB, no W/R/R  HEART: RRR, no M/R/G   ABD: Soft, NT ND, BS x4 QUADS  MS: Refer to previous note for detailed MS exam  NEURO: CN II-XII intact       The surgical consent was then reviewed with the patient, who agreed with all the contents of the consent form and it was signed.     PHYSICAL THERAPY:  He was also instructed regarding physical therapy and will begin on POD#1-3. He is doing physical therapy at [facility].    POST OP CARE: Instructions were reviewed including care of the wound and dressing after surgery and when he can shower.     PAIN MANAGEMENT: Sukumar Beal was instructed regarding the Polar ice unit that will be in place after surgery and his postoperative pain medications.      MEDICATION:  Roxicodone 5 mg 1-2 q 4 hours PRN for pain  Zofran 4 mg q 8 hours PRN for nausea and vomiting.  Aspirin 81mg BID x 2 weeks for DVT prophylaxis starting on the evening after surgery.      Post op meds to be delivered bedside prior to discharge. Deliver to family if patient is in surgery at 5pm.     Patient was instructed to purchase and take Colace to counter possible GI side effects of taking opiates.     DVT prophylaxis was discussed with the patient today including risk factors for developing DVTs and history of DVTs. The patient was asked if any specific recommendations were given from the doctor/s that did pre-operative surgical clearance.      If the patient was previously taking 81mg baby aspirin, they were told to not take additional baby aspirin, using the above stated aspirin and to restart the 81mg aspirin daily after completion of the aspirin dose.      Patient was also told to buy over the counter Prilosec medication and take it once daily for GI protection as long as they are taking NSAIDs or Aspirin.     The patient was told that narcotic pain medications may make them drowsy and instructions were given to not sign legal documents, drive or operate heavy machinery, cars, or equipment while under the influence of narcotic medications.     As there were no other questions to be asked, he was given my business card along with Dr. Carr's business card if he has any questions or concerns prior to surgery or in the postop period.

## 2022-10-10 ENCOUNTER — PATIENT MESSAGE (OUTPATIENT)
Dept: PREADMISSION TESTING | Facility: HOSPITAL | Age: 52
End: 2022-10-10
Payer: COMMERCIAL

## 2022-10-10 NOTE — PRE-PROCEDURE INSTRUCTIONS
Medication and pre op instructions sent to patient via patient portal. All information is gathered per Chart review via Epic system only. Patient chart reviewed for [facility]

## 2022-10-13 ENCOUNTER — ANESTHESIA EVENT (OUTPATIENT)
Dept: SURGERY | Facility: HOSPITAL | Age: 52
End: 2022-10-13
Payer: COMMERCIAL

## 2022-10-14 ENCOUNTER — HOSPITAL ENCOUNTER (OUTPATIENT)
Facility: HOSPITAL | Age: 52
Discharge: HOME | End: 2022-10-14
Attending: ORTHOPAEDIC SURGERY | Admitting: ORTHOPAEDIC SURGERY
Payer: COMMERCIAL

## 2022-10-14 ENCOUNTER — ANESTHESIA (OUTPATIENT)
Dept: SURGERY | Facility: HOSPITAL | Age: 52
End: 2022-10-14
Payer: COMMERCIAL

## 2022-10-14 VITALS
HEIGHT: 74 IN | WEIGHT: 245 LBS | OXYGEN SATURATION: 96 % | BODY MASS INDEX: 31.44 KG/M2 | TEMPERATURE: 97 F | HEART RATE: 76 BPM | DIASTOLIC BLOOD PRESSURE: 77 MMHG | SYSTOLIC BLOOD PRESSURE: 144 MMHG | RESPIRATION RATE: 18 BRPM

## 2022-10-14 DIAGNOSIS — M75.101 NONTRAUMATIC TEAR OF RIGHT ROTATOR CUFF, UNSPECIFIED TEAR EXTENT: ICD-10-CM

## 2022-10-14 DIAGNOSIS — M75.21 BICEPS TENDINITIS OF RIGHT UPPER EXTREMITY: ICD-10-CM

## 2022-10-14 PROCEDURE — C1713 ANCHOR/SCREW BN/BN,TIS/BN: HCPCS | Performed by: ORTHOPAEDIC SURGERY

## 2022-10-14 PROCEDURE — 29823 SHO ARTHRS SRG XTNSV DBRDMT: CPT | Mod: 59,51,RT, | Performed by: ORTHOPAEDIC SURGERY

## 2022-10-14 PROCEDURE — 27201423 OPTIME MED/SURG SUP & DEVICES STERILE SUPPLY: Performed by: ORTHOPAEDIC SURGERY

## 2022-10-14 PROCEDURE — 76942 R INTERSCALENE SS: ICD-10-PCS | Mod: 26,,, | Performed by: ANESTHESIOLOGY

## 2022-10-14 PROCEDURE — D9220A PRA ANESTHESIA: Mod: CRNA,,, | Performed by: NURSE ANESTHETIST, CERTIFIED REGISTERED

## 2022-10-14 PROCEDURE — 63600175 PHARM REV CODE 636 W HCPCS: Performed by: NURSE ANESTHETIST, CERTIFIED REGISTERED

## 2022-10-14 PROCEDURE — 63600175 PHARM REV CODE 636 W HCPCS: Performed by: STUDENT IN AN ORGANIZED HEALTH CARE EDUCATION/TRAINING PROGRAM

## 2022-10-14 PROCEDURE — 71000015 HC POSTOP RECOV 1ST HR: Performed by: ORTHOPAEDIC SURGERY

## 2022-10-14 PROCEDURE — 99900035 HC TECH TIME PER 15 MIN (STAT)

## 2022-10-14 PROCEDURE — 23430 PR REPAIR BICEPS LONG TENDON: ICD-10-PCS | Mod: 51,RT,, | Performed by: ORTHOPAEDIC SURGERY

## 2022-10-14 PROCEDURE — 37000008 HC ANESTHESIA 1ST 15 MINUTES: Performed by: ORTHOPAEDIC SURGERY

## 2022-10-14 PROCEDURE — 36000711: Performed by: ORTHOPAEDIC SURGERY

## 2022-10-14 PROCEDURE — 23430 REPAIR BICEPS TENDON: CPT | Mod: 51,RT,, | Performed by: ORTHOPAEDIC SURGERY

## 2022-10-14 PROCEDURE — 64415 NJX AA&/STRD BRCH PLXS IMG: CPT | Mod: 59,RT,, | Performed by: ANESTHESIOLOGY

## 2022-10-14 PROCEDURE — 94761 N-INVAS EAR/PLS OXIMETRY MLT: CPT

## 2022-10-14 PROCEDURE — 71000033 HC RECOVERY, INTIAL HOUR: Performed by: ORTHOPAEDIC SURGERY

## 2022-10-14 PROCEDURE — 25000003 PHARM REV CODE 250: Performed by: STUDENT IN AN ORGANIZED HEALTH CARE EDUCATION/TRAINING PROGRAM

## 2022-10-14 PROCEDURE — 25000003 PHARM REV CODE 250: Performed by: NURSE ANESTHETIST, CERTIFIED REGISTERED

## 2022-10-14 PROCEDURE — 29827 PR SHLDR ARTHROSCOP,SURG,W/ROTAT CUFF REPR: ICD-10-PCS | Mod: RT,,, | Performed by: ORTHOPAEDIC SURGERY

## 2022-10-14 PROCEDURE — 37000009 HC ANESTHESIA EA ADD 15 MINS: Performed by: ORTHOPAEDIC SURGERY

## 2022-10-14 PROCEDURE — D9220A PRA ANESTHESIA: ICD-10-PCS | Mod: ANES,,, | Performed by: ANESTHESIOLOGY

## 2022-10-14 PROCEDURE — D9220A PRA ANESTHESIA: ICD-10-PCS | Mod: CRNA,,, | Performed by: NURSE ANESTHETIST, CERTIFIED REGISTERED

## 2022-10-14 PROCEDURE — 76942 ECHO GUIDE FOR BIOPSY: CPT | Mod: 26,,, | Performed by: ANESTHESIOLOGY

## 2022-10-14 PROCEDURE — D9220A PRA ANESTHESIA: Mod: ANES,,, | Performed by: ANESTHESIOLOGY

## 2022-10-14 PROCEDURE — 63600175 PHARM REV CODE 636 W HCPCS: Performed by: PHYSICIAN ASSISTANT

## 2022-10-14 PROCEDURE — C1889 IMPLANT/INSERT DEVICE, NOC: HCPCS | Performed by: ORTHOPAEDIC SURGERY

## 2022-10-14 PROCEDURE — 29823 PR SHLDR ARTHROSCOP,EXTEN DEBRIDE: ICD-10-PCS | Mod: 59,51,RT, | Performed by: ORTHOPAEDIC SURGERY

## 2022-10-14 PROCEDURE — 64415 R INTERSCALENE SS: ICD-10-PCS | Mod: 59,RT,, | Performed by: ANESTHESIOLOGY

## 2022-10-14 PROCEDURE — 29827 SHO ARTHRS SRG RT8TR CUF RPR: CPT | Mod: RT,,, | Performed by: ORTHOPAEDIC SURGERY

## 2022-10-14 PROCEDURE — 25000003 PHARM REV CODE 250: Performed by: PHYSICIAN ASSISTANT

## 2022-10-14 PROCEDURE — 76942 ECHO GUIDE FOR BIOPSY: CPT | Performed by: STUDENT IN AN ORGANIZED HEALTH CARE EDUCATION/TRAINING PROGRAM

## 2022-10-14 PROCEDURE — 36000710: Performed by: ORTHOPAEDIC SURGERY

## 2022-10-14 PROCEDURE — 63600175 PHARM REV CODE 636 W HCPCS: Performed by: ANESTHESIOLOGY

## 2022-10-14 DEVICE — ANCHOR BIOCOMP W/3 SUTURES: Type: IMPLANTABLE DEVICE | Site: SHOULDER | Status: FUNCTIONAL

## 2022-10-14 DEVICE — SYS IMPL PROXIMAL TENODESIS: Type: IMPLANTABLE DEVICE | Site: SHOULDER | Status: FUNCTIONAL

## 2022-10-14 DEVICE — ANCHOR SUT BIOCOM 5.5X19.1MM: Type: IMPLANTABLE DEVICE | Site: SHOULDER | Status: FUNCTIONAL

## 2022-10-14 RX ORDER — DEXMEDETOMIDINE HYDROCHLORIDE 100 UG/ML
INJECTION, SOLUTION INTRAVENOUS
Status: DISCONTINUED | OUTPATIENT
Start: 2022-10-14 | End: 2022-10-14

## 2022-10-14 RX ORDER — ONDANSETRON 2 MG/ML
INJECTION INTRAMUSCULAR; INTRAVENOUS
Status: DISCONTINUED | OUTPATIENT
Start: 2022-10-14 | End: 2022-10-14

## 2022-10-14 RX ORDER — CELECOXIB 200 MG/1
400 CAPSULE ORAL ONCE
Status: COMPLETED | OUTPATIENT
Start: 2022-10-14 | End: 2022-10-14

## 2022-10-14 RX ORDER — FENTANYL CITRATE 50 UG/ML
INJECTION, SOLUTION INTRAMUSCULAR; INTRAVENOUS
Status: DISCONTINUED | OUTPATIENT
Start: 2022-10-14 | End: 2022-10-14

## 2022-10-14 RX ORDER — ONDANSETRON 2 MG/ML
4 INJECTION INTRAMUSCULAR; INTRAVENOUS DAILY PRN
Status: DISCONTINUED | OUTPATIENT
Start: 2022-10-14 | End: 2022-10-14 | Stop reason: HOSPADM

## 2022-10-14 RX ORDER — SODIUM CHLORIDE 9 MG/ML
INJECTION, SOLUTION INTRAVENOUS CONTINUOUS
Status: DISCONTINUED | OUTPATIENT
Start: 2022-10-14 | End: 2022-10-14 | Stop reason: HOSPADM

## 2022-10-14 RX ORDER — HALOPERIDOL 5 MG/ML
0.5 INJECTION INTRAMUSCULAR EVERY 10 MIN PRN
Status: DISCONTINUED | OUTPATIENT
Start: 2022-10-14 | End: 2022-10-14 | Stop reason: HOSPADM

## 2022-10-14 RX ORDER — LIDOCAINE AND PRILOCAINE 25; 25 MG/G; MG/G
CREAM TOPICAL
Status: DISCONTINUED | OUTPATIENT
Start: 2022-10-14 | End: 2022-10-14 | Stop reason: HOSPADM

## 2022-10-14 RX ORDER — NEOSTIGMINE METHYLSULFATE 0.5 MG/ML
INJECTION, SOLUTION INTRAVENOUS
Status: DISCONTINUED | OUTPATIENT
Start: 2022-10-14 | End: 2022-10-14

## 2022-10-14 RX ORDER — FENTANYL CITRATE 50 UG/ML
25 INJECTION, SOLUTION INTRAMUSCULAR; INTRAVENOUS EVERY 5 MIN PRN
Status: DISCONTINUED | OUTPATIENT
Start: 2022-10-14 | End: 2022-10-14 | Stop reason: HOSPADM

## 2022-10-14 RX ORDER — KETAMINE HCL IN 0.9 % NACL 50 MG/5 ML
SYRINGE (ML) INTRAVENOUS
Status: DISCONTINUED | OUTPATIENT
Start: 2022-10-14 | End: 2022-10-14

## 2022-10-14 RX ORDER — FAMOTIDINE 10 MG/ML
INJECTION INTRAVENOUS
Status: DISCONTINUED | OUTPATIENT
Start: 2022-10-14 | End: 2022-10-14

## 2022-10-14 RX ORDER — OXYCODONE HYDROCHLORIDE 5 MG/1
5 TABLET ORAL
Status: DISCONTINUED | OUTPATIENT
Start: 2022-10-14 | End: 2022-10-14 | Stop reason: HOSPADM

## 2022-10-14 RX ORDER — CEFAZOLIN SODIUM 1 G/3ML
2 INJECTION, POWDER, FOR SOLUTION INTRAMUSCULAR; INTRAVENOUS
Status: DISCONTINUED | OUTPATIENT
Start: 2022-10-14 | End: 2022-10-14 | Stop reason: HOSPADM

## 2022-10-14 RX ORDER — ROPIVACAINE HYDROCHLORIDE 5 MG/ML
INJECTION, SOLUTION EPIDURAL; INFILTRATION; PERINEURAL
Status: COMPLETED | OUTPATIENT
Start: 2022-10-14 | End: 2022-10-14

## 2022-10-14 RX ORDER — DEXAMETHASONE SODIUM PHOSPHATE 4 MG/ML
INJECTION, SOLUTION INTRA-ARTICULAR; INTRALESIONAL; INTRAMUSCULAR; INTRAVENOUS; SOFT TISSUE
Status: DISCONTINUED | OUTPATIENT
Start: 2022-10-14 | End: 2022-10-14

## 2022-10-14 RX ORDER — ROCURONIUM BROMIDE 10 MG/ML
INJECTION, SOLUTION INTRAVENOUS
Status: DISCONTINUED | OUTPATIENT
Start: 2022-10-14 | End: 2022-10-14

## 2022-10-14 RX ORDER — PROPOFOL 10 MG/ML
VIAL (ML) INTRAVENOUS
Status: DISCONTINUED | OUTPATIENT
Start: 2022-10-14 | End: 2022-10-14

## 2022-10-14 RX ORDER — MIDAZOLAM HYDROCHLORIDE 1 MG/ML
.5-4 INJECTION INTRAMUSCULAR; INTRAVENOUS
Status: DISCONTINUED | OUTPATIENT
Start: 2022-10-14 | End: 2022-10-14 | Stop reason: HOSPADM

## 2022-10-14 RX ORDER — ACETAMINOPHEN 500 MG
1000 TABLET ORAL
Status: COMPLETED | OUTPATIENT
Start: 2022-10-14 | End: 2022-10-14

## 2022-10-14 RX ORDER — FENTANYL CITRATE 50 UG/ML
25-200 INJECTION, SOLUTION INTRAMUSCULAR; INTRAVENOUS
Status: DISCONTINUED | OUTPATIENT
Start: 2022-10-14 | End: 2022-10-14 | Stop reason: HOSPADM

## 2022-10-14 RX ORDER — EPHEDRINE SULFATE 50 MG/ML
INJECTION, SOLUTION INTRAVENOUS
Status: DISCONTINUED | OUTPATIENT
Start: 2022-10-14 | End: 2022-10-14

## 2022-10-14 RX ORDER — SODIUM CHLORIDE 0.9 % (FLUSH) 0.9 %
10 SYRINGE (ML) INJECTION
Status: DISCONTINUED | OUTPATIENT
Start: 2022-10-14 | End: 2022-10-14 | Stop reason: HOSPADM

## 2022-10-14 RX ORDER — LIDOCAINE HYDROCHLORIDE 20 MG/ML
INJECTION INTRAVENOUS
Status: DISCONTINUED | OUTPATIENT
Start: 2022-10-14 | End: 2022-10-14

## 2022-10-14 RX ADMIN — Medication 30 MG: at 09:10

## 2022-10-14 RX ADMIN — SODIUM CHLORIDE, SODIUM GLUCONATE, SODIUM ACETATE, POTASSIUM CHLORIDE, MAGNESIUM CHLORIDE, SODIUM PHOSPHATE, DIBASIC, AND POTASSIUM PHOSPHATE: .53; .5; .37; .037; .03; .012; .00082 INJECTION, SOLUTION INTRAVENOUS at 12:10

## 2022-10-14 RX ADMIN — DEXAMETHASONE SODIUM PHOSPHATE 8 MG: 4 INJECTION, SOLUTION INTRAMUSCULAR; INTRAVENOUS at 09:10

## 2022-10-14 RX ADMIN — PROPOFOL 150 MG: 10 INJECTION, EMULSION INTRAVENOUS at 09:10

## 2022-10-14 RX ADMIN — NEOSTIGMINE METHYLSULFATE 4 MG: 0.5 INJECTION, SOLUTION INTRAVENOUS at 12:10

## 2022-10-14 RX ADMIN — MIDAZOLAM 2 MG: 1 INJECTION INTRAMUSCULAR; INTRAVENOUS at 08:10

## 2022-10-14 RX ADMIN — FENTANYL CITRATE 50 MCG: 50 INJECTION, SOLUTION INTRAMUSCULAR; INTRAVENOUS at 11:10

## 2022-10-14 RX ADMIN — LIDOCAINE HYDROCHLORIDE 100 MG: 20 INJECTION INTRAVENOUS at 09:10

## 2022-10-14 RX ADMIN — ROCURONIUM BROMIDE 50 MG: 10 INJECTION INTRAVENOUS at 09:10

## 2022-10-14 RX ADMIN — GLYCOPYRROLATE 0.2 MG: 0.2 INJECTION, SOLUTION INTRAMUSCULAR; INTRAVITREAL at 09:10

## 2022-10-14 RX ADMIN — SODIUM CHLORIDE, SODIUM GLUCONATE, SODIUM ACETATE, POTASSIUM CHLORIDE, MAGNESIUM CHLORIDE, SODIUM PHOSPHATE, DIBASIC, AND POTASSIUM PHOSPHATE: .53; .5; .37; .037; .03; .012; .00082 INJECTION, SOLUTION INTRAVENOUS at 10:10

## 2022-10-14 RX ADMIN — EPHEDRINE SULFATE 10 MG: 50 INJECTION INTRAVENOUS at 10:10

## 2022-10-14 RX ADMIN — FAMOTIDINE 20 MG: 10 INJECTION, SOLUTION INTRAVENOUS at 09:10

## 2022-10-14 RX ADMIN — CEFAZOLIN 2 G: 330 INJECTION, POWDER, FOR SOLUTION INTRAMUSCULAR; INTRAVENOUS at 09:10

## 2022-10-14 RX ADMIN — ROPIVACAINE HYDROCHLORIDE 10 ML: 5 INJECTION EPIDURAL; INFILTRATION; PERINEURAL at 08:10

## 2022-10-14 RX ADMIN — DEXMEDETOMIDINE HYDROCHLORIDE 20 MCG: 100 INJECTION, SOLUTION INTRAVENOUS at 09:10

## 2022-10-14 RX ADMIN — SODIUM CHLORIDE: 0.9 INJECTION, SOLUTION INTRAVENOUS at 08:10

## 2022-10-14 RX ADMIN — CELECOXIB 400 MG: 200 CAPSULE ORAL at 08:10

## 2022-10-14 RX ADMIN — ONDANSETRON 4 MG: 2 INJECTION INTRAMUSCULAR; INTRAVENOUS at 10:10

## 2022-10-14 RX ADMIN — GLYCOPYRROLATE 0.4 MG: 0.2 INJECTION, SOLUTION INTRAMUSCULAR; INTRAVITREAL at 12:10

## 2022-10-14 RX ADMIN — ACETAMINOPHEN 1000 MG: 500 TABLET ORAL at 08:10

## 2022-10-14 RX ADMIN — FENTANYL CITRATE 50 MCG: 50 INJECTION, SOLUTION INTRAMUSCULAR; INTRAVENOUS at 09:10

## 2022-10-14 NOTE — PLAN OF CARE
Pre-op complete. Waiting on site radha, H&P update, and anesthesia consent. No family present. Belongings placed in a locker. Bed locked in lowest position.

## 2022-10-14 NOTE — ANESTHESIA PROCEDURE NOTES
Intubation    Date/Time: 10/14/2022 9:18 AM  Performed by: Tootie Larsen CRNA  Authorized by: Jyotsna Islas MD     Intubation:     Induction:  Intravenous    Intubated:  Postinduction    Attempts:  1    Attempted By:  CRNA    Method of Intubation:  Direct    Blade:  Stout 2    Laryngeal View Grade: Grade I - full view of cords      Difficult Airway Encountered?: No      Complications:  None    Airway Device:  Oral endotracheal tube    Airway Device Size:  7.5    Style/Cuff Inflation:  Cuffed    Inflation Amount (mL):  7    Tube secured:  23    Secured at:  The lips    Placement Verified By:  Capnometry    Complicating Factors:  None    Findings Post-Intubation:  BS equal bilateral

## 2022-10-14 NOTE — TRANSFER OF CARE
"Anesthesia Transfer of Care Note    Patient: Sukumar Beal    Procedure(s) Performed: Procedure(s) (LRB):  REPAIR, ROTATOR CUFF, ARTHROSCOPIC - POLAR CARE (Right)  ARTHROSCOPY,SHOULDER,WITH BICEPS TENODESIS (Right)  DECOMPRESSION, SUBACROMIAL SPACE (Right)  DEBRIDEMENT, SHOULDER, ARTHROSCOPIC    Patient location: PACU    Anesthesia Type: general    Transport from OR: Transported from OR on 6-10 L/min O2 by face mask with adequate spontaneous ventilation    Post pain: adequate analgesia    Post assessment: no apparent anesthetic complications    Post vital signs: stable    Level of consciousness: alert and awake    Nausea/Vomiting: no nausea/vomiting    Complications: none    Transfer of care protocol was followed      Last vitals:   Visit Vitals  BP (!) 158/90 (BP Location: Left arm, Patient Position: Lying)   Pulse (!) 59   Temp 36.5 °C (97.7 °F) (Temporal)   Resp 15   Ht 6' 1.5" (1.867 m)   Wt 111.1 kg (245 lb)   SpO2 98%   BMI 31.89 kg/m²     "

## 2022-10-14 NOTE — ANESTHESIA PROCEDURE NOTES
R interscalene SS    Patient location during procedure: pre-op   Block not for primary anesthetic.  Reason for block: at surgeon's request and post-op pain management   Post-op Pain Location: Right arm pain   Start time: 10/14/2022 8:36 AM  Timeout: 10/14/2022 8:35 AM   End time: 10/14/2022 8:45 AM    Staffing  Authorizing Provider: Ning Brandt MD  Performing Provider: Landon Rosales MD    Preanesthetic Checklist  Completed: patient identified, IV checked, site marked, risks and benefits discussed, surgical consent, monitors and equipment checked, pre-op evaluation and timeout performed  Peripheral Block  Patient position: sitting  Prep: ChloraPrep  Patient monitoring: heart rate, cardiac monitor, continuous pulse ox, continuous capnometry and frequent blood pressure checks  Block type: interscalene  Laterality: right  Injection technique: single shot  Needle  Needle type: Stimuplex   Needle gauge: 22 G  Needle length: 2 in  Needle localization: anatomical landmarks and ultrasound guidance   -ultrasound image captured on disc.  Assessment  Injection assessment: negative aspiration, negative parasthesia and local visualized surrounding nerve  Paresthesia pain: none  Heart rate change: no  Slow fractionated injection: yes  Pain Tolerance: comfortable throughout block and no complaints  Medications:    Medications: ropivacaine (NAROPIN) injection 0.5% - Perineural   10 mL - 10/14/2022 8:45:00 AM    Additional Notes  VSS.  [facility] RN monitoring vitals throughout procedure.  Patient tolerated procedure well.

## 2022-10-14 NOTE — BRIEF OP NOTE
[facility] - Surgery (Hospital)  Brief Operative Note    Surgery Date: 10/14/2022     Surgeon(s) and Role:     * LEONIDAS Carr MD - Primary    Assisting Surgeon: None    Pre-op Diagnosis:  Nontraumatic tear of right rotator cuff, unspecified tear extent [M75.101]  Biceps tendinitis of right upper extremity [M75.21]    Post-op Diagnosis:  Post-Op Diagnosis Codes:     * Nontraumatic tear of right rotator cuff, unspecified tear extent [M75.101]     * Biceps tendinitis of right upper extremity [M75.21]    Procedure(s) (LRB):  REPAIR, ROTATOR CUFF, ARTHROSCOPIC - POLAR CARE (Right)  ARTHROSCOPY,SHOULDER,WITH BICEPS TENODESIS (Right)  DECOMPRESSION, SUBACROMIAL SPACE (Right)  DEBRIDEMENT, SHOULDER, ARTHROSCOPIC    Anesthesia: General    Operative Findings: R shoulder rotator cuff tear and biceps tendinitis    Estimated Blood Loss: Minimal         Specimens:   Specimen (24h ago, onward)      None              Discharge Note    OUTCOME: Patient tolerated treatment/procedure well without complication and is now ready for discharge.    DISPOSITION: Home or Self Care    FINAL DIAGNOSIS:  <principal problem not specified>    FOLLOWUP: In clinic    DISCHARGE INSTRUCTIONS:    Discharge Procedure Orders   Diet Adult Regular     Notify your health care provider if you experience any of the following:  temperature >100.4     Notify your health care provider if you experience any of the following:  persistent nausea and vomiting or diarrhea     Notify your health care provider if you experience any of the following:  severe uncontrolled pain     Notify your health care provider if you experience any of the following:  redness, tenderness, or signs of infection (pain, swelling, redness, odor or green/yellow discharge around incision site)     Leave dressing on - Keep it clean, dry, and intact until clinic visit     Weight bearing restrictions (specify):   Order Comments: VIKY GARCIA

## 2022-10-14 NOTE — PLAN OF CARE
VSS.  Patient tolerating oral liquids without difficulty.   No apparent s&s of distress noted at this time, no complaints voiced at this time.   Discharge instructions reviewed with patient/friend with good verbal feedback received.   Post op medications given at BS, patient given instructions on sling/polar care  Patient ready for discharge.

## 2022-10-14 NOTE — ANESTHESIA PREPROCEDURE EVALUATION
10/14/2022  Sukumar Beal is a 52 y.o., male.    Procedure Summary    Case: [id number] Date/Time: 10/14/22 0930   Procedures:        REPAIR, ROTATOR CUFF, ARTHROSCOPIC - POLAR CARE (Right: Shoulder) - Regional w/o Catheter, Interscalene       FIXATION, TENDON (Right)   Anesthesia type: General   Diagnosis:        Nontraumatic tear of right rotator cuff, unspecified tear extent [M75.101]       Biceps tendinitis of right upper extremity [M75.21]         Past Medical History:   Diagnosis Date    Gout 7/2014    High cholesterol     Hypertension     Hypothyroid     Low testosterone     Staph aureus infection age 13    R leg       Past Surgical History:   Procedure Laterality Date    COLONOSCOPY N/A 9/21/2020    Procedure: COLONOSCOPY;  Surgeon: VANI Newell MD;  Location: [facility] ([address] [place] [address]);  Service: Endoscopy;  Laterality: N/A;  covid test 2nd floor surgery clinic 9/18    FRACTURE SURGERY Left 2004    wrist    Incision and drainage of tibia Right 1983    SINUS SURGERY      x2         Pre-op Assessment    I have reviewed the Patient Summary Reports.    I have reviewed the Nursing Notes. I have reviewed the NPO Status.   I have reviewed the Medications.     Review of Systems  Anesthesia Hx:  Neg history of prior surgery. Denies Family Hx of Anesthesia complications.   Denies Personal Hx of Anesthesia complications.   Cardiovascular:   Exercise tolerance: good        Physical Exam  General:  Well nourished      Airway/Jaw/Neck:  Airway Findings: Mouth Opening: Normal   Tongue: Normal   General Airway Assessment: Adult Mallampati: II  TM Distance: Normal, at least 6 cm          Dental:  DENTAL FINDINGS: Normal   Chest/Lungs:  Chest/Lungs Clear    Heart/Vascular:  Heart Findings: Normal       Mental Status:  Mental Status Findings:  Alert and Oriented         Anesthesia Plan  Type of Anesthesia, risks  & benefits discussed:  Anesthesia Type:  Gen ETT, Regional    Patient's Preference:   Plan Factors:          Intra-op Monitoring Plan: standard ASA monitors  Intra-op Monitoring Plan Comments:   Post Op Pain Control Plan: multimodal analgesia, peripheral nerve block and IV/PO Opioids PRN  Post Op Pain Control Plan Comments:     Induction:   IV  Beta Blocker:  Patient is not currently on a Beta-Blocker (No further documentation required).       Informed Consent: Informed consent signed with the Patient and all parties understand the risks and agree with anesthesia plan.  All questions answered.  Anesthesia consent signed with patient.  ASA Score: 2     Day of Surgery Review of History & Physical:              Ready For Surgery From Anesthesia Perspective.           Physical Exam  General: Well nourished    Airway:  Mallampati: II   Mouth Opening: Normal  TM Distance: Normal, at least 6 cm  Tongue: Normal          Anesthesia Plan  Type of Anesthesia, risks & benefits discussed:    Anesthesia Type: Gen ETT, Regional  Intra-op Monitoring Plan: standard ASA monitors  Post Op Pain Control Plan: multimodal analgesia, peripheral nerve block and IV/PO Opioids PRN  Induction:  IV  Informed Consent: Informed consent signed with the Patient and all parties understand the risks and agree with anesthesia plan.  All questions answered.   ASA Score: 2    Ready For Surgery From Anesthesia Perspective.       .

## 2022-10-16 NOTE — OP NOTE
[facility]   OPERATIVE REPORT   ORTHOPAEDIC SURGERY   PROVIDER: DR. ABBI RHODES    PATIENT INFORMATION   Sukumar Beal 52 y.o. male 1970   MRN: [id number]   LOCATION: [facility]     DATE OF PROCEDURE: 10/14/2022     PREOPERATIVE DIAGNOSES:   Right  1. Shoulder rotator cuff tear   2. Shoulder biceps tendinopathy    POSTOPERATIVE DIAGNOSES:   Right  1. Shoulder rotator cuff tear, retracted L-shaped tear   2. Shoulder biceps tendinopathy  3. Shoulder external impingement  4. Shoulder degenerative labral tearing  5. Shoulder synovitis     OPERATION:   Right  1. Shoulder arthroscopic rotator cuff repair, transosseous equivalent double row (CPT 49340)  2. Shoulder open subpectoral biceps tenodesis (CPT 50513)  3. Shoulder arthroscopic extensive debridement (anterior, posterior glenohumeral joint, subacromial space) (CPT 89525)    4. Shoulder arthroscopic subacromial decompression     Surgeon(s) and Role:     * LEONIDAS Rhodes MD - Primary     * Ernesto Kang MD - Fellow     * SMA Barbara    ANESTHESIA: General with interscalene block    ESTIMATED BLOOD LOSS: 75 cc    IMPLANTS:   Implant Name Type Inv. Item Serial No.  Lot No. LRB No. Used Action   ANCHOR BIOCOMP W/3 SUTURES - [id number]  ANCHOR BIOCOMP W/3 SUTURES  [employer] [id number] Right 2 Implanted   ANCHOR SUT BIOCOM 5.5X19.1MM - [id number]  ANCHOR SUT BIOCOM 5.5X19.1MM  [employer] [id number] Right 2 Implanted   SYS IMPL PROXIMAL TENODESIS - [id number]  SYS IMPL PROXIMAL TENODESIS  [employer] [id number] Right 1 Implanted      SPECIMENS: None.     COMPLICATIONS: None.     INTRAOPERATIVE COUNTS: Correct.     PROPHYLACTIC IV ANTIBIOTICS: Given per [facility] Protocol.    INDICATIONS FOR PROCEDURE:   Sukumar Beal 52 y.o. male  has been seen and evaluated in the office for continued right shoulder pain and mechanical symptoms.  After a lengthy discussion and failed nonoperative management, the patient wished to proceed with  surgical intervention and was fully informed of risks and benefits.    DETAILS OF PROCEDURE:  After the correct operative site was marked by the operating surgeon, an interscalene block was administered by the anesthesia team.  The patient was then taken to the operating room and placed supine on the operating room table, where the patient underwent general anesthesia by the anesthesia team.  The patient was then rolled into the lateral decubitus position with the operative side up.  A well-padded axillary roll, beanbag and pillows were placed.  All pressure points were carefully padded and checked.  The upper extremities and both lower extremities were placed in comfortable positions and were also well-padded.      A verbal timeout was confirmed to identify the patient, operative site and planned operative procedure. It was also confirmed the patient had received preoperative IV antibiotic per protocol.     Examination under anesthesia demonstrated: Forward elevation 170 degrees, external rotation with arm to side 70 degrees; grade 0 anterior and posterior load and shift.    The operative upper extremity was then prepped and draped in the usual sterile fashion.     The Spider arm positioner was implemented with balanced suspension and appropriate landmarks were noted on the skin.  A posterior followed by santi-superior portals were created and systematic examination of the joint revealed the following:      -Biceps tendinitis at the root attachment with partial tearing. Thickened intra-articular long head tendon.  -Diffuse synovitis from anterior to posterior with degnerative labral fraying  -Thickened and inflamed capsular tissue over the anterior rotator interval extending distally through the MGHL  -A posterior push-pull maneuver with probing was performing demonstrating an intact subscapularis  -The undersurface of the superior cuff was torn with an exposed insertional footprint, anterior at the supraspinatus  and extending into the infraspinatus.  -No loose bodies  -Mild superficial delamination of the glenoid articular cartilage, otherwise no glenohumeral focal chondral defects    A shaver was again brought in to clear the field of view and to debride torn labrum and undersurface cuff from anterior to posterior. Extensive synovitis was also removed. Cautery was used to resect the interval to the coracoid and to release capsule through the MGHL. All thickened capsular tissue was released adjacent to the glenoid labrum. Care was taken to protect the axillary nerve during this portion of the procedure.     The biceps was released with Metzenbaum scissors.     The torn cuff undersurface was debrided carefully with a shaver. Starting at the level of the superior glenoid and moving medially, a hand held rasp was used to complete the capsular sided releases. Care was taken to avoid injury to the suprascapular nerve. The cuff footprint was also debrided and lightly decorticated for healing response.    Attention was then turned to the subacromial space where significant hypertrophic bursa was encountered. Through an anterolateral working portal, shaver and cautery devices were introduced to clear the subacromial space of bursa and adhesions. Bursal reflections to the deltoid fascia anteriorly and posteriorly were taken down to further expand this space. This created a nice room with a view. The undersurface of the acromion was exposed with cautery to delineate bony anatomy. Systematic examination of this space revealed the following:    -Subacromial spurring with narrowing of the anterolateral subacromial interval  -Fraying and degeneration of the CA ligament indicative of some degree of chronic outlet impingement  -Full thickness cuff tearing anteriorly involving the supraspinatus and extending into the infraspinatus with L shaped pattern. The anterior cable attachment of the supraspinatus was completely disrupted and the torn  rotator cuff was retracted.      The large full thickness cuff tear was revisualized. Bursal sided releases were performed down to the scapular spine. The tuberosity was prepared with the shaver through accessory posterolateral and posterior portals while looking from the standard anterolateral portal.  The large sized, posteriorly retracted tear was then repaired with Fiberwire suture originating from two 5.5 mm Corkscrew medial row anchors with sutures passed in horizontal mattress fashion across the tear. The medial row was then tied for a total of 5 knots. These suture limbs were then taken laterally to two 5.5 mm Swivelock lateral row anchors to complete the transosseous equivalent double row repair configuration. A free suture tape was passed in horizontal mattress fashion across the anterior margin of the tear and incorporated into the posterior lateral row anchor for further footprint compression.  The cuff was repaired to its native position on the greater tuberosity without excessive tension. Following repair, probing of the repair site revealed good tissue apposition to the footprint and good construct stability.     Subacromial decompression was completed using posterior cutting block technique in the standard fashion with a 4.5 mm yunior without difficulty. The anterior osteophyte was flattened converting the acromion morphology from a type 3 to a type 1.  Confirmation of adequate resection was confirmed while viewing from the lateral portal and referencing from the posterior acromial undersurface.    The shoulder and subacromial space were then irrigated and fluid was extravasated using suction.     An axillary incision was made overlying the pectoralis inferior border, measuring 3-4 cm in length. The interval between the pectoralis major and medial conjoined and biceps short head was developed bluntly. Dissection was carried down to the humerus and the biceps long head tendon was retrieved from the  wound. Tenosynovitis was noted around the tendon. Approximately 10 mm of tendon was whipstitched just proximal to the musculotendinous junction and the remainder of the tendon was then resected. A guidepin was placed, approximately 10 mm proximal to the inferior crossing border of the pectoralis, to create a unicortical hole. The unicortical biceps button was then dunked into the hole, pulling the free sutures to advance the tendon to the hole for reattachment. One suture limb was then brought back through the tendon and tied back to the other limb to secure the tenodesis. Excellent fixation was achieved with proper tensioning. The wound was then thoroughly irrigated with normal saline.      All portals were reapproximated using 3-0 Nylon. The axillary wound was closed with 3-0 Vicryl and 3-0 Monocryl. Mastisol and steri-strips were placed over the axillary wound. Local anesthetic was injected as well. Xeroform and absorbant mepilex pads were placed to cover all incisions.  A polar care shoulder sleeve was secured followed by a  sling with abduction pillow. The patient was then repositioned supine, extubated and taken to the recovery room where the patient arrived in stable condition with the compartments of the arm and forearm soft and good perfusion in all digits.     POSTOPERATIVE PLAN OF CARE:  -Patient will be discharged home according to protocol.  -Physical Therapy: Follow the >3 cm cuff repair rehab protocol. PROM starts after 2 weeks. AROM starts after 7 weeks. No cuff resistive activity x 12 weeks. No biceps resistive activity x 8 weeks. We will be just a bit slower in this case given the tear pattern and size in this case.  -DVT prophylaxis: ASA 81 mg twice a day x 2 weeks.

## 2022-10-18 NOTE — ANESTHESIA POSTPROCEDURE EVALUATION
Anesthesia Post Evaluation    Patient: Sukumar Beal    Procedure(s) Performed: Procedure(s) (LRB):  REPAIR, ROTATOR CUFF, ARTHROSCOPIC - POLAR CARE (Right)  ARTHROSCOPY,SHOULDER,WITH BICEPS TENODESIS (Right)  DECOMPRESSION, SUBACROMIAL SPACE (Right)  DEBRIDEMENT, SHOULDER, ARTHROSCOPIC    Final Anesthesia Type: general      Patient location during evaluation: PACU  Patient participation: Yes- Able to Participate  Level of consciousness: awake and alert  Post-procedure vital signs: reviewed and stable  Pain management: adequate  Airway patency: patent    PONV status at discharge: No PONV  Anesthetic complications: no      Cardiovascular status: blood pressure returned to baseline  Respiratory status: unassisted  Hydration status: euvolemic  Follow-up not needed.          Vitals Value Taken Time   /82 10/14/22 1316   Temp 36.3 °C (97.3 °F) 10/14/22 1236   Pulse 70 10/14/22 1328   Resp 38 10/14/22 1328   SpO2 97 % 10/14/22 1327   Vitals shown include unvalidated device data.      Event Time   Out of Recovery 13:34:00         Pain/Renate Score: No data recorded

## 2022-10-20 ENCOUNTER — TELEPHONE (OUTPATIENT)
Dept: SPORTS MEDICINE | Facility: CLINIC | Age: 52
End: 2022-10-20
Payer: COMMERCIAL

## 2022-10-20 NOTE — TELEPHONE ENCOUNTER
I spoke to patient about his difficulty sleeping. He says his pain is minimal at this point and hasnt even used all of his prescribed pain medications. We discussed different OTC options regarding sleep. He understands and has no further questions.

## 2022-10-27 ENCOUNTER — OFFICE VISIT (OUTPATIENT)
Dept: SPORTS MEDICINE | Facility: CLINIC | Age: 52
End: 2022-10-27
Payer: COMMERCIAL

## 2022-10-27 VITALS
WEIGHT: 251.56 LBS | BODY MASS INDEX: 34.07 KG/M2 | SYSTOLIC BLOOD PRESSURE: 141 MMHG | HEART RATE: 73 BPM | HEIGHT: 72 IN | DIASTOLIC BLOOD PRESSURE: 91 MMHG

## 2022-10-27 DIAGNOSIS — Z98.890 S/P RIGHT ROTATOR CUFF REPAIR: Primary | ICD-10-CM

## 2022-10-27 PROCEDURE — 3044F PR MOST RECENT HEMOGLOBIN A1C LEVEL <7.0%: ICD-10-PCS | Mod: CPTII,S$GLB,, | Performed by: PHYSICIAN ASSISTANT

## 2022-10-27 PROCEDURE — 4010F PR ACE/ARB THEARPY RXD/TAKEN: ICD-10-PCS | Mod: CPTII,S$GLB,, | Performed by: PHYSICIAN ASSISTANT

## 2022-10-27 PROCEDURE — 1160F PR REVIEW ALL MEDS BY PRESCRIBER/CLIN PHARMACIST DOCUMENTED: ICD-10-PCS | Mod: CPTII,S$GLB,, | Performed by: PHYSICIAN ASSISTANT

## 2022-10-27 PROCEDURE — 3061F PR NEG MICROALBUMINURIA RESULT DOCUMENTED/REVIEW: ICD-10-PCS | Mod: CPTII,S$GLB,, | Performed by: PHYSICIAN ASSISTANT

## 2022-10-27 PROCEDURE — 1159F MED LIST DOCD IN RCRD: CPT | Mod: CPTII,S$GLB,, | Performed by: PHYSICIAN ASSISTANT

## 2022-10-27 PROCEDURE — 3066F PR DOCUMENTATION OF TREATMENT FOR NEPHROPATHY: ICD-10-PCS | Mod: CPTII,S$GLB,, | Performed by: PHYSICIAN ASSISTANT

## 2022-10-27 PROCEDURE — 99999 PR PBB SHADOW E&M-EST. PATIENT-LVL III: CPT | Mod: PBBFAC,,, | Performed by: PHYSICIAN ASSISTANT

## 2022-10-27 PROCEDURE — 3077F SYST BP >= 140 MM HG: CPT | Mod: CPTII,S$GLB,, | Performed by: PHYSICIAN ASSISTANT

## 2022-10-27 PROCEDURE — 99024 PR POST-OP FOLLOW-UP VISIT: ICD-10-PCS | Mod: S$GLB,,, | Performed by: PHYSICIAN ASSISTANT

## 2022-10-27 PROCEDURE — 3061F NEG MICROALBUMINURIA REV: CPT | Mod: CPTII,S$GLB,, | Performed by: PHYSICIAN ASSISTANT

## 2022-10-27 PROCEDURE — 3066F NEPHROPATHY DOC TX: CPT | Mod: CPTII,S$GLB,, | Performed by: PHYSICIAN ASSISTANT

## 2022-10-27 PROCEDURE — 1160F RVW MEDS BY RX/DR IN RCRD: CPT | Mod: CPTII,S$GLB,, | Performed by: PHYSICIAN ASSISTANT

## 2022-10-27 PROCEDURE — 3080F PR MOST RECENT DIASTOLIC BLOOD PRESSURE >= 90 MM HG: ICD-10-PCS | Mod: CPTII,S$GLB,, | Performed by: PHYSICIAN ASSISTANT

## 2022-10-27 PROCEDURE — 99024 POSTOP FOLLOW-UP VISIT: CPT | Mod: S$GLB,,, | Performed by: PHYSICIAN ASSISTANT

## 2022-10-27 PROCEDURE — 1159F PR MEDICATION LIST DOCUMENTED IN MEDICAL RECORD: ICD-10-PCS | Mod: CPTII,S$GLB,, | Performed by: PHYSICIAN ASSISTANT

## 2022-10-27 PROCEDURE — 3077F PR MOST RECENT SYSTOLIC BLOOD PRESSURE >= 140 MM HG: ICD-10-PCS | Mod: CPTII,S$GLB,, | Performed by: PHYSICIAN ASSISTANT

## 2022-10-27 PROCEDURE — 4010F ACE/ARB THERAPY RXD/TAKEN: CPT | Mod: CPTII,S$GLB,, | Performed by: PHYSICIAN ASSISTANT

## 2022-10-27 PROCEDURE — 3044F HG A1C LEVEL LT 7.0%: CPT | Mod: CPTII,S$GLB,, | Performed by: PHYSICIAN ASSISTANT

## 2022-10-27 PROCEDURE — 99999 PR PBB SHADOW E&M-EST. PATIENT-LVL III: ICD-10-PCS | Mod: PBBFAC,,, | Performed by: PHYSICIAN ASSISTANT

## 2022-10-27 PROCEDURE — 3080F DIAST BP >= 90 MM HG: CPT | Mod: CPTII,S$GLB,, | Performed by: PHYSICIAN ASSISTANT

## 2022-10-27 NOTE — PROGRESS NOTES
S:Sukumar Beal presents for post-operative evaluation.     DATE OF PROCEDURE: 10/14/2022      PREOPERATIVE DIAGNOSES:   Right  1. Shoulder rotator cuff tear   2. Shoulder biceps tendinopathy    POSTOPERATIVE DIAGNOSES:   Right  1. Shoulder rotator cuff tear, retracted L-shaped tear   2. Shoulder biceps tendinopathy  3. Shoulder external impingement  4. Shoulder degenerative labral tearing  5. Shoulder synovitis     OPERATION:   Right  1. Shoulder arthroscopic rotator cuff repair, transosseous equivalent double row (CPT 32604)  2. Shoulder open subpectoral biceps tenodesis (CPT 43399)  3. Shoulder arthroscopic extensive debridement (anterior, posterior glenohumeral joint, subacromial space) (CPT 90700)    4. Shoulder arthroscopic subacromial decompression     Sukumar Beal reports to be doing well 2wk s/p the above mentioned procedure. Denies fevers, chills, night sweats, chest pain, difficulty breathing, calf pain or tenderness. Referral to PT placed at the [place] location. PT messaged today to get patient scheduled within the next 1-2 days to begin passive ROM/post op protocol. Pain levels are improving. Has d/c'd pain medication.     O: The incisions are healing well.  No signs of infection.  Sutures were removed. No significant pain or unusual tenderness.    A/P: Plan to follow the rehab plan as previously outlined. PT messaged to scheduled patient ASAP. RTC in 4 weeks.     POSTOPERATIVE PLAN OF CARE:  -Physical Therapy: Follow the >3 cm cuff repair rehab protocol. PROM starts after 2 weeks. AROM starts after 7 weeks. No cuff resistive activity x 12 weeks. No biceps resistive activity x 8 weeks. We will be just a bit slower in this case given the tear pattern and size in this case.  -DVT prophylaxis: ASA 81 mg twice a day x 2 weeks.

## 2022-11-01 ENCOUNTER — CLINICAL SUPPORT (OUTPATIENT)
Dept: REHABILITATION | Facility: HOSPITAL | Age: 52
End: 2022-11-01
Payer: COMMERCIAL

## 2022-11-01 DIAGNOSIS — M62.81 DECREASED MUSCLE STRENGTH: ICD-10-CM

## 2022-11-01 DIAGNOSIS — M75.101 NONTRAUMATIC TEAR OF RIGHT ROTATOR CUFF, UNSPECIFIED TEAR EXTENT: ICD-10-CM

## 2022-11-01 DIAGNOSIS — M75.21 BICEPS TENDINITIS OF RIGHT UPPER EXTREMITY: ICD-10-CM

## 2022-11-01 DIAGNOSIS — M25.611 DECREASED RANGE OF MOTION OF RIGHT SHOULDER: ICD-10-CM

## 2022-11-01 PROCEDURE — 97140 MANUAL THERAPY 1/> REGIONS: CPT | Performed by: PHYSICAL THERAPIST

## 2022-11-01 PROCEDURE — 97161 PT EVAL LOW COMPLEX 20 MIN: CPT | Performed by: PHYSICAL THERAPIST

## 2022-11-01 PROCEDURE — 97110 THERAPEUTIC EXERCISES: CPT | Performed by: PHYSICAL THERAPIST

## 2022-11-01 NOTE — PLAN OF CARE
[facility]  Physical Therapy Initial Evaluation    Date: 11/1/2022   Name: Sukumar Beal  [facility] Number: [id number]    Therapy Diagnosis:   Encounter Diagnoses   Name Primary?    Nontraumatic tear of right rotator cuff, unspecified tear extent     Biceps tendinitis of right upper extremity     Decreased range of motion of right shoulder     Decreased muscle strength      Physician: Jh Sibley*    Physician Orders: PT Eval and Treat   Medical Diagnosis from Referral:   Diagnosis   M75.101 (ICD-10-CM) - Nontraumatic tear of right rotator cuff, unspecified tear extent   M75.21 (ICD-10-CM) - Biceps tendinitis of right upper extremity     Evaluation Date: 11/1/2022  Authorization Period Expiration: 12/31/2022  Plan of Care Expiration: 1/24/2022  Visit # / Visits authorized: 1/ 1    Time In: 1100am  Time Out: 1200pm  Total Appointment Time (timed & untimed codes): 60 minutes    Precautions: Standard and RCR    Date of Surgery: 10/14/2022  Weeks Post Op: 2 weeks, 4 days    From Surgeon:  Physical Therapy: Follow the >3 cm cuff repair rehab protocol. PROM starts after 2 weeks. AROM starts after 7 weeks. No cuff resistive activity x 12 weeks. No biceps resistive activity x 8 weeks. We will be just a bit slower in this case given the tear pattern and size in this case    Subjective     History of current condition - Sukumar reports: shoulder issues progressing over the years. He is fairly active with his job and hobbies. He had surgery about two weeks ago and says everything has been going well since then. He has worn the sling all the time, even with sleeping. He sleeps on the couch with pillows which has worked best for him. He has lifted some things with his arm in the sling, like groceries on accident. His incisions have recovered well.      Medical History:   Past Medical History:   Diagnosis Date    Gout 7/2014    High cholesterol     Hypertension     Hypothyroid     Low  testosterone     Staph aureus infection age 13    R leg       Surgical History:   Sukumar Beal  has a past surgical history that includes Incision and drainage of tibia (Right, 1983); Fracture surgery (Left, 2004); Sinus surgery; Colonoscopy (N/A, 9/21/2020); Arthroscopic repair of rotator cuff of shoulder (Right, 10/14/2022); arthroscopy,shoulder,with biceps tenodesis (Right, 10/14/2022); Decompression of subacromial space (Right, 10/14/2022); and Arthroscopic debridement of shoulder (10/14/2022).    Medications:   Sukumar has a current medication list which includes the following prescription(s): aspirin, atorvastatin, b-complex with vitamin c, cyanocobalamin (vitamin b-12), ergocalciferol (vitamin d2), levothyroxine, losartan, ondansetron, oxycodone, sildenafil, testosterone, and vitamin e acetate.    Allergies:   Review of patient's allergies indicates:   Allergen Reactions    Codeine Rash    Penicillins Rash        Imaging, MRI studies: Moderate-sized rotator cuff tear, involving the supraspinatus tendon, as above.  Additional tendinosis of the infraspinatus noted.    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, active job with lifting  Current Level of Function: unable to participate in boot camp or perform lifting at work    Pain:  Current 0/10, worst 3/10, best 0/10   Location: right shoulder    Description: Aching  Aggravating Factors: Lifting  Easing Factors: relaxation and pain medication    Pts goals: return to boot camp three times a week with no restrictions    Objective   Observation: sling not fitted well, incisions healing well, no stiches     Posture:    Rounded shoulders, forward head, winging scapula    Passive Range of Motion:   Shoulder Left Right   Flexion 180 85   Abduction 180 -   ER at 0 60 10   ER at 90 90 -   IR 60 -         Joint Mobility:    Right glenohumeral joint mobility within  normal limits, no restrictions        Limitation/Restriction for FOTO Shoulder Survey    Therapist reviewed FOTO scores for Sukumar Beal on 11/1/2022.   FOTO documents entered into [employer] - see Media section.    Limitation Score: 58% (Predicted 27%)         TREATMENT   Treatment Time In: 1130am  Treatment Time Out: 1200pm  Total Treatment time (time-based codes) separate from Evaluation: 30 minutes    Sukumar received therapeutic exercises to develop strength, endurance, ROM, flexibility and posture for 15 minutes including:    Home Exercise Program education  Pendulum exercises  Table Slides into flexion and scaption  Passive elbow flexion and extension  Education on precautions, sling wear    Sukumar received the following manual therapy techniques: Joint mobilizations, Myofacial release and Soft tissue Mobilization were applied to the: right shoulder for 15 minutes, including:    Passive Range of Motion into flexion (85 degrees achieved) and External Rotation (10 degrees achieved)  Grade I-II oscillations for pain relief  Inferior and posterior mobilizations  Scapular mobilizations    Sukumar received cold pack for 0 minutes to right shoulder.    Home Exercises and Patient Education Provided    Education provided:   - Home Exercise Program, Plan of care, prognosis, precautions, sling wear, lifting restrictions    Written Home Exercises Provided: yes.  Exercises were reviewed and Sukumar was able to demonstrate them prior to the end of the session.  Sukumar demonstrated good  understanding of the education provided.     See EMR under Patient Instructions for exercises provided 11/1/2022.    Assessment   Sukumar is a 52 y.o. male referred to outpatient Physical Therapy with a medical diagnosis of surgical repair of right Rotator Cuff and biceps tenodesis. Patient presents with limited and/or painful Passive Range of Motion  scapular winging, tenderness/tightness of right shoulder, and functional limitations of in ability to lift  or participate in boot camp. Sukumar would benefit from skilled physical therapy to progress him through his post op protocol and aid in his return to work and hobbies. Range of motion and strengthening to be performed as indicated in the protocol.    Pt prognosis is Good.   Pt will benefit from skilled outpatient Physical Therapy to address the deficits stated above and in the chart below, provide pt/family education, and to maximize pt's level of independence.     Plan of care discussed with patient: Yes  Pt's spiritual, cultural and educational needs considered and patient is agreeable to the plan of care and goals as stated below:     Anticipated Barriers for therapy: none    Medical Necessity is demonstrated by the following  History  Co-morbidities and personal factors that may impact the plan of care Co-morbidities:   HTN    Personal Factors:   no deficits     low   Examination  Body Structures and Functions, activity limitations and participation restrictions that may impact the plan of care Body Regions:   upper extremities    Body Systems:    gross symmetry  ROM  strength  gross coordinated movement  motor control    Participation Restrictions:   Boot camp    Activity limitations:   Learning and applying knowledge  no deficits    General Tasks and Commands  no deficits    Communication  no deficits    Mobility  no deficits    Self care  no deficits    Domestic Life  no deficits    Interactions/Relationships  no deficits    Life Areas  no deficits    Community and Social Life  no deficits         low   Clinical Presentation stable and uncomplicated low   Decision Making/ Complexity Score: low       GOALS:   Short Term Goals:  6 weeks  Patient will be independent in Home Exercise Program for independent progression within protocol.  Patient will show improved Passive Range of Motion to 120 degrees of flexion to show progress within protocol.  Patient will show improved Passive Range of Motion to 30 degrees of  External Rotation to show progress within protocol.     Long Term Goals: 12 weeks  Patient will be independent in Home Exercise Program to initiate strengthening and return to hobbies.  Patient will show improved full Passive Range of Motion in flexion and External Rotation.   Patient will show improved strength by demonstrating active flexion to 120 degrees.  Patient goal: return to boot camp three times a week with no restrictions.  Patient will have improved limitation score to 27% limited on FOTO shoulder in order to demonstrate functional improvement.    Plan   Plan of care Certification: 11/1/2022 to 1/24/2022.    Outpatient Physical Therapy 1-2 times weekly for 12 weeks to include the following interventions: Manual Therapy, Moist Heat/ Ice, Neuromuscular Re-ed, Patient Education, Therapeutic Activities, and Therapeutic Exercise.     Madelyn Flowers, PT , DPT

## 2022-11-07 ENCOUNTER — OFFICE VISIT (OUTPATIENT)
Dept: SPORTS MEDICINE | Facility: CLINIC | Age: 52
End: 2022-11-07
Payer: COMMERCIAL

## 2022-11-07 VITALS
HEIGHT: 74 IN | HEART RATE: 70 BPM | WEIGHT: 250.19 LBS | BODY MASS INDEX: 32.11 KG/M2 | SYSTOLIC BLOOD PRESSURE: 142 MMHG | DIASTOLIC BLOOD PRESSURE: 98 MMHG

## 2022-11-07 DIAGNOSIS — Z47.89 SURGICAL AFTERCARE, MUSCULOSKELETAL SYSTEM: Primary | ICD-10-CM

## 2022-11-07 DIAGNOSIS — Z98.890 S/P RIGHT ROTATOR CUFF REPAIR: ICD-10-CM

## 2022-11-07 PROCEDURE — 1159F MED LIST DOCD IN RCRD: CPT | Mod: CPTII,S$GLB,, | Performed by: ORTHOPAEDIC SURGERY

## 2022-11-07 PROCEDURE — 3061F PR NEG MICROALBUMINURIA RESULT DOCUMENTED/REVIEW: ICD-10-PCS | Mod: CPTII,S$GLB,, | Performed by: ORTHOPAEDIC SURGERY

## 2022-11-07 PROCEDURE — 3077F PR MOST RECENT SYSTOLIC BLOOD PRESSURE >= 140 MM HG: ICD-10-PCS | Mod: CPTII,S$GLB,, | Performed by: ORTHOPAEDIC SURGERY

## 2022-11-07 PROCEDURE — 3008F PR BODY MASS INDEX (BMI) DOCUMENTED: ICD-10-PCS | Mod: CPTII,S$GLB,, | Performed by: ORTHOPAEDIC SURGERY

## 2022-11-07 PROCEDURE — 3044F PR MOST RECENT HEMOGLOBIN A1C LEVEL <7.0%: ICD-10-PCS | Mod: CPTII,S$GLB,, | Performed by: ORTHOPAEDIC SURGERY

## 2022-11-07 PROCEDURE — 99024 PR POST-OP FOLLOW-UP VISIT: ICD-10-PCS | Mod: S$GLB,,, | Performed by: ORTHOPAEDIC SURGERY

## 2022-11-07 PROCEDURE — 3008F BODY MASS INDEX DOCD: CPT | Mod: CPTII,S$GLB,, | Performed by: ORTHOPAEDIC SURGERY

## 2022-11-07 PROCEDURE — 3077F SYST BP >= 140 MM HG: CPT | Mod: CPTII,S$GLB,, | Performed by: ORTHOPAEDIC SURGERY

## 2022-11-07 PROCEDURE — 4010F ACE/ARB THERAPY RXD/TAKEN: CPT | Mod: CPTII,S$GLB,, | Performed by: ORTHOPAEDIC SURGERY

## 2022-11-07 PROCEDURE — 3066F NEPHROPATHY DOC TX: CPT | Mod: CPTII,S$GLB,, | Performed by: ORTHOPAEDIC SURGERY

## 2022-11-07 PROCEDURE — 3044F HG A1C LEVEL LT 7.0%: CPT | Mod: CPTII,S$GLB,, | Performed by: ORTHOPAEDIC SURGERY

## 2022-11-07 PROCEDURE — 99024 POSTOP FOLLOW-UP VISIT: CPT | Mod: S$GLB,,, | Performed by: ORTHOPAEDIC SURGERY

## 2022-11-07 PROCEDURE — 3066F PR DOCUMENTATION OF TREATMENT FOR NEPHROPATHY: ICD-10-PCS | Mod: CPTII,S$GLB,, | Performed by: ORTHOPAEDIC SURGERY

## 2022-11-07 PROCEDURE — 3080F DIAST BP >= 90 MM HG: CPT | Mod: CPTII,S$GLB,, | Performed by: ORTHOPAEDIC SURGERY

## 2022-11-07 PROCEDURE — 3080F PR MOST RECENT DIASTOLIC BLOOD PRESSURE >= 90 MM HG: ICD-10-PCS | Mod: CPTII,S$GLB,, | Performed by: ORTHOPAEDIC SURGERY

## 2022-11-07 PROCEDURE — 3061F NEG MICROALBUMINURIA REV: CPT | Mod: CPTII,S$GLB,, | Performed by: ORTHOPAEDIC SURGERY

## 2022-11-07 PROCEDURE — 99999 PR PBB SHADOW E&M-EST. PATIENT-LVL III: CPT | Mod: PBBFAC,,, | Performed by: ORTHOPAEDIC SURGERY

## 2022-11-07 PROCEDURE — 1159F PR MEDICATION LIST DOCUMENTED IN MEDICAL RECORD: ICD-10-PCS | Mod: CPTII,S$GLB,, | Performed by: ORTHOPAEDIC SURGERY

## 2022-11-07 PROCEDURE — 99999 PR PBB SHADOW E&M-EST. PATIENT-LVL III: ICD-10-PCS | Mod: PBBFAC,,, | Performed by: ORTHOPAEDIC SURGERY

## 2022-11-07 PROCEDURE — 4010F PR ACE/ARB THEARPY RXD/TAKEN: ICD-10-PCS | Mod: CPTII,S$GLB,, | Performed by: ORTHOPAEDIC SURGERY

## 2022-11-07 NOTE — PROGRESS NOTES
S:Sukumar Beal presents for post-operative evaluation.     DATE OF PROCEDURE: 10/14/2022   OPERATION:   Right  1. Shoulder arthroscopic rotator cuff repair, transosseous equivalent double row (CPT 31797)  2. Shoulder open subpectoral biceps tenodesis (CPT 74391)  3. Shoulder arthroscopic extensive debridement (anterior, posterior glenohumeral joint, subacromial space) (CPT 98993)    4. Shoulder arthroscopic subacromial decompression     Sukumar Beal reports to be doing well 3wk and 3 days s/p the above mentioned procedure. PT at [facility].  States he is doing well.  Really has no pain.  Comes in to clinic earlier than scheduled to see me to discuss surgery, expectations, rehab plan. No numbness or tingling reported.    O:  Exam of the right shoulder shows well-healed incisions.  No signs of infection.  Sensation intact to light touch over the axillary nerve distribution.  Passive forward elevation with relative ease to 80 degrees. passive external rotation with arm at side to 40°.  Biceps is well tensioned.  Motor and sensory intact to the right hand    A/P:  Arthroscopic pictures reviewed with the patient.  This was a large rotator cuff tear. will remain conservative with his initial rehab protocol.  I progressed with gentle passive range of motion.  No active range of motion for another 3 weeks.  Continue to wear the sling and pillow for another 3 weeks.  No lifting more than a Coke or a small book.  Focus on periscapular stabilization exercises.  All questions answered.  I will see him back for the scheduled 6 week follow-up.

## 2022-11-08 ENCOUNTER — CLINICAL SUPPORT (OUTPATIENT)
Dept: REHABILITATION | Facility: HOSPITAL | Age: 52
End: 2022-11-08
Payer: COMMERCIAL

## 2022-11-08 DIAGNOSIS — M25.611 DECREASED RANGE OF MOTION OF RIGHT SHOULDER: Primary | ICD-10-CM

## 2022-11-08 DIAGNOSIS — M62.81 DECREASED MUSCLE STRENGTH: ICD-10-CM

## 2022-11-08 PROCEDURE — 97110 THERAPEUTIC EXERCISES: CPT

## 2022-11-08 PROCEDURE — 97140 MANUAL THERAPY 1/> REGIONS: CPT

## 2022-11-08 NOTE — PROGRESS NOTES
Physical Therapy Treatment Note     Name: Sukumar Beal  [facility] Number: [id number]    Therapy Diagnosis:   Encounter Diagnoses   Name Primary?    Decreased range of motion of right shoulder Yes    Decreased muscle strength      Physician: Jh Sibley*    Visit Date: 11/8/2022    Physician Orders: PT Eval and Treat   Medical Diagnosis from Referral:   Diagnosis   M75.101 (ICD-10-CM) - Nontraumatic tear of right rotator cuff, unspecified tear extent   M75.21 (ICD-10-CM) - Biceps tendinitis of right upper extremity      Evaluation Date: 11/1/2022  Authorization Period Expiration: 12/31/2022  Plan of Care Expiration: 1/24/2022  Visit # / Visits authorized: 1/ 20      Initial FOTO: 58% (Predicted 27%)  FOTO 1st F/U:   FOTO 2nd F/U:       Time In: 1000am  Time Out: 1040am  Total Billable Time: 40 minutes    Precautions: Standard and RCR     Date of Surgery: 10/14/2022  Weeks Post Op: 3 weeks, 4 days     From Surgeon:  Physical Therapy: Follow the >3 cm cuff repair rehab protocol. PROM starts after 2 weeks. AROM starts after 7 weeks. No cuff resistive activity x 12 weeks. No biceps resistive activity x 8 weeks. We will be just a bit slower in this case given the tear pattern and size in this case    Subjective     Pt reports: he has been sleeping better. Still no issues with pain other than soreness after his exercises. He saw the doctor yesterday just to touch base after surgery. They want to increase his frequency to twice per week for now.   He was compliant with home exercise program.  Response to previous treatment: soreness  Functional change: ongoing    Pain: not formally assessed  Location: right shoulder      Objective     Sukumar received therapeutic exercises to develop strength, endurance, ROM, flexibility, and posture for 25 minutes including:    Pendulums  Table slides into flexion and scaption  Upper Body Ergometer LEFT arm only, 4' forward/ 4' backward, for blood flow and postural  Please have pt stop in office this week for a manual BP check and send me the results endurance  Recumbent bike, level 10, 8', for endurance and conditioning    Sukumar received the following manual therapy techniques: Joint mobilizations and Soft tissue Mobilization were applied to the: right shoulder for 15 minutes, including:    Passive Range of Motion into flexion (90 degrees) and External Rotation (15 degrees)  Oscillations    Sukumar participated in neuromuscular re-education activities to improve: Coordination, Proprioception, and Posture for 0 minutes. The following activities were included:    Sukumar participated in dynamic functional therapeutic activities to improve functional performance for 0  minutes, including:      Home Exercises Provided and Patient Education Provided     Education provided:   - Home Exercise Program, precautions    Written Home Exercises Provided: Patient instructed to cont prior HEP.  Exercises were reviewed and Sukmuar was able to demonstrate them prior to the end of the session.  Sukumar demonstrated good  understanding of the education provided.     See EMR under Patient Instructions for exercises provided prior visit.    Assessment     Sukumar tolerated treatment well today with little discomfort during manual therapy. We reviewed his Home Exercise Program to ensure proper performance. We also worked on full body endurance and conditioning to ensure easy return to gym hobbies when appropriate. I educated him on his progression within the protocol and the importance of protection of the surgery. Will continue to educate and progress as tolerated within the protocol.  Sukumar Is progressing well towards his goals.   Pt prognosis is Good.     Pt will continue to benefit from skilled outpatient physical therapy to address the deficits listed in the problem list box on initial evaluation, provide pt/family education and to maximize pt's level of independence in the home and community environment.     Pt's spiritual, cultural and educational needs considered and pt agreeable to plan of  care and goals.     Anticipated barriers to physical therapy: none    Goals:   Short Term Goals:  6 weeks  Patient will be independent in Home Exercise Program for independent progression within protocol.-Progressing, not met   Patient will show improved Passive Range of Motion to 120 degrees of flexion to show progress within protocol.-Progressing, not met   Patient will show improved Passive Range of Motion to 30 degrees of External Rotation to show progress within protocol.-Progressing, not met      Long Term Goals: 12 weeks  Patient will be independent in Home Exercise Program to initiate strengthening and return to hobbies.-Progressing, not met   Patient will show improved full Passive Range of Motion in flexion and External Rotation. -Progressing, not met   Patient will show improved strength by demonstrating active flexion to 120 degrees.-Progressing, not met   Patient goal: return to boot camp three times a week with no restrictions.-Progressing, not met   Patient will have improved limitation score to 27% limited on FOTO shoulder in order to demonstrate functional improvement.-Progressing, not met     Plan     Plan of care Certification: 11/1/2022 to 1/24/2022.     Outpatient Physical Therapy 1-2 times weekly for 12 weeks to include the following interventions: Manual Therapy, Moist Heat/ Ice, Neuromuscular Re-ed, Patient Education, Therapeutic Activities, and Therapeutic Exercise.     Madelyn Flowers, PT

## 2022-11-10 ENCOUNTER — CLINICAL SUPPORT (OUTPATIENT)
Dept: REHABILITATION | Facility: HOSPITAL | Age: 52
End: 2022-11-10
Payer: COMMERCIAL

## 2022-11-10 DIAGNOSIS — M25.611 DECREASED RANGE OF MOTION OF RIGHT SHOULDER: Primary | ICD-10-CM

## 2022-11-10 DIAGNOSIS — M62.81 DECREASED MUSCLE STRENGTH: ICD-10-CM

## 2022-11-10 PROCEDURE — 97140 MANUAL THERAPY 1/> REGIONS: CPT

## 2022-11-10 PROCEDURE — 97110 THERAPEUTIC EXERCISES: CPT

## 2022-11-10 NOTE — PROGRESS NOTES
Physical Therapy Treatment Note     Name: Sukumar Beal  [facility] Number: [id number]    Therapy Diagnosis:   Encounter Diagnoses   Name Primary?    Decreased range of motion of right shoulder Yes    Decreased muscle strength        Physician: Jh Sibley*    Visit Date: 11/10/2022    Physician Orders: PT Eval and Treat   Medical Diagnosis from Referral:   Diagnosis   M75.101 (ICD-10-CM) - Nontraumatic tear of right rotator cuff, unspecified tear extent   M75.21 (ICD-10-CM) - Biceps tendinitis of right upper extremity      Evaluation Date: 11/1/2022  Authorization Period Expiration: 12/31/2022  Plan of Care Expiration: 1/24/2022  Visit # / Visits authorized: 1/ 20      Initial FOTO: 58% (Predicted 27%)  FOTO 1st F/U:   FOTO 2nd F/U:       Time In: 100pm  Time Out: 155pm  Total Billable Time: 55 minutes    Precautions: Standard and RCR     Date of Surgery: 10/14/2022  Weeks Post Op: 3 weeks, 6 days     From Surgeon:  Physical Therapy: Follow the >3 cm cuff repair rehab protocol. PROM starts after 2 weeks. AROM starts after 7 weeks. No cuff resistive activity x 12 weeks. No biceps resistive activity x 8 weeks. We will be just a bit slower in this case given the tear pattern and size in this case    Subjective     Pt reports: he feels like his Home Exercise Program is very easy. Some soreness after. He has not been lifting or moving the shoulder other than a couple instances when he forgot, but that doesn't happen often.   He was compliant with home exercise program.  Response to previous treatment: soreness  Functional change: ongoing    Pain: not formally assessed  Location: right shoulder      Objective     Sukumar received therapeutic exercises to develop strength, endurance, ROM, flexibility, and posture for 30 minutes including:    Pendulums  Table slides into flexion and scaption  Swiss ball roll outs into flexion and abduction, 4' each  Supine External Rotation with cane 4'  Recumbent bike, level 10,  8', for endurance and conditioning (scapular retractions while biking)    Not Performed Today:  Upper Body Ergometer LEFT arm only, 4' forward/ 4' backward, for blood flow and postural endurance    Sukumar received the following manual therapy techniques: Joint mobilizations and Soft tissue Mobilization were applied to the: right shoulder for 25 minutes, including:    Passive Range of Motion into flexion (100 degrees) and External Rotation (20 degrees)  Oscillations  Inferior and posterior joint mobilizations at 90 degrees of flexion, Grades I-III    Sukumar participated in neuromuscular re-education activities to improve: Coordination, Proprioception, and Posture for 0 minutes. The following activities were included:    Sukumar participated in dynamic functional therapeutic activities to improve functional performance for 0  minutes, including:      Home Exercises Provided and Patient Education Provided     Education provided:   - Home Exercise Program, precautions    Written Home Exercises Provided: Patient instructed to cont prior HEP.  Exercises were reviewed and Sukumar was able to demonstrate them prior to the end of the session.  Sukumar demonstrated good  understanding of the education provided.     See EMR under Patient Instructions for exercises provided prior visit.    Assessment     Sukumar tolerated treatment well today with little discomfort during manual therapy. We reviewed his Home Exercise Program to ensure proper performance. We also worked on full body endurance and conditioning to ensure easy return to gym hobbies when appropriate. I educated him on his progression within the protocol and the importance of protection of the surgery. Will continue to educate and progress as tolerated within the protocol.  Sukumar Is progressing well towards his goals.   Pt prognosis is Good.     Pt will continue to benefit from skilled outpatient physical therapy to address the deficits listed in the problem list box on initial  evaluation, provide pt/family education and to maximize pt's level of independence in the home and community environment.     Pt's spiritual, cultural and educational needs considered and pt agreeable to plan of care and goals.     Anticipated barriers to physical therapy: none    Goals:   Short Term Goals:  6 weeks  Patient will be independent in Home Exercise Program for independent progression within protocol.-Progressing, not met   Patient will show improved Passive Range of Motion to 120 degrees of flexion to show progress within protocol.-Progressing, not met   Patient will show improved Passive Range of Motion to 30 degrees of External Rotation to show progress within protocol.-Progressing, not met      Long Term Goals: 12 weeks  Patient will be independent in Home Exercise Program to initiate strengthening and return to hobbies.-Progressing, not met   Patient will show improved full Passive Range of Motion in flexion and External Rotation. -Progressing, not met   Patient will show improved strength by demonstrating active flexion to 120 degrees.-Progressing, not met   Patient goal: return to boot camp three times a week with no restrictions.-Progressing, not met   Patient will have improved limitation score to 27% limited on FOTO shoulder in order to demonstrate functional improvement.-Progressing, not met     Plan     Continue range of motion activities per protocol    Madelyn Flowers, PT  , DPT

## 2022-11-15 ENCOUNTER — CLINICAL SUPPORT (OUTPATIENT)
Dept: REHABILITATION | Facility: HOSPITAL | Age: 52
End: 2022-11-15
Payer: COMMERCIAL

## 2022-11-15 DIAGNOSIS — M62.81 DECREASED MUSCLE STRENGTH: ICD-10-CM

## 2022-11-15 DIAGNOSIS — M25.611 DECREASED RANGE OF MOTION OF RIGHT SHOULDER: Primary | ICD-10-CM

## 2022-11-15 PROCEDURE — 97140 MANUAL THERAPY 1/> REGIONS: CPT | Performed by: PHYSICAL THERAPIST

## 2022-11-15 PROCEDURE — 97110 THERAPEUTIC EXERCISES: CPT | Performed by: PHYSICAL THERAPIST

## 2022-11-15 NOTE — PROGRESS NOTES
Physical Therapy Treatment Note     Name: Sukumar Beal  [facility] Number: [id number]    Therapy Diagnosis:   Encounter Diagnoses   Name Primary?    Decreased range of motion of right shoulder Yes    Decreased muscle strength        Physician: Jh Sibley*    Visit Date: 11/15/2022    Physician Orders: PT Eval and Treat   Medical Diagnosis from Referral:   Diagnosis   M75.101 (ICD-10-CM) - Nontraumatic tear of right rotator cuff, unspecified tear extent   M75.21 (ICD-10-CM) - Biceps tendinitis of right upper extremity      Evaluation Date: 11/1/2022  Authorization Period Expiration: 12/31/2022  Plan of Care Expiration: 1/24/2022  Visit # / Visits authorized: 3/ 20      Initial FOTO: 58% (Predicted 27%)  FOTO 1st F/U:   FOTO 2nd F/U:       Time In: 300pm  Time Out: 355 pm  Total Billable Time: 55 minutes    Precautions: Standard and RCR     Date of Surgery: 10/14/2022  Weeks Post Op: 3 weeks, 6 days     From Surgeon:  Physical Therapy: Follow the >3 cm cuff repair rehab protocol. PROM starts after 2 weeks. AROM starts after 7 weeks. No cuff resistive activity x 12 weeks. No biceps resistive activity x 8 weeks. We will be just a bit slower in this case given the tear pattern and size in this case    Subjective     Pt reports: the last couple of days my motion has gotten much better when I am out the sling taking a bath. Still pain free most of the time  He was compliant with home exercise program.  Response to previous treatment: soreness  Functional change: ongoing    Pain: not formally assessed  Location: right shoulder      Objective     Sukumar received therapeutic exercises to develop strength, endurance, ROM, flexibility, and posture for 30 minutes including:    Pendulums 1' each way   Table slides into flexion and scaption 3' ea  Swiss ball roll outs into flexion and abduction, 4' each  Table step backs 30x     Not Performed Today:  Upper Body Ergometer LEFT arm only, 4' forward/ 4' backward, for blood  flow and postural endurance    Sukumar received the following manual therapy techniques: Joint mobilizations and Soft tissue Mobilization were applied to the: right shoulder for 25 minutes, including:    Passive Range of Motion into flexion (100 degrees) and External Rotation (20 degrees)  Oscillations  Inferior and posterior joint mobilizations at 90 degrees of flexion, Grades I-III    Sukumar participated in neuromuscular re-education activities to improve: Coordination, Proprioception, and Posture for 0 minutes. The following activities were included:    Sukumar participated in dynamic functional therapeutic activities to improve functional performance for 0  minutes, including:      Home Exercises Provided and Patient Education Provided     Education provided:   - Home Exercise Program, precautions    Written Home Exercises Provided: Patient instructed to cont prior HEP.  Exercises were reviewed and Sukumar was able to demonstrate them prior to the end of the session.  Sukumar demonstrated good  understanding of the education provided.     See EMR under Patient Instructions for exercises provided prior visit.    Assessment     Sukumar progressed today with step backs and swiss ball roll outs. Improved passive motion and ER today in clinic. Educated on step back for at home, continue sling until MD visit     Sukumar Is progressing well towards his goals.   Pt prognosis is Good.     Pt will continue to benefit from skilled outpatient physical therapy to address the deficits listed in the problem list box on initial evaluation, provide pt/family education and to maximize pt's level of independence in the home and community environment.     Pt's spiritual, cultural and educational needs considered and pt agreeable to plan of care and goals.     Anticipated barriers to physical therapy: none    Goals:   Short Term Goals:  6 weeks  Patient will be independent in Home Exercise Program for independent progression within  protocol.-Progressing, not met   Patient will show improved Passive Range of Motion to 120 degrees of flexion to show progress within protocol.-Progressing, not met   Patient will show improved Passive Range of Motion to 30 degrees of External Rotation to show progress within protocol.-Progressing, not met      Long Term Goals: 12 weeks  Patient will be independent in Home Exercise Program to initiate strengthening and return to hobbies.-Progressing, not met   Patient will show improved full Passive Range of Motion in flexion and External Rotation. -Progressing, not met   Patient will show improved strength by demonstrating active flexion to 120 degrees.-Progressing, not met   Patient goal: return to boot camp three times a week with no restrictions.-Progressing, not met   Patient will have improved limitation score to 27% limited on FOTO shoulder in order to demonstrate functional improvement.-Progressing, not met     Plan     Continue range of motion activities per protocol    Reji Szymanski, PT  , DPT

## 2022-11-17 ENCOUNTER — CLINICAL SUPPORT (OUTPATIENT)
Dept: REHABILITATION | Facility: HOSPITAL | Age: 52
End: 2022-11-17
Payer: COMMERCIAL

## 2022-11-17 DIAGNOSIS — M62.81 DECREASED MUSCLE STRENGTH: ICD-10-CM

## 2022-11-17 DIAGNOSIS — M25.611 DECREASED RANGE OF MOTION OF RIGHT SHOULDER: Primary | ICD-10-CM

## 2022-11-17 PROCEDURE — 97110 THERAPEUTIC EXERCISES: CPT | Performed by: PHYSICAL THERAPIST

## 2022-11-17 PROCEDURE — 97140 MANUAL THERAPY 1/> REGIONS: CPT | Performed by: PHYSICAL THERAPIST

## 2022-11-17 NOTE — PROGRESS NOTES
"  Physical Therapy Treatment Note     Name: Sukumar Beal  [facility] Number: [id number]    Therapy Diagnosis:   Encounter Diagnoses   Name Primary?    Decreased range of motion of right shoulder Yes    Decreased muscle strength        Physician: Jh Sibley*    Visit Date: 11/17/2022    Physician Orders: PT Eval and Treat   Medical Diagnosis from Referral:   Diagnosis   M75.101 (ICD-10-CM) - Nontraumatic tear of right rotator cuff, unspecified tear extent   M75.21 (ICD-10-CM) - Biceps tendinitis of right upper extremity      Evaluation Date: 11/1/2022  Authorization Period Expiration: 12/31/2022  Plan of Care Expiration: 1/24/2022  Visit # / Visits authorized: 4/ 20      Initial FOTO: 58% (Predicted 27%)  FOTO 1st F/U:   FOTO 2nd F/U:       Time In: 1015am  Time Out: 1100 m  Total Billable Time: 45 minutes    Precautions: Standard and RCR     Date of Surgery: 10/14/2022  Weeks Post Op: 3 weeks, 6 days     From Surgeon:  Physical Therapy: Follow the >3 cm cuff repair rehab protocol. PROM starts after 2 weeks. AROM starts after 7 weeks. No cuff resistive activity x 12 weeks. No biceps resistive activity x 8 weeks. We will be just a bit slower in this case given the tear pattern and size in this case    Subjective     Pt reports: I hate being late. Notes the last session felt fine with progression   He was compliant with home exercise program.  Response to previous treatment: soreness  Functional change: ongoing    Pain: not formally assessed  Location: right shoulder      Objective   0-120 deg passive flexion pain free, ER to 35 deg       Sukumar received therapeutic exercises to develop strength, endurance, ROM, flexibility, and posture for 20 minutes including:    Pendulums 1' each way   Table slides into flexion and scaption 3' ea  Table step backs 30x   Table self ER 2 x 10 3" hold    Not Performed Today:  Upper Body Ergometer LEFT arm only, 4' forward/ 4' backward, for blood flow and postural " endurance  Swiss ball roll outs into flexion and abduction, 4' each    Sukumar received the following manual therapy techniques: Joint mobilizations and Soft tissue Mobilization were applied to the: right shoulder for 15 minutes, including:    Passive Range of Motion into flexion and External Rotation Gr III  Oscillations  Inferior and posterior joint mobilizations at 90 degrees of flexion, Grades I-III    Sukumar participated in neuromuscular re-education activities to improve: Coordination, Proprioception, and Posture for 0 minutes. The following activities were included:    Sukumar participated in dynamic functional therapeutic activities to improve functional performance for 0  minutes, including:      Home Exercises Provided and Patient Education Provided     Education provided:   - Home Exercise Program, precautions    Written Home Exercises Provided: Patient instructed to cont prior HEP.  Exercises were reviewed and Sukumar was able to demonstrate them prior to the end of the session.  Sukumar demonstrated good  understanding of the education provided.     See EMR under Patient Instructions for exercises provided prior visit.    Assessment     Sukumar presented today with excellent passive motion. He remains complaint with HEP, just had not exercised yet this morning. Progressing per protocol well, pain free with mobs and remains empty end feel. Progressing very well    Sukumar Is progressing well towards his goals.   Pt prognosis is Good.     Pt will continue to benefit from skilled outpatient physical therapy to address the deficits listed in the problem list box on initial evaluation, provide pt/family education and to maximize pt's level of independence in the home and community environment.     Pt's spiritual, cultural and educational needs considered and pt agreeable to plan of care and goals.     Anticipated barriers to physical therapy: none    Goals:   Short Term Goals:  6 weeks  Patient will be independent in Home  Exercise Program for independent progression within protocol.-Progressing, not met   Patient will show improved Passive Range of Motion to 120 degrees of flexion to show progress within protocol.-Progressing, not met   Patient will show improved Passive Range of Motion to 30 degrees of External Rotation to show progress within protocol.-Progressing, not met      Long Term Goals: 12 weeks  Patient will be independent in Home Exercise Program to initiate strengthening and return to hobbies.-Progressing, not met   Patient will show improved full Passive Range of Motion in flexion and External Rotation. -Progressing, not met   Patient will show improved strength by demonstrating active flexion to 120 degrees.-Progressing, not met   Patient goal: return to boot camp three times a week with no restrictions.-Progressing, not met   Patient will have improved limitation score to 27% limited on FOTO shoulder in order to demonstrate functional improvement.-Progressing, not met     Plan     Continue range of motion activities per protocol    Reji Szymanski, PT  , DPT

## 2022-11-21 ENCOUNTER — PATIENT MESSAGE (OUTPATIENT)
Dept: INTERNAL MEDICINE | Facility: CLINIC | Age: 52
End: 2022-11-21
Payer: COMMERCIAL

## 2022-11-21 ENCOUNTER — CLINICAL SUPPORT (OUTPATIENT)
Dept: REHABILITATION | Facility: HOSPITAL | Age: 52
End: 2022-11-21
Payer: COMMERCIAL

## 2022-11-21 DIAGNOSIS — M25.611 DECREASED RANGE OF MOTION OF RIGHT SHOULDER: Primary | ICD-10-CM

## 2022-11-21 DIAGNOSIS — M62.81 DECREASED MUSCLE STRENGTH: ICD-10-CM

## 2022-11-21 PROCEDURE — 97110 THERAPEUTIC EXERCISES: CPT

## 2022-11-21 PROCEDURE — 97140 MANUAL THERAPY 1/> REGIONS: CPT

## 2022-11-21 NOTE — PROGRESS NOTES
"  Physical Therapy Treatment Note     Name: Sukumar Beal  [facility] Number: [id number]    Therapy Diagnosis:   Encounter Diagnoses   Name Primary?    Decreased range of motion of right shoulder Yes    Decreased muscle strength          Physician: Jh Sibley*    Visit Date: 11/21/2022    Physician Orders: PT Eval and Treat   Medical Diagnosis from Referral:   Diagnosis   M75.101 (ICD-10-CM) - Nontraumatic tear of right rotator cuff, unspecified tear extent   M75.21 (ICD-10-CM) - Biceps tendinitis of right upper extremity      Evaluation Date: 11/1/2022  Authorization Period Expiration: 12/31/2022  Plan of Care Expiration: 1/24/2022  Visit # / Visits authorized: 5/ 20      Initial FOTO: 58% (Predicted 27%)  FOTO 1st F/U:   FOTO 2nd F/U:       Time In: 1100am  Time Out: 1145 m  Total Billable Time: 45 minutes    Precautions: Standard and RCR     Date of Surgery: 10/14/2022  Weeks Post Op: 5 weeks, 3 days     From Surgeon:  Physical Therapy: Follow the >3 cm cuff repair rehab protocol. PROM starts after 2 weeks. AROM starts after 7 weeks. No cuff resistive activity x 12 weeks. No biceps resistive activity x 8 weeks. We will be just a bit slower in this case given the tear pattern and size in this case    Subjective     Pt reports: he feels like he has been slacking on his Home Exercise Program, but he has still been doing them. Still no big issues. Some tenderness today but no real pain.   He was compliant with home exercise program.  Response to previous treatment: soreness  Functional change: ongoing    Pain: not formally assessed  Location: right shoulder      Objective   0-120 deg passive flexion pain free, ER to 35 deg       Sukumar received therapeutic exercises to develop strength, endurance, ROM, flexibility, and posture for 30 minutes including:    Pendulums 1' each way   Table slides into flexion and scaption 3' ea  Table step backs 30x   Table self ER 2 x 10 3" hold  Recumbent bike level 10, 10' for " endurance and blood flow    Not Performed Today:  Upper Body Ergometer LEFT arm only, 4' forward/ 4' backward, for blood flow and postural endurance  Swiss ball roll outs into flexion and abduction, 4' each    Sukumar received the following manual therapy techniques: Joint mobilizations and Soft tissue Mobilization were applied to the: right shoulder for 15 minutes, including:    Passive Range of Motion into flexion and External Rotation Gr III  Oscillations  Inferior and posterior joint mobilizations at 90 degrees of flexion, Grades I-III    Sukumar participated in neuromuscular re-education activities to improve: Coordination, Proprioception, and Posture for 0 minutes. The following activities were included:    Sukumar participated in dynamic functional therapeutic activities to improve functional performance for 0  minutes, including:      Home Exercises Provided and Patient Education Provided     Education provided:   - Home Exercise Program, precautions    Written Home Exercises Provided: Patient instructed to cont prior HEP.  Exercises were reviewed and Sukumar was able to demonstrate them prior to the end of the session.  Sukumar demonstrated good  understanding of the education provided.     See EMR under Patient Instructions for exercises provided prior visit.    Assessment     Sukumar presented today with excellent passive motion. He remains complaint with HEP, just had not exercised yet this morning. Progressing per protocol well, pain free with mobs and remains empty end feel. Progressing very well    Sukumar Is progressing well towards his goals.   Pt prognosis is Good.     Pt will continue to benefit from skilled outpatient physical therapy to address the deficits listed in the problem list box on initial evaluation, provide pt/family education and to maximize pt's level of independence in the home and community environment.     Pt's spiritual, cultural and educational needs considered and pt agreeable to plan of care and  goals.     Anticipated barriers to physical therapy: none    Goals:   Short Term Goals:  6 weeks  Patient will be independent in Home Exercise Program for independent progression within protocol.-Progressing, not met   Patient will show improved Passive Range of Motion to 120 degrees of flexion to show progress within protocol.-Progressing, not met   Patient will show improved Passive Range of Motion to 30 degrees of External Rotation to show progress within protocol.-Progressing, not met      Long Term Goals: 12 weeks  Patient will be independent in Home Exercise Program to initiate strengthening and return to hobbies.-Progressing, not met   Patient will show improved full Passive Range of Motion in flexion and External Rotation. -Progressing, not met   Patient will show improved strength by demonstrating active flexion to 120 degrees.-Progressing, not met   Patient goal: return to boot camp three times a week with no restrictions.-Progressing, not met   Patient will have improved limitation score to 27% limited on FOTO shoulder in order to demonstrate functional improvement.-Progressing, not met     Plan     Continue range of motion activities per protocol    Madelyn Flowers, PT  , DPT

## 2022-11-29 ENCOUNTER — CLINICAL SUPPORT (OUTPATIENT)
Dept: REHABILITATION | Facility: HOSPITAL | Age: 52
End: 2022-11-29
Payer: COMMERCIAL

## 2022-11-29 ENCOUNTER — OFFICE VISIT (OUTPATIENT)
Dept: SPORTS MEDICINE | Facility: CLINIC | Age: 52
End: 2022-11-29
Payer: COMMERCIAL

## 2022-11-29 DIAGNOSIS — M25.611 DECREASED RANGE OF MOTION OF RIGHT SHOULDER: Primary | ICD-10-CM

## 2022-11-29 DIAGNOSIS — Z98.890 S/P RIGHT ROTATOR CUFF REPAIR: Primary | ICD-10-CM

## 2022-11-29 DIAGNOSIS — M62.81 DECREASED MUSCLE STRENGTH: ICD-10-CM

## 2022-11-29 DIAGNOSIS — Z47.89 SURGICAL AFTERCARE, MUSCULOSKELETAL SYSTEM: ICD-10-CM

## 2022-11-29 PROCEDURE — 97140 MANUAL THERAPY 1/> REGIONS: CPT | Performed by: PHYSICAL THERAPIST

## 2022-11-29 PROCEDURE — 3066F NEPHROPATHY DOC TX: CPT | Mod: CPTII,S$GLB,, | Performed by: ORTHOPAEDIC SURGERY

## 2022-11-29 PROCEDURE — 97110 THERAPEUTIC EXERCISES: CPT | Performed by: PHYSICAL THERAPIST

## 2022-11-29 PROCEDURE — 4010F ACE/ARB THERAPY RXD/TAKEN: CPT | Mod: CPTII,S$GLB,, | Performed by: ORTHOPAEDIC SURGERY

## 2022-11-29 PROCEDURE — 99999 PR PBB SHADOW E&M-EST. PATIENT-LVL I: CPT | Mod: PBBFAC,,, | Performed by: ORTHOPAEDIC SURGERY

## 2022-11-29 PROCEDURE — 3061F PR NEG MICROALBUMINURIA RESULT DOCUMENTED/REVIEW: ICD-10-PCS | Mod: CPTII,S$GLB,, | Performed by: ORTHOPAEDIC SURGERY

## 2022-11-29 PROCEDURE — 99024 POSTOP FOLLOW-UP VISIT: CPT | Mod: S$GLB,,, | Performed by: ORTHOPAEDIC SURGERY

## 2022-11-29 PROCEDURE — 99999 PR PBB SHADOW E&M-EST. PATIENT-LVL I: ICD-10-PCS | Mod: PBBFAC,,, | Performed by: ORTHOPAEDIC SURGERY

## 2022-11-29 PROCEDURE — 3061F NEG MICROALBUMINURIA REV: CPT | Mod: CPTII,S$GLB,, | Performed by: ORTHOPAEDIC SURGERY

## 2022-11-29 PROCEDURE — 3044F PR MOST RECENT HEMOGLOBIN A1C LEVEL <7.0%: ICD-10-PCS | Mod: CPTII,S$GLB,, | Performed by: ORTHOPAEDIC SURGERY

## 2022-11-29 PROCEDURE — 99024 PR POST-OP FOLLOW-UP VISIT: ICD-10-PCS | Mod: S$GLB,,, | Performed by: ORTHOPAEDIC SURGERY

## 2022-11-29 PROCEDURE — 4010F PR ACE/ARB THEARPY RXD/TAKEN: ICD-10-PCS | Mod: CPTII,S$GLB,, | Performed by: ORTHOPAEDIC SURGERY

## 2022-11-29 PROCEDURE — 3044F HG A1C LEVEL LT 7.0%: CPT | Mod: CPTII,S$GLB,, | Performed by: ORTHOPAEDIC SURGERY

## 2022-11-29 PROCEDURE — 3066F PR DOCUMENTATION OF TREATMENT FOR NEPHROPATHY: ICD-10-PCS | Mod: CPTII,S$GLB,, | Performed by: ORTHOPAEDIC SURGERY

## 2022-11-29 RX ORDER — NAPROXEN 500 MG/1
500 TABLET ORAL 2 TIMES DAILY
Qty: 60 TABLET | Refills: 1 | Status: SHIPPED | OUTPATIENT
Start: 2022-11-29 | End: 2023-03-09

## 2022-11-29 NOTE — PROGRESS NOTES
Physical Therapy Treatment Note     Name: Sukumar Beal  [facility] Number: [id number]    Therapy Diagnosis:   Encounter Diagnoses   Name Primary?    Decreased range of motion of right shoulder Yes    Decreased muscle strength      Physician: Jh Sibley*    Visit Date: 11/29/2022    Physician Orders: PT Eval and Treat   Medical Diagnosis from Referral:   Diagnosis   M75.101 (ICD-10-CM) - Nontraumatic tear of right rotator cuff, unspecified tear extent   M75.21 (ICD-10-CM) - Biceps tendinitis of right upper extremity      Evaluation Date: 11/1/2022  Authorization Period Expiration: 12/31/2022  Plan of Care Expiration: 1/24/2022  Visit # / Visits authorized: 6/ 20    Initial FOTO: 58% (Predicted 27%)  FOTO 1st F/U:   FOTO 2nd F/U:       Time In: 900am  Time Out: 0955 m  Total Billable Time: 55 minutes    Precautions: Standard and RCR     Date of Surgery: 10/14/2022  Weeks Post Op: 6 weeks, 4 days     From Surgeon:  Physical Therapy: Follow the >3 cm cuff repair rehab protocol. PROM starts after 2 weeks. AROM starts after 7 weeks. No cuff resistive activity x 12 weeks. No biceps resistive activity x 8 weeks. We will be just a bit slower in this case given the tear pattern and size in this case    A/P:  Arthroscopic pictures again reviewed with the patient.  May wean out of the sling.  Discussed ongoing lifting precautions.  No more than a Coke can or small book.  May begin the active phase of his range of motion.  Work on active and passive range of motion and continued scapular mechanics.  I will see him back in 6 weeks.  At that time we will begin the strengthening phase of recovery.  He understands that he will have some activity and lifting restrictions up to 5 months postop.  All questions answered.  Prescription for Naprosyn given.    Subjective     Pt reports: I just saw the MD and he took me out the sling but no exercise for 6 more weeks. Prescribed me an anti inflammatory. Excited to be out of the  "sling  He was compliant with home exercise program.  Response to previous treatment: soreness  Functional change: ongoing    Pain: not formally assessed  Location: right shoulder      Objective   Passive 0-145, ER to 35 deg  Active 85 deg flexion    Sukumar received therapeutic exercises to develop strength, endurance, ROM, flexibility, and posture for 40 minutes including:    Wand flexion supine 30x  Sidelying flexion 3 x 10  Sidelying ER to neutral 3 x 10  Standing slot machine 2 x 15  Pulley 3' flexion/scaption   Scap retrction with OTB 2 x 10 3"    Not Performed Today:  Upper Body Ergometer LEFT arm only, 4' forward/ 4' backward, for blood flow and postural endurance  Swiss ball roll outs into flexion and abduction, 4' each    Sukumar received the following manual therapy techniques: Joint mobilizations and Soft tissue Mobilization were applied to the: right shoulder for 15 minutes, including:    Passive Range of Motion into flexion and External Rotation Gr III  Gr I-II oscillations relaxation  Inferior mob arm at 20 deg abd Gr III    Sukumar participated in neuromuscular re-education activities to improve: Coordination, Proprioception, and Posture for 0 minutes. The following activities were included:    Sukumar participated in dynamic functional therapeutic activities to improve functional performance for 0  minutes, including:      Home Exercises Provided and Patient Education Provided     Education provided:   - Home Exercise Program, precautions    Written Home Exercises Provided: Patient instructed to cont prior HEP.  Exercises were reviewed and Sukumar was able to demonstrate them prior to the end of the session.  Sukumar demonstrated good  understanding of the education provided.     See EMR under Patient Instructions for exercises provided prior visit.    Assessment     Sukumar presented out of the sling. Passive motion continues to improve and he was progressed today to [facility] in the clinic. He fatigued with sidelying " flexion and was educated on getting a home pulley unit. He was emailed a new HEP and verbalized hs understandin on continued precautions.    Sukumar Is progressing well towards his goals.   Pt prognosis is Good.     Pt will continue to benefit from skilled outpatient physical therapy to address the deficits listed in the problem list box on initial evaluation, provide pt/family education and to maximize pt's level of independence in the home and community environment.     Pt's spiritual, cultural and educational needs considered and pt agreeable to plan of care and goals.     Anticipated barriers to physical therapy: none    Goals:   Short Term Goals:  6 weeks  Patient will be independent in Home Exercise Program for independent progression within protocol.-Progressing, not met   Patient will show improved Passive Range of Motion to 120 degrees of flexion to show progress within protocol.-Progressing, not met   Patient will show improved Passive Range of Motion to 30 degrees of External Rotation to show progress within protocol.-Progressing, not met      Long Term Goals: 12 weeks  Patient will be independent in Home Exercise Program to initiate strengthening and return to hobbies.-Progressing, not met   Patient will show improved full Passive Range of Motion in flexion and External Rotation. -Progressing, not met   Patient will show improved strength by demonstrating active flexion to 120 degrees.-Progressing, not met   Patient goal: return to boot camp three times a week with no restrictions.-Progressing, not met   Patient will have improved limitation score to 27% limited on FOTO shoulder in order to demonstrate functional improvement.-Progressing, not met     Plan     Continue range of motion activities per protocol    Reji Szymanski, PT  , DPT

## 2022-11-29 NOTE — PROGRESS NOTES
S:Sukumar Beal presents for post-operative evaluation.     DATE OF PROCEDURE: 10/14/2022   OPERATION:   Right  1. Shoulder arthroscopic rotator cuff repair, transosseous equivalent double row (CPT 55155)  2. Shoulder open subpectoral biceps tenodesis (CPT 95229)  3. Shoulder arthroscopic extensive debridement (anterior, posterior glenohumeral joint, subacromial space) (CPT 41199)    4. Shoulder arthroscopic subacromial decompression     Sukumar Beal reports to be doing well 6.5 wk s/p the above mentioned procedure. PT at [facility].  States he is doing well.  Really has no pain.  Making progress.    O:  Exam of the right shoulder shows well-healed incisions.  No signs of infection.  Sensation intact to light touch over the axillary nerve distribution.  Passive forward elevation with relative ease to 140 degrees. passive external rotation with arm at side to 50°.  Active forward elevation the scapular plane to 115°.  Biceps is well tensioned.  Motor and sensory intact to the right hand.  Good scapular mechanics.      A/P:  Arthroscopic pictures again reviewed with the patient.  May wean out of the sling.  Discussed ongoing lifting precautions.  No more than a Coke can or small book.  May begin the active phase of his range of motion.  Work on active and passive range of motion and continued scapular mechanics.  I will see him back in 6 weeks.  At that time we will begin the strengthening phase of recovery.  He understands that he will have some activity and lifting restrictions up to 5 months postop.  All questions answered.  Prescription for Naprosyn given.

## 2022-12-01 ENCOUNTER — CLINICAL SUPPORT (OUTPATIENT)
Dept: REHABILITATION | Facility: HOSPITAL | Age: 52
End: 2022-12-01
Payer: COMMERCIAL

## 2022-12-01 DIAGNOSIS — M62.81 DECREASED MUSCLE STRENGTH: ICD-10-CM

## 2022-12-01 DIAGNOSIS — M25.611 DECREASED RANGE OF MOTION OF RIGHT SHOULDER: Primary | ICD-10-CM

## 2022-12-01 PROCEDURE — 97140 MANUAL THERAPY 1/> REGIONS: CPT | Performed by: PHYSICAL THERAPIST

## 2022-12-01 PROCEDURE — 97110 THERAPEUTIC EXERCISES: CPT | Performed by: PHYSICAL THERAPIST

## 2022-12-02 NOTE — PROGRESS NOTES
Physical Therapy Treatment Note     Name: Sukumar Beal  [facility] Number: [id number]    Therapy Diagnosis:   Encounter Diagnoses   Name Primary?    Decreased range of motion of right shoulder Yes    Decreased muscle strength      Physician: Jh Sibley*    Visit Date: 12/1/2022    Physician Orders: PT Eval and Treat   Medical Diagnosis from Referral:   Diagnosis   M75.101 (ICD-10-CM) - Nontraumatic tear of right rotator cuff, unspecified tear extent   M75.21 (ICD-10-CM) - Biceps tendinitis of right upper extremity      Evaluation Date: 11/1/2022  Authorization Period Expiration: 12/31/2022  Plan of Care Expiration: 1/24/2022  Visit # / Visits authorized: 7/ 20    Initial FOTO: 58% (Predicted 27%)  FOTO 1st F/U:   FOTO 2nd F/U:       Time In: 1000am  Time Out: 1100am  Total Billable Time: 60 minutes    Precautions: Standard and RCR     Date of Surgery: 10/14/2022  Weeks Post Op: 6 weeks, 4 days     From Surgeon:  Physical Therapy: Follow the >3 cm cuff repair rehab protocol. PROM starts after 2 weeks. AROM starts after 7 weeks. No cuff resistive activity x 12 weeks. No biceps resistive activity x 8 weeks. We will be just a bit slower in this case given the tear pattern and size in this case    A/P:  Arthroscopic pictures again reviewed with the patient.  May wean out of the sling.  Discussed ongoing lifting precautions.  No more than a Coke can or small book.  May begin the active phase of his range of motion.  Work on active and passive range of motion and continued scapular mechanics.  I will see him back in 6 weeks.  At that time we will begin the strengthening phase of recovery.  He understands that he will have some activity and lifting restrictions up to 5 months postop.  All questions answered.  Prescription for Naprosyn given.    Subjective     Pt reports: My shoulder is fatigued from coming out of the sling. He notes trying to lift his arm overhead but being unable (educated to keep active motion  "below 90)  He was compliant with home exercise program.  Response to previous treatment: soreness  Functional change: ongoing    Pain: not formally assessed  Location: right shoulder      Objective   Passive 0-145, ER to 35 deg  Active 85 deg flexion    Sukumar received therapeutic exercises to develop strength, endurance, ROM, flexibility, and posture for 45 minutes including:    UBE 8' for scap mobility level 1 and cardio endurance training  Sidelying flexion 3 x 10  Sidelying ER to neutral 3 x 10  Standing slot machine 2 x 15  Scap retrction with OTB 2 x 10 3"  IR/ER walkouts OTB/YTB 2 x 10    Not Performed Today:  Upper Body Ergometer LEFT arm only, 4' forward/ 4' backward, for blood flow and postural endurance  Swiss ball roll outs into flexion and abduction, 4' each    Sukumar received the following manual therapy techniques: Joint mobilizations and Soft tissue Mobilization were applied to the: right shoulder for 15 minutes, including:    Passive Range of Motion into flexion and External Rotation Gr III  Gr I-II oscillations relaxation  Inferior mob arm at 20 deg abd Gr III    Sukumar participated in neuromuscular re-education activities to improve: Coordination, Proprioception, and Posture for 0 minutes. The following activities were included:    Sukumar participated in dynamic functional therapeutic activities to improve functional performance for 0  minutes, including:      Home Exercises Provided and Patient Education Provided     Education provided:   - Home Exercise Program, precautions    Written Home Exercises Provided: Patient instructed to cont prior HEP.  Exercises were reviewed and Sukumar was able to demonstrate them prior to the end of the session.  Sukumar demonstrated good  understanding of the education provided.     See EMR under Patient Instructions for exercises provided prior visit.    Assessment     Sukumar presented with improved motion to the right shoulder. He is fatigued as expected post operative and " coming out of the shoulder. We reviewed again that he should not be lifting anything over a coke can weight or reaching over shoulder height at this time. He will go back into the sling when fatigued as well.    Sukumar Is progressing well towards his goals.   Pt prognosis is Good.     Pt will continue to benefit from skilled outpatient physical therapy to address the deficits listed in the problem list box on initial evaluation, provide pt/family education and to maximize pt's level of independence in the home and community environment.     Pt's spiritual, cultural and educational needs considered and pt agreeable to plan of care and goals.     Anticipated barriers to physical therapy: none    Goals:   Short Term Goals:  6 weeks  Patient will be independent in Home Exercise Program for independent progression within protocol.-Progressing, not met   Patient will show improved Passive Range of Motion to 120 degrees of flexion to show progress within protocol.-Progressing, not met   Patient will show improved Passive Range of Motion to 30 degrees of External Rotation to show progress within protocol.-Progressing, not met      Long Term Goals: 12 weeks  Patient will be independent in Home Exercise Program to initiate strengthening and return to hobbies.-Progressing, not met   Patient will show improved full Passive Range of Motion in flexion and External Rotation. -Progressing, not met   Patient will show improved strength by demonstrating active flexion to 120 degrees.-Progressing, not met   Patient goal: return to boot camp three times a week with no restrictions.-Progressing, not met   Patient will have improved limitation score to 27% limited on FOTO shoulder in order to demonstrate functional improvement.-Progressing, not met     Plan     Continue range of motion activities per protocol    Reji Szymanski, PT  , DPT

## 2022-12-06 ENCOUNTER — CLINICAL SUPPORT (OUTPATIENT)
Dept: REHABILITATION | Facility: HOSPITAL | Age: 52
End: 2022-12-06
Payer: COMMERCIAL

## 2022-12-06 DIAGNOSIS — M25.611 DECREASED RANGE OF MOTION OF RIGHT SHOULDER: Primary | ICD-10-CM

## 2022-12-06 DIAGNOSIS — M62.81 DECREASED MUSCLE STRENGTH: ICD-10-CM

## 2022-12-06 PROCEDURE — 97140 MANUAL THERAPY 1/> REGIONS: CPT

## 2022-12-06 PROCEDURE — 97110 THERAPEUTIC EXERCISES: CPT

## 2022-12-06 NOTE — PROGRESS NOTES
Physical Therapy Treatment Note     Name: Sukumar Beal  [facility] Number: [id number]    Therapy Diagnosis:   No diagnosis found.    Physician: Jh Sibley*    Visit Date: 12/6/2022    Physician Orders: PT Eval and Treat   Medical Diagnosis from Referral:   Diagnosis   M75.101 (ICD-10-CM) - Nontraumatic tear of right rotator cuff, unspecified tear extent   M75.21 (ICD-10-CM) - Biceps tendinitis of right upper extremity      Evaluation Date: 11/1/2022  Authorization Period Expiration: 12/31/2022  Plan of Care Expiration: 1/24/2022  Visit # / Visits authorized: 8/ 20    Initial FOTO: 58% (Predicted 27%)  FOTO 1st F/U:   FOTO 2nd F/U:       Time In: 1100am  Time Out: 1200pm  Total Billable Time: 60 minutes    Precautions: Standard and RCR     Date of Surgery: 10/14/2022  Weeks Post Op: 7 weeks, 4 days     From Surgeon:  Physical Therapy: Follow the >3 cm cuff repair rehab protocol. PROM starts after 2 weeks. AROM starts after 7 weeks. No cuff resistive activity x 12 weeks. No biceps resistive activity x 8 weeks. We will be just a bit slower in this case given the tear pattern and size in this case    A/P:  Arthroscopic pictures again reviewed with the patient.  May wean out of the sling.  Discussed ongoing lifting precautions.  No more than a Coke can or small book.  May begin the active phase of his range of motion.  Work on active and passive range of motion and continued scapular mechanics.  I will see him back in 6 weeks.  At that time we will begin the strengthening phase of recovery.  He understands that he will have some activity and lifting restrictions up to 5 months postop.  All questions answered.  Prescription for Naprosyn given.    Subjective     Pt reports: No pain reported. 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   He was compliant with home exercise program.  Response to previous treatment:  "soreness  Functional change: ongoing    Pain: not formally assessed  Location: right shoulder      Objective   Passive 0-145, ER to 35 deg  Active 85 deg flexion    Sukumar received therapeutic exercises to develop strength, endurance, ROM, flexibility, and posture for 48 minutes including:    UBE 8' for scap mobility level 1 and cardio endurance training  Sidelying flexion AAROM 3 x 10  Sidelying ER to neutral 3 x 10  Seated no money no resistance 3x10  Standing slot machine 2 x 15  Scap retrction with OTB 3 x 10 3"  IR/ER walkouts OTB/YTB 3 x 10  Supine shoulder flexion AAROM w/ dowel 3x10    Not Performed Today:  Upper Body Ergometer LEFT arm only, 4' forward/ 4' backward, for blood flow and postural endurance  Swiss ball roll outs into flexion and abduction, 4' each    Sukumar received the following manual therapy techniques: Joint mobilizations and Soft tissue Mobilization were applied to the: right shoulder for 12 minutes, including:    Passive Range of Motion into flexion and External Rotation Gr III AP mob on R GH  Gr I-II oscillations relaxation  Inferior mob arm at 20 deg abd Gr III    Sukumar participated in neuromuscular re-education activities to improve: Coordination, Proprioception, and Posture for 0 minutes. The following activities were included:    Sukumar participated in dynamic functional therapeutic activities to improve functional performance for 0  minutes, including:      Home Exercises Provided and Patient Education Provided     Education provided:   - Home Exercise Program, precautions    Written Home Exercises Provided: Patient instructed to cont prior HEP.  Exercises were reviewed and Sukumar was able to demonstrate them prior to the end of the session.  Sukumar demonstrated good  understanding of the education provided.     See EMR under Patient Instructions for exercises provided prior visit.    Assessment     Continued to review lifting restrictions and education on importance of compliance with " lifting. Reports understanding. Overall performing well with minimal pain throughout session. Progressed AAROM as tolerated.     Sukumar Is progressing well towards his goals.   Pt prognosis is Good.     Pt will continue to benefit from skilled outpatient physical therapy to address the deficits listed in the problem list box on initial evaluation, provide pt/family education and to maximize pt's level of independence in the home and community environment.     Pt's spiritual, cultural and educational needs considered and pt agreeable to plan of care and goals.     Anticipated barriers to physical therapy: none    Goals:   Short Term Goals:  6 weeks  Patient will be independent in Home Exercise Program for independent progression within protocol.-Progressing, not met   Patient will show improved Passive Range of Motion to 120 degrees of flexion to show progress within protocol.-Progressing, not met   Patient will show improved Passive Range of Motion to 30 degrees of External Rotation to show progress within protocol.-Progressing, not met      Long Term Goals: 12 weeks  Patient will be independent in Home Exercise Program to initiate strengthening and return to hobbies.-Progressing, not met   Patient will show improved full Passive Range of Motion in flexion and External Rotation. -Progressing, not met   Patient will show improved strength by demonstrating active flexion to 120 degrees.-Progressing, not met   Patient goal: return to boot camp three times a week with no restrictions.-Progressing, not met   Patient will have improved limitation score to 27% limited on FOTO shoulder in order to demonstrate functional improvement.-Progressing, not met     Plan     Continue range of motion activities per protocol    Reyes Olivia, PT  , DPT

## 2022-12-08 ENCOUNTER — CLINICAL SUPPORT (OUTPATIENT)
Dept: REHABILITATION | Facility: HOSPITAL | Age: 52
End: 2022-12-08
Payer: COMMERCIAL

## 2022-12-08 ENCOUNTER — IMMUNIZATION (OUTPATIENT)
Dept: INTERNAL MEDICINE | Facility: CLINIC | Age: 52
End: 2022-12-08
Payer: COMMERCIAL

## 2022-12-08 DIAGNOSIS — M25.611 DECREASED RANGE OF MOTION OF RIGHT SHOULDER: Primary | ICD-10-CM

## 2022-12-08 DIAGNOSIS — M62.81 DECREASED MUSCLE STRENGTH: ICD-10-CM

## 2022-12-08 PROCEDURE — 90686 FLU VACCINE (QUAD) GREATER THAN OR EQUAL TO 3YO PRESERVATIVE FREE IM: ICD-10-PCS | Mod: S$GLB,,, | Performed by: INTERNAL MEDICINE

## 2022-12-08 PROCEDURE — 90686 IIV4 VACC NO PRSV 0.5 ML IM: CPT | Mod: S$GLB,,, | Performed by: INTERNAL MEDICINE

## 2022-12-08 PROCEDURE — 90471 IMMUNIZATION ADMIN: CPT | Mod: S$GLB,,, | Performed by: INTERNAL MEDICINE

## 2022-12-08 PROCEDURE — 97140 MANUAL THERAPY 1/> REGIONS: CPT

## 2022-12-08 PROCEDURE — 90471 FLU VACCINE (QUAD) GREATER THAN OR EQUAL TO 3YO PRESERVATIVE FREE IM: ICD-10-PCS | Mod: S$GLB,,, | Performed by: INTERNAL MEDICINE

## 2022-12-08 PROCEDURE — 97110 THERAPEUTIC EXERCISES: CPT

## 2022-12-08 NOTE — PROGRESS NOTES
Physical Therapy Treatment Note     Name: Sukumar Beal  [facility] Number: [id number]    Therapy Diagnosis:   Encounter Diagnoses   Name Primary?    Decreased range of motion of right shoulder Yes    Decreased muscle strength        Physician: Jh Sibley*    Visit Date: 12/8/2022    Physician Orders: PT Eval and Treat   Medical Diagnosis from Referral:   Diagnosis   M75.101 (ICD-10-CM) - Nontraumatic tear of right rotator cuff, unspecified tear extent   M75.21 (ICD-10-CM) - Biceps tendinitis of right upper extremity      Evaluation Date: 11/1/2022  Authorization Period Expiration: 12/31/2022  Plan of Care Expiration: 1/24/2022  Visit # / Visits authorized: 9/ 20    Initial FOTO: 58% (Predicted 27%)  FOTO 1st F/U:   FOTO 2nd F/U:       Time In: 1000am  Time Out: 1055am  Total Billable Time: 55 minutes    Precautions: Standard and RCR     Date of Surgery: 10/14/2022  Weeks Post Op: 7 weeks, 4 days     From Surgeon:  Physical Therapy: Follow the >3 cm cuff repair rehab protocol. PROM starts after 2 weeks. AROM starts after 7 weeks. No cuff resistive activity x 12 weeks. No biceps resistive activity x 8 weeks. We will be just a bit slower in this case given the tear pattern and size in this case    A/P:  Arthroscopic pictures again reviewed with the patient.  May wean out of the sling.  Discussed ongoing lifting precautions.  No more than a Coke can or small book.  May begin the active phase of his range of motion.  Work on active and passive range of motion and continued scapular mechanics.  I will see him back in 6 weeks.  At that time we will begin the strengthening phase of recovery.  He understands that he will have some activity and lifting restrictions up to 5 months postop.  All questions answered.  Prescription for Naprosyn given.    Subjective     Pt reports: says things have been going well at home and he is being careful.  He was compliant with home exercise program.  Response to previous  "treatment: soreness  Functional change: ongoing    Pain: not formally assessed  Location: right shoulder      Objective   Passive 0-150, ER to 35 deg  Active 115 deg flexion    Sukumar received therapeutic exercises to develop strength, endurance, ROM, flexibility, and posture for 40 minutes including:    UBE 8' for scap mobility level 1 and cardio endurance training  Sidelying flexion AAROM 3 x 10  Sidelying ER to neutral 3 x 10  Seated no money no resistance 3x10  Standing slot machine 2 x 15  Scap retrction with OTB 3 x 10 3"  IR/ER walkouts OTB/YTB 3 x 10  Supine shoulder flexion AAROM w/ dowel 3x10    Not Performed Today:  Upper Body Ergometer LEFT arm only, 4' forward/ 4' backward, for blood flow and postural endurance  Swiss ball roll outs into flexion and abduction, 4' each    Sukumar received the following manual therapy techniques: Joint mobilizations and Soft tissue Mobilization were applied to the: right shoulder for 15 minutes, including:    Passive Range of Motion into flexion and External Rotation Gr III AP mob on R GH  Gr I-II oscillations relaxation  Inferior mob arm at 20 deg abd Gr III    Sukumar participated in neuromuscular re-education activities to improve: Coordination, Proprioception, and Posture for 0 minutes. The following activities were included:    Sukumar participated in dynamic functional therapeutic activities to improve functional performance for 0  minutes, including:      Home Exercises Provided and Patient Education Provided     Education provided:   - Home Exercise Program, precautions    Written Home Exercises Provided: Patient instructed to cont prior HEP.  Exercises were reviewed and Sukumar was able to demonstrate them prior to the end of the session.  Sukumar demonstrated good  understanding of the education provided.     See EMR under Patient Instructions for exercises provided prior visit.    Assessment     Continued to review lifting restrictions and education on importance of compliance " with lifting. Reports understanding. Overall performing well with minimal pain throughout session. Progressed AAROM as tolerated.     Sukumar Is progressing well towards his goals.   Pt prognosis is Good.     Pt will continue to benefit from skilled outpatient physical therapy to address the deficits listed in the problem list box on initial evaluation, provide pt/family education and to maximize pt's level of independence in the home and community environment.     Pt's spiritual, cultural and educational needs considered and pt agreeable to plan of care and goals.     Anticipated barriers to physical therapy: none    Goals:   Short Term Goals:  6 weeks  Patient will be independent in Home Exercise Program for independent progression within protocol.-Progressing, not met   Patient will show improved Passive Range of Motion to 120 degrees of flexion to show progress within protocol.-Progressing, not met   Patient will show improved Passive Range of Motion to 30 degrees of External Rotation to show progress within protocol.-Progressing, not met      Long Term Goals: 12 weeks  Patient will be independent in Home Exercise Program to initiate strengthening and return to hobbies.-Progressing, not met   Patient will show improved full Passive Range of Motion in flexion and External Rotation. -Progressing, not met   Patient will show improved strength by demonstrating active flexion to 120 degrees.-Progressing, not met   Patient goal: return to boot camp three times a week with no restrictions.-Progressing, not met   Patient will have improved limitation score to 27% limited on FOTO shoulder in order to demonstrate functional improvement.-Progressing, not met     Plan     Continue range of motion activities per protocol    Madelyn Flowers, PT  , DPT

## 2022-12-08 NOTE — PROGRESS NOTES
1145 Used 2 pt identifiers and reviewed allergies prior to giving  Fluarix injection in the L/deltoid, VIS given then asked pt to wait 15 mins post injection for s/sx of adverse reactions.     1200  No s/sx of adverse reactions, encouraged pt to exercise this limb to avoid stiffness.       done

## 2022-12-13 ENCOUNTER — CLINICAL SUPPORT (OUTPATIENT)
Dept: REHABILITATION | Facility: HOSPITAL | Age: 52
End: 2022-12-13
Payer: COMMERCIAL

## 2022-12-13 DIAGNOSIS — M62.81 DECREASED MUSCLE STRENGTH: ICD-10-CM

## 2022-12-13 DIAGNOSIS — M25.611 DECREASED RANGE OF MOTION OF RIGHT SHOULDER: Primary | ICD-10-CM

## 2022-12-13 PROCEDURE — 97110 THERAPEUTIC EXERCISES: CPT

## 2022-12-13 PROCEDURE — 97140 MANUAL THERAPY 1/> REGIONS: CPT

## 2022-12-13 NOTE — PROGRESS NOTES
Physical Therapy Treatment Note     Name: Sukumar Beal  [facility] Number: [id number]    Therapy Diagnosis:   Encounter Diagnoses   Name Primary?    Decreased range of motion of right shoulder Yes    Decreased muscle strength          Physician: Jh Sibley*    Visit Date: 12/13/2022    Physician Orders: PT Eval and Treat   Medical Diagnosis from Referral:   Diagnosis   M75.101 (ICD-10-CM) - Nontraumatic tear of right rotator cuff, unspecified tear extent   M75.21 (ICD-10-CM) - Biceps tendinitis of right upper extremity      Evaluation Date: 11/1/2022  Authorization Period Expiration: 12/31/2022  Plan of Care Expiration: 1/24/2022  Visit # / Visits authorized: 10/ 20    Initial FOTO: 58% (Predicted 27%)  FOTO 1st F/U:   FOTO 2nd F/U:       Time In: 900am  Time Out: 955am  Total Billable Time: 55 minutes    Precautions: Standard and RCR     Date of Surgery: 10/14/2022  Weeks Post Op: 7 weeks, 4 days     From Surgeon:  Physical Therapy: Follow the >3 cm cuff repair rehab protocol. PROM starts after 2 weeks. AROM starts after 7 weeks. No cuff resistive activity x 12 weeks. No biceps resistive activity x 8 weeks. We will be just a bit slower in this case given the tear pattern and size in this case    A/P:  Arthroscopic pictures again reviewed with the patient.  May wean out of the sling.  Discussed ongoing lifting precautions.  No more than a Coke can or small book.  May begin the active phase of his range of motion.  Work on active and passive range of motion and continued scapular mechanics.  I will see him back in 6 weeks.  At that time we will begin the strengthening phase of recovery.  He understands that he will have some activity and lifting restrictions up to 5 months postop.  All questions answered.  Prescription for Naprosyn given.    Subjective     Pt reports: says things have been going well at home and he is being careful.  He was compliant with home exercise program.  Response to previous  "treatment: soreness  Functional change: ongoing    Pain: not formally assessed  Location: right shoulder      Objective   Passive 0-150, ER to 35 deg  Active 115 deg flexion    Sukumar received therapeutic exercises to develop strength, endurance, ROM, flexibility, and posture for 40 minutes including:    UBE 8' for scap mobility level 1 and cardio endurance training  Sidelying flexion AAROM 3 x 10  Sidelying ER to neutral 3 x 10  Seated no money no resistance 3x10  Standing slot machine 2 x 15  Scap retrction with OTB 3 x 10 3"  IR/ER walkouts OTB/YTB 3 x 10  Supine shoulder flexion 3x10    Sukumar received the following manual therapy techniques: Joint mobilizations and Soft tissue Mobilization were applied to the: right shoulder for 15 minutes, including:    Passive Range of Motion into flexion and External Rotation Gr III AP mob on R GH  Gr I-II oscillations relaxation  Inferior mob arm at 20 deg abd Gr III    Sukumar participated in neuromuscular re-education activities to improve: Coordination, Proprioception, and Posture for 0 minutes. The following activities were included:    Sukumar participated in dynamic functional therapeutic activities to improve functional performance for 0  minutes, including:      Home Exercises Provided and Patient Education Provided     Education provided:   - Home Exercise Program, precautions    Written Home Exercises Provided: Patient instructed to cont prior HEP.  Exercises were reviewed and Sukumar was able to demonstrate them prior to the end of the session.  Sukumar demonstrated good  understanding of the education provided.     See EMR under Patient Instructions for exercises provided prior visit.    Assessment     Patient tolerated treatment well today that emphasized Active Assisted Range of Motion and Passive Range of Motion. Will continue to educate on precautions and progress within protocol.    Sukumar Is progressing well towards his goals.   Pt prognosis is Good.     Pt will continue to " benefit from skilled outpatient physical therapy to address the deficits listed in the problem list box on initial evaluation, provide pt/family education and to maximize pt's level of independence in the home and community environment.     Pt's spiritual, cultural and educational needs considered and pt agreeable to plan of care and goals.     Anticipated barriers to physical therapy: none    Goals:   Short Term Goals:  6 weeks  Patient will be independent in Home Exercise Program for independent progression within protocol.-Progressing, not met   Patient will show improved Passive Range of Motion to 120 degrees of flexion to show progress within protocol.-Progressing, not met   Patient will show improved Passive Range of Motion to 30 degrees of External Rotation to show progress within protocol.-Progressing, not met      Long Term Goals: 12 weeks  Patient will be independent in Home Exercise Program to initiate strengthening and return to hobbies.-Progressing, not met   Patient will show improved full Passive Range of Motion in flexion and External Rotation. -Progressing, not met   Patient will show improved strength by demonstrating active flexion to 120 degrees.-Progressing, not met   Patient goal: return to boot camp three times a week with no restrictions.-Progressing, not met   Patient will have improved limitation score to 27% limited on FOTO shoulder in order to demonstrate functional improvement.-Progressing, not met     Plan     Continue range of motion activities per protocol    Madelyn Flowers, PT  , DPT

## 2022-12-15 ENCOUNTER — CLINICAL SUPPORT (OUTPATIENT)
Dept: REHABILITATION | Facility: HOSPITAL | Age: 52
End: 2022-12-15
Payer: COMMERCIAL

## 2022-12-15 DIAGNOSIS — M25.611 DECREASED RANGE OF MOTION OF RIGHT SHOULDER: Primary | ICD-10-CM

## 2022-12-15 DIAGNOSIS — M62.81 DECREASED MUSCLE STRENGTH: ICD-10-CM

## 2022-12-15 PROCEDURE — 97112 NEUROMUSCULAR REEDUCATION: CPT | Performed by: PHYSICAL THERAPIST

## 2022-12-15 PROCEDURE — 97140 MANUAL THERAPY 1/> REGIONS: CPT | Performed by: PHYSICAL THERAPIST

## 2022-12-15 PROCEDURE — 97110 THERAPEUTIC EXERCISES: CPT | Performed by: PHYSICAL THERAPIST

## 2022-12-20 ENCOUNTER — CLINICAL SUPPORT (OUTPATIENT)
Dept: REHABILITATION | Facility: HOSPITAL | Age: 52
End: 2022-12-20
Payer: COMMERCIAL

## 2022-12-20 DIAGNOSIS — M62.81 DECREASED MUSCLE STRENGTH: ICD-10-CM

## 2022-12-20 DIAGNOSIS — M25.611 DECREASED RANGE OF MOTION OF RIGHT SHOULDER: Primary | ICD-10-CM

## 2022-12-20 PROCEDURE — 97140 MANUAL THERAPY 1/> REGIONS: CPT | Performed by: PHYSICAL THERAPIST

## 2022-12-20 PROCEDURE — 97110 THERAPEUTIC EXERCISES: CPT | Performed by: PHYSICAL THERAPIST

## 2022-12-20 PROCEDURE — 97112 NEUROMUSCULAR REEDUCATION: CPT | Performed by: PHYSICAL THERAPIST

## 2022-12-20 NOTE — PROGRESS NOTES
Physical Therapy Treatment Note     Name: Sukumar Beal  [facility] Number: [id number]    Therapy Diagnosis:   Encounter Diagnoses   Name Primary?    Decreased range of motion of right shoulder Yes    Decreased muscle strength      Physician: Jh Sibley*    Visit Date: 12/20/2022    Physician Orders: PT Eval and Treat   Medical Diagnosis from Referral:   Diagnosis   M75.101 (ICD-10-CM) - Nontraumatic tear of right rotator cuff, unspecified tear extent   M75.21 (ICD-10-CM) - Biceps tendinitis of right upper extremity      Evaluation Date: 11/1/2022  Authorization Period Expiration: 12/31/2022  Plan of Care Expiration: 1/24/2022  Visit # / Visits authorized: 12/ 20    Initial FOTO: 58% (Predicted 27%)  FOTO 1st F/U:   FOTO 2nd F/U:       Time In: 0902am  Time Out: 1002am  Total Billable Time: 60 minutes    Precautions: Standard and RCR     Date of Surgery: 10/14/2022  Weeks Post Op: 8 weeks, 6 days     From Surgeon:  Physical Therapy: Follow the >3 cm cuff repair rehab protocol. PROM starts after 2 weeks. AROM starts after 7 weeks. No cuff resistive activity x 12 weeks. No biceps resistive activity x 8 weeks. We will be just a bit slower in this case given the tear pattern and size in this case    A/P:  Arthroscopic pictures again reviewed with the patient.  May wean out of the sling.  Discussed ongoing lifting precautions.  No more than a Coke can or small book.  May begin the active phase of his range of motion.  Work on active and passive range of motion and continued scapular mechanics.  I will see him back in 6 weeks.  At that time we will begin the strengthening phase of recovery.  He understands that he will have some activity and lifting restrictions up to 5 months postop.  All questions answered.  Prescription for Naprosyn given.    Subjective     Pt reports: I was sore but not bad sore. I am doing better, just tired at the end of session    He was compliant with home exercise program.  Response to  previous treatment: soreness  Functional change: improved overhead reach    Pain: not formally assessed  Location: right shoulder      Objective   Passive 0-150, ER to 35 deg  Active 135 deg flexion    Sukumar received therapeutic exercises to develop strength, endurance, ROM, flexibility, and posture for 28 minutes including:    Standing scaption mirror cues 3 x 12  Sidelying ER to neutral 3 x 10  Sidelying flexion 1# 0-90 deg 2 x 10   Serratus press YTB 2 x 1 5    Sukumar received the following manual therapy techniques: Joint mobilizations and Soft tissue Mobilization were applied to the: right shoulder for 14 minutes, including:    Passive Range of Motion into flexion and External Rotation Gr III AP mob on R GH  Gr I-II oscillations relaxation  Inferior mob arm at 20 deg abd Gr III    Sukumar participated in neuromuscular re-education activities to improve: Coordination, Proprioception, and Posture for 18 minutes. The following activities were included:    Foam roller up wall 30x  No money RTB 3x10  Prone extensions 3# 3 x 12    Sukumar participated in dynamic functional therapeutic activities to improve functional performance for 0  minutes, including:      Home Exercises Provided and Patient Education Provided     Education provided:   - Home Exercise Program, precautions    Written Home Exercises Provided: Patient instructed to cont prior HEP.  Exercises were reviewed and Sukumar was able to demonstrate them prior to the end of the session.  Sukumar demonstrated good  understanding of the education provided.     See EMR under Patient Instructions for exercises provided prior visit.    Assessment     Patient progressing with sidelying rhythmic stab and scap stabilization. Less fatigue with prone extensions today. His motion continues to improve with exercise. Will continue to progress per protocol    Sukumar Is progressing well towards his goals.   Pt prognosis is Good.     Pt will continue to benefit from skilled outpatient  physical therapy to address the deficits listed in the problem list box on initial evaluation, provide pt/family education and to maximize pt's level of independence in the home and community environment.     Pt's spiritual, cultural and educational needs considered and pt agreeable to plan of care and goals.     Anticipated barriers to physical therapy: none    Goals:   Short Term Goals:  6 weeks  Patient will be independent in Home Exercise Program for independent progression within protocol.-Progressing, not met   Patient will show improved Passive Range of Motion to 120 degrees of flexion to show progress within protocol.-Progressing, not met   Patient will show improved Passive Range of Motion to 30 degrees of External Rotation to show progress within protocol.-Progressing, not met      Long Term Goals: 12 weeks  Patient will be independent in Home Exercise Program to initiate strengthening and return to hobbies.-Progressing, not met   Patient will show improved full Passive Range of Motion in flexion and External Rotation. -Progressing, not met   Patient will show improved strength by demonstrating active flexion to 120 degrees.-Progressing, not met   Patient goal: return to boot camp three times a week with no restrictions.-Progressing, not met   Patient will have improved limitation score to 27% limited on FOTO shoulder in order to demonstrate functional improvement.-Progressing, not met     Plan     Continue range of motion activities per protocol    Reji Szymanski, PT  , DPT

## 2022-12-22 ENCOUNTER — CLINICAL SUPPORT (OUTPATIENT)
Dept: REHABILITATION | Facility: HOSPITAL | Age: 52
End: 2022-12-22
Payer: COMMERCIAL

## 2022-12-22 DIAGNOSIS — M25.611 DECREASED RANGE OF MOTION OF RIGHT SHOULDER: Primary | ICD-10-CM

## 2022-12-22 DIAGNOSIS — M62.81 DECREASED MUSCLE STRENGTH: ICD-10-CM

## 2022-12-22 PROCEDURE — 97112 NEUROMUSCULAR REEDUCATION: CPT

## 2022-12-22 PROCEDURE — 97110 THERAPEUTIC EXERCISES: CPT

## 2022-12-22 PROCEDURE — 97140 MANUAL THERAPY 1/> REGIONS: CPT

## 2022-12-22 NOTE — PROGRESS NOTES
Physical Therapy Treatment Note     Name: Sukumar Beal  [facility] Number: [id number]    Therapy Diagnosis:   Encounter Diagnoses   Name Primary?    Decreased range of motion of right shoulder Yes    Decreased muscle strength        Physician: Jh Sibley*    Visit Date: 12/22/2022    Physician Orders: PT Eval and Treat   Medical Diagnosis from Referral:   Diagnosis   M75.101 (ICD-10-CM) - Nontraumatic tear of right rotator cuff, unspecified tear extent   M75.21 (ICD-10-CM) - Biceps tendinitis of right upper extremity      Evaluation Date: 11/1/2022  Authorization Period Expiration: 12/31/2022  Plan of Care Expiration: 1/24/2022  Visit # / Visits authorized: 13/ 20    Initial FOTO: 58% (Predicted 27%)  FOTO 1st F/U:   FOTO 2nd F/U:       Time In: 1005am  Time Out: 1100am  Total Billable Time: 55 minutes    Precautions: Standard and RCR     Date of Surgery: 10/14/2022  Weeks Post Op: 9 weeks, 6 days as of 12/22     From Surgeon:  Physical Therapy: Follow the >3 cm cuff repair rehab protocol. PROM starts after 2 weeks. AROM starts after 7 weeks. No cuff resistive activity x 12 weeks. No biceps resistive activity x 8 weeks. We will be just a bit slower in this case given the tear pattern and size in this case    A/P:  Arthroscopic pictures again reviewed with the patient.  May wean out of the sling.  Discussed ongoing lifting precautions.  No more than a Coke can or small book.  May begin the active phase of his range of motion.  Work on active and passive range of motion and continued scapular mechanics.  I will see him back in 6 weeks.  At that time we will begin the strengthening phase of recovery.  He understands that he will have some activity and lifting restrictions up to 5 months postop.  All questions answered.  Prescription for Naprosyn given.    Subjective     Pt reports: I was sore after last session from all the new stuff.    He was compliant with home exercise program.  Response to previous  treatment: soreness  Functional change: improved overhead reach    Pain: not formally assessed  Location: right shoulder      Objective   Passive 0-150, ER to 35 deg  Active 135 deg flexion    Sukumar received therapeutic exercises to develop strength, endurance, ROM, flexibility, and posture for 23 minutes including:    Standing scaption mirror cues 3 x 12  Sidelying ER to neutral 3 x 10  Sidelying flexion 1# 0-90 deg 2 x 10   Serratus press YTB 2 x 1 5    Sukumar received the following manual therapy techniques: Joint mobilizations and Soft tissue Mobilization were applied to the: right shoulder for 14 minutes, including:    Passive Range of Motion into flexion and External Rotation Gr III AP mob on R GH  Gr I-II oscillations relaxation  Inferior mob arm at 20 deg abd Gr III    Sukumar participated in neuromuscular re-education activities to improve: Coordination, Proprioception, and Posture for 18 minutes. The following activities were included:    Foam roller up wall 30x  No money RTB 3x10  Prone extensions 3# 3 x 12  Wall ball rotations 2 x 30    Sukumar participated in dynamic functional therapeutic activities to improve functional performance for 0  minutes, including:      Home Exercises Provided and Patient Education Provided     Education provided:   - Home Exercise Program, precautions    Written Home Exercises Provided: Patient instructed to cont prior HEP.  Exercises were reviewed and Sukumar was able to demonstrate them prior to the end of the session.  Sukumar demonstrated good  understanding of the education provided.     See EMR under Patient Instructions for exercises provided prior visit.    Assessment     Patient progressing with sidelying rhythmic stab and scap stabilization. His motion continues to improve with exercise. Will continue to progress per protocol    Sukumar Is progressing well towards his goals.   Pt prognosis is Good.     Pt will continue to benefit from skilled outpatient physical therapy to address  the deficits listed in the problem list box on initial evaluation, provide pt/family education and to maximize pt's level of independence in the home and community environment.     Pt's spiritual, cultural and educational needs considered and pt agreeable to plan of care and goals.     Anticipated barriers to physical therapy: none    Goals:   Short Term Goals:  6 weeks  Patient will be independent in Home Exercise Program for independent progression within protocol.-Progressing, not met   Patient will show improved Passive Range of Motion to 120 degrees of flexion to show progress within protocol.-Progressing, not met   Patient will show improved Passive Range of Motion to 30 degrees of External Rotation to show progress within protocol.-Progressing, not met      Long Term Goals: 12 weeks  Patient will be independent in Home Exercise Program to initiate strengthening and return to hobbies.-Progressing, not met   Patient will show improved full Passive Range of Motion in flexion and External Rotation. -Progressing, not met   Patient will show improved strength by demonstrating active flexion to 120 degrees.-Progressing, not met   Patient goal: return to boot [place] three times a week with no restrictions.-Progressing, not met   Patient will have improved limitation score to 27% limited on FOTO shoulder in order to demonstrate functional improvement.-Progressing, not met     Plan     Continue range of motion activities per protocol    Madelyn Flowers, PT  , DPT

## 2022-12-27 ENCOUNTER — DOCUMENTATION ONLY (OUTPATIENT)
Dept: REHABILITATION | Facility: HOSPITAL | Age: 52
End: 2022-12-27

## 2022-12-27 ENCOUNTER — CLINICAL SUPPORT (OUTPATIENT)
Dept: REHABILITATION | Facility: HOSPITAL | Age: 52
End: 2022-12-27
Payer: COMMERCIAL

## 2022-12-27 DIAGNOSIS — M62.81 DECREASED MUSCLE STRENGTH: ICD-10-CM

## 2022-12-27 DIAGNOSIS — M25.611 DECREASED RANGE OF MOTION OF RIGHT SHOULDER: Primary | ICD-10-CM

## 2022-12-27 PROCEDURE — 97140 MANUAL THERAPY 1/> REGIONS: CPT

## 2022-12-27 PROCEDURE — 97110 THERAPEUTIC EXERCISES: CPT

## 2022-12-27 PROCEDURE — 97112 NEUROMUSCULAR REEDUCATION: CPT

## 2022-12-27 NOTE — PROGRESS NOTES
Physical Therapy Treatment Note     Name: Sukumar Beal  [facility] Number: [id number]    Therapy Diagnosis:   Encounter Diagnoses   Name Primary?    Decreased range of motion of right shoulder Yes    Decreased muscle strength        Physician: Jh Sibley*    Visit Date: 12/27/2022    Physician Orders: PT Eval and Treat   Medical Diagnosis from Referral:   Diagnosis   M75.101 (ICD-10-CM) - Nontraumatic tear of right rotator cuff, unspecified tear extent   M75.21 (ICD-10-CM) - Biceps tendinitis of right upper extremity      Evaluation Date: 11/1/2022  Authorization Period Expiration: 12/31/2022  Plan of Care Expiration: 1/24/2022  Visit # / Visits authorized: 14/ 20    Initial FOTO: 58% (Predicted 27%)  FOTO 1st F/U:   FOTO 2nd F/U:       Time In: 300pm  Time Out: 355pm  Total Billable Time: 55 minutes    Precautions: Standard and RCR     Date of Surgery: 10/14/2022  Weeks Post Op: 10 weeks, 4 days as of 12/27     From Surgeon:  Physical Therapy: Follow the >3 cm cuff repair rehab protocol. PROM starts after 2 weeks. AROM starts after 7 weeks. No cuff resistive activity x 12 weeks. No biceps resistive activity x 8 weeks. We will be just a bit slower in this case given the tear pattern and size in this case    A/P:  Arthroscopic pictures again reviewed with the patient.  May wean out of the sling.  Discussed ongoing lifting precautions.  No more than a Coke can or small book.  May begin the active phase of his range of motion.  Work on active and passive range of motion and continued scapular mechanics.  I will see him back in 6 weeks.  At that time we will begin the strengthening phase of recovery.  He understands that he will have some activity and lifting restrictions up to 5 months postop.  All questions answered.  Prescription for Naprosyn given.        Subjective     Pt reports: its been a little sore and painful lately, he can't tell if he is using it more.    He was compliant with home exercise  program.  Response to previous treatment: soreness  Functional change: improved overhead reach    Pain: not formally assessed  Location: right shoulder      Objective     Sukumar received therapeutic exercises to develop strength, endurance, ROM, flexibility, and posture for 23 minutes including:    Standing scaption mirror cues 3 x 12  Sidelying ER to neutral 3 x 10  Sidelying flexion 1# 0-90 deg 2 x 10   Serratus press YTB 2 x 1 5    Sukumar received the following manual therapy techniques: Joint mobilizations and Soft tissue Mobilization were applied to the: right shoulder for 14 minutes, including:    Passive Range of Motion into flexion and External Rotation Gr III AP mob on R GH  Gr I-II oscillations relaxation  Inferior mob arm at 20 deg abd Gr III    Sukumar participated in neuromuscular re-education activities to improve: Coordination, Proprioception, and Posture for 18 minutes. The following activities were included:    Foam roller up wall 30x  No money RTB 3x10  Prone extensions 3# 3 x 12  Wall ball rotations 2 x 30    Sukumar participated in dynamic functional therapeutic activities to improve functional performance for 0  minutes, including:      Home Exercises Provided and Patient Education Provided     Education provided:   - Home Exercise Program, precautions    Written Home Exercises Provided: Patient instructed to cont prior HEP.  Exercises were reviewed and Sukumar was able to demonstrate them prior to the end of the session.  Sukumar demonstrated good  understanding of the education provided.     See EMR under Patient Instructions for exercises provided prior visit.    Assessment     Patient progressing with sidelying rhythmic stab and scap stabilization. His motion continues to improve with exercise. Will continue to progress per protocol    Sukumar Is progressing well towards his goals.   Pt prognosis is Good.     Pt will continue to benefit from skilled outpatient physical therapy to address the deficits listed in  the problem list box on initial evaluation, provide pt/family education and to maximize pt's level of independence in the home and community environment.     Pt's spiritual, cultural and educational needs considered and pt agreeable to plan of care and goals.     Anticipated barriers to physical therapy: none    Goals:   Short Term Goals:  6 weeks  Patient will be independent in Home Exercise Program for independent progression within protocol.-Progressing, not met   Patient will show improved Passive Range of Motion to 120 degrees of flexion to show progress within protocol.-Progressing, not met   Patient will show improved Passive Range of Motion to 30 degrees of External Rotation to show progress within protocol.-Progressing, not met      Long Term Goals: 12 weeks  Patient will be independent in Home Exercise Program to initiate strengthening and return to hobbies.-Progressing, not met   Patient will show improved full Passive Range of Motion in flexion and External Rotation. -Progressing, not met   Patient will show improved strength by demonstrating active flexion to 120 degrees.-Progressing, not met   Patient goal: return to boot [place] three times a week with no restrictions.-Progressing, not met   Patient will have improved limitation score to 27% limited on FOTO shoulder in order to demonstrate functional improvement.-Progressing, not met     Plan     Continue range of motion activities per protocol    Madelyn Flowers, PT  , DPT

## 2022-12-27 NOTE — PROGRESS NOTES
PT called patient after patient had not arrived for 15 minutes after visit was scheduled to begin.   Communication: Called patient but there was no answer and left a voicemail for patient. Told patient to give a call back and would work him into the schedule today as he is post-op. Left date and time of next scheduled appointment.    Patient Confirmed Next Appointment: No  Number of No-Shows To Date: 1  Number of Same-Day Cancels To Date: 0    Kimberlee Hayward, PT

## 2022-12-27 NOTE — PROGRESS NOTES
Physical Therapy Treatment Note     Name: Sukumar Beal  [facility] Number: [id number]    Therapy Diagnosis:   No diagnosis found.      Physician: Jh Sibley*    Visit Date: 12/27/2022    Physician Orders: PT Eval and Treat   Medical Diagnosis from Referral:   Diagnosis   M75.101 (ICD-10-CM) - Nontraumatic tear of right rotator cuff, unspecified tear extent   M75.21 (ICD-10-CM) - Biceps tendinitis of right upper extremity      Evaluation Date: 11/1/2022  Authorization Period Expiration: 12/31/2022  Plan of Care Expiration: 1/24/2022  Visit # / Visits authorized: 13/ 20 ***     Initial FOTO: 58% (Predicted 27%)  FOTO 1st F/U:   FOTO 2nd F/U:       Time In: *** am  Time Out: *** am  Total Billable Time: *** minutes    Precautions: Standard and RCR     Date of Surgery: 10/14/2022  Weeks Post Op: 9 weeks, 6 days as of 12/22 ***     From Surgeon:  Physical Therapy: Follow the >3 cm cuff repair rehab protocol. PROM starts after 2 weeks. AROM starts after 7 weeks. No cuff resistive activity x 12 weeks. No biceps resistive activity x 8 weeks. We will be just a bit slower in this case given the tear pattern and size in this case    A/P:  Arthroscopic pictures again reviewed with the patient.  May wean out of the sling.  Discussed ongoing lifting precautions.  No more than a Coke can or small book.  May begin the active phase of his range of motion.  Work on active and passive range of motion and continued scapular mechanics.  I will see him back in 6 weeks.  At that time we will begin the strengthening phase of recovery.  He understands that he will have some activity and lifting restrictions up to 5 months postop.  All questions answered.  Prescription for Naprosyn given.    Subjective     Pt reports: I was sore after last session from all the new stuff. ***    He was compliant with home exercise program.  Response to previous treatment: soreness  Functional change: improved overhead reach    Pain: not formally  assessed  Location: right shoulder      Objective   Passive 0-150, ER to 35 deg  Active 135 deg flexion    Sukumar received therapeutic exercises to develop strength, endurance, ROM, flexibility, and posture for *** minutes including:    Standing scaption mirror cues 3 x 12  Sidelying ER to neutral 3 x 10  Sidelying flexion 1# 0-90 deg 2 x 10   Serratus press YTB 2 x 1 5    Sukumar received the following manual therapy techniques: Joint mobilizations and Soft tissue Mobilization were applied to the: right shoulder for *** minutes, including:    Passive Range of Motion into flexion and External Rotation Gr III AP mob on R GH  Gr I-II oscillations relaxation  Inferior mob arm at 20 deg abd Gr III    Sukumar participated in neuromuscular re-education activities to improve: Coordination, Proprioception, and Posture for *** minutes. The following activities were included:    Foam roller up wall 30x  No money RTB 3x10  Prone extensions 3# 3 x 12  Wall ball rotations 2 x 30    Sukumar participated in dynamic functional therapeutic activities to improve functional performance for 0  minutes, including:      Home Exercises Provided and Patient Education Provided     Education provided:   - Home Exercise Program, precautions    Written Home Exercises Provided: Patient instructed to cont prior HEP.  Exercises were reviewed and Sukumar was able to demonstrate them prior to the end of the session.  Sukumar demonstrated good  understanding of the education provided.     See EMR under Patient Instructions for exercises provided prior visit.    Assessment     ***    Patient progressing with sidelying rhythmic stab and scap stabilization. His motion continues to improve with exercise. Will continue to progress per protocol    Sukumar Is progressing well towards his goals.   Pt prognosis is Good.     Pt will continue to benefit from skilled outpatient physical therapy to address the deficits listed in the problem list box on initial evaluation, provide  pt/family education and to maximize pt's level of independence in the home and community environment.     Pt's spiritual, cultural and educational needs considered and pt agreeable to plan of care and goals.     Anticipated barriers to physical therapy: none    Goals:   Short Term Goals:  6 weeks  Patient will be independent in Home Exercise Program for independent progression within protocol.-Progressing, not met   Patient will show improved Passive Range of Motion to 120 degrees of flexion to show progress within protocol.-Progressing, not met   Patient will show improved Passive Range of Motion to 30 degrees of External Rotation to show progress within protocol.-Progressing, not met      Long Term Goals: 12 weeks  Patient will be independent in Home Exercise Program to initiate strengthening and return to hobbies.-Progressing, not met   Patient will show improved full Passive Range of Motion in flexion and External Rotation. -Progressing, not met   Patient will show improved strength by demonstrating active flexion to 120 degrees.-Progressing, not met   Patient goal: return to boot [place] three times a week with no restrictions.-Progressing, not met   Patient will have improved limitation score to 27% limited on FOTO shoulder in order to demonstrate functional improvement.-Progressing, not met     Plan     Continue range of motion activities per protocol    Kimberlee Hayward, PT  , DPT

## 2022-12-29 ENCOUNTER — CLINICAL SUPPORT (OUTPATIENT)
Dept: REHABILITATION | Facility: HOSPITAL | Age: 52
End: 2022-12-29
Payer: COMMERCIAL

## 2022-12-29 DIAGNOSIS — M25.611 DECREASED RANGE OF MOTION OF RIGHT SHOULDER: Primary | ICD-10-CM

## 2022-12-29 DIAGNOSIS — M62.81 DECREASED MUSCLE STRENGTH: ICD-10-CM

## 2022-12-29 PROCEDURE — 97112 NEUROMUSCULAR REEDUCATION: CPT

## 2022-12-29 PROCEDURE — 97140 MANUAL THERAPY 1/> REGIONS: CPT

## 2022-12-29 PROCEDURE — 97110 THERAPEUTIC EXERCISES: CPT

## 2022-12-29 NOTE — PROGRESS NOTES
Physical Therapy Treatment Note     Name: Sukumar Beal  [facility] Number: [id number]    Therapy Diagnosis:   Encounter Diagnoses   Name Primary?    Decreased range of motion of right shoulder Yes    Decreased muscle strength          Physician: Jh Sibley*    Visit Date: 12/29/2022    Physician Orders: PT Eval and Treat   Medical Diagnosis from Referral:   Diagnosis   M75.101 (ICD-10-CM) - Nontraumatic tear of right rotator cuff, unspecified tear extent   M75.21 (ICD-10-CM) - Biceps tendinitis of right upper extremity      Evaluation Date: 11/1/2022  Authorization Period Expiration: 12/31/2022  Plan of Care Expiration: 1/24/2022  Visit # / Visits authorized: 14/ 20    Initial FOTO: 58% (Predicted 27%)  FOTO 1st F/U:   FOTO 2nd F/U:       Time In: 1005am  Time Out: 1100am  Total Billable Time: 55 minutes    Precautions: Standard and RCR     Date of Surgery: 10/14/2022  Weeks Post Op: 10 weeks, 6 days as of 12/27     From Surgeon:  Physical Therapy: Follow the >3 cm cuff repair rehab protocol. PROM starts after 2 weeks. AROM starts after 7 weeks. No cuff resistive activity x 12 weeks. No biceps resistive activity x 8 weeks. We will be just a bit slower in this case given the tear pattern and size in this case    A/P:  Arthroscopic pictures again reviewed with the patient.  May wean out of the sling.  Discussed ongoing lifting precautions.  No more than a Coke can or small book.  May begin the active phase of his range of motion.  Work on active and passive range of motion and continued scapular mechanics.  I will see him back in 6 weeks.  At that time we will begin the strengthening phase of recovery.  He understands that he will have some activity and lifting restrictions up to 5 months postop.  All questions answered.  Prescription for Naprosyn given.        Subjective     Pt reports: still sore, but not more than it has been recently.    He was compliant with home exercise program.  Response to  previous treatment: soreness  Functional change: improved overhead reach    Pain: not formally assessed  Location: right shoulder      Objective     Sukumar received therapeutic exercises to develop strength, endurance, ROM, flexibility, and posture for 23 minutes including:    Upper Body Ergometer 4'/4'  Standing scaption mirror cues 3 x 12  Sidelying ER to neutral 3 x 10  Sidelying flexion 1# 0-90 deg 2 x 10   Serratus press YTB 2 x 1 5    Sukumar received the following manual therapy techniques: Joint mobilizations and Soft tissue Mobilization were applied to the: right shoulder for 14 minutes, including:    Passive Range of Motion into flexion and External Rotation Gr III AP mob on R GH  Gr I-II oscillations relaxation  Inferior mob arm at 20 deg abd Gr III    Sukumar participated in neuromuscular re-education activities to improve: Coordination, Proprioception, and Posture for 18 minutes. The following activities were included:    Foam roller up wall 30x  No money RTB 3x10  Prone extensions 3# 3 x 12  Wall ball rotations 2 x 30    Sukumar participated in dynamic functional therapeutic activities to improve functional performance for 0  minutes, including:      Home Exercises Provided and Patient Education Provided     Education provided:   - Home Exercise Program, precautions    Written Home Exercises Provided: Patient instructed to cont prior HEP.  Exercises were reviewed and Sukumar was able to demonstrate them prior to the end of the session.  Sukumar demonstrated good  understanding of the education provided.     See EMR under Patient Instructions for exercises provided prior visit.    Assessment     Patient progressing with sidelying rhythmic stab and scap stabilization. His motion continues to improve with exercise. Will continue to progress per protocol    Sukumar Is progressing well towards his goals.   Pt prognosis is Good.     Pt will continue to benefit from skilled outpatient physical therapy to address the deficits  listed in the problem list box on initial evaluation, provide pt/family education and to maximize pt's level of independence in the home and community environment.     Pt's spiritual, cultural and educational needs considered and pt agreeable to plan of care and goals.     Anticipated barriers to physical therapy: none    Goals:   Short Term Goals:  6 weeks  Patient will be independent in Home Exercise Program for independent progression within protocol.-Progressing, not met   Patient will show improved Passive Range of Motion to 120 degrees of flexion to show progress within protocol.-Progressing, not met   Patient will show improved Passive Range of Motion to 30 degrees of External Rotation to show progress within protocol.-Progressing, not met      Long Term Goals: 12 weeks  Patient will be independent in Home Exercise Program to initiate strengthening and return to hobbies.-Progressing, not met   Patient will show improved full Passive Range of Motion in flexion and External Rotation. -Progressing, not met   Patient will show improved strength by demonstrating active flexion to 120 degrees.-Progressing, not met   Patient goal: return to boot [place] three times a week with no restrictions.-Progressing, not met   Patient will have improved limitation score to 27% limited on FOTO shoulder in order to demonstrate functional improvement.-Progressing, not met     Plan     Continue range of motion activities per protocol    Madelyn Flowers, PT  , DPT

## 2023-01-10 ENCOUNTER — CLINICAL SUPPORT (OUTPATIENT)
Dept: REHABILITATION | Facility: HOSPITAL | Age: 53
End: 2023-01-10
Payer: COMMERCIAL

## 2023-01-10 DIAGNOSIS — M62.81 DECREASED MUSCLE STRENGTH: ICD-10-CM

## 2023-01-10 DIAGNOSIS — M25.611 DECREASED RANGE OF MOTION OF RIGHT SHOULDER: Primary | ICD-10-CM

## 2023-01-10 PROCEDURE — 97112 NEUROMUSCULAR REEDUCATION: CPT

## 2023-01-10 PROCEDURE — 97110 THERAPEUTIC EXERCISES: CPT

## 2023-01-10 PROCEDURE — 97140 MANUAL THERAPY 1/> REGIONS: CPT

## 2023-01-10 NOTE — PROGRESS NOTES
Physical Therapy Treatment Note     Name: Sukumar Beal  [facility] Number: [id number]    Therapy Diagnosis:   Encounter Diagnoses   Name Primary?    Decreased range of motion of right shoulder Yes    Decreased muscle strength          Physician: Jh Sibley*    Visit Date: 1/10/2023    Physician Orders: PT Eval and Treat   Medical Diagnosis from Referral:   Diagnosis   M75.101 (ICD-10-CM) - Nontraumatic tear of right rotator cuff, unspecified tear extent   M75.21 (ICD-10-CM) - Biceps tendinitis of right upper extremity      Evaluation Date: 11/1/2022  Authorization Period Expiration: 12/31/2023  Plan of Care Expiration: 1/24/2023  Visit # / Visits authorized: 1/20 (15 total)    Initial FOTO: 58% (Predicted 27%)  FOTO 1st F/U:   FOTO 2nd F/U:       Time In: 1005am  Time Out: 1100am  Total Billable Time: 55 minutes    Precautions: Standard and RCR     Date of Surgery: 10/14/2022  Weeks Post Op: 10 weeks, 6 days as of 12/27     From Surgeon:  Physical Therapy: Follow the >3 cm cuff repair rehab protocol. PROM starts after 2 weeks. AROM starts after 7 weeks. No cuff resistive activity x 12 weeks. No biceps resistive activity x 8 weeks. We will be just a bit slower in this case given the tear pattern and size in this case    A/P:  Arthroscopic pictures again reviewed with the patient.  May wean out of the sling.  Discussed ongoing lifting precautions.  No more than a Coke can or small book.  May begin the active phase of his range of motion.  Work on active and passive range of motion and continued scapular mechanics.  I will see him back in 6 weeks.  At that time we will begin the strengthening phase of recovery.  He understands that he will have some activity and lifting restrictions up to 5 months postop.  All questions answered.  Prescription for Naprosyn given.        Subjective     Pt reports: still sore, but not more than it has been recently.    He was compliant with home exercise program.  Response to  previous treatment: soreness  Functional change: improved overhead reach    Pain: not formally assessed  Location: right shoulder      Objective     Sukumar received therapeutic exercises to develop strength, endurance, ROM, flexibility, and posture for 25 minutes including:    Upper Body Ergometer 4'/4' level 2 for endurance  Standing scaption mirror cues 3 x 12  Sidelying ER to neutral 3 x 10  Sidelying flexion 1# 0-90 deg 2 x 10     Not Performed Today:  Serratus press YTB 2 x 1 5    Sukumar received the following manual therapy techniques: Joint mobilizations and Soft tissue Mobilization were applied to the: right shoulder for 10 minutes, including:    Passive Range of Motion into flexion and External Rotation Gr III AP mob on R GH  Gr I-II oscillations relaxation  Inferior mob arm at 20 deg abd Gr III    Sukumar participated in neuromuscular re-education activities to improve: Coordination, Proprioception, and Posture for 20 minutes. The following activities were included:    No money RTB 3x10  Wall ball rotations 2 x 30  Rhythmic stabilization at 20 degrees and 45 degrees of abduction as well as 90 degrees of flexion    Not Performed Today:   Foam roller up wall 30x  Prone extensions 3# 3 x 12    Sukumar participated in dynamic functional therapeutic activities to improve functional performance for 0  minutes, including:      Home Exercises Provided and Patient Education Provided     Education provided:   - Home Exercise Program, precautions    Written Home Exercises Provided: Patient instructed to cont prior HEP.  Exercises were reviewed and Sukumar was able to demonstrate them prior to the end of the session.  Sukumar demonstrated good  understanding of the education provided.     See EMR under Patient Instructions for exercises provided prior visit.    Assessment     Patient progressing with sidelying rhythmic stab and scap stabilization. His motion continues to improve with exercise. Will continue to progress per  protocol    Sukumar Is progressing well towards his goals.   Pt prognosis is Good.     Pt will continue to benefit from skilled outpatient physical therapy to address the deficits listed in the problem list box on initial evaluation, provide pt/family education and to maximize pt's level of independence in the home and community environment.     Pt's spiritual, cultural and educational needs considered and pt agreeable to plan of care and goals.     Anticipated barriers to physical therapy: none    Goals:   Short Term Goals:  6 weeks  Patient will be independent in Home Exercise Program for independent progression within protocol.-Progressing, not met   Patient will show improved Passive Range of Motion to 120 degrees of flexion to show progress within protocol.-Progressing, not met   Patient will show improved Passive Range of Motion to 30 degrees of External Rotation to show progress within protocol.-Progressing, not met      Long Term Goals: 12 weeks  Patient will be independent in Home Exercise Program to initiate strengthening and return to hobbies.-Progressing, not met   Patient will show improved full Passive Range of Motion in flexion and External Rotation. -Progressing, not met   Patient will show improved strength by demonstrating active flexion to 120 degrees.-Progressing, not met   Patient goal: return to boot camp three times a week with no restrictions.-Progressing, not met   Patient will have improved limitation score to 27% limited on FOTO shoulder in order to demonstrate functional improvement.-Progressing, not met     Plan     Continue range of motion activities per protocol. Give FOTO next session    Madelyn Flowers, PT  , DPT

## 2023-01-12 ENCOUNTER — OFFICE VISIT (OUTPATIENT)
Dept: SPORTS MEDICINE | Facility: CLINIC | Age: 53
End: 2023-01-12
Payer: COMMERCIAL

## 2023-01-12 ENCOUNTER — CLINICAL SUPPORT (OUTPATIENT)
Dept: REHABILITATION | Facility: HOSPITAL | Age: 53
End: 2023-01-12
Payer: COMMERCIAL

## 2023-01-12 VITALS
DIASTOLIC BLOOD PRESSURE: 81 MMHG | HEIGHT: 74 IN | SYSTOLIC BLOOD PRESSURE: 116 MMHG | BODY MASS INDEX: 31.7 KG/M2 | HEART RATE: 69 BPM | WEIGHT: 247 LBS

## 2023-01-12 DIAGNOSIS — M62.81 DECREASED MUSCLE STRENGTH: ICD-10-CM

## 2023-01-12 DIAGNOSIS — R29.898 SHOULDER WEAKNESS: Primary | ICD-10-CM

## 2023-01-12 DIAGNOSIS — M25.611 DECREASED RANGE OF MOTION OF RIGHT SHOULDER: Primary | ICD-10-CM

## 2023-01-12 PROCEDURE — 99999 PR PBB SHADOW E&M-EST. PATIENT-LVL III: CPT | Mod: PBBFAC,,, | Performed by: ORTHOPAEDIC SURGERY

## 2023-01-12 PROCEDURE — 97140 MANUAL THERAPY 1/> REGIONS: CPT

## 2023-01-12 PROCEDURE — 4010F ACE/ARB THERAPY RXD/TAKEN: CPT | Mod: CPTII,S$GLB,, | Performed by: ORTHOPAEDIC SURGERY

## 2023-01-12 PROCEDURE — 99999 PR PBB SHADOW E&M-EST. PATIENT-LVL III: ICD-10-PCS | Mod: PBBFAC,,, | Performed by: ORTHOPAEDIC SURGERY

## 2023-01-12 PROCEDURE — 1159F PR MEDICATION LIST DOCUMENTED IN MEDICAL RECORD: ICD-10-PCS | Mod: CPTII,S$GLB,, | Performed by: ORTHOPAEDIC SURGERY

## 2023-01-12 PROCEDURE — 3079F PR MOST RECENT DIASTOLIC BLOOD PRESSURE 80-89 MM HG: ICD-10-PCS | Mod: CPTII,S$GLB,, | Performed by: ORTHOPAEDIC SURGERY

## 2023-01-12 PROCEDURE — 3008F BODY MASS INDEX DOCD: CPT | Mod: CPTII,S$GLB,, | Performed by: ORTHOPAEDIC SURGERY

## 2023-01-12 PROCEDURE — 99024 POSTOP FOLLOW-UP VISIT: CPT | Mod: S$GLB,,, | Performed by: ORTHOPAEDIC SURGERY

## 2023-01-12 PROCEDURE — 97110 THERAPEUTIC EXERCISES: CPT

## 2023-01-12 PROCEDURE — 3079F DIAST BP 80-89 MM HG: CPT | Mod: CPTII,S$GLB,, | Performed by: ORTHOPAEDIC SURGERY

## 2023-01-12 PROCEDURE — 4010F PR ACE/ARB THEARPY RXD/TAKEN: ICD-10-PCS | Mod: CPTII,S$GLB,, | Performed by: ORTHOPAEDIC SURGERY

## 2023-01-12 PROCEDURE — 99024 PR POST-OP FOLLOW-UP VISIT: ICD-10-PCS | Mod: S$GLB,,, | Performed by: ORTHOPAEDIC SURGERY

## 2023-01-12 PROCEDURE — 3074F PR MOST RECENT SYSTOLIC BLOOD PRESSURE < 130 MM HG: ICD-10-PCS | Mod: CPTII,S$GLB,, | Performed by: ORTHOPAEDIC SURGERY

## 2023-01-12 PROCEDURE — 3008F PR BODY MASS INDEX (BMI) DOCUMENTED: ICD-10-PCS | Mod: CPTII,S$GLB,, | Performed by: ORTHOPAEDIC SURGERY

## 2023-01-12 PROCEDURE — 1159F MED LIST DOCD IN RCRD: CPT | Mod: CPTII,S$GLB,, | Performed by: ORTHOPAEDIC SURGERY

## 2023-01-12 PROCEDURE — 3074F SYST BP LT 130 MM HG: CPT | Mod: CPTII,S$GLB,, | Performed by: ORTHOPAEDIC SURGERY

## 2023-01-12 NOTE — PROGRESS NOTES
Physical Therapy Treatment Note     Name: Sukumar Beal  [facility] Number: [id number]    Therapy Diagnosis:   Encounter Diagnoses   Name Primary?    Decreased range of motion of right shoulder Yes    Decreased muscle strength          Physician: Jh Sibley*    Visit Date: 1/12/2023    Physician Orders: PT Eval and Treat   Medical Diagnosis from Referral:   Diagnosis   M75.101 (ICD-10-CM) - Nontraumatic tear of right rotator cuff, unspecified tear extent   M75.21 (ICD-10-CM) - Biceps tendinitis of right upper extremity      Evaluation Date: 11/1/2022  Authorization Period Expiration: 12/31/2023  Plan of Care Expiration: 1/24/2023  Visit # / Visits authorized: 2/20 (16 total)    Initial FOTO: 58% (Predicted 27%)  FOTO 1st F/U:   FOTO 2nd F/U:       Time In: 1005am  Time Out: 1100am  Total Billable Time: 55 minutes    Precautions: Standard and RCR     Date of Surgery: 10/14/2022  Weeks Post Op: 12 weeks, 6 days as of 1/12     From Surgeon:  Physical Therapy: Follow the >3 cm cuff repair rehab protocol. PROM starts after 2 weeks. AROM starts after 7 weeks. No cuff resistive activity x 12 weeks. No biceps resistive activity x 8 weeks. We will be just a bit slower in this case given the tear pattern and size in this case    A/P:  Arthroscopic pictures again reviewed with the patient.  May wean out of the sling.  Discussed ongoing lifting precautions.  No more than a Coke can or small book.  May begin the active phase of his range of motion.  Work on active and passive range of motion and continued scapular mechanics.  I will see him back in 6 weeks.  At that time we will begin the strengthening phase of recovery.  He understands that he will have some activity and lifting restrictions up to 5 months postop.  All questions answered.  Prescription for Naprosyn given.        Subjective     Pt reports: feeling okay. Hectic at work. Doing Active Range of Motion at home.    He was compliant with home exercise  program.  Response to previous treatment: soreness  Functional change: improved overhead reach    Pain: not formally assessed  Location: right shoulder      Objective     Sukumar received therapeutic exercises to develop strength, endurance, ROM, flexibility, and posture for 30 minutes including:    Upper Body Ergometer 4'/4' level 2 for endurance  Landmine press 4# with plus 2 x 20  Internal Rotation/External Rotation with orange thera band 2 x 15  Wall ball rotations 2 x 30  Wall slides with 2# weight 2 x 20  Update Home Exercise Program     Not Performed Today:  Serratus press YTB 2 x 1 5  Standing scaption mirror cues 3 x 12  Sidelying ER to neutral 3 x 10  Sidelying flexion 1# 0-90 deg 2 x 10     Sukumar received the following manual therapy techniques: Joint mobilizations and Soft tissue Mobilization were applied to the: right shoulder for 25 minutes, including:    Passive Range of Motion into flexion and External Rotation Gr III AP mob on R GH  Gr I-II oscillations relaxation  Inferior mob arm at 20 deg abd Gr III    Sukumar participated in neuromuscular re-education activities to improve: Coordination, Proprioception, and Posture for 0 minutes. The following activities were included:    Not Performed Today:   Foam roller up wall 30x  Prone extensions 3# 3 x 12  No money RTB 3x10  Rhythmic stabilization at 20 degrees and 45 degrees of abduction as well as 90 degrees of flexion    Sukumar participated in dynamic functional therapeutic activities to improve functional performance for 0  minutes, including:      Home Exercises Provided and Patient Education Provided     Education provided:   - Home Exercise Program, precautions, progress    Written Home Exercises Provided: Patient instructed to cont prior HEP.  Exercises were reviewed and Sukumar was able to demonstrate them prior to the end of the session.  Sukumar demonstrated good  understanding of the education provided.     See EMR under Patient Instructions for exercises  provided prior visit.    Assessment     Sukumar presented today with good Passive Range of Motion and limited Active Range of Motion. Treatment focused on progressing Active Assisted Range of Motion and Active Range of Motion exercises. He tolerated it well and showed improved Active Range of Motion into abduction after manual.    uSkumar Is progressing well towards his goals.   Pt prognosis is Good.     Pt will continue to benefit from skilled outpatient physical therapy to address the deficits listed in the problem list box on initial evaluation, provide pt/family education and to maximize pt's level of independence in the home and community environment.     Pt's spiritual, cultural and educational needs considered and pt agreeable to plan of care and goals.     Anticipated barriers to physical therapy: none    Goals:   Short Term Goals:  6 weeks  Patient will be independent in Home Exercise Program for independent progression within protocol.-Progressing, not met   Patient will show improved Passive Range of Motion to 120 degrees of flexion to show progress within protocol.-Progressing, not met   Patient will show improved Passive Range of Motion to 30 degrees of External Rotation to show progress within protocol.-Progressing, not met      Long Term Goals: 12 weeks  Patient will be independent in Home Exercise Program to initiate strengthening and return to hobbies.-Progressing, not met   Patient will show improved full Passive Range of Motion in flexion and External Rotation. -Progressing, not met   Patient will show improved strength by demonstrating active flexion to 120 degrees.-Progressing, not met   Patient goal: return to boot camp three times a week with no restrictions.-Progressing, not met   Patient will have improved limitation score to 27% limited on FOTO shoulder in order to demonstrate functional improvement.-Progressing, not met     Plan     Continue range of motion activities per protocol. Give FOTO  next session    Madelyn Flowers, PT  , DPT     Gabapentin Pregnancy And Lactation Text: This medication is Pregnancy Category C and isn't considered safe during pregnancy. It is excreted in breast milk.

## 2023-01-12 NOTE — PROGRESS NOTES
S:Sukumar Beal presents for post-operative evaluation.     DATE OF PROCEDURE: 10/14/2022   OPERATION:   Right  1. Shoulder arthroscopic rotator cuff repair, transosseous equivalent double row (CPT 47513)  2. Shoulder open subpectoral biceps tenodesis (CPT 15439)  3. Shoulder arthroscopic extensive debridement (anterior, posterior glenohumeral joint, subacromial space) (CPT 23556)    4. Shoulder arthroscopic subacromial decompression     The patient is just over 3 months out from surgery.  States he is making good progress.  Denies any significant pain.  Pleased with his recovery to this point.  No numbness or tingling complaints.    Pain Score: 0-No pain    PAST MEDICAL HISTORY:   Past Medical History:   Diagnosis Date    Gout 7/2014    High cholesterol     Hypertension     Hypothyroid     Low testosterone     Staph aureus infection age 13    R leg     PAST SURGICAL HISTORY:  Past Surgical History:   Procedure Laterality Date    ARTHROSCOPIC DEBRIDEMENT OF SHOULDER  10/14/2022    Procedure: DEBRIDEMENT, SHOULDER, ARTHROSCOPIC;  Surgeon: LEONIDAS Carr MD;  Location: [facility] OR;  Service: Orthopedics;;    ARTHROSCOPIC REPAIR OF ROTATOR CUFF OF SHOULDER Right 10/14/2022    Procedure: REPAIR, ROTATOR CUFF, ARTHROSCOPIC - POLAR CARE;  Surgeon: LEONIDAS Carr MD;  Location: [facility] OR;  Service: Orthopedics;  Laterality: Right;  Regional w/o Catheter, Interscalene    ARTHROSCOPY,SHOULDER,WITH BICEPS TENODESIS Right 10/14/2022    Procedure: ARTHROSCOPY,SHOULDER,WITH BICEPS TENODESIS;  Surgeon: LEONIDAS Carr MD;  Location: [facility] OR;  Service: Orthopedics;  Laterality: Right;    COLONOSCOPY N/A 9/21/2020    Procedure: COLONOSCOPY;  Surgeon: VANI Newell MD;  Location: [address]);  Service: Endoscopy;  Laterality: N/A;  covid test 2nd floor surgery clinic 9/18    DECOMPRESSION OF SUBACROMIAL SPACE Right 10/14/2022    Procedure: DECOMPRESSION, SUBACROMIAL SPACE;  Surgeon: LEONIDAS Carr MD;  Location:  [facility] OR;  Service: Orthopedics;  Laterality: Right;    FRACTURE SURGERY Left     wrist    Incision and drainage of tibia Right     SINUS SURGERY      x2     FAMILY HISTORY:  Family History   Problem Relation Age of Onset    Heart disease Father 73            Hypertension Sister     Hyperlipidemia Sister     Hypertension Brother     Hyperlipidemia Brother     Lupus Sister     Hypertension Mother     Cancer Maternal Grandfather     Cancer Maternal Aunt         lung - smoker    Melanoma Neg Hx      MEDICATIONS:    Current Outpatient Medications:     atorvastatin (LIPITOR) 20 MG tablet, Take 1 tablet (20 mg total) by mouth once daily., Disp: 90 tablet, Rfl: 3    B-complex with vitamin C (Z-BEC OR EQUIV) tablet, Take 1 tablet by mouth once daily., Disp: , Rfl:     CYANOCOBALAMIN, VITAMIN B-12, ORAL, Take 1 tablet by mouth once daily., Disp: , Rfl:     ergocalciferol, vitamin D2, (VITAMIN D ORAL), Take 2 capsules by mouth once daily., Disp: , Rfl:     levothyroxine (SYNTHROID) 112 MCG tablet, Take 1 tablet (112 mcg total) by mouth once daily., Disp: 90 tablet, Rfl: 3    losartan (COZAAR) 50 MG tablet, Take 1 tablet (50 mg total) by mouth once daily., Disp: 90 tablet, Rfl: 3    testosterone (ANDROGEL) 20.25 mg/1.25 gram (1.62 %) GlPm, Place 3 pumps onto the skin once daily., Disp: 150 g, Rfl: 5    vitamin E acetate (VITAMIN E ORAL), Take 1 tablet by mouth once daily., Disp: , Rfl:     aspirin (ECOTRIN) 81 MG EC tablet, Take 1 tablet (81 mg total) by mouth 2 (two) times a day. for 14 days (Patient not taking: Reported on 10/27/2022), Disp: 28 tablet, Rfl: 0    naproxen (NAPROSYN) 500 MG tablet, Take 1 tablet (500 mg total) by mouth 2 (two) times daily. (Patient not taking: Reported on 2023), Disp: 60 tablet, Rfl: 1    ondansetron (ZOFRAN-ODT) 4 MG TbDL, Dissolve 1 tablet (4 mg total) by mouth every 8 (eight) hours as needed (nausea). (Patient not taking: Reported on 2023), Disp: 30 tablet, Rfl: 0    " oxyCODONE (ROXICODONE) 5 MG immediate release tablet, Take 1-2 tablets (5-10 mg total) by mouth every 4 to 6 hours as needed for Pain. (Patient not taking: Reported on 10/27/2022), Disp: 28 tablet, Rfl: 0    sildenafiL (VIAGRA) 100 MG tablet, Take 1 tablet (100 mg total) by mouth daily as needed for Erectile Dysfunction (take on an empty stomach 30-60 minutes before). (Patient not taking: Reported on 1/12/2023), Disp: 10 tablet, Rfl: 12    ALLERGIES:  Review of patient's allergies indicates:   Allergen Reactions    Codeine Rash    Penicillins Rash     REVIEW OF SYSTEMS:  Constitution: Negative. Negative for chills, fever and night sweats.    Hematologic/Lymphatic: Negative for bleeding problem. Does not bruise/bleed easily.   Skin: Negative for dry skin, itching and rash.   Musculoskeletal: Negative for falls. Neg for right shoulder pain and  muscle weakness.     PHYSICAL EXAMINATION:  Vitals:  /81 (BP Location: Right arm, Patient Position: Sitting, BP Method: Large (Automatic))   Pulse 69   Ht 6' 1.5" (1.867 m)   Wt 112 kg (247 lb)   BMI 32.15 kg/m²    General: Well-developed well-nourished 52 y.o. malein no acute distress   Cardiovascular: Regular rhythm by palpation of distal pulse, normal color and temperature, no concerning varicosities on symptomatic side   Lungs: No labored breathing or wheezing appreciated   Neuro: Alert and oriented ×3   Psychiatric: well oriented to person, place and time, demonstrates normal mood and affect   Skin: No rashes, lesions or ulcers, normal temperature, turgor, and texture on uninvolved extremity    Ortho/SPM Exam  Exam of the right shoulder shows well-healed incisions.  No tenderness to palpation of significance.  Active forward elevation in scapular plane to 140, passive to 160.  Active external rotation with arm at side to 50.  Passive to 60.  No scapular winging.  There is some mild scapular dyskinesis.  No pain to resisted scaption.  Weakness as " expected.    IMAGING:  No new imaging    ASSESSMENT:      ICD-10-CM ICD-9-CM   1. Shoulder weakness  R29.898 719.61       PLAN:       Findings discussed with the patient.  Doing well overall.  May begin the resisted phase of his recovery.  Strengthening under the guidance of physical therapy.  No lifting still more than 5 lb for now.  I will see him back in 2 months.  At that time we will lift most of his restrictions and move towards a full release for day-to-day activity.    Procedures

## 2023-01-16 ENCOUNTER — PATIENT MESSAGE (OUTPATIENT)
Dept: INTERNAL MEDICINE | Facility: CLINIC | Age: 53
End: 2023-01-16
Payer: COMMERCIAL

## 2023-01-17 ENCOUNTER — CLINICAL SUPPORT (OUTPATIENT)
Dept: REHABILITATION | Facility: HOSPITAL | Age: 53
End: 2023-01-17
Payer: COMMERCIAL

## 2023-01-17 DIAGNOSIS — M62.81 DECREASED MUSCLE STRENGTH: ICD-10-CM

## 2023-01-17 DIAGNOSIS — M25.611 DECREASED RANGE OF MOTION OF RIGHT SHOULDER: Primary | ICD-10-CM

## 2023-01-17 PROCEDURE — 97110 THERAPEUTIC EXERCISES: CPT

## 2023-01-17 PROCEDURE — 97140 MANUAL THERAPY 1/> REGIONS: CPT

## 2023-01-17 NOTE — PROGRESS NOTES
Physical Therapy Treatment Note     Name: Sukumar Beal  [facility] Number: [id number]    Therapy Diagnosis:   Encounter Diagnoses   Name Primary?    Decreased range of motion of right shoulder Yes    Decreased muscle strength          Physician: Jh Sibley*    Visit Date: 1/17/2023    Physician Orders: PT Eval and Treat   Medical Diagnosis from Referral:   Diagnosis   M75.101 (ICD-10-CM) - Nontraumatic tear of right rotator cuff, unspecified tear extent   M75.21 (ICD-10-CM) - Biceps tendinitis of right upper extremity      Evaluation Date: 11/1/2022  Authorization Period Expiration: 12/31/2023  Plan of Care Expiration: 1/24/2023  Visit # / Visits authorized: 3/20 (17 total)    Initial FOTO: 58% (Predicted 27%)  FOTO 1st F/U:   FOTO 2nd F/U:       Time In: 900am  Time Out: 950am  Total Billable Time: 50 minutes    Precautions: Standard and RCR     Date of Surgery: 10/14/2022  Weeks Post Op: 12 weeks, 6 days as of 1/12     From Surgeon:  Physical Therapy: Follow the >3 cm cuff repair rehab protocol. PROM starts after 2 weeks. AROM starts after 7 weeks. No cuff resistive activity x 12 weeks. No biceps resistive activity x 8 weeks. We will be just a bit slower in this case given the tear pattern and size in this case    A/P:  Arthroscopic pictures again reviewed with the patient.  May wean out of the sling.  Discussed ongoing lifting precautions.  No more than a Coke can or small book.  May begin the active phase of his range of motion.  Work on active and passive range of motion and continued scapular mechanics.  I will see him back in 6 weeks.  At that time we will begin the strengthening phase of recovery.  He understands that he will have some activity and lifting restrictions up to 5 months postop.  All questions answered.  Prescription for Naprosyn given.        Subjective     Pt reports: appointment with doctor went well. Still some soreness in the shoulder. Feels like he is not as far along as he  should be.    He was compliant with home exercise program.  Response to previous treatment: soreness  Functional change: improved overhead reach    Pain: not formally assessed  Location: right shoulder      Objective     Sukumar received therapeutic exercises to develop strength, endurance, ROM, flexibility, and posture for 35 minutes including:    Upper Body Ergometer 4'/4' level 2 for endurance  Landmine press 4# with plus 2 x 20  Internal Rotation/External Rotation with lift, orange thera band 2 x 15  Wall ball rotations 2 x 30  Wall slides with 2# weight 2 x 20  Update Home Exercise Program   Scaption yellow band 2 x 15    Not Performed Today:  Serratus press YTB 2 x 1 5  Standing scaption mirror cues 3 x 12  Sidelying ER to neutral 3 x 10  Sidelying flexion 1# 0-90 deg 2 x 10     Sukumar received the following manual therapy techniques: Joint mobilizations and Soft tissue Mobilization were applied to the: right shoulder for 15 minutes, including:    Passive Range of Motion into flexion and External Rotation Gr III AP mob on R GH  Gr I-II oscillations relaxation  Inferior mob arm at 20 deg abd Gr III    Sukumar participated in neuromuscular re-education activities to improve: Coordination, Proprioception, and Posture for 0 minutes. The following activities were included:    Not Performed Today:   Foam roller up wall 30x  Prone extensions 3# 3 x 12  No money RTB 3x10  Rhythmic stabilization at 20 degrees and 45 degrees of abduction as well as 90 degrees of flexion    Sukumar participated in dynamic functional therapeutic activities to improve functional performance for 0  minutes, including:      Home Exercises Provided and Patient Education Provided     Education provided:   - Home Exercise Program, precautions, progress    Written Home Exercises Provided: Patient instructed to cont prior HEP.  Exercises were reviewed and Sukumar was able to demonstrate them prior to the end of the session.  Sukumar demonstrated good   understanding of the education provided.     See EMR under Patient Instructions for exercises provided prior visit.    Assessment     Sukumar presented today with good Passive Range of Motion and limited Active Range of Motion. Treatment focused on progressing Active Assisted Range of Motion and Active Range of Motion exercises. He tolerated it well and showed improved Active Range of Motion into abduction after manual.    Sukumar Is progressing well towards his goals.   Pt prognosis is Good.     Pt will continue to benefit from skilled outpatient physical therapy to address the deficits listed in the problem list box on initial evaluation, provide pt/family education and to maximize pt's level of independence in the home and community environment.     Pt's spiritual, cultural and educational needs considered and pt agreeable to plan of care and goals.     Anticipated barriers to physical therapy: none    Goals:   Short Term Goals:  6 weeks  Patient will be independent in Home Exercise Program for independent progression within protocol.-Progressing, not met   Patient will show improved Passive Range of Motion to 120 degrees of flexion to show progress within protocol.-Progressing, not met   Patient will show improved Passive Range of Motion to 30 degrees of External Rotation to show progress within protocol.-Progressing, not met      Long Term Goals: 12 weeks  Patient will be independent in Home Exercise Program to initiate strengthening and return to hobbies.-Progressing, not met   Patient will show improved full Passive Range of Motion in flexion and External Rotation. -Progressing, not met   Patient will show improved strength by demonstrating active flexion to 120 degrees.-Progressing, not met   Patient goal: return to boot camp three times a week with no restrictions.-Progressing, not met   Patient will have improved limitation score to 27% limited on FOTO shoulder in order to demonstrate functional  improvement.-Progressing, not met     Plan     Continue range of motion activities per protocol.     Madelyn Flowers, PT  , DPT

## 2023-01-19 ENCOUNTER — CLINICAL SUPPORT (OUTPATIENT)
Dept: REHABILITATION | Facility: HOSPITAL | Age: 53
End: 2023-01-19
Payer: COMMERCIAL

## 2023-01-19 DIAGNOSIS — M25.611 DECREASED RANGE OF MOTION OF RIGHT SHOULDER: Primary | ICD-10-CM

## 2023-01-19 DIAGNOSIS — M62.81 DECREASED MUSCLE STRENGTH: ICD-10-CM

## 2023-01-19 PROCEDURE — 97110 THERAPEUTIC EXERCISES: CPT | Performed by: PHYSICAL THERAPIST

## 2023-01-19 PROCEDURE — 97140 MANUAL THERAPY 1/> REGIONS: CPT | Performed by: PHYSICAL THERAPIST

## 2023-01-19 NOTE — PROGRESS NOTES
Physical Therapy Treatment Note     Name: Sukumar Beal  [facility] Number: [id number]    Therapy Diagnosis:   Encounter Diagnoses   Name Primary?    Decreased range of motion of right shoulder Yes    Decreased muscle strength      Physician: Jh Sibley*    Visit Date: 1/19/2023    Physician Orders: PT Eval and Treat   Medical Diagnosis from Referral:   Diagnosis   M75.101 (ICD-10-CM) - Nontraumatic tear of right rotator cuff, unspecified tear extent   M75.21 (ICD-10-CM) - Biceps tendinitis of right upper extremity      Evaluation Date: 11/1/2022  Authorization Period Expiration: 12/31/2023  Plan of Care Expiration: 1/24/2023  Visit # / Visits authorized: 4/20 (17 total)    Initial FOTO: 58% (Predicted 27%)  FOTO 1st F/U:   FOTO 2nd F/U:       Time In: 1000am  Time Out: 1100am  Total Billable Time: 60 minutes    Precautions: Standard and RCR     Date of Surgery: 10/14/2022  Weeks Post Op: 13 weeks, 6 days as of 1/19     From Surgeon:  Physical Therapy: Follow the >3 cm cuff repair rehab protocol. PROM starts after 2 weeks. AROM starts after 7 weeks. No cuff resistive activity x 12 weeks. No biceps resistive activity x 8 weeks. We will be just a bit slower in this case given the tear pattern and size in this case    A/P:  Arthroscopic pictures again reviewed with the patient.  May wean out of the sling.  Discussed ongoing lifting precautions.  No more than a Coke can or small book.  May begin the active phase of his range of motion.  Work on active and passive range of motion and continued scapular mechanics.  I will see him back in 6 weeks.  At that time we will begin the strengthening phase of recovery.  He understands that he will have some activity and lifting restrictions up to 5 months postop.  All questions answered.  Prescription for Naprosyn given.        Subjective     Pt reports: I feel like I should be doing better but Im not sure why. It doesn't hurt just stiff    He was compliant with home  exercise program.  Response to previous treatment: soreness  Functional change: improved overhead reach    Pain: not formally assessed  Location: right shoulder      Objective     Sukumar received therapeutic exercises to develop strength, endurance, ROM, flexibility, and posture for 45 minutes including:    Wall slide 2# BUE at wall 30x  Sidelying ER 2# 3 x 1 5  Propped sitting 90/90 1# ER 3 x 12  ER with lift to 90 deg OTB 2 x 15  Arm in front IR/ER 2 x 10 OTB     Not Performed Today:    Landmine press 4# with plus 2 x 20  Internal Rotation/External Rotation with lift, orange thera band 2 x 15  Wall ball rotations 2 x 30  Wall slides with 2# weight 2 x 20  Update Home Exercise Program   Scaption yellow band 2 x 15  Serratus press YTB 2 x 1 5  Standing scaption mirror cues 3 x 12  Sidelying ER to neutral 3 x 10  Sidelying flexion 1# 0-90 deg 2 x 10     Sukumar received the following manual therapy techniques: Joint mobilizations and Soft tissue Mobilization were applied to the: right shoulder for 15 minutes, including:    Passive Range of Motion into flexion and External Rotation Gr III AP mob on R GH  Gr I-II oscillations relaxation  Inferior mob arm at 20 and 90 deg abd Gr III  Post capsule stretch cross body    Sukumar participated in neuromuscular re-education activities to improve: Coordination, Proprioception, and Posture for 0 minutes. The following activities were included:    Not Performed Today:   Foam roller up wall 30x  Prone extensions 3# 3 x 12  No money RTB 3x10  Rhythmic stabilization at 20 degrees and 45 degrees of abduction as well as 90 degrees of flexion    Sukumar participated in dynamic functional therapeutic activities to improve functional performance for 0  minutes, including:      Home Exercises Provided and Patient Education Provided     Education provided:   - Home Exercise Program, precautions, progress    Written Home Exercises Provided: Patient instructed to cont prior HEP.  Exercises were  reviewed and Sukumar was able to demonstrate them prior to the end of the session.  Sukumar demonstrated good  understanding of the education provided.     See EMR under Patient Instructions for exercises provided prior visit.    Assessment     Sukumar presented with expected ROM, assured him of his progress and the stiffness he is feeling is due to cuff weakness. Progressed him today and he shows excellent ROM at 90 deg abduction. Able to perform exercise with arm in front with muscle fatigue, will begin this progression to improve on his overhead lifting    Sukumar Is progressing well towards his goals.   Pt prognosis is Good.     Pt will continue to benefit from skilled outpatient physical therapy to address the deficits listed in the problem list box on initial evaluation, provide pt/family education and to maximize pt's level of independence in the home and community environment.     Pt's spiritual, cultural and educational needs considered and pt agreeable to plan of care and goals.     Anticipated barriers to physical therapy: none    Goals:   Short Term Goals:  6 weeks  Patient will be independent in Home Exercise Program for independent progression within protocol.-Progressing, not met   Patient will show improved Passive Range of Motion to 120 degrees of flexion to show progress within protocol.-Progressing, not met   Patient will show improved Passive Range of Motion to 30 degrees of External Rotation to show progress within protocol.-Progressing, not met      Long Term Goals: 12 weeks  Patient will be independent in Home Exercise Program to initiate strengthening and return to hobbies.-Progressing, not met   Patient will show improved full Passive Range of Motion in flexion and External Rotation. -Progressing, not met   Patient will show improved strength by demonstrating active flexion to 120 degrees.-Progressing, not met   Patient goal: return to boot camp three times a week with no restrictions.-Progressing, not  met   Patient will have improved limitation score to 27% limited on FOTO shoulder in order to demonstrate functional improvement.-Progressing, not met     Plan     Continue range of motion activities per protocol.     Reij Szymanski, PT  , DPT

## 2023-01-24 ENCOUNTER — CLINICAL SUPPORT (OUTPATIENT)
Dept: REHABILITATION | Facility: HOSPITAL | Age: 53
End: 2023-01-24
Payer: COMMERCIAL

## 2023-01-24 DIAGNOSIS — M62.81 DECREASED MUSCLE STRENGTH: ICD-10-CM

## 2023-01-24 DIAGNOSIS — M25.611 DECREASED RANGE OF MOTION OF RIGHT SHOULDER: Primary | ICD-10-CM

## 2023-01-24 PROCEDURE — 97110 THERAPEUTIC EXERCISES: CPT

## 2023-01-24 PROCEDURE — 97140 MANUAL THERAPY 1/> REGIONS: CPT

## 2023-01-24 NOTE — PROGRESS NOTES
Physical Therapy Treatment Note     Name: Sukumar Beal  [facility] Number: [id number]    Therapy Diagnosis:   Encounter Diagnoses   Name Primary?    Decreased range of motion of right shoulder Yes    Decreased muscle strength      Physician: Jh Sibley*    Visit Date: 1/24/2023    Physician Orders: PT Eval and Treat   Medical Diagnosis from Referral:   Diagnosis   M75.101 (ICD-10-CM) - Nontraumatic tear of right rotator cuff, unspecified tear extent   M75.21 (ICD-10-CM) - Biceps tendinitis of right upper extremity      Evaluation Date: 11/1/2022  Authorization Period Expiration: 12/31/2023  Plan of Care Expiration: 1/24/2023  Visit # / Visits authorized: 5/20 (18 total)    Initial FOTO: 58% (Predicted 27%)  FOTO 1st F/U:   FOTO 2nd F/U:       Time In: 1000am  Time Out: 1045am  Total Billable Time: 45 minutes    Precautions: Standard and RCR     Date of Surgery: 10/14/2022  Weeks Post Op: 13 weeks, 6 days as of 1/19     From Surgeon:  Physical Therapy: Follow the >3 cm cuff repair rehab protocol. PROM starts after 2 weeks. AROM starts after 7 weeks. No cuff resistive activity x 12 weeks. No biceps resistive activity x 8 weeks. We will be just a bit slower in this case given the tear pattern and size in this case    A/P:  Arthroscopic pictures again reviewed with the patient.  May wean out of the sling.  Discussed ongoing lifting precautions.  No more than a Coke can or small book.  May begin the active phase of his range of motion.  Work on active and passive range of motion and continued scapular mechanics.  I will see him back in 6 weeks.  At that time we will begin the strengthening phase of recovery.  He understands that he will have some activity and lifting restrictions up to 5 months postop.  All questions answered.  Prescription for Naprosyn given.        Subjective     Pt reports: does his Home Exercise Program most of the time. Feeling okay, sometimes he wishes he was better than he is  currently    He was compliant with home exercise program.  Response to previous treatment: soreness  Functional change: improved overhead reach    Pain: not formally assessed  Location: right shoulder      Objective     Sukumar received therapeutic exercises to develop strength, endurance, ROM, flexibility, and posture for 30 minutes including:    Upper Body Ergometer level 6 4'/4' for strength and endurance  Wall slide 2# BUE at wall 30x  Sidelying ER 2# 3 x 1 5  Propped sitting 90/90 1# ER 3 x 12  ER with lift to 90 deg OTB 2 x 15  Arm in front IR/ER 2 x 10 OTB   Body blade 3 x 30 sec with arm at side, elbow bent to 90    Not Performed Today:    Landmine press 4# with plus 2 x 20  Internal Rotation/External Rotation with lift, orange thera band 2 x 15  Wall ball rotations 2 x 30  Wall slides with 2# weight 2 x 20  Update Home Exercise Program   Scaption yellow band 2 x 15  Serratus press YTB 2 x 1 5  Standing scaption mirror cues 3 x 12  Sidelying ER to neutral 3 x 10  Sidelying flexion 1# 0-90 deg 2 x 10     Sukumar received the following manual therapy techniques: Joint mobilizations and Soft tissue Mobilization were applied to the: right shoulder for 15 minutes, including:    Passive Range of Motion into flexion and External Rotation Gr III AP mob on R GH  Gr I-II oscillations relaxation  Inferior mob arm at 20 and 90 deg abd Gr III  Post capsule stretch cross body    Sukumar participated in neuromuscular re-education activities to improve: Coordination, Proprioception, and Posture for 0 minutes. The following activities were included:    Not Performed Today:   Foam roller up wall 30x  Prone extensions 3# 3 x 12  No money RTB 3x10  Rhythmic stabilization at 20 degrees and 45 degrees of abduction as well as 90 degrees of flexion    Sukumar participated in dynamic functional therapeutic activities to improve functional performance for 0  minutes, including:      Home Exercises Provided and Patient Education Provided      Education provided:   - Home Exercise Program, precautions, progress    Written Home Exercises Provided: Patient instructed to cont prior HEP.  Exercises were reviewed and Sukumar was able to demonstrate them prior to the end of the session.  Sukumar demonstrated good  understanding of the education provided.     See EMR under Patient Instructions for exercises provided prior visit.    Assessment     Sukumar presents today with good active and passive range of motion for his stage post op. He progressed with his exercises today that focused on strengthening. He was fatigued but pain free.     Sukumar Is progressing well towards his goals.   Pt prognosis is Good.     Pt will continue to benefit from skilled outpatient physical therapy to address the deficits listed in the problem list box on initial evaluation, provide pt/family education and to maximize pt's level of independence in the home and community environment.     Pt's spiritual, cultural and educational needs considered and pt agreeable to plan of care and goals.     Anticipated barriers to physical therapy: none    Goals:   Short Term Goals:  6 weeks  Patient will be independent in Home Exercise Program for independent progression within protocol.-Progressing, not met   Patient will show improved Passive Range of Motion to 120 degrees of flexion to show progress within protocol.-Progressing, not met   Patient will show improved Passive Range of Motion to 30 degrees of External Rotation to show progress within protocol.-Progressing, not met      Long Term Goals: 12 weeks  Patient will be independent in Home Exercise Program to initiate strengthening and return to hobbies.-Progressing, not met   Patient will show improved full Passive Range of Motion in flexion and External Rotation. -Progressing, not met   Patient will show improved strength by demonstrating active flexion to 120 degrees.-Progressing, not met   Patient goal: return to boot camp three times a week  with no restrictions.-Progressing, not met   Patient will have improved limitation score to 27% limited on FOTO shoulder in order to demonstrate functional improvement.-Progressing, not met     Plan     Continue range of motion and strengthening activities per protocol.     Madelyn Flowers, PT  , DPT

## 2023-01-26 ENCOUNTER — CLINICAL SUPPORT (OUTPATIENT)
Dept: REHABILITATION | Facility: HOSPITAL | Age: 53
End: 2023-01-26
Payer: COMMERCIAL

## 2023-01-26 DIAGNOSIS — M25.611 DECREASED RANGE OF MOTION OF RIGHT SHOULDER: Primary | ICD-10-CM

## 2023-01-26 DIAGNOSIS — M62.81 DECREASED MUSCLE STRENGTH: ICD-10-CM

## 2023-01-26 PROCEDURE — 97110 THERAPEUTIC EXERCISES: CPT

## 2023-01-26 PROCEDURE — 97140 MANUAL THERAPY 1/> REGIONS: CPT

## 2023-01-26 NOTE — PROGRESS NOTES
Physical Therapy Treatment Note     Name: Sukumar Beal  [facility] Number: [id number]    Therapy Diagnosis:   Encounter Diagnoses   Name Primary?    Decreased range of motion of right shoulder Yes    Decreased muscle strength      Physician: Jh Sibley*    Visit Date: 1/26/2023    Physician Orders: PT Eval and Treat   Medical Diagnosis from Referral:   Diagnosis   M75.101 (ICD-10-CM) - Nontraumatic tear of right rotator cuff, unspecified tear extent   M75.21 (ICD-10-CM) - Biceps tendinitis of right upper extremity      Evaluation Date: 11/1/2022  Authorization Period Expiration: 12/31/2023  Plan of Care Expiration: 1/24/2023  Visit # / Visits authorized: 5/20 (18 total)    Initial FOTO: 58% (Predicted 27%)  FOTO 1st F/U:   FOTO 2nd F/U:       Time In: 1000am  Time Out: 1045am  Total Billable Time: 45 minutes    Precautions: Standard and RCR     Date of Surgery: 10/14/2022  Weeks Post Op: 14 weeks, 6 days as of 1/26     From Surgeon:  Physical Therapy: Follow the >3 cm cuff repair rehab protocol. PROM starts after 2 weeks. AROM starts after 7 weeks. No cuff resistive activity x 12 weeks. No biceps resistive activity x 8 weeks. We will be just a bit slower in this case given the tear pattern and size in this case    A/P:  Arthroscopic pictures again reviewed with the patient.  May wean out of the sling.  Discussed ongoing lifting precautions.  No more than a Coke can or small book.  May begin the active phase of his range of motion.  Work on active and passive range of motion and continued scapular mechanics.  I will see him back in 6 weeks.  At that time we will begin the strengthening phase of recovery.  He understands that he will have some activity and lifting restrictions up to 5 months postop.  All questions answered.  Prescription for Naprosyn given.        Subjective     Pt reports: does his Home Exercise Program most of the time. Feeling okay, sometimes he wishes he was better than he is  currently    He was compliant with home exercise program.  Response to previous treatment: soreness  Functional change: improved overhead reach    Pain: not formally assessed  Location: right shoulder      Objective     Sukumar received therapeutic exercises to develop strength, endurance, ROM, flexibility, and posture for 30 minutes including:    Upper Body Ergometer level 6 4'/4' for strength and endurance  ER with lift to 90 deg OTB 2 x 15  Arm in front IR/ER 2 x 10 OTB   Body blade 3 x 30 sec with arm abduction to 90, elbow bent to 90  Yellow band scaption 3 x 10 with shoulder blade tucked  Landmine press 15# barbell 3 x 10    Not Performed Today:  Propped sitting 90/90 1# ER 3 x 12  Sidelying ER 2# 3 x 1 5  Wall slide 2# BUE at wall 30x  Landmine press 4# with plus 2 x 20  Internal Rotation/External Rotation with lift, orange thera band 2 x 15  Wall ball rotations 2 x 30  Wall slides with 2# weight 2 x 20  Update Home Exercise Program   Scaption yellow band 2 x 15  Serratus press YTB 2 x 1 5  Standing scaption mirror cues 3 x 12  Sidelying ER to neutral 3 x 10  Sidelying flexion 1# 0-90 deg 2 x 10     Sukumar received the following manual therapy techniques: Joint mobilizations and Soft tissue Mobilization were applied to the: right shoulder for 15 minutes, including:    Passive Range of Motion into flexion and External Rotation Gr III AP mob on R GH  Gr I-II oscillations relaxation  Inferior mob arm at 20 and 90 deg abd Gr III  Scapular upward rotation grade III  Prone CTJ gapping grade III    Not Performed Today:  Post capsule stretch cross body    Sukumar participated in neuromuscular re-education activities to improve: Coordination, Proprioception, and Posture for 0 minutes. The following activities were included:    Not Performed Today:   Foam roller up wall 30x  Prone extensions 3# 3 x 12  No money RTB 3x10  Rhythmic stabilization at 20 degrees and 45 degrees of abduction as well as 90 degrees of flexion    Sukumar  participated in dynamic functional therapeutic activities to improve functional performance for 0  minutes, including:      Home Exercises Provided and Patient Education Provided     Education provided:   - Home Exercise Program, precautions, progress    Written Home Exercises Provided: Patient instructed to cont prior HEP.  Exercises were reviewed and Sukumar was able to demonstrate them prior to the end of the session.  Sukumar demonstrated good  understanding of the education provided.     See EMR under Patient Instructions for exercises provided prior visit.    Assessment     Sukumar presents today with good active and passive range of motion for his stage post op. He progressed with his exercises today that focused on strengthening. He was fatigued but pain free.     Sukumar Is progressing well towards his goals.   Pt prognosis is Good.     Pt will continue to benefit from skilled outpatient physical therapy to address the deficits listed in the problem list box on initial evaluation, provide pt/family education and to maximize pt's level of independence in the home and community environment.     Pt's spiritual, cultural and educational needs considered and pt agreeable to plan of care and goals.     Anticipated barriers to physical therapy: none    Goals:   Short Term Goals:  6 weeks  Patient will be independent in Home Exercise Program for independent progression within protocol.-Progressing, not met   Patient will show improved Passive Range of Motion to 120 degrees of flexion to show progress within protocol.-Progressing, not met   Patient will show improved Passive Range of Motion to 30 degrees of External Rotation to show progress within protocol.-Progressing, not met      Long Term Goals: 12 weeks  Patient will be independent in Home Exercise Program to initiate strengthening and return to hobbies.-Progressing, not met   Patient will show improved full Passive Range of Motion in flexion and External Rotation.  -Progressing, not met   Patient will show improved strength by demonstrating active flexion to 120 degrees.-Progressing, not met   Patient goal: return to boot camp three times a week with no restrictions.-Progressing, not met   Patient will have improved limitation score to 27% limited on FOTO shoulder in order to demonstrate functional improvement.-Progressing, not met     Plan     Continue range of motion and strengthening activities per protocol.     Madelyn Flowers, PT  , DPT

## 2023-01-30 ENCOUNTER — CLINICAL SUPPORT (OUTPATIENT)
Dept: REHABILITATION | Facility: HOSPITAL | Age: 53
End: 2023-01-30
Payer: COMMERCIAL

## 2023-01-30 DIAGNOSIS — M25.611 DECREASED RANGE OF MOTION OF RIGHT SHOULDER: Primary | ICD-10-CM

## 2023-01-30 DIAGNOSIS — M62.81 DECREASED MUSCLE STRENGTH: ICD-10-CM

## 2023-01-30 PROCEDURE — 97140 MANUAL THERAPY 1/> REGIONS: CPT | Mod: CQ

## 2023-01-30 PROCEDURE — 97110 THERAPEUTIC EXERCISES: CPT | Mod: CQ

## 2023-01-30 NOTE — PROGRESS NOTES
Physical Therapy Treatment Note     Name: Sukumar Beal  [facility] Number: [id number]    Therapy Diagnosis:   Encounter Diagnoses   Name Primary?    Decreased range of motion of right shoulder Yes    Decreased muscle strength      Physician: Jh Sibley*    Visit Date: 1/30/2023    Physician Orders: PT Eval and Treat   Medical Diagnosis from Referral:   Diagnosis   M75.101 (ICD-10-CM) - Nontraumatic tear of right rotator cuff, unspecified tear extent   M75.21 (ICD-10-CM) - Biceps tendinitis of right upper extremity      Evaluation Date: 11/1/2022  Authorization Period Expiration: 12/31/2023  Plan of Care Expiration: 1/24/2023  Visit # / Visits authorized: 5/20 (18 total)    Initial FOTO: 58% (Predicted 27%)  FOTO 1st F/U:   FOTO 2nd F/U:       Time In: 0910  Time Out: 1000  Total Billable Time: 45 minutes    Precautions: Standard and RCR     Date of Surgery: 10/14/2022  Weeks Post Op: 14 weeks, 6 days as of 1/26     From Surgeon:  Physical Therapy: Follow the >3 cm cuff repair rehab protocol. PROM starts after 2 weeks. AROM starts after 7 weeks. No cuff resistive activity x 12 weeks. No biceps resistive activity x 8 weeks. We will be just a bit slower in this case given the tear pattern and size in this case    A/P:  Arthroscopic pictures again reviewed with the patient.  May wean out of the sling.  Discussed ongoing lifting precautions.  No more than a Coke can or small book.  May begin the active phase of his range of motion.  Work on active and passive range of motion and continued scapular mechanics.  I will see him back in 6 weeks.  At that time we will begin the strengthening phase of recovery.  He understands that he will have some activity and lifting restrictions up to 5 months postop.  All questions answered.  Prescription for Naprosyn given.        Subjective     Pt reports: my shld feels great  He was compliant with home exercise program.  Response to previous treatment: soreness  Functional  "change: improved overhead reach    Pain: 0/10  Location: right shoulder      Objective     Sukumar received therapeutic exercises to develop strength, endurance, ROM, flexibility, and posture for 40 minutes including:    Upper Body Ergometer level 7 4'/4' for strength and endurance  ER with lift to 90 deg OTB 3x10  Arm in front IR/ER 3x10 OTB   Body blade 3 x 30 sec with arm abduction to 90, elbow bent to 90  Yellow band scaption 3 x 10 with shoulder blade tucked  Landmine press 15# with yellow dowel - BB not available 3x10/3"  Seated propped 90/90 ER >OH press 3x10    Not Performed Today:  Propped sitting 90/90 1# ER 3 x 12  Sidelying ER 2# 3 x 1 5  Wall slide 2# BUE at wall 30x  Landmine press 4# with plus 2 x 20  Internal Rotation/External Rotation with lift, orange thera band 2 x 15  Wall ball rotations 2 x 30  Wall slides with 2# weight 2 x 20  Update Home Exercise Program   Scaption yellow band 2 x 15  Serratus press YTB 2 x 1 5  Standing scaption mirror cues 3 x 12  Sidelying ER to neutral 3 x 10  Sidelying flexion 1# 0-90 deg 2 x 10     Sukumar received the following manual therapy techniques: Joint mobilizations and Soft tissue Mobilization were applied to the: right shoulder for 10 minutes, including:    Passive Range of Motion into flexion and External Rotation Gr III AP mob on R GH  Gr I-II oscillations relaxation  Inferior mob arm at 20 and 90 deg abd Gr III  Scapular upward rotation grade III          Sukumar participated in neuromuscular re-education activities to improve: Coordination, Proprioception, and Posture for 0 minutes. The following activities were included:      Sukumar participated in dynamic functional therapeutic activities to improve functional performance for 0  minutes, including:      Home Exercises Provided and Patient Education Provided     Education provided:   - Home Exercise Program, precautions, progress    Written Home Exercises Provided: Patient instructed to cont prior HEP.  Exercises " were reviewed and Sukumar was able to demonstrate them prior to the end of the session.  Sukumar demonstrated good  understanding of the education provided.     See EMR under Patient Instructions for exercises provided prior visit.    Assessment   Pt tolerating tx well. Continues with show improvement with active and PROM. VC/TC for scap activation with shld ROM. Cotinue with progressing with shld strengthening and stabilization activity.    Sukumar Is progressing well towards his goals.   Pt prognosis is Good.     Pt will continue to benefit from skilled outpatient physical therapy to address the deficits listed in the problem list box on initial evaluation, provide pt/family education and to maximize pt's level of independence in the home and community environment.     Pt's spiritual, cultural and educational needs considered and pt agreeable to plan of care and goals.     Anticipated barriers to physical therapy: none    Goals:   Short Term Goals:  6 weeks  Patient will be independent in Home Exercise Program for independent progression within protocol.-Progressing, not met   Patient will show improved Passive Range of Motion to 120 degrees of flexion to show progress within protocol.-Progressing, not met   Patient will show improved Passive Range of Motion to 30 degrees of External Rotation to show progress within protocol.-Progressing, not met      Long Term Goals: 12 weeks  Patient will be independent in Home Exercise Program to initiate strengthening and return to hobbies.-Progressing, not met   Patient will show improved full Passive Range of Motion in flexion and External Rotation. -Progressing, not met   Patient will show improved strength by demonstrating active flexion to 120 degrees.-Progressing, not met   Patient goal: return to boot camp three times a week with no restrictions.-Progressing, not met   Patient will have improved limitation score to 27% limited on FOTO shoulder in order to demonstrate functional  improvement.-Progressing, not met     Plan     Continue range of motion and strengthening activities per protocol.     Davey Carson, PTA, STS

## 2023-02-02 ENCOUNTER — CLINICAL SUPPORT (OUTPATIENT)
Dept: REHABILITATION | Facility: HOSPITAL | Age: 53
End: 2023-02-02
Payer: COMMERCIAL

## 2023-02-02 DIAGNOSIS — M62.81 DECREASED MUSCLE STRENGTH: ICD-10-CM

## 2023-02-02 DIAGNOSIS — M25.611 DECREASED RANGE OF MOTION OF RIGHT SHOULDER: Primary | ICD-10-CM

## 2023-02-02 PROCEDURE — 97112 NEUROMUSCULAR REEDUCATION: CPT | Performed by: PHYSICAL THERAPIST

## 2023-02-02 PROCEDURE — 97110 THERAPEUTIC EXERCISES: CPT | Performed by: PHYSICAL THERAPIST

## 2023-02-02 PROCEDURE — 97140 MANUAL THERAPY 1/> REGIONS: CPT | Performed by: PHYSICAL THERAPIST

## 2023-02-02 NOTE — PROGRESS NOTES
Physical Therapy Treatment Note     Name: Sukumar Beal  [facility] Number: [id number]    Therapy Diagnosis:   Encounter Diagnoses   Name Primary?    Decreased range of motion of right shoulder Yes    Decreased muscle strength      Physician: Jh Sibley*    Visit Date: 2/2/2023    Physician Orders: PT Eval and Treat   Medical Diagnosis from Referral:   Diagnosis   M75.101 (ICD-10-CM) - Nontraumatic tear of right rotator cuff, unspecified tear extent   M75.21 (ICD-10-CM) - Biceps tendinitis of right upper extremity      Evaluation Date: 11/1/2022  Authorization Period Expiration: 12/31/2023  Plan of Care Expiration: 1/24/2023  Visit # / Visits authorized: 8/20 (24 total)    Initial FOTO: 58% (Predicted 27%)  FOTO 1st F/U:   FOTO 2nd F/U:       Time In: 1000  Time Out: 1100  Total Billable Time: 60 minutes    Precautions: Standard and RCR     Date of Surgery: 10/14/2022  Weeks Post Op: 14 weeks, 6 days as of 1/26     From Surgeon:  Physical Therapy: Follow the >3 cm cuff repair rehab protocol. PROM starts after 2 weeks. AROM starts after 7 weeks. No cuff resistive activity x 12 weeks. No biceps resistive activity x 8 weeks. We will be just a bit slower in this case given the tear pattern and size in this case    A/P:  Arthroscopic pictures again reviewed with the patient.  May wean out of the sling.  Discussed ongoing lifting precautions.  No more than a Coke can or small book.  May begin the active phase of his range of motion.  Work on active and passive range of motion and continued scapular mechanics.  I will see him back in 6 weeks.  At that time we will begin the strengthening phase of recovery.  He understands that he will have some activity and lifting restrictions up to 5 months postop.  All questions answered.  Prescription for Naprosyn given.        Subjective     Pt reports: I want a good stretch. It hasnt been hurting too bad, just been doing the same things     He was compliant with home  "exercise program.  Response to previous treatment: soreness  Functional change: improved overhead reach    Pain: 0/10  Location: right shoulder      Objective     Sukumar received therapeutic exercises to develop strength, endurance, ROM, flexibility, and posture for 31 minutes including:    Open books 20x    carry 3# 3 laps  Serratus punch BTB 3 x 15  Standing T OTB 2 x15 3" hold    Not Performed Today:  Upper Body Ergometer level 7 4'/4' for strength and endurance  ER with lift to 90 deg OTB 3x10  Arm in front IR/ER 3x10 OTB   Body blade 3 x 30 sec with arm abduction to 90, elbow bent to 90  Yellow band scaption 3 x 10 with shoulder blade tucked  Landmine press 15# with yellow dowel - BB not available 3x10/3"  Seated propped 90/90 ER >OH press 3x10  Propped sitting 90/90 1# ER 3 x 12  Sidelying ER 2# 3 x 1 5  Wall slide 2# BUE at wall 30x      Sukumar received the following manual therapy techniques: Joint mobilizations and Soft tissue Mobilization were applied to the: right shoulder for 15 minutes, including:    Passive Range of Motion into flexion and External Rotation Gr III AP mob on R GH  Gr I-II oscillations relaxation  Inferior mob arm at 20 and 90 deg abd Gr III  Scapular upward rotation grade III  Cross body MET    Sukumar participated in neuromuscular re-education activities to improve: Coordination, Proprioception, and Posture for 14 minutes. The following activities were included:    Serratus foam roll YTB 2 x 15  IR/ER walkouts 90/90 OTB 3 x 10     Sukumar participated in dynamic functional therapeutic activities to improve functional performance for 0  minutes, including:      Home Exercises Provided and Patient Education Provided     Education provided:   - Home Exercise Program, precautions, progress    Written Home Exercises Provided: Patient instructed to cont prior HEP.  Exercises were reviewed and Sukumar was able to demonstrate them prior to the end of the session.  Sukumar demonstrated good  " understanding of the education provided.     See EMR under Patient Instructions for exercises provided prior visit.    Assessment   Progressed cuff strengthening 90/90 position pain free. Still limited end range ER so stretched cross body today to improve posterior capsule mobility. He notes relief from stretching. Added thoracic mobility with open books and overhead wall slides    Sukumar Is progressing well towards his goals.   Pt prognosis is Good.     Pt will continue to benefit from skilled outpatient physical therapy to address the deficits listed in the problem list box on initial evaluation, provide pt/family education and to maximize pt's level of independence in the home and community environment.     Pt's spiritual, cultural and educational needs considered and pt agreeable to plan of care and goals.     Anticipated barriers to physical therapy: none    Goals:   Short Term Goals:  6 weeks  Patient will be independent in Home Exercise Program for independent progression within protocol.-Progressing, not met   Patient will show improved Passive Range of Motion to 120 degrees of flexion to show progress within protocol.-Progressing, not met   Patient will show improved Passive Range of Motion to 30 degrees of External Rotation to show progress within protocol.-Progressing, not met      Long Term Goals: 12 weeks  Patient will be independent in Home Exercise Program to initiate strengthening and return to hobbies.-Progressing, not met   Patient will show improved full Passive Range of Motion in flexion and External Rotation. -Progressing, not met   Patient will show improved strength by demonstrating active flexion to 120 degrees.-Progressing, not met   Patient goal: return to boot camp three times a week with no restrictions.-Progressing, not met   Patient will have improved limitation score to 27% limited on FOTO shoulder in order to demonstrate functional improvement.-Progressing, not met     Plan     Continue  range of motion and strengthening activities per protocol.     Reji Szymanski, PT, DPT OCS

## 2023-02-06 ENCOUNTER — CLINICAL SUPPORT (OUTPATIENT)
Dept: REHABILITATION | Facility: HOSPITAL | Age: 53
End: 2023-02-06
Payer: COMMERCIAL

## 2023-02-06 DIAGNOSIS — M62.81 DECREASED MUSCLE STRENGTH: ICD-10-CM

## 2023-02-06 DIAGNOSIS — M25.611 DECREASED RANGE OF MOTION OF RIGHT SHOULDER: Primary | ICD-10-CM

## 2023-02-06 PROCEDURE — 97110 THERAPEUTIC EXERCISES: CPT | Performed by: PHYSICAL THERAPIST

## 2023-02-06 PROCEDURE — 97112 NEUROMUSCULAR REEDUCATION: CPT | Performed by: PHYSICAL THERAPIST

## 2023-02-06 NOTE — PROGRESS NOTES
Physical Therapy Treatment Note     Name: Sukumar Beal  [facility] Number: [id number]    Therapy Diagnosis:   Encounter Diagnoses   Name Primary?    Decreased range of motion of right shoulder Yes    Decreased muscle strength      Physician: Jh Sibley*    Visit Date: 2/6/2023    Physician Orders: PT Eval and Treat   Medical Diagnosis from Referral:   Diagnosis   M75.101 (ICD-10-CM) - Nontraumatic tear of right rotator cuff, unspecified tear extent   M75.21 (ICD-10-CM) - Biceps tendinitis of right upper extremity      Evaluation Date: 11/1/2022  Authorization Period Expiration: 12/31/2023  Plan of Care Expiration: 1/24/2023  Visit # / Visits authorized: 9/20 (24 total)    Initial FOTO: 58% (Predicted 27%)  FOTO 1st F/U:   FOTO 2nd F/U:       Time In: 1000  Time Out: 1100  Total Billable Time: 60 minutes    Precautions: Standard and RCR     Date of Surgery: 10/14/2022  Weeks Post Op: 16 weeks, 3 days as of 2/6     From Surgeon:  Physical Therapy: Follow the >3 cm cuff repair rehab protocol. PROM starts after 2 weeks. AROM starts after 7 weeks. No cuff resistive activity x 12 weeks. No biceps resistive activity x 8 weeks. We will be just a bit slower in this case given the tear pattern and size in this case    A/P:  Arthroscopic pictures again reviewed with the patient.  May wean out of the sling.  Discussed ongoing lifting precautions.  No more than a Coke can or small book.  May begin the active phase of his range of motion.  Work on active and passive range of motion and continued scapular mechanics.  I will see him back in 6 weeks.  At that time we will begin the strengthening phase of recovery.  He understands that he will have some activity and lifting restrictions up to 5 months postop.  All questions answered.  Prescription for Naprosyn given.        Subjective     Pt reports: I am tired today. Got some water damage so I was pulling carpet     He was compliant with home exercise program.  Response to  "previous treatment: soreness  Functional change: improved overhead reach    Pain: 0/10  Location: right shoulder      Objective     Sukumar received therapeutic exercises to develop strength, endurance, ROM, flexibility, and posture for 24 minutes including:    S/L ER 2# 3 x 15  Serratus punch BTB 3 x 15  1/2 kneeling landmine press 35# 3 x 12    Not Performed Today:  Open books 20x    carry 3# 3 laps  Standing T OTB 2 x15 3" hold  Upper Body Ergometer level 7 4'/4' for strength and endurance  ER with lift to 90 deg OTB 3x10  Arm in front IR/ER 3x10 OTB   Body blade 3 x 30 sec with arm abduction to 90, elbow bent to 90  Yellow band scaption 3 x 10 with shoulder blade tucked  Landmine press 15# with yellow dowel - BB not available 3x10/3"  Seated propped 90/90 ER >OH press 3x10  Propped sitting 90/90 1# ER 3 x 12  Sidelying ER 2# 3 x 1 5  Wall slide 2# BUE at wall 30x      Sukumar received the following manual therapy techniques: Joint mobilizations and Soft tissue Mobilization were applied to the: right shoulder for 15 minutes, including:    Passive Range of Motion into flexion and External Rotation Gr III AP mob on R GH  Gr I-II oscillations relaxation  Inferior mob arm at 20 and 90 deg abd Gr III  Scapular upward rotation grade III  Cross body MET    Sukumar participated in neuromuscular re-education activities to improve: Coordination, Proprioception, and Posture for 21 minutes. The following activities were included:    Swiss ball on wall CW/CCW 3x fatigue  Prone over swiss ball I/T/Y 3 X 8  IR/ER walkouts 90/90 OTB 3 x 10     Sukumar participated in dynamic functional therapeutic activities to improve functional performance for 0  minutes, including:      Home Exercises Provided and Patient Education Provided     Education provided:   - Home Exercise Program, precautions, progress    Written Home Exercises Provided: Patient instructed to cont prior HEP.  Exercises were reviewed and Sukumar was able to demonstrate them " prior to the end of the session.  Sukumar demonstrated good  understanding of the education provided.     See EMR under Patient Instructions for exercises provided prior visit.    Assessment     Sukumar had improved ER with passive stretching today. Progressing cuff and scap stabilization, notes fatigue with scap work over swiss ball and progressed serratus with landmine and swiss ball on wall. Fatigue end of session     Sukumar Is progressing well towards his goals.   Pt prognosis is Good.     Pt will continue to benefit from skilled outpatient physical therapy to address the deficits listed in the problem list box on initial evaluation, provide pt/family education and to maximize pt's level of independence in the home and community environment.     Pt's spiritual, cultural and educational needs considered and pt agreeable to plan of care and goals.     Anticipated barriers to physical therapy: none    Goals:   Short Term Goals:  6 weeks  Patient will be independent in Home Exercise Program for independent progression within protocol.-Progressing, not met   Patient will show improved Passive Range of Motion to 120 degrees of flexion to show progress within protocol.-Progressing, not met   Patient will show improved Passive Range of Motion to 30 degrees of External Rotation to show progress within protocol.-Progressing, not met      Long Term Goals: 12 weeks  Patient will be independent in Home Exercise Program to initiate strengthening and return to hobbies.-Progressing, not met   Patient will show improved full Passive Range of Motion in flexion and External Rotation. -Progressing, not met   Patient will show improved strength by demonstrating active flexion to 120 degrees.-Progressing, not met   Patient goal: return to boot camp three times a week with no restrictions.-Progressing, not met   Patient will have improved limitation score to 27% limited on FOTO shoulder in order to demonstrate functional  improvement.-Progressing, not met     Plan     Continue range of motion and strengthening activities per protocol.     Reji Szymanski, PT, DPT O[facility]

## 2023-02-09 ENCOUNTER — CLINICAL SUPPORT (OUTPATIENT)
Dept: REHABILITATION | Facility: HOSPITAL | Age: 53
End: 2023-02-09
Payer: COMMERCIAL

## 2023-02-09 DIAGNOSIS — M62.81 DECREASED MUSCLE STRENGTH: ICD-10-CM

## 2023-02-09 DIAGNOSIS — M25.611 DECREASED RANGE OF MOTION OF RIGHT SHOULDER: Primary | ICD-10-CM

## 2023-02-09 PROCEDURE — 97140 MANUAL THERAPY 1/> REGIONS: CPT

## 2023-02-09 PROCEDURE — 97110 THERAPEUTIC EXERCISES: CPT

## 2023-02-09 PROCEDURE — 97112 NEUROMUSCULAR REEDUCATION: CPT

## 2023-02-09 NOTE — PROGRESS NOTES
Physical Therapy Treatment Note     Name: Sukumar Beal  [facility] Number: [id number]    Therapy Diagnosis:   Encounter Diagnoses   Name Primary?    Decreased range of motion of right shoulder Yes    Decreased muscle strength      Physician: Jh Sibley*    Visit Date: 2/9/2023    Physician Orders: PT Eval and Treat   Medical Diagnosis from Referral:   Diagnosis   M75.101 (ICD-10-CM) - Nontraumatic tear of right rotator cuff, unspecified tear extent   M75.21 (ICD-10-CM) - Biceps tendinitis of right upper extremity      Evaluation Date: 11/1/2022  Authorization Period Expiration: 12/31/2023  Plan of Care Expiration: 1/24/2023  Visit # / Visits authorized: 10/20 (25 total)    Initial FOTO: 58% (Predicted 27%)  FOTO 1st F/U:   FOTO 2nd F/U:       Time In: 1005  Time Out: 1100  Total Billable Time: 55 minutes    Precautions: Standard and RCR     Date of Surgery: 10/14/2022  Weeks Post Op: 16 weeks, 6 days as of 2/9     From Surgeon:  Physical Therapy: Follow the >3 cm cuff repair rehab protocol. PROM starts after 2 weeks. AROM starts after 7 weeks. No cuff resistive activity x 12 weeks. No biceps resistive activity x 8 weeks. We will be just a bit slower in this case given the tear pattern and size in this case    A/P:  Arthroscopic pictures again reviewed with the patient.  May wean out of the sling.  Discussed ongoing lifting precautions.  No more than a Coke can or small book.  May begin the active phase of his range of motion.  Work on active and passive range of motion and continued scapular mechanics.  I will see him back in 6 weeks.  At that time we will begin the strengthening phase of recovery.  He understands that he will have some activity and lifting restrictions up to 5 months postop.  All questions answered.  Prescription for Naprosyn given.        Subjective     Pt reports: I am tired today. Got some water damage so I was pulling carpet     He was compliant with home exercise program.  Response  "to previous treatment: soreness  Functional change: improved overhead reach    Pain: 0/10  Location: right shoulder      Objective     Sukumar received therapeutic exercises to develop strength, endurance, ROM, flexibility, and posture for 23 minutes including:    S/L ER 3# 3 x 15  1/2 kneeling landmine press 35# 3 x 12  Arm in front IR/ER 3x10 OTB  Shoulder taps at edge of table 2 x 30  Red band D2 flexion 2 x 10    Not Performed Today:  Open books 20x    carry 3# 3 laps  Standing T OTB 2 x15 3" hold  Upper Body Ergometer level 7 4'/4' for strength and endurance  ER with lift to 90 deg OTB 3x10   Body blade 3 x 30 sec with arm abduction to 90, elbow bent to 90  Yellow band scaption 3 x 10 with shoulder blade tucked  Landmine press 15# with yellow dowel - BB not available 3x10/3"  Seated propped 90/90 ER >OH press 3x10  Propped sitting 90/90 1# ER 3 x 12  Sidelying ER 2# 3 x 1 5  Wall slide 2# BUE at wall 30x  Serratus punch BTB 3 x 15      Sukumar received the following manual therapy techniques: Joint mobilizations and Soft tissue Mobilization were applied to the: right shoulder for 23 minutes, including:    Passive Range of Motion into flexion and External Rotation Gr III AP mob on R GH  Gr I-II oscillations relaxation  Inferior mob arm at 20 and 90 deg abd Gr III  Scapular upward rotation grade III  Cross body MET    Sukumar participated in neuromuscular re-education activities to improve: Coordination, Proprioception, and Posture for 9 minutes. The following activities were included:    Prone over swiss ball I/T/Y 3 X 8    Not Performed Today:  IR/ER walkouts 90/90 OTB 3 x 10   Swiss ball on wall CW/CCW 3x fatigue    Sukumar participated in dynamic functional therapeutic activities to improve functional performance for 0  minutes, including:      Home Exercises Provided and Patient Education Provided     Education provided:   - Home Exercise Program, precautions, progress    Written Home Exercises Provided: Patient " instructed to cont prior HEP.  Exercises were reviewed and Sukumar was able to demonstrate them prior to the end of the session.  Sukumar demonstrated good  understanding of the education provided.     See EMR under Patient Instructions for exercises provided prior visit.    Assessment     Sukumar had improved ER with passive stretching today. Progressing cuff and scap stabilization, notes fatigue with scap work over swiss ball and progressed serratus with landmine. Fatigue end of session after D2 flexion.    Sukumar Is progressing well towards his goals.   Pt prognosis is Good.     Pt will continue to benefit from skilled outpatient physical therapy to address the deficits listed in the problem list box on initial evaluation, provide pt/family education and to maximize pt's level of independence in the home and community environment.     Pt's spiritual, cultural and educational needs considered and pt agreeable to plan of care and goals.     Anticipated barriers to physical therapy: none    Goals:   Short Term Goals:  6 weeks  Patient will be independent in Home Exercise Program for independent progression within protocol.-Progressing, not met   Patient will show improved Passive Range of Motion to 120 degrees of flexion to show progress within protocol.-Progressing, not met   Patient will show improved Passive Range of Motion to 30 degrees of External Rotation to show progress within protocol.-Progressing, not met      Long Term Goals: 12 weeks  Patient will be independent in Home Exercise Program to initiate strengthening and return to hobbies.-Progressing, not met   Patient will show improved full Passive Range of Motion in flexion and External Rotation. -Progressing, not met   Patient will show improved strength by demonstrating active flexion to 120 degrees.-Progressing, not met   Patient goal: return to boot camp three times a week with no restrictions.-Progressing, not met   Patient will have improved limitation score  to 27% limited on FOTO shoulder in order to demonstrate functional improvement.-Progressing, not met     Plan     Continue range of motion and strengthening activities per protocol.     Madelyn Flowers, PT, DPT

## 2023-02-16 ENCOUNTER — CLINICAL SUPPORT (OUTPATIENT)
Dept: REHABILITATION | Facility: HOSPITAL | Age: 53
End: 2023-02-16
Payer: COMMERCIAL

## 2023-02-16 DIAGNOSIS — M25.611 DECREASED RANGE OF MOTION OF RIGHT SHOULDER: Primary | ICD-10-CM

## 2023-02-16 DIAGNOSIS — M62.81 DECREASED MUSCLE STRENGTH: ICD-10-CM

## 2023-02-16 PROCEDURE — 97140 MANUAL THERAPY 1/> REGIONS: CPT

## 2023-02-16 PROCEDURE — 97112 NEUROMUSCULAR REEDUCATION: CPT

## 2023-02-16 PROCEDURE — 97110 THERAPEUTIC EXERCISES: CPT

## 2023-02-16 NOTE — PROGRESS NOTES
Physical Therapy Treatment Note     Name: Sukumar Beal  [facility] Number: [id number]    Therapy Diagnosis:   Encounter Diagnoses   Name Primary?    Decreased range of motion of right shoulder Yes    Decreased muscle strength        Physician: Jh Sibley*    Visit Date: 2/16/2023    Physician Orders: PT Eval and Treat   Medical Diagnosis from Referral:   Diagnosis   M75.101 (ICD-10-CM) - Nontraumatic tear of right rotator cuff, unspecified tear extent   M75.21 (ICD-10-CM) - Biceps tendinitis of right upper extremity      Evaluation Date: 11/1/2022  Authorization Period Expiration: 12/31/2023  Plan of Care Expiration: 1/24/2023  Visit # / Visits authorized: 11/20 (26 total)    Initial FOTO: 58% (Predicted 27%)  FOTO 1st F/U:   FOTO 2nd F/U:       Time In: 1000am  Time Out: 1100am  Total Billable Time: 60 minutes    Precautions: Standard and RCR     Date of Surgery: 10/14/2022  Weeks Post Op: 17 weeks, 6 days as of 2/16     From Surgeon:  Physical Therapy: Follow the >3 cm cuff repair rehab protocol. PROM starts after 2 weeks. AROM starts after 7 weeks. No cuff resistive activity x 12 weeks. No biceps resistive activity x 8 weeks. We will be just a bit slower in this case given the tear pattern and size in this case    A/P:  Arthroscopic pictures again reviewed with the patient.  May wean out of the sling.  Discussed ongoing lifting precautions.  No more than a Coke can or small book.  May begin the active phase of his range of motion.  Work on active and passive range of motion and continued scapular mechanics.  I will see him back in 6 weeks.  At that time we will begin the strengthening phase of recovery.  He understands that he will have some activity and lifting restrictions up to 5 months postop.  All questions answered.  Prescription for Naprosyn given.          Subjective     Pt reports: recovering from [employer] activities. Can tell his shoulder still doesn't feel the same side to side. Does the  "Rotator Cuff strengthening at home.    He was compliant with home exercise program.  Response to previous treatment: soreness  Functional change: improved overhead reach    Pain: 0/10  Location: right shoulder      Objective     Sukumar received therapeutic exercises to develop strength, endurance, ROM, flexibility, and posture for 25 minutes including:    Side Lying open books x 20 Bilateral   Lat stretch with can at edge of table 5 x 20"  Elevated push ups at edge of table x 20  External Rotation/Internal Rotation into full flexion 2 x 15  1/2 kneeling landmine press 35# 2 x 15 Bilateral     Sukumar received the following manual therapy techniques: Joint mobilizations and Soft tissue Mobilization were applied to the: right shoulder for 25 minutes, including:    Gr I-II oscillations relaxation  Inferior mob arm at 20 and 90 deg abd Gr III  External Rotation stretch at 90/90 grade III   Rhythmic stabilization at end range of flexion     Not Performed Today:  Scapular upward rotation grade III  Cross body MET    Sukumar participated in neuromuscular re-education activities to improve: Coordination, Proprioception, and Posture for 10 minutes. The following activities were included:    Prone over swiss ball I/T/Y 3 X 8    Sukumar participated in dynamic functional therapeutic activities to improve functional performance for 0  minutes, including:      Home Exercises Provided and Patient Education Provided     Education provided:   - Home Exercise Program, precautions, progress    Written Home Exercises Provided: Patient instructed to cont prior HEP.  Exercises were reviewed and Sukumar was able to demonstrate them prior to the end of the session.  Sukumar demonstrated good  understanding of the education provided.     See EMR under Patient Instructions for exercises provided prior visit.    Assessment     Sukumar presented with near full range of motion that improved slightly with thoracic and lat mobility intervention. Rotator Cuff " strengthening progressed to end range of Active Range of Motion. Weight bearing intervention included for proprioception and muscle activation. Will continue to address end range motion and cuff activation.    Sukumar Is progressing well towards his goals.   Pt prognosis is Good.     Pt will continue to benefit from skilled outpatient physical therapy to address the deficits listed in the problem list box on initial evaluation, provide pt/family education and to maximize pt's level of independence in the home and community environment.     Pt's spiritual, cultural and educational needs considered and pt agreeable to plan of care and goals.     Anticipated barriers to physical therapy: none    Goals:   Short Term Goals:  6 weeks  Patient will be independent in Home Exercise Program for independent progression within protocol.-Progressing, not met   Patient will show improved Passive Range of Motion to 120 degrees of flexion to show progress within protocol.-Progressing, not met   Patient will show improved Passive Range of Motion to 30 degrees of External Rotation to show progress within protocol.-Progressing, not met      Long Term Goals: 12 weeks  Patient will be independent in Home Exercise Program to initiate strengthening and return to hobbies.-Progressing, not met   Patient will show improved full Passive Range of Motion in flexion and External Rotation. -Progressing, not met   Patient will show improved strength by demonstrating active flexion to 120 degrees.-Progressing, not met   Patient goal: return to boot camp three times a week with no restrictions.-Progressing, not met   Patient will have improved limitation score to 27% limited on FOTO shoulder in order to demonstrate functional improvement.-Progressing, not met     Plan     Continue range of motion and strengthening activities per protocol.     Madelyn Flowers, PT, DPT

## 2023-02-27 ENCOUNTER — CLINICAL SUPPORT (OUTPATIENT)
Dept: REHABILITATION | Facility: HOSPITAL | Age: 53
End: 2023-02-27
Payer: COMMERCIAL

## 2023-02-27 DIAGNOSIS — M62.81 DECREASED MUSCLE STRENGTH: ICD-10-CM

## 2023-02-27 DIAGNOSIS — M25.611 DECREASED RANGE OF MOTION OF RIGHT SHOULDER: Primary | ICD-10-CM

## 2023-02-27 PROCEDURE — 97110 THERAPEUTIC EXERCISES: CPT

## 2023-02-27 PROCEDURE — 97140 MANUAL THERAPY 1/> REGIONS: CPT

## 2023-02-27 PROCEDURE — 97112 NEUROMUSCULAR REEDUCATION: CPT

## 2023-02-27 NOTE — PROGRESS NOTES
Physical Therapy Treatment Note     Name: Sukumar Beal  [facility] Number: [id number]    Therapy Diagnosis:   Encounter Diagnoses   Name Primary?    Decreased range of motion of right shoulder Yes    Decreased muscle strength        Physician: Jh Sibley*    Visit Date: 2/27/2023    Physician Orders: PT Eval and Treat   Medical Diagnosis from Referral:   Diagnosis   M75.101 (ICD-10-CM) - Nontraumatic tear of right rotator cuff, unspecified tear extent   M75.21 (ICD-10-CM) - Biceps tendinitis of right upper extremity      Evaluation Date: 11/1/2022  Authorization Period Expiration: 12/31/2023  Plan of Care Expiration: 1/24/2023  Visit # / Visits authorized: 12/20 (27 total)    Initial FOTO: 58% (Predicted 27%)  FOTO 1st F/U:   FOTO 2nd F/U:       Time In: 1000am  Time Out: 1100am  Total Billable Time: 60 minutes    Precautions: Standard and RCR     Date of Surgery: 10/14/2022  Weeks Post Op: 19 weeks, 4 days as of 2/28     From Surgeon:  Physical Therapy: Follow the >3 cm cuff repair rehab protocol. PROM starts after 2 weeks. AROM starts after 7 weeks. No cuff resistive activity x 12 weeks. No biceps resistive activity x 8 weeks. We will be just a bit slower in this case given the tear pattern and size in this case    A/P:  Arthroscopic pictures again reviewed with the patient.  May wean out of the sling.  Discussed ongoing lifting precautions.  No more than a Coke can or small book.  May begin the active phase of his range of motion.  Work on active and passive range of motion and continued scapular mechanics.  I will see him back in 6 weeks.  At that time we will begin the strengthening phase of recovery.  He understands that he will have some activity and lifting restrictions up to 5 months postop.  All questions answered.  Prescription for Naprosyn given.          Subjective     Pt reports: feeling okay, tries to fit in his Home Exercise Program     He was compliant with home exercise program.  Response  "to previous treatment: soreness  Functional change: improved overhead reach    Pain: 0/10  Location: right shoulder      Objective     Sukumar received therapeutic exercises to develop strength, endurance, ROM, flexibility, and posture for 25 minutes including:    Thoracic mobility at the wall x 15 Bilateral   Lat stretch with can at edge of table 5 x 20"  Elevated push ups at 24 in box x 20  External Rotation/Internal Rotation into full flexion 2 x 15  1/2 kneeling landmine press 35# 2 x 15 Bilateral     Sukumar received the following manual therapy techniques: Joint mobilizations and Soft tissue Mobilization were applied to the: right shoulder for 25 minutes, including:    Gr I-II oscillations relaxation  Inferior mob arm at 20 and 90 deg abd Gr III  External Rotation stretch at 90/90 grade III   Rhythmic stabilization at end range of flexion     Not Performed Today:  Scapular upward rotation grade III  Cross body MET    Sukumar participated in neuromuscular re-education activities to improve: Coordination, Proprioception, and Posture for 10 minutes. The following activities were included:    Prone over swiss ball I/T/Y 3 X 8    Sukumar participated in dynamic functional therapeutic activities to improve functional performance for 0  minutes, including:      Home Exercises Provided and Patient Education Provided     Education provided:   - Home Exercise Program, precautions, progress    Written Home Exercises Provided: Patient instructed to cont prior HEP.  Exercises were reviewed and Sukumar was able to demonstrate them prior to the end of the session.  Sukumar demonstrated good  understanding of the education provided.     See EMR under Patient Instructions for exercises provided prior visit.    Assessment     Sukumar presented with near full range of motion that improved slightly with thoracic and lat mobility intervention. Rotator Cuff strengthening progressed to end range of Active Range of Motion. Weight bearing intervention " included for proprioception and muscle activation. Will continue to address end range motion and cuff activation.    Sukumar Is progressing well towards his goals.   Pt prognosis is Good.     Pt will continue to benefit from skilled outpatient physical therapy to address the deficits listed in the problem list box on initial evaluation, provide pt/family education and to maximize pt's level of independence in the home and community environment.     Pt's spiritual, cultural and educational needs considered and pt agreeable to plan of care and goals.     Anticipated barriers to physical therapy: none    Goals:   Short Term Goals:  6 weeks  Patient will be independent in Home Exercise Program for independent progression within protocol.-Progressing, not met   Patient will show improved Passive Range of Motion to 120 degrees of flexion to show progress within protocol.-Progressing, not met   Patient will show improved Passive Range of Motion to 30 degrees of External Rotation to show progress within protocol.-Progressing, not met      Long Term Goals: 12 weeks  Patient will be independent in Home Exercise Program to initiate strengthening and return to hobbies.-Progressing, not met   Patient will show improved full Passive Range of Motion in flexion and External Rotation. -Progressing, not met   Patient will show improved strength by demonstrating active flexion to 120 degrees.-Progressing, not met   Patient goal: return to boot camp three times a week with no restrictions.-Progressing, not met   Patient will have improved limitation score to 27% limited on FOTO shoulder in order to demonstrate functional improvement.-Progressing, not met     Plan     Continue range of motion and strengthening activities per protocol.     Madelyn Flowers, PT, DPT

## 2023-03-01 ENCOUNTER — TELEPHONE (OUTPATIENT)
Dept: SPORTS MEDICINE | Facility: CLINIC | Age: 53
End: 2023-03-01
Payer: COMMERCIAL

## 2023-03-01 DIAGNOSIS — N52.9 ED (ERECTILE DYSFUNCTION) OF ORGANIC ORIGIN: ICD-10-CM

## 2023-03-01 DIAGNOSIS — R53.83 FATIGUE, UNSPECIFIED TYPE: ICD-10-CM

## 2023-03-01 DIAGNOSIS — E29.1 PRIMARY MALE HYPOGONADISM: ICD-10-CM

## 2023-03-01 DIAGNOSIS — N40.0 BENIGN PROSTATIC HYPERPLASIA WITHOUT LOWER URINARY TRACT SYMPTOMS: ICD-10-CM

## 2023-03-01 RX ORDER — TESTOSTERONE 20.25 MG/1.25G
GEL TOPICAL DAILY
Qty: 150 G | Refills: 5 | OUTPATIENT
Start: 2023-03-01 | End: 2023-08-28

## 2023-03-01 NOTE — TELEPHONE ENCOUNTER
Left message for patient to call back to reschedule appointment on 3/16 due to Dr Carr being out of the clinic.

## 2023-03-02 ENCOUNTER — CLINICAL SUPPORT (OUTPATIENT)
Dept: REHABILITATION | Facility: HOSPITAL | Age: 53
End: 2023-03-02
Payer: COMMERCIAL

## 2023-03-02 DIAGNOSIS — M25.611 DECREASED RANGE OF MOTION OF RIGHT SHOULDER: Primary | ICD-10-CM

## 2023-03-02 DIAGNOSIS — M62.81 DECREASED MUSCLE STRENGTH: ICD-10-CM

## 2023-03-02 PROCEDURE — 97110 THERAPEUTIC EXERCISES: CPT | Performed by: PHYSICAL THERAPIST

## 2023-03-02 PROCEDURE — 97140 MANUAL THERAPY 1/> REGIONS: CPT | Performed by: PHYSICAL THERAPIST

## 2023-03-02 PROCEDURE — 97112 NEUROMUSCULAR REEDUCATION: CPT | Performed by: PHYSICAL THERAPIST

## 2023-03-02 NOTE — PROGRESS NOTES
Physical Therapy Treatment Note     Name: Sukumar Beal  [facility] Number: [id number]    Therapy Diagnosis:   Encounter Diagnoses   Name Primary?    Decreased range of motion of right shoulder Yes    Decreased muscle strength        Physician: Jh Sibley*    Visit Date: 3/2/2023    Physician Orders: PT Eval and Treat   Medical Diagnosis from Referral:   Diagnosis   M75.101 (ICD-10-CM) - Nontraumatic tear of right rotator cuff, unspecified tear extent   M75.21 (ICD-10-CM) - Biceps tendinitis of right upper extremity      Evaluation Date: 11/1/2022  Authorization Period Expiration: 12/31/2023  Plan of Care Expiration: 1/24/2023  Visit # / Visits authorized: 13/20 (27 total)    Initial FOTO: 58% (Predicted 27%)  FOTO 1st F/U: 45%  FOTO 2nd F/U: 35%      Time In: 1000am  Time Out: 1055am  Total Billable Time: 55 minutes    Precautions: Standard and RCR     Date of Surgery: 10/14/2022  Weeks Post Op: 19 weeks, 6 days as of 3/2     From Surgeon:  Physical Therapy: Follow the >3 cm cuff repair rehab protocol. PROM starts after 2 weeks. AROM starts after 7 weeks. No cuff resistive activity x 12 weeks. No biceps resistive activity x 8 weeks. We will be just a bit slower in this case given the tear pattern and size in this case    A/P:  Arthroscopic pictures again reviewed with the patient.  May wean out of the sling.  Discussed ongoing lifting precautions.  No more than a Coke can or small book.  May begin the active phase of his range of motion.  Work on active and passive range of motion and continued scapular mechanics.  I will see him back in 6 weeks.  At that time we will begin the strengthening phase of recovery.  He understands that he will have some activity and lifting restrictions up to 5 months postop.  All questions answered.  Prescription for Naprosyn given.          Subjective     Pt reports: I love the new stretching exercise. I finally feel like I am making a turn and its better     He was compliant  "with home exercise program.  Response to previous treatment: soreness  Functional change: improved overhead reach    Pain: 0/10  Location: right shoulder      Objective     Sukumar received therapeutic exercises to develop strength, endurance, ROM, flexibility, and posture for 30 minutes including:    Thoracic mobility at the wall x 15 Bilateral   Serratus press with BTB 3 x 15  Pushup edge of table 3 x 15  Waiters carry 5# 3 laps     NT  Lat stretch with cane at edge of table 5 x 20"  Elevated push ups at 24 in box x 20  External Rotation/Internal Rotation into full flexion 2 x 15    Sukumar received the following manual therapy techniques: Joint mobilizations and Soft tissue Mobilization were applied to the: right shoulder for 15 minutes, including:    Gr I-II oscillations relaxation  Inferior mob arm at 20 and 90 deg abd Gr III  External Rotation stretch at 90/90 grade III   Rhythmic stabilization at end range of flexion     Not Performed Today:  Scapular upward rotation grade III  Cross body MET    Sukumar participated in neuromuscular re-education activities to improve: Coordination, Proprioception, and Posture for 10 minutes. The following activities were included:    Prone over swiss ball T 2# 3 X 5 iso hold  Standing row with ER at 90/90 OTB 3 x 15     Sukumar participated in dynamic functional therapeutic activities to improve functional performance for 0  minutes, including:      Home Exercises Provided and Patient Education Provided     Education provided:   - Home Exercise Program, precautions, progress    Written Home Exercises Provided: Patient instructed to cont prior HEP.  Exercises were reviewed and Sukumar was able to demonstrate them prior to the end of the session.  Sukumar demonstrated good  understanding of the education provided.     See EMR under Patient Instructions for exercises provided prior visit.    Assessment     Sukumar progressed with strengthening to the cuff overhead and in 90/90 position. He notes " relief with thoracic mobility exercises and just muscle fatigue with overhead lifting. He will continue to progress overhead strength    Sukumar Is progressing well towards his goals.   Pt prognosis is Good.     Pt will continue to benefit from skilled outpatient physical therapy to address the deficits listed in the problem list box on initial evaluation, provide pt/family education and to maximize pt's level of independence in the home and community environment.     Pt's spiritual, cultural and educational needs considered and pt agreeable to plan of care and goals.     Anticipated barriers to physical therapy: none    Goals:   Short Term Goals:  6 weeks  Patient will be independent in Home Exercise Program for independent progression within protocol.-Progressing, not met   Patient will show improved Passive Range of Motion to 120 degrees of flexion to show progress within protocol.-Progressing, not met   Patient will show improved Passive Range of Motion to 30 degrees of External Rotation to show progress within protocol.-Progressing, not met      Long Term Goals: 12 weeks  Patient will be independent in Home Exercise Program to initiate strengthening and return to hobbies.-Progressing, not met   Patient will show improved full Passive Range of Motion in flexion and External Rotation. -Progressing, not met   Patient will show improved strength by demonstrating active flexion to 120 degrees.-Progressing, not met   Patient goal: return to boot camp three times a week with no restrictions.-Progressing, not met   Patient will have improved limitation score to 27% limited on FOTO shoulder in order to demonstrate functional improvement.-Progressing, not met     Plan     Continue range of motion and strengthening activities per protocol.     Reji Szymanski, PT, DPT

## 2023-03-06 ENCOUNTER — PATIENT MESSAGE (OUTPATIENT)
Dept: SPORTS MEDICINE | Facility: CLINIC | Age: 53
End: 2023-03-06
Payer: COMMERCIAL

## 2023-03-07 ENCOUNTER — CLINICAL SUPPORT (OUTPATIENT)
Dept: REHABILITATION | Facility: HOSPITAL | Age: 53
End: 2023-03-07
Payer: COMMERCIAL

## 2023-03-07 DIAGNOSIS — M62.81 DECREASED MUSCLE STRENGTH: ICD-10-CM

## 2023-03-07 DIAGNOSIS — M25.611 DECREASED RANGE OF MOTION OF RIGHT SHOULDER: Primary | ICD-10-CM

## 2023-03-07 PROCEDURE — 97140 MANUAL THERAPY 1/> REGIONS: CPT

## 2023-03-07 PROCEDURE — 97110 THERAPEUTIC EXERCISES: CPT

## 2023-03-07 PROCEDURE — 97112 NEUROMUSCULAR REEDUCATION: CPT

## 2023-03-07 NOTE — PROGRESS NOTES
Physical Therapy Treatment Note     Name: Sukumar Beal  [facility] Number: [id number]    Therapy Diagnosis:   Encounter Diagnoses   Name Primary?    Decreased range of motion of right shoulder Yes    Decreased muscle strength        Physician: Jh Sibley*    Visit Date: 3/7/2023    Physician Orders: PT Eval and Treat   Medical Diagnosis from Referral:   Diagnosis   M75.101 (ICD-10-CM) - Nontraumatic tear of right rotator cuff, unspecified tear extent   M75.21 (ICD-10-CM) - Biceps tendinitis of right upper extremity      Evaluation Date: 11/1/2022  Authorization Period Expiration: 12/31/2023  Plan of Care Expiration: 1/24/2023  Visit # / Visits authorized: 14/20 (28 total)    Initial FOTO: 58% (Predicted 27%)  FOTO 1st F/U: 45%  FOTO 2nd F/U: 35%      Time In: 900am  Time Out: 955am  Total Billable Time: 55 minutes    Precautions: Standard and RCR     Date of Surgery: 10/14/2022  Weeks Post Op: 19 weeks, 6 days as of 3/2     From Surgeon:  Physical Therapy: Follow the >3 cm cuff repair rehab protocol. PROM starts after 2 weeks. AROM starts after 7 weeks. No cuff resistive activity x 12 weeks. No biceps resistive activity x 8 weeks. We will be just a bit slower in this case given the tear pattern and size in this case    A/P:  Arthroscopic pictures again reviewed with the patient.  May wean out of the sling.  Discussed ongoing lifting precautions.  No more than a Coke can or small book.  May begin the active phase of his range of motion.  Work on active and passive range of motion and continued scapular mechanics.  I will see him back in 6 weeks.  At that time we will begin the strengthening phase of recovery.  He understands that he will have some activity and lifting restrictions up to 5 months postop.  All questions answered.  Prescription for Naprosyn given.          Subjective     Pt reports: feeling pretty good, happy with his progress    He was compliant with home exercise program.  Response to  "previous treatment: soreness  Functional change: improved overhead reach    Pain: 0/10  Location: right shoulder      Objective     Sukumar received therapeutic exercises to develop strength, endurance, ROM, flexibility, and posture for 30 minutes including:    Thoracic mobility at the wall x 15 Bilateral   Serratus press with BTB 3 x 15  Pushup edge of table 3 x 15  Waiters carry 5# 3 laps     NT  Lat stretch with cane at edge of table 5 x 20"  Elevated push ups at 24 in box x 20  External Rotation/Internal Rotation into full flexion 2 x 15    Sukumar received the following manual therapy techniques: Joint mobilizations and Soft tissue Mobilization were applied to the: right shoulder for 15 minutes, including:    Gr I-II oscillations relaxation  Inferior mob arm at 20 and 90 deg abd Gr III  External Rotation stretch at 90/90 grade III   Rhythmic stabilization at end range of flexion     Not Performed Today:  Scapular upward rotation grade III  Cross body MET    Sukumar participated in neuromuscular re-education activities to improve: Coordination, Proprioception, and Posture for 10 minutes. The following activities were included:    Prone over swiss ball T 2# 3 X 5 iso hold  Standing row with ER at 90/90 OTB 3 x 15     Sukumar participated in dynamic functional therapeutic activities to improve functional performance for 0  minutes, including:      Home Exercises Provided and Patient Education Provided     Education provided:   - Home Exercise Program, precautions, progress    Written Home Exercises Provided: Patient instructed to cont prior HEP.  Exercises were reviewed and Sukumar was able to demonstrate them prior to the end of the session.  Sukumar demonstrated good  understanding of the education provided.     See EMR under Patient Instructions for exercises provided prior visit.    Assessment     Sukumar progressed with strengthening to the cuff overhead and in 90/90 position. He notes relief with thoracic mobility exercises and " just muscle fatigue with overhead lifting. He will continue to progress overhead strength    Sukumar Is progressing well towards his goals.   Pt prognosis is Good.     Pt will continue to benefit from skilled outpatient physical therapy to address the deficits listed in the problem list box on initial evaluation, provide pt/family education and to maximize pt's level of independence in the home and community environment.     Pt's spiritual, cultural and educational needs considered and pt agreeable to plan of care and goals.     Anticipated barriers to physical therapy: none    Goals:   Short Term Goals:  6 weeks  Patient will be independent in Home Exercise Program for independent progression within protocol.Met  Patient will show improved Passive Range of Motion to 120 degrees of flexion to show progress within protocol.Met  Patient will show improved Passive Range of Motion to 30 degrees of External Rotation to show progress within protocol.Met     Long Term Goals: 12 weeks  Patient will be independent in Home Exercise Program to initiate strengthening and return to hobbies.-Progressing, not met   Patient will show improved full Passive Range of Motion in flexion and External Rotation. -Progressing, not met   Patient will show improved strength by demonstrating active flexion to 120 degrees.-Progressing, not met   Patient goal: return to boot camp three times a week with no restrictions.-Progressing, not met   Patient will have improved limitation score to 27% limited on FOTO shoulder in order to demonstrate functional improvement.-Progressing, not met     Plan     Continue range of motion and strengthening activities per protocol.     Madelyn Flowers, PT, DPT

## 2023-03-09 ENCOUNTER — CLINICAL SUPPORT (OUTPATIENT)
Dept: REHABILITATION | Facility: HOSPITAL | Age: 53
End: 2023-03-09
Payer: COMMERCIAL

## 2023-03-09 ENCOUNTER — OFFICE VISIT (OUTPATIENT)
Dept: UROLOGY | Facility: CLINIC | Age: 53
End: 2023-03-09
Payer: COMMERCIAL

## 2023-03-09 VITALS
HEART RATE: 59 BPM | WEIGHT: 240.88 LBS | SYSTOLIC BLOOD PRESSURE: 131 MMHG | HEIGHT: 74 IN | BODY MASS INDEX: 30.91 KG/M2 | DIASTOLIC BLOOD PRESSURE: 87 MMHG

## 2023-03-09 DIAGNOSIS — M25.611 DECREASED RANGE OF MOTION OF RIGHT SHOULDER: Primary | ICD-10-CM

## 2023-03-09 DIAGNOSIS — E34.9 TESTOSTERONE DEFICIENCY: ICD-10-CM

## 2023-03-09 DIAGNOSIS — M62.81 DECREASED MUSCLE STRENGTH: ICD-10-CM

## 2023-03-09 DIAGNOSIS — N52.9 ED (ERECTILE DYSFUNCTION) OF ORGANIC ORIGIN: ICD-10-CM

## 2023-03-09 DIAGNOSIS — E78.5 DYSLIPIDEMIA: ICD-10-CM

## 2023-03-09 DIAGNOSIS — N40.0 BENIGN PROSTATIC HYPERPLASIA WITHOUT LOWER URINARY TRACT SYMPTOMS: ICD-10-CM

## 2023-03-09 DIAGNOSIS — E29.1 PRIMARY MALE HYPOGONADISM: Primary | ICD-10-CM

## 2023-03-09 DIAGNOSIS — R53.83 FATIGUE, UNSPECIFIED TYPE: ICD-10-CM

## 2023-03-09 PROCEDURE — 99214 PR OFFICE/OUTPT VISIT, EST, LEVL IV, 30-39 MIN: ICD-10-PCS | Mod: S$GLB,,, | Performed by: NURSE PRACTITIONER

## 2023-03-09 PROCEDURE — 3008F BODY MASS INDEX DOCD: CPT | Mod: CPTII,S$GLB,, | Performed by: NURSE PRACTITIONER

## 2023-03-09 PROCEDURE — 99214 OFFICE O/P EST MOD 30 MIN: CPT | Mod: S$GLB,,, | Performed by: NURSE PRACTITIONER

## 2023-03-09 PROCEDURE — 1160F RVW MEDS BY RX/DR IN RCRD: CPT | Mod: CPTII,S$GLB,, | Performed by: NURSE PRACTITIONER

## 2023-03-09 PROCEDURE — 97112 NEUROMUSCULAR REEDUCATION: CPT

## 2023-03-09 PROCEDURE — 97140 MANUAL THERAPY 1/> REGIONS: CPT

## 2023-03-09 PROCEDURE — 3008F PR BODY MASS INDEX (BMI) DOCUMENTED: ICD-10-PCS | Mod: CPTII,S$GLB,, | Performed by: NURSE PRACTITIONER

## 2023-03-09 PROCEDURE — 99999 PR PBB SHADOW E&M-EST. PATIENT-LVL III: ICD-10-PCS | Mod: PBBFAC,,, | Performed by: NURSE PRACTITIONER

## 2023-03-09 PROCEDURE — 4010F PR ACE/ARB THEARPY RXD/TAKEN: ICD-10-PCS | Mod: CPTII,S$GLB,, | Performed by: NURSE PRACTITIONER

## 2023-03-09 PROCEDURE — 1160F PR REVIEW ALL MEDS BY PRESCRIBER/CLIN PHARMACIST DOCUMENTED: ICD-10-PCS | Mod: CPTII,S$GLB,, | Performed by: NURSE PRACTITIONER

## 2023-03-09 PROCEDURE — 1159F PR MEDICATION LIST DOCUMENTED IN MEDICAL RECORD: ICD-10-PCS | Mod: CPTII,S$GLB,, | Performed by: NURSE PRACTITIONER

## 2023-03-09 PROCEDURE — 3075F PR MOST RECENT SYSTOLIC BLOOD PRESS GE 130-139MM HG: ICD-10-PCS | Mod: CPTII,S$GLB,, | Performed by: NURSE PRACTITIONER

## 2023-03-09 PROCEDURE — 4010F ACE/ARB THERAPY RXD/TAKEN: CPT | Mod: CPTII,S$GLB,, | Performed by: NURSE PRACTITIONER

## 2023-03-09 PROCEDURE — 1159F MED LIST DOCD IN RCRD: CPT | Mod: CPTII,S$GLB,, | Performed by: NURSE PRACTITIONER

## 2023-03-09 PROCEDURE — 3079F PR MOST RECENT DIASTOLIC BLOOD PRESSURE 80-89 MM HG: ICD-10-PCS | Mod: CPTII,S$GLB,, | Performed by: NURSE PRACTITIONER

## 2023-03-09 PROCEDURE — 97110 THERAPEUTIC EXERCISES: CPT

## 2023-03-09 PROCEDURE — 3075F SYST BP GE 130 - 139MM HG: CPT | Mod: CPTII,S$GLB,, | Performed by: NURSE PRACTITIONER

## 2023-03-09 PROCEDURE — 99999 PR PBB SHADOW E&M-EST. PATIENT-LVL III: CPT | Mod: PBBFAC,,, | Performed by: NURSE PRACTITIONER

## 2023-03-09 PROCEDURE — 3079F DIAST BP 80-89 MM HG: CPT | Mod: CPTII,S$GLB,, | Performed by: NURSE PRACTITIONER

## 2023-03-09 RX ORDER — TESTOSTERONE 40.5 MG/2.5G
5 GEL TOPICAL DAILY
Qty: 150 G | Refills: 5 | Status: SHIPPED | OUTPATIENT
Start: 2023-03-09 | End: 2023-09-11 | Stop reason: SDUPTHER

## 2023-03-09 RX ORDER — SILDENAFIL 100 MG/1
100 TABLET, FILM COATED ORAL DAILY PRN
Qty: 10 TABLET | Refills: 12 | Status: SHIPPED | OUTPATIENT
Start: 2023-03-09 | End: 2023-09-11 | Stop reason: SDUPTHER

## 2023-03-09 NOTE — PROGRESS NOTES
CHIEF COMPLAINT:    Sukumar Beal is a 52 y.o. male presentts    HISTORY OF PRESENTING ILLINESS:    Sukumar Beal is a 51 y.o. male who has a history of Low Testosterone. He has complaints are fatigue, loss of axillary hair.     He's on Androgel 1.62% (1.25 gms) using 3 pumps.  While on TRT, While on TRT, he has more energy.    He wanted testopel, but he has [employer].     Last clinic visit was 09/08/2022.   TRT labs were completed 08/31/2022 at 9:18 am by his PCP    PSA was 0.76 (stable)  T level was 130 (out of medication)  HCT was 47.5. (stable).    He is here today for 6 month TRT.   Ran out of his medications; unable to do 30 day supply due dosing requirements with the pump.   He has definitely feeling more fatigued, run done since running out of his Testosterone.        REVIEW OF SYSTEMS:  Review of Systems   Constitutional:  Positive for malaise/fatigue (out of his AndroGel). Negative for chills and fever.   Eyes:  Negative for double vision.   Respiratory:  Negative for cough and shortness of breath.    Cardiovascular:  Negative for chest pain.   Gastrointestinal:  Negative for abdominal pain, constipation, diarrhea, nausea and vomiting.   Genitourinary: Negative.  Negative for dysuria, flank pain and hematuria.        Ok with urination  FOS is not as strong as before.      Neurological:  Negative for dizziness and seizures.   Endo/Heme/Allergies:  Negative for polydipsia.       PATIENT HISTORY:    Past Medical History:   Diagnosis Date    Gout 7/2014    High cholesterol     Hypertension     Hypothyroid     Low testosterone     Staph aureus infection age 13    R leg       Past Surgical History:   Procedure Laterality Date    ARTHROSCOPIC DEBRIDEMENT OF SHOULDER  10/14/2022    Procedure: DEBRIDEMENT, SHOULDER, ARTHROSCOPIC;  Surgeon: LEONIDAS Carr MD;  Location: [facility] OR;  Service: Orthopedics;;    ARTHROSCOPIC REPAIR OF ROTATOR CUFF OF SHOULDER Right 10/14/2022    Procedure: REPAIR, ROTATOR CUFF,  ARTHROSCOPIC - POLAR CARE;  Surgeon: LEONIDAS Carr MD;  Location: [facility] OR;  Service: Orthopedics;  Laterality: Right;  Regional w/o Catheter, Interscalene    ARTHROSCOPY,SHOULDER,WITH BICEPS TENODESIS Right 10/14/2022    Procedure: ARTHROSCOPY,SHOULDER,WITH BICEPS TENODESIS;  Surgeon: LEONIDAS Carr MD;  Location: [facility] OR;  Service: Orthopedics;  Laterality: Right;    COLONOSCOPY N/A 2020    Procedure: COLONOSCOPY;  Surgeon: VANI Newell MD;  [facility]  Service: Endoscopy;  Laterality: N/A;  covid test 2nd floor surgery clinic     DECOMPRESSION OF SUBACROMIAL SPACE Right 10/14/2022    Procedure: DECOMPRESSION, SUBACROMIAL SPACE;  Surgeon: LEONIDAS Carr MD;  Location: [facility] OR;  Service: Orthopedics;  Laterality: Right;    FRACTURE SURGERY Left     wrist    Incision and drainage of tibia Right     SINUS SURGERY      x2       Family History   Problem Relation Age of Onset    Heart disease Father 73            Hypertension Sister     Hyperlipidemia Sister     Hypertension Brother     Hyperlipidemia Brother     Lupus Sister     Hypertension Mother     Cancer Maternal Grandfather     Cancer Maternal Aunt         lung - smoker    Melanoma Neg Hx        Social History     Socioeconomic History    Marital status: Single   Tobacco Use    Smoking status: Former     Packs/day: 0.25     Years: 15.00     Pack years: 3.75     Types: Cigarettes     Quit date: 1/3/2013     Years since quitting: 10.1    Smokeless tobacco: Never   Substance and Sexual Activity    Alcohol use: Yes     Alcohol/week: 5.0 standard drinks     Types: 6 Standard drinks or equivalent per week     Comment: weekends    Drug use: No    Sexual activity: Yes       Allergies:  Codeine and Penicillins    Medications:    Current Outpatient Medications:     atorvastatin (LIPITOR) 20 MG tablet, Take 1 tablet (20 mg total) by mouth once daily., Disp: 90 tablet, Rfl: 3    B-complex with vitamin C (Z-BEC OR  EQUIV) tablet, Take 1 tablet by mouth once daily., Disp: , Rfl:     CYANOCOBALAMIN, VITAMIN B-12, ORAL, Take 1 tablet by mouth once daily., Disp: , Rfl:     ergocalciferol, vitamin D2, (VITAMIN D ORAL), Take 2 capsules by mouth once daily., Disp: , Rfl:     levothyroxine (SYNTHROID) 112 MCG tablet, Take 1 tablet (112 mcg total) by mouth once daily., Disp: 90 tablet, Rfl: 3    losartan (COZAAR) 50 MG tablet, Take 1 tablet (50 mg total) by mouth once daily., Disp: 90 tablet, Rfl: 3    vitamin E acetate (VITAMIN E ORAL), Take 1 tablet by mouth once daily., Disp: , Rfl:     sildenafiL (VIAGRA) 100 MG tablet, Take 1 tablet (100 mg total) by mouth daily as needed for Erectile Dysfunction (take on an empty stomach 30-60 minutes before)., Disp: 10 tablet, Rfl: 12    testosterone (ANDROGEL) 1.62 % (40.5 mg/2.5 gram) GlPk, Place 5 g onto the skin once daily. 2 packets to shoulders daily., Disp: 150 g, Rfl: 5    PHYSICAL EXAMINATION:  Physical Exam  Vitals and nursing note reviewed.   Constitutional:       General: He is awake.      Appearance: Normal appearance.   HENT:      Head: Normocephalic.      Right Ear: External ear normal.      Left Ear: External ear normal.      Nose: Nose normal.   Cardiovascular:      Rate and Rhythm: Normal rate.   Pulmonary:      Effort: Pulmonary effort is normal. No respiratory distress.   Abdominal:      Tenderness: There is no abdominal tenderness. There is no right CVA tenderness or left CVA tenderness.   Genitourinary:     Penis: Normal.       Testes: Normal.      Prostate: Enlarged (stable). Not tender and no nodules present.   Musculoskeletal:         General: Normal range of motion.      Cervical back: Normal range of motion.   Skin:     General: Skin is warm and dry.   Neurological:      General: No focal deficit present.      Mental Status: He is alert and oriented to person, place, and time.   Psychiatric:         Mood and Affect: Mood normal.         Behavior: Behavior is  cooperative.         LABS:          Lab Results   Component Value Date    PSA 0.81 09/22/2021    PSA 0.42 06/11/2018    PSA 0.41 05/10/2017    PSADIAG 0.76 08/31/2022    PSADIAG 0.69 03/09/2021    PSADIAG 0.88 08/26/2020       Lab Results   Component Value Date    CREATININE 1.1 08/31/2022    EGFRNORACEVR >60.0 08/31/2022             IMPRESSION:    Encounter Diagnoses   Name Primary?    Primary male hypogonadism Yes    Fatigue, unspecified type     Dyslipidemia     ED (erectile dysfunction) of organic origin     Benign prostatic hyperplasia without lower urinary tract symptoms     Testosterone deficiency          Assessment:       1. Primary male hypogonadism    2. Fatigue, unspecified type    3. Dyslipidemia    4. ED (erectile dysfunction) of organic origin    5. Benign prostatic hyperplasia without lower urinary tract symptoms    6. Testosterone deficiency        Plan:          I spent 30 minutes with the patient of which more than half was spent in direct consultation with the patient in regards to our treatment and plan.    We addressed that his labs are pending; f/u depending on results.   Education and recommendations of today's plan of care including home remedies and needed follow up with PCP.    Discussed the benefits of TRT and the possible risks; expectations with TRT; importance of lab monitoring.  Discussed different ways to apply T.   Change to packets or the pumps; dose increase due to T levels low.  Rx to AndroGel 1.62% 2.5 mg packets; 2 packets daily  Recheck T  Diet and exercise; donating blood every 6 months strongly encouraged to prevent polycythemia.  RTC 6 months for full exam/labs

## 2023-03-09 NOTE — PROGRESS NOTES
Physical Therapy Treatment Note     Name: Sukumar Beal  [facility] Number: [id number]    Therapy Diagnosis:   Encounter Diagnoses   Name Primary?    Decreased range of motion of right shoulder Yes    Decreased muscle strength        Physician: Jh Sibley*    Visit Date: 3/9/2023    Physician Orders: PT Eval and Treat   Medical Diagnosis from Referral:   Diagnosis   M75.101 (ICD-10-CM) - Nontraumatic tear of right rotator cuff, unspecified tear extent   M75.21 (ICD-10-CM) - Biceps tendinitis of right upper extremity      Evaluation Date: 11/1/2022  Authorization Period Expiration: 12/31/2023  Plan of Care Expiration: 1/24/2023  Visit # / Visits authorized: 15/20 (29 total)    Initial FOTO: 58% (Predicted 27%)  FOTO 1st F/U: 45%  FOTO 2nd F/U: 35%      Time In: 900am  Time Out: 1000am  Total Billable Time: 60 minutes    Precautions: Standard and RCR     Date of Surgery: 10/14/2022  Weeks Post Op: 20 weeks, 6 days as of 3/9     From Surgeon:  Physical Therapy: Follow the >3 cm cuff repair rehab protocol. PROM starts after 2 weeks. AROM starts after 7 weeks. No cuff resistive activity x 12 weeks. No biceps resistive activity x 8 weeks. We will be just a bit slower in this case given the tear pattern and size in this case    A/P:  Arthroscopic pictures again reviewed with the patient.  May wean out of the sling.  Discussed ongoing lifting precautions.  No more than a Coke can or small book.  May begin the active phase of his range of motion.  Work on active and passive range of motion and continued scapular mechanics.  I will see him back in 6 weeks.  At that time we will begin the strengthening phase of recovery.  He understands that he will have some activity and lifting restrictions up to 5 months postop.  All questions answered.  Prescription for Naprosyn given.      Subjective     Pt reports: feeling pretty good, happy with his progress    He was compliant with home exercise program.  Response to previous  "treatment: soreness  Functional change: improved overhead reach    Pain: 0/10  Location: right shoulder      Objective     Sukumar received therapeutic exercises to develop strength, endurance, ROM, flexibility, and posture for 25 minutes including:    Thoracic mobility at the wall x 15 Bilateral   Serratus press with BTB 3 x 15  Pushup edge of table 3 x 15  Waiters carry 5# 3 laps     NT  Lat stretch with cane at edge of table 5 x 20"  Elevated push ups at 24 in box x 20  External Rotation/Internal Rotation into full flexion 2 x 15    Sukumar received the following manual therapy techniques: Joint mobilizations and Soft tissue Mobilization were applied to the: right shoulder for 10 minutes, including:    Gr I-II oscillations relaxation  Inferior mob arm at 20 and 90 deg abd Gr III  External Rotation stretch at 90/90 grade III   Rhythmic stabilization at end range of flexion     Not Performed Today:  Scapular upward rotation grade III  Cross body MET    Sukumar participated in neuromuscular re-education activities to improve: Coordination, Proprioception, and Posture for 25 minutes. The following activities were included:    Prone over swiss ball T 2# 3 X 5 iso hold  Standing row with ER at 90/90 OTB 3 x 15   Rhythmic stabilization with blue band at full flexion x 3 to fatigue    Sukumar participated in dynamic functional therapeutic activities to improve functional performance for 0  minutes, including:      Home Exercises Provided and Patient Education Provided     Education provided:   - Home Exercise Program, precautions, progress    Written Home Exercises Provided: Patient instructed to cont prior HEP.  Exercises were reviewed and Sukumar was able to demonstrate them prior to the end of the session.  Sukumar demonstrated good  understanding of the education provided.     See EMR under Patient Instructions for exercises provided prior visit.    Assessment     Sukumar progressed with strengthening to the cuff overhead and in 90/90 " position. He notes relief with thoracic mobility exercises and just muscle fatigue with overhead lifting. He will continue to progress overhead strength    Sukumar Is progressing well towards his goals.   Pt prognosis is Good.     Pt will continue to benefit from skilled outpatient physical therapy to address the deficits listed in the problem list box on initial evaluation, provide pt/family education and to maximize pt's level of independence in the home and community environment.     Pt's spiritual, cultural and educational needs considered and pt agreeable to plan of care and goals.     Anticipated barriers to physical therapy: none    Goals:   Short Term Goals:  6 weeks  Patient will be independent in Home Exercise Program for independent progression within protocol.Met  Patient will show improved Passive Range of Motion to 120 degrees of flexion to show progress within protocol.Met  Patient will show improved Passive Range of Motion to 30 degrees of External Rotation to show progress within protocol.Met     Long Term Goals: 12 weeks  Patient will be independent in Home Exercise Program to initiate strengthening and return to hobbies.-Progressing, not met   Patient will show improved full Passive Range of Motion in flexion and External Rotation. -Progressing, not met   Patient will show improved strength by demonstrating active flexion to 120 degrees.-Progressing, not met   Patient goal: return to boot camp three times a week with no restrictions.-Progressing, not met   Patient will have improved limitation score to 27% limited on FOTO shoulder in order to demonstrate functional improvement.-Progressing, not met     Plan     Continue range of motion and strengthening activities per protocol.     Madelyn Flowers, PT, DPT

## 2023-03-10 ENCOUNTER — LAB VISIT (OUTPATIENT)
Dept: LAB | Facility: HOSPITAL | Age: 53
End: 2023-03-10
Attending: NURSE PRACTITIONER
Payer: COMMERCIAL

## 2023-03-10 DIAGNOSIS — N52.9 ED (ERECTILE DYSFUNCTION) OF ORGANIC ORIGIN: ICD-10-CM

## 2023-03-10 DIAGNOSIS — N40.0 BENIGN PROSTATIC HYPERPLASIA WITHOUT LOWER URINARY TRACT SYMPTOMS: ICD-10-CM

## 2023-03-10 DIAGNOSIS — E29.1 PRIMARY MALE HYPOGONADISM: ICD-10-CM

## 2023-03-10 DIAGNOSIS — R53.83 FATIGUE, UNSPECIFIED TYPE: ICD-10-CM

## 2023-03-10 LAB
ALBUMIN SERPL BCP-MCNC: 4.2 G/DL (ref 3.5–5.2)
ALP SERPL-CCNC: 82 U/L (ref 55–135)
ALT SERPL W/O P-5'-P-CCNC: 37 U/L (ref 10–44)
AST SERPL-CCNC: 32 U/L (ref 10–40)
BASOPHILS # BLD AUTO: 0.04 K/UL (ref 0–0.2)
BASOPHILS NFR BLD: 0.7 % (ref 0–1.9)
BILIRUB DIRECT SERPL-MCNC: 0.2 MG/DL (ref 0.1–0.3)
BILIRUB SERPL-MCNC: 0.6 MG/DL (ref 0.1–1)
CHOLEST SERPL-MCNC: 135 MG/DL (ref 120–199)
CHOLEST/HDLC SERPL: 3.1 {RATIO} (ref 2–5)
COMPLEXED PSA SERPL-MCNC: 0.61 NG/ML (ref 0–4)
CREAT SERPL-MCNC: 1 MG/DL (ref 0.5–1.4)
DIFFERENTIAL METHOD: NORMAL
EOSINOPHIL # BLD AUTO: 0.1 K/UL (ref 0–0.5)
EOSINOPHIL NFR BLD: 2.4 % (ref 0–8)
ERYTHROCYTE [DISTWIDTH] IN BLOOD BY AUTOMATED COUNT: 12.8 % (ref 11.5–14.5)
EST. GFR  (NO RACE VARIABLE): >60 ML/MIN/1.73 M^2
HCT VFR BLD AUTO: 42.9 % (ref 40–54)
HDLC SERPL-MCNC: 44 MG/DL (ref 40–75)
HDLC SERPL: 32.6 % (ref 20–50)
HGB BLD-MCNC: 14.4 G/DL (ref 14–18)
IMM GRANULOCYTES # BLD AUTO: 0.01 K/UL (ref 0–0.04)
IMM GRANULOCYTES NFR BLD AUTO: 0.2 % (ref 0–0.5)
LDLC SERPL CALC-MCNC: 73.4 MG/DL (ref 63–159)
LYMPHOCYTES # BLD AUTO: 1.3 K/UL (ref 1–4.8)
LYMPHOCYTES NFR BLD: 24.6 % (ref 18–48)
MCH RBC QN AUTO: 30.3 PG (ref 27–31)
MCHC RBC AUTO-ENTMCNC: 33.6 G/DL (ref 32–36)
MCV RBC AUTO: 90 FL (ref 82–98)
MONOCYTES # BLD AUTO: 0.5 K/UL (ref 0.3–1)
MONOCYTES NFR BLD: 9.7 % (ref 4–15)
NEUTROPHILS # BLD AUTO: 3.4 K/UL (ref 1.8–7.7)
NEUTROPHILS NFR BLD: 62.4 % (ref 38–73)
NONHDLC SERPL-MCNC: 91 MG/DL
NRBC BLD-RTO: 0 /100 WBC
PLATELET # BLD AUTO: 194 K/UL (ref 150–450)
PMV BLD AUTO: 10.5 FL (ref 9.2–12.9)
PROT SERPL-MCNC: 6.7 G/DL (ref 6–8.4)
RBC # BLD AUTO: 4.75 M/UL (ref 4.6–6.2)
TESTOST SERPL-MCNC: 241 NG/DL (ref 304–1227)
TRIGL SERPL-MCNC: 88 MG/DL (ref 30–150)
WBC # BLD AUTO: 5.44 K/UL (ref 3.9–12.7)

## 2023-03-10 PROCEDURE — 80061 LIPID PANEL: CPT | Performed by: NURSE PRACTITIONER

## 2023-03-10 PROCEDURE — 85025 COMPLETE CBC W/AUTO DIFF WBC: CPT | Performed by: NURSE PRACTITIONER

## 2023-03-10 PROCEDURE — 84403 ASSAY OF TOTAL TESTOSTERONE: CPT | Performed by: NURSE PRACTITIONER

## 2023-03-10 PROCEDURE — 84153 ASSAY OF PSA TOTAL: CPT | Performed by: NURSE PRACTITIONER

## 2023-03-10 PROCEDURE — 36415 COLL VENOUS BLD VENIPUNCTURE: CPT | Performed by: NURSE PRACTITIONER

## 2023-03-10 PROCEDURE — 80076 HEPATIC FUNCTION PANEL: CPT | Performed by: NURSE PRACTITIONER

## 2023-03-10 PROCEDURE — 82565 ASSAY OF CREATININE: CPT | Performed by: NURSE PRACTITIONER

## 2023-03-13 ENCOUNTER — TELEPHONE (OUTPATIENT)
Dept: PHARMACY | Facility: CLINIC | Age: 53
End: 2023-03-13
Payer: COMMERCIAL

## 2023-03-14 ENCOUNTER — CLINICAL SUPPORT (OUTPATIENT)
Dept: REHABILITATION | Facility: HOSPITAL | Age: 53
End: 2023-03-14
Payer: COMMERCIAL

## 2023-03-14 DIAGNOSIS — M25.611 DECREASED RANGE OF MOTION OF RIGHT SHOULDER: Primary | ICD-10-CM

## 2023-03-14 DIAGNOSIS — M62.81 DECREASED MUSCLE STRENGTH: ICD-10-CM

## 2023-03-14 PROCEDURE — 97112 NEUROMUSCULAR REEDUCATION: CPT

## 2023-03-14 PROCEDURE — 97140 MANUAL THERAPY 1/> REGIONS: CPT

## 2023-03-14 PROCEDURE — 97110 THERAPEUTIC EXERCISES: CPT

## 2023-03-14 NOTE — PROGRESS NOTES
Physical Therapy Treatment Note     Name: Sukumar Beal  [facility] Number: [id number]    Therapy Diagnosis:   Encounter Diagnoses   Name Primary?    Decreased range of motion of right shoulder Yes    Decreased muscle strength        Physician: Jh Sibley*    Visit Date: 3/14/2023    Physician Orders: PT Eval and Treat   Medical Diagnosis from Referral:   Diagnosis   M75.101 (ICD-10-CM) - Nontraumatic tear of right rotator cuff, unspecified tear extent   M75.21 (ICD-10-CM) - Biceps tendinitis of right upper extremity      Evaluation Date: 11/1/2022  Authorization Period Expiration: 12/31/2023  Plan of Care Expiration: 1/24/2023  Visit # / Visits authorized: 16/20 (30 total)    Initial FOTO: 58% (Predicted 27%)  FOTO 1st F/U: 45%  FOTO 2nd F/U: 35%      Time In: 900am  Time Out: 950am  Total Billable Time: 50 minutes    Precautions: Standard and RCR     Date of Surgery: 10/14/2022  Weeks Post Op: 21 weeks, 4 days as of 3/14     From Surgeon:  Physical Therapy: Follow the >3 cm cuff repair rehab protocol. PROM starts after 2 weeks. AROM starts after 7 weeks. No cuff resistive activity x 12 weeks. No biceps resistive activity x 8 weeks. We will be just a bit slower in this case given the tear pattern and size in this case    A/P:  Arthroscopic pictures again reviewed with the patient.  May wean out of the sling.  Discussed ongoing lifting precautions.  No more than a Coke can or small book.  May begin the active phase of his range of motion.  Work on active and passive range of motion and continued scapular mechanics.  I will see him back in 6 weeks.  At that time we will begin the strengthening phase of recovery.  He understands that he will have some activity and lifting restrictions up to 5 months postop.  All questions answered.  Prescription for Naprosyn given.      Subjective     Pt reports: feeling good    He was compliant with home exercise program.  Response to previous treatment: soreness  Functional  "change: improved overhead reach    Pain: 0/10  Location: right shoulder      Objective     Sukumar received therapeutic exercises to develop strength, endurance, ROM, flexibility, and posture for 15 minutes including:    Thoracic mobility at the wall x 15 Bilateral   Serratus press with BTB 3 x 15  Pushup edge of table 3 x 15  Waiters carry 10# 3 laps     NT  Lat stretch with cane at edge of table 5 x 20"  Elevated push ups at 24 in box x 20  External Rotation/Internal Rotation into full flexion 2 x 15    Sukumar received the following manual therapy techniques: Joint mobilizations and Soft tissue Mobilization were applied to the: right shoulder for 10 minutes, including:    Gr I-II oscillations relaxation  Inferior mob arm at 20 and 90 deg abd Gr III  External Rotation stretch at 90/90 grade III   Rhythmic stabilization at end range of flexion     Not Performed Today:  Scapular upward rotation grade III  Cross body MET    Sukumar participated in neuromuscular re-education activities to improve: Coordination, Proprioception, and Posture for 25 minutes. The following activities were included:    Prone over swiss ball T 2# 3 X 5 iso hold  Standing row with ER at 90/90 OTB 3 x 15   Rhythmic stabilization with blue band at full flexion x 3 to fatigue  Plank position to down dog 2 x 15    Sukumar participated in dynamic functional therapeutic activities to improve functional performance for 0  minutes, including:      Home Exercises Provided and Patient Education Provided     Education provided:   - Home Exercise Program, precautions, progress    Written Home Exercises Provided: Patient instructed to cont prior HEP.  Exercises were reviewed and Sukumar was able to demonstrate them prior to the end of the session.  Sukumar demonstrated good  understanding of the education provided.     See EMR under Patient Instructions for exercises provided prior visit.    Assessment     Tolerated treatment well today. Progressed closed chain exercise " with down dog for scapular stability and glenohumeral stabilization.     Sukumar Is progressing well towards his goals.   Pt prognosis is Good.     Pt will continue to benefit from skilled outpatient physical therapy to address the deficits listed in the problem list box on initial evaluation, provide pt/family education and to maximize pt's level of independence in the home and community environment.     Pt's spiritual, cultural and educational needs considered and pt agreeable to plan of care and goals.     Anticipated barriers to physical therapy: none    Goals:   Short Term Goals:  6 weeks  Patient will be independent in Home Exercise Program for independent progression within protocol.Met  Patient will show improved Passive Range of Motion to 120 degrees of flexion to show progress within protocol.Met  Patient will show improved Passive Range of Motion to 30 degrees of External Rotation to show progress within protocol.Met     Long Term Goals: 12 weeks  Patient will be independent in Home Exercise Program to initiate strengthening and return to hobbies.-Progressing, not met   Patient will show improved full Passive Range of Motion in flexion and External Rotation. -Progressing, not met   Patient will show improved strength by demonstrating active flexion to 120 degrees.-Progressing, not met   Patient goal: return to boot camp three times a week with no restrictions.-Progressing, not met   Patient will have improved limitation score to 27% limited on FOTO shoulder in order to demonstrate functional improvement.-Progressing, not met     Plan     Continue range of motion and strengthening activities per protocol.     Madelyn Flowers, PT, DPT

## 2023-03-16 ENCOUNTER — CLINICAL SUPPORT (OUTPATIENT)
Dept: REHABILITATION | Facility: HOSPITAL | Age: 53
End: 2023-03-16
Payer: COMMERCIAL

## 2023-03-16 DIAGNOSIS — M62.81 DECREASED MUSCLE STRENGTH: ICD-10-CM

## 2023-03-16 DIAGNOSIS — M25.611 DECREASED RANGE OF MOTION OF RIGHT SHOULDER: Primary | ICD-10-CM

## 2023-03-16 PROCEDURE — 97140 MANUAL THERAPY 1/> REGIONS: CPT

## 2023-03-16 PROCEDURE — 97112 NEUROMUSCULAR REEDUCATION: CPT

## 2023-03-16 PROCEDURE — 97110 THERAPEUTIC EXERCISES: CPT

## 2023-03-16 NOTE — PROGRESS NOTES
Physical Therapy Treatment Note     Name: Sukumar Beal  [facility] Number: [id number]    Therapy Diagnosis:   Encounter Diagnoses   Name Primary?    Decreased range of motion of right shoulder Yes    Decreased muscle strength        Physician: Jh Sibley*    Visit Date: 3/16/2023    Physician Orders: PT Eval and Treat   Medical Diagnosis from Referral:   Diagnosis   M75.101 (ICD-10-CM) - Nontraumatic tear of right rotator cuff, unspecified tear extent   M75.21 (ICD-10-CM) - Biceps tendinitis of right upper extremity      Evaluation Date: 11/1/2022  Authorization Period Expiration: 12/31/2023  Plan of Care Expiration: 1/24/2023  Visit # / Visits authorized: 17/20 (31 total)    Initial FOTO: 58% (Predicted 27%)  FOTO 1st F/U: 45%  FOTO 2nd F/U: 35%      Time In: 1010am  Time Out: 1100am  Total Billable Time: 50 minutes    Precautions: Standard and RCR     Date of Surgery: 10/14/2022  Weeks Post Op: 21 weeks, 6 days as of 3/16     From Surgeon:  Physical Therapy: Follow the >3 cm cuff repair rehab protocol. PROM starts after 2 weeks. AROM starts after 7 weeks. No cuff resistive activity x 12 weeks. No biceps resistive activity x 8 weeks. We will be just a bit slower in this case given the tear pattern and size in this case    A/P:  Arthroscopic pictures again reviewed with the patient.  May wean out of the sling.  Discussed ongoing lifting precautions.  No more than a Coke can or small book.  May begin the active phase of his range of motion.  Work on active and passive range of motion and continued scapular mechanics.  I will see him back in 6 weeks.  At that time we will begin the strengthening phase of recovery.  He understands that he will have some activity and lifting restrictions up to 5 months postop.  All questions answered.  Prescription for Naprosyn given.      Subjective     Pt reports: feeling good    He was compliant with home exercise program.  Response to previous treatment:  "soreness  Functional change: improved overhead reach    Pain: 0/10  Location: right shoulder      Objective     Sukumar received therapeutic exercises to develop strength, endurance, ROM, flexibility, and posture for 15 minutes including:    Thoracic mobility at the wall x 15 Bilateral   Serratus press with BTB 3 x 15  Pushup edge of table 3 x 15  Waiters carry 10# 2 laps     NT  Lat stretch with cane at edge of table 5 x 20"  Elevated push ups at 24 in box x 20  External Rotation/Internal Rotation into full flexion 2 x 15    Sukumar received the following manual therapy techniques: Joint mobilizations and Soft tissue Mobilization were applied to the: right shoulder for 10 minutes, including:    Gr I-II oscillations relaxation  Inferior mob arm at 20 and 90 deg abd Gr III  External Rotation stretch at 90/90 grade III   Shoulder flexion stretch grade III  Rhythmic stabilization at end range of flexion     Not Performed Today:  Scapular upward rotation grade III  Cross body MET    Sukumar participated in neuromuscular re-education activities to improve: Coordination, Proprioception, and Posture for 25 minutes. The following activities were included:    Prone over swiss ball T 2# 3 X 5 iso hold  Plank position to down dog 1 x 15  Shoulder taps 3 x 20 taps    Not Performed Today:  Standing row with ER at 90/90 OTB 3 x 15   Rhythmic stabilization with blue band at full flexion x 3 to fatigue    Sukumar participated in dynamic functional therapeutic activities to improve functional performance for 0  minutes, including:      Home Exercises Provided and Patient Education Provided     Education provided:   - Home Exercise Program, precautions, progress    Written Home Exercises Provided: Patient instructed to cont prior HEP.  Exercises were reviewed and Sukumar was able to demonstrate them prior to the end of the session.  Sukumar demonstrated good  understanding of the education provided.     See EMR under Patient Instructions for exercises " provided prior visit.    Assessment     Sukumar tolerated treatment well today. Closed chain progression included with shoulder taps. Patient fatigued at end of session but no pain.     Sukumar Is progressing well towards his goals.   Pt prognosis is Good.     Pt will continue to benefit from skilled outpatient physical therapy to address the deficits listed in the problem list box on initial evaluation, provide pt/family education and to maximize pt's level of independence in the home and community environment.     Pt's spiritual, cultural and educational needs considered and pt agreeable to plan of care and goals.     Anticipated barriers to physical therapy: none    Goals:   Short Term Goals:  6 weeks  Patient will be independent in Home Exercise Program for independent progression within protocol.Met  Patient will show improved Passive Range of Motion to 120 degrees of flexion to show progress within protocol.Met  Patient will show improved Passive Range of Motion to 30 degrees of External Rotation to show progress within protocol.Met     Long Term Goals: 12 weeks  Patient will be independent in Home Exercise Program to initiate strengthening and return to hobbies.-Progressing, not met   Patient will show improved full Passive Range of Motion in flexion and External Rotation. -Progressing, not met   Patient will show improved strength by demonstrating active flexion to 120 degrees.-Progressing, not met   Patient goal: return to boot camp three times a week with no restrictions.-Progressing, not met   Patient will have improved limitation score to 27% limited on FOTO shoulder in order to demonstrate functional improvement.-Progressing, not met     Plan     Continue range of motion and strengthening activities per protocol.   Thoracic mobility, strengthening, end range flexion and abduction     Madelyn Flowers, PT, DPT

## 2023-03-23 ENCOUNTER — CLINICAL SUPPORT (OUTPATIENT)
Dept: REHABILITATION | Facility: HOSPITAL | Age: 53
End: 2023-03-23
Payer: COMMERCIAL

## 2023-03-23 DIAGNOSIS — M62.81 DECREASED MUSCLE STRENGTH: ICD-10-CM

## 2023-03-23 DIAGNOSIS — M25.611 DECREASED RANGE OF MOTION OF RIGHT SHOULDER: Primary | ICD-10-CM

## 2023-03-23 PROCEDURE — 97112 NEUROMUSCULAR REEDUCATION: CPT | Performed by: PHYSICAL THERAPIST

## 2023-03-23 PROCEDURE — 97140 MANUAL THERAPY 1/> REGIONS: CPT | Performed by: PHYSICAL THERAPIST

## 2023-03-23 NOTE — PROGRESS NOTES
Physical Therapy Treatment Note     Name: Sukumar Beal  [facility] Number: [id number]    Therapy Diagnosis:   Encounter Diagnoses   Name Primary?    Decreased range of motion of right shoulder Yes    Decreased muscle strength      Physician: Jh Sibley*    Visit Date: 3/23/2023    Physician Orders: PT Eval and Treat   Medical Diagnosis from Referral:   Diagnosis   M75.101 (ICD-10-CM) - Nontraumatic tear of right rotator cuff, unspecified tear extent   M75.21 (ICD-10-CM) - Biceps tendinitis of right upper extremity      Evaluation Date: 11/1/2022  Authorization Period Expiration: 12/31/2023  Plan of Care Expiration: 6/24/2023  Visit # / Visits authorized: 18/35 (31 total)    Initial FOTO: 58% (Predicted 27%)  FOTO 1st F/U: 45%  FOTO 2nd F/U: 35%      Time In: 1300pm  Time Out: 1400pm  Total Billable Time: 30 minutes    Precautions: Standard and RCR     Date of Surgery: 10/14/2022  Weeks Post Op: 21 weeks, 6 days as of 3/16     From Surgeon:  Physical Therapy: Follow the >3 cm cuff repair rehab protocol. PROM starts after 2 weeks. AROM starts after 7 weeks. No cuff resistive activity x 12 weeks. No biceps resistive activity x 8 weeks. We will be just a bit slower in this case given the tear pattern and size in this case    A/P:  Arthroscopic pictures again reviewed with the patient.  May wean out of the sling.  Discussed ongoing lifting precautions.  No more than a Coke can or small book.  May begin the active phase of his range of motion.  Work on active and passive range of motion and continued scapular mechanics.  I will see him back in 6 weeks.  At that time we will begin the strengthening phase of recovery.  He understands that he will have some activity and lifting restrictions up to 5 months postop.  All questions answered.  Prescription for Naprosyn given.      Subjective     Pt reports: my shoulder has made a good turn and is doing well now    He was compliant with home exercise program.  Response  "to previous treatment: soreness  Functional change: improved overhead reach    Pain: 0/10  Location: right shoulder      Objective     Sukumar received therapeutic exercises to develop strength, endurance, ROM, flexibility, and posture for 38 minutes including:    Supine lat stretch 30x   Sport cord serratus press Red 3 x 15  D2 extensions 17# CC 3 x 15  Supine chest press on ground 15# DB 3 x 15  OH carry 5# 3 laps     NT  Lat stretch with cane at edge of table 5 x 20"  Elevated push ups at 24 in box x 20  External Rotation/Internal Rotation into full flexion 2 x 15    Sukumar received the following manual therapy techniques: Joint mobilizations and Soft tissue Mobilization were applied to the: right shoulder for 10 minutes, including:    Gr I-II oscillations relaxation  Inferior mob arm at 20 and 90 deg abd Gr III  External Rotation stretch at 90/90 grade III   Shoulder flexion stretch grade III  Rhythmic stabilization at end range of flexion     Not Performed Today:  Scapular upward rotation grade III  Cross body MET    Sukumar participated in neuromuscular re-education activities to improve: Coordination, Proprioception, and Posture for 12 minutes. The following activities were included:    Landmine press 45# 3 x 15  Mod pushup shoulder taps 45x    Not Performed Today:  Prone over swiss ball T 2# 3 X 5 iso hold  Plank position to down dog 1 x 15  Shoulder taps 3 x 20 taps  Standing row with ER at 90/90 OTB 3 x 15   Rhythmic stabilization with blue band at full flexion x 3 to fatigue    Sukumar participated in dynamic functional therapeutic activities to improve functional performance for 0  minutes, including:      Home Exercises Provided and Patient Education Provided     Education provided:   - Home Exercise Program, precautions, progress    Written Home Exercises Provided: Patient instructed to cont prior HEP.  Exercises were reviewed and Sukumar was able to demonstrate them prior to the end of the session.  Sukumar " demonstrated good  understanding of the education provided.     See EMR under Patient Instructions for exercises provided prior visit.    Assessment     Sukumar progressed with OH strengthening and return to gym exercise. He felt good with the progression, need to progress pushup position as today was too easy. Progressing well at this time    Sukumar Is progressing well towards his goals.   Pt prognosis is Good.     Pt will continue to benefit from skilled outpatient physical therapy to address the deficits listed in the problem list box on initial evaluation, provide pt/family education and to maximize pt's level of independence in the home and community environment.     Pt's spiritual, cultural and educational needs considered and pt agreeable to plan of care and goals.     Anticipated barriers to physical therapy: none    Goals:   Short Term Goals:  6 weeks  Patient will be independent in Home Exercise Program for independent progression within protocol.Met  Patient will show improved Passive Range of Motion to 120 degrees of flexion to show progress within protocol.Met  Patient will show improved Passive Range of Motion to 30 degrees of External Rotation to show progress within protocol.Met     Long Term Goals: 12 weeks  Patient will be independent in Home Exercise Program to initiate strengthening and return to hobbies.-Progressing, not met   Patient will show improved full Passive Range of Motion in flexion and External Rotation. -Progressing, not met   Patient will show improved strength by demonstrating active flexion to 120 degrees.-Progressing, not met   Patient goal: return to boot camp three times a week with no restrictions.-Progressing, not met   Patient will have improved limitation score to 27% limited on FOTO shoulder in order to demonstrate functional improvement.-Progressing, not met     Plan     Certification Period: 3/23/2023 to 6/24/2023  Recommended Treatment Plan: 2 times per week for 12 weeks:  Manual Therapy, Moist Heat/ Ice, Neuromuscular Re-ed, Patient Education, Self Care, Therapeutic Activities, and Therapeutic Exercise  Other Recommendations: modalities prn, ASTYM prn, Dry Needling prn    Therapist: Reji Szymanski, PT, DPT    I CERTIFY THE NEED FOR THESE SERVICES FURNISHED UNDER THIS PLAN OF TREATMENT AND WHILE UNDER MY CARE    Physician's comments: ________________________________________________________________________________________________________________________________________________      Physician's Name: ___________________________________

## 2023-03-28 ENCOUNTER — CLINICAL SUPPORT (OUTPATIENT)
Dept: REHABILITATION | Facility: HOSPITAL | Age: 53
End: 2023-03-28
Payer: COMMERCIAL

## 2023-03-28 DIAGNOSIS — M62.81 DECREASED MUSCLE STRENGTH: ICD-10-CM

## 2023-03-28 DIAGNOSIS — M25.611 DECREASED RANGE OF MOTION OF RIGHT SHOULDER: Primary | ICD-10-CM

## 2023-03-28 PROCEDURE — 97140 MANUAL THERAPY 1/> REGIONS: CPT | Performed by: PHYSICAL THERAPIST

## 2023-03-28 PROCEDURE — 97112 NEUROMUSCULAR REEDUCATION: CPT | Performed by: PHYSICAL THERAPIST

## 2023-03-28 PROCEDURE — 97110 THERAPEUTIC EXERCISES: CPT | Performed by: PHYSICAL THERAPIST

## 2023-03-28 NOTE — PROGRESS NOTES
Physical Therapy Treatment Note     Name: Sukumar Beal  [facility] Number: [id number]    Therapy Diagnosis:   Encounter Diagnoses   Name Primary?    Decreased range of motion of right shoulder Yes    Decreased muscle strength      Physician: Jh Sibley*    Visit Date: 3/28/2023    Physician Orders: PT Eval and Treat   Medical Diagnosis from Referral:   Diagnosis   M75.101 (ICD-10-CM) - Nontraumatic tear of right rotator cuff, unspecified tear extent   M75.21 (ICD-10-CM) - Biceps tendinitis of right upper extremity      Evaluation Date: 11/1/2022  Authorization Period Expiration: 12/31/2023  Plan of Care Expiration: 6/24/2023  Visit # / Visits authorized: 19/35 (31 total)    Initial FOTO: 58% (Predicted 27%)  FOTO 1st F/U: 45%  FOTO 2nd F/U: 35%      Time In: 1300pm  Time Out: 1400pm  Total Billable Time: 60 minutes    Precautions: Standard and RCR     Date of Surgery: 10/14/2022  Weeks Post Op: 23 weeks, 4 days as of 3/28     From Surgeon:  Physical Therapy: Follow the >3 cm cuff repair rehab protocol. PROM starts after 2 weeks. AROM starts after 7 weeks. No cuff resistive activity x 12 weeks. No biceps resistive activity x 8 weeks. We will be just a bit slower in this case given the tear pattern and size in this case    A/P:  Arthroscopic pictures again reviewed with the patient.  May wean out of the sling.  Discussed ongoing lifting precautions.  No more than a Coke can or small book.  May begin the active phase of his range of motion.  Work on active and passive range of motion and continued scapular mechanics.  I will see him back in 6 weeks.  At that time we will begin the strengthening phase of recovery.  He understands that he will have some activity and lifting restrictions up to 5 months postop.  All questions answered.  Prescription for Naprosyn given.    Subjective     Pt reports: I was a little sore after last time but overall my shoulder is doing great     He was compliant with home exercise  "program.  Response to previous treatment: soreness  Functional change: improved overhead reach    Pain: 0/10  Location: right shoulder      Objective     Sukumar received therapeutic exercises to develop strength, endurance, ROM, flexibility, and posture for 18 minutes including:    Supine chest press on ground 25# DB 3 x 15  OH carry 4# 4 laps     NT  Supine lat stretch 30x   Sport cord serratus press Red 3 x 15  D2 extensions 17# CC 3 x 15  Lat stretch with cane at edge of table 5 x 20"  Elevated push ups at 24 in box x 20  External Rotation/Internal Rotation into full flexion 2 x 15    Sukumar received the following manual therapy techniques: Joint mobilizations and Soft tissue Mobilization were applied to the: right shoulder for 10 minutes, including:    Gr I-II oscillations relaxation  Inferior mob arm at 20 and 90 deg abd Gr III  External Rotation stretch at 90/90 grade III   Shoulder flexion stretch grade III  Rhythmic stabilization at end range of flexion     Not Performed Today:  Scapular upward rotation grade III  Cross body MET    Sukumar participated in neuromuscular re-education activities to improve: Coordination, Proprioception, and Posture for 32 minutes. The following activities were included:    Landmine press 45# 3 x 15  pushup shoulder taps 2 x 15  Prone over swiss ball T and Y 3 X 15   Arm in front IR/ER GTB 2 x 15  Body blade arm by side 30 " 3x     Not Performed Today:  Plank position to down dog 1 x 15  Shoulder taps 3 x 20 taps  Standing row with ER at 90/90 OTB 3 x 15   Rhythmic stabilization with blue band at full flexion x 3 to fatigue    Sukumar participated in dynamic functional therapeutic activities to improve functional performance for 0  minutes, including:      Home Exercises Provided and Patient Education Provided     Education provided:   - Home Exercise Program, precautions, progress    Written Home Exercises Provided: Patient instructed to cont prior HEP.  Exercises were reviewed and Sukumar " was able to demonstrate them prior to the end of the session.  Sukumar demonstrated good  understanding of the education provided.     See EMR under Patient Instructions for exercises provided prior visit.    Assessment     Sukumar presented with improved OH strength, progressing stabilization and scap control. He continues to progress towards meeting his discharge goals and return to exercise classes    Sukumar Is progressing well towards his goals.   Pt prognosis is Good.     Pt will continue to benefit from skilled outpatient physical therapy to address the deficits listed in the problem list box on initial evaluation, provide pt/family education and to maximize pt's level of independence in the home and community environment.     Pt's spiritual, cultural and educational needs considered and pt agreeable to plan of care and goals.     Anticipated barriers to physical therapy: none    Goals:   Short Term Goals:  6 weeks  Patient will be independent in Home Exercise Program for independent progression within protocol.Met  Patient will show improved Passive Range of Motion to 120 degrees of flexion to show progress within protocol.Met  Patient will show improved Passive Range of Motion to 30 degrees of External Rotation to show progress within protocol.Met     Long Term Goals: 12 weeks  Patient will be independent in Home Exercise Program to initiate strengthening and return to hobbies.-Progressing, not met   Patient will show improved full Passive Range of Motion in flexion and External Rotation. -Progressing, not met   Patient will show improved strength by demonstrating active flexion to 120 degrees.-Progressing, not met   Patient goal: return to boot camp three times a week with no restrictions.-Progressing, not met   Patient will have improved limitation score to 27% limited on FOTO shoulder in order to demonstrate functional improvement.-Progressing, not met     Plan     Certification Period: 3/23/2023 to  6/24/2023  Recommended Treatment Plan: 2 times per week for 12 weeks: Manual Therapy, Moist Heat/ Ice, Neuromuscular Re-ed, Patient Education, Self Care, Therapeutic Activities, and Therapeutic Exercise  Other Recommendations: modalities prn, ASTYM prn, Dry Needling prn    Therapist: Reji Szymanski, PT, DPT    I CERTIFY THE NEED FOR THESE SERVICES FURNISHED UNDER THIS PLAN OF TREATMENT AND WHILE UNDER MY CARE    Physician's comments: ________________________________________________________________________________________________________________________________________________      Physician's Name: ___________________________________

## 2023-03-30 ENCOUNTER — CLINICAL SUPPORT (OUTPATIENT)
Dept: REHABILITATION | Facility: HOSPITAL | Age: 53
End: 2023-03-30
Payer: COMMERCIAL

## 2023-03-30 DIAGNOSIS — M62.81 DECREASED MUSCLE STRENGTH: ICD-10-CM

## 2023-03-30 DIAGNOSIS — M25.611 DECREASED RANGE OF MOTION OF RIGHT SHOULDER: Primary | ICD-10-CM

## 2023-03-30 PROCEDURE — 97140 MANUAL THERAPY 1/> REGIONS: CPT | Performed by: PHYSICAL THERAPIST

## 2023-03-30 PROCEDURE — 97110 THERAPEUTIC EXERCISES: CPT | Performed by: PHYSICAL THERAPIST

## 2023-03-30 PROCEDURE — 97112 NEUROMUSCULAR REEDUCATION: CPT | Performed by: PHYSICAL THERAPIST

## 2023-03-30 NOTE — PROGRESS NOTES
Physical Therapy Treatment Note     Name: Sukumar Beal  [facility] Number: [id number]    Therapy Diagnosis:   Encounter Diagnoses   Name Primary?    Decreased range of motion of right shoulder Yes    Decreased muscle strength      Physician: Jh Sibley*    Visit Date: 3/30/2023    Physician Orders: PT Eval and Treat   Medical Diagnosis from Referral:   Diagnosis   M75.101 (ICD-10-CM) - Nontraumatic tear of right rotator cuff, unspecified tear extent   M75.21 (ICD-10-CM) - Biceps tendinitis of right upper extremity      Evaluation Date: 11/1/2022  Authorization Period Expiration: 12/31/2023  Plan of Care Expiration: 6/24/2023  Visit # / Visits authorized: 20/35 (31 total)    Initial FOTO: 58% (Predicted 27%)  FOTO 1st F/U: 45%  FOTO 2nd F/U: 35%      Time In: 904am  Time Out: 1000am  Total Billable Time: 56 minutes    Precautions: Standard and RCR     Date of Surgery: 10/14/2022  Weeks Post Op: 23 weeks, 6 days as of 3/30     From Surgeon:  Physical Therapy: Follow the >3 cm cuff repair rehab protocol. PROM starts after 2 weeks. AROM starts after 7 weeks. No cuff resistive activity x 12 weeks. No biceps resistive activity x 8 weeks. We will be just a bit slower in this case given the tear pattern and size in this case    A/P:  Arthroscopic pictures again reviewed with the patient.  May wean out of the sling.  Discussed ongoing lifting precautions.  No more than a Coke can or small book.  May begin the active phase of his range of motion.  Work on active and passive range of motion and continued scapular mechanics.  I will see him back in 6 weeks.  At that time we will begin the strengthening phase of recovery.  He understands that he will have some activity and lifting restrictions up to 5 months postop.  All questions answered.  Prescription for Naprosyn given.    Subjective     Pt reports: I was a little bit sore last time, but overall its getting better. The kink in the shoulder is getting better. I need  "to reschedule my appt    He was compliant with home exercise program.  Response to previous treatment: soreness  Functional change: improved overhead reach    Pain: 0/10  Location: right shoulder      Objective     Sukumar received therapeutic exercises to develop strength, endurance, ROM, flexibility, and posture for 20 minutes including:    IR with pulley 3'  ER with OH press GTB 3x fatigue  OH carry 5# 2 laps   -10# feedback 2 laps    NT  Supine chest press on ground 25# DB 3 x 15    Supine lat stretch 30x   Sport cord serratus press Red 3 x 15  D2 extensions 17# CC 3 x 15  Lat stretch with cane at edge of table 5 x 20"  Elevated push ups at 24 in box x 20  External Rotation/Internal Rotation into full flexion 2 x 15    Sukumar received the following manual therapy techniques: Joint mobilizations and Soft tissue Mobilization were applied to the: right shoulder for 10 minutes, including:    Gr I-II oscillations relaxation  Inferior mob arm at 20 and 90 deg abd Gr III  External Rotation stretch at 90/90 grade III   Shoulder flexion stretch grade III  Rhythmic stabilization at end range of flexion     Not Performed Today:  Scapular upward rotation grade III  Cross body MET    Sukumar participated in neuromuscular re-education activities to improve: Coordination, Proprioception, and Posture for 26 minutes. The following activities were included:    Landmine press 55# 3 x 15  I/T/Y over swiss ball 2# 3 x 15  pushup shoulder taps 2 x 15 20" box  CC ROW 15# 3 X 15    Not Performed Today:    Prone over swiss ball T and Y 3 X 15   Arm in front IR/ER GTB 2 x 15  Body blade arm by side 30 " 3x   Plank position to down dog 1 x 15  Shoulder taps 3 x 20 taps  Standing row with ER at 90/90 OTB 3 x 15   Rhythmic stabilization with blue band at full flexion x 3 to fatigue    Sukumar participated in dynamic functional therapeutic activities to improve functional performance for 0  minutes, including:      Home Exercises Provided and Patient " Education Provided     Education provided:   - Home Exercise Program, precautions, progress    Written Home Exercises Provided: Patient instructed to cont prior HEP.  Exercises were reviewed and Sukumar was able to demonstrate them prior to the end of the session.  Sukumar demonstrated good  understanding of the education provided.     See EMR under Patient Instructions for exercises provided prior visit.    Assessment     Sukumar continues to progress overhead strengthening with minimal pain or discomfort seen day to day. Progressed his strengthening today to cuff with press, doing very well at this point. Plans to reschedule his appt with Dr. Carr    Sukuamr Is progressing well towards his goals.   Pt prognosis is Good.     Pt will continue to benefit from skilled outpatient physical therapy to address the deficits listed in the problem list box on initial evaluation, provide pt/family education and to maximize pt's level of independence in the home and community environment.     Pt's spiritual, cultural and educational needs considered and pt agreeable to plan of care and goals.     Anticipated barriers to physical therapy: none    Goals:   Short Term Goals:  6 weeks  Patient will be independent in Home Exercise Program for independent progression within protocol.Met  Patient will show improved Passive Range of Motion to 120 degrees of flexion to show progress within protocol.Met  Patient will show improved Passive Range of Motion to 30 degrees of External Rotation to show progress within protocol.Met     Long Term Goals: 12 weeks  Patient will be independent in Home Exercise Program to initiate strengthening and return to hobbies.-Progressing, not met   Patient will show improved full Passive Range of Motion in flexion and External Rotation. -Progressing, not met   Patient will show improved strength by demonstrating active flexion to 120 degrees.-Progressing, not met   Patient goal: return to boot camp three times a week  with no restrictions.-Progressing, not met   Patient will have improved limitation score to 27% limited on FOTO shoulder in order to demonstrate functional improvement.-Progressing, not met     Plan     Certification Period: 3/23/2023 to 6/24/2023  Recommended Treatment Plan: 2 times per week for 12 weeks: Manual Therapy, Moist Heat/ Ice, Neuromuscular Re-ed, Patient Education, Self Care, Therapeutic Activities, and Therapeutic Exercise  Other Recommendations: modalities prn, ASTYM prn, Dry Needling prn    Therapist: Reji Szymanski, PT, DPT    I CERTIFY THE NEED FOR THESE SERVICES FURNISHED UNDER THIS PLAN OF TREATMENT AND WHILE UNDER MY CARE    Physician's comments: ________________________________________________________________________________________________________________________________________________      Physician's Name: ___________________________________

## 2023-04-04 ENCOUNTER — CLINICAL SUPPORT (OUTPATIENT)
Dept: REHABILITATION | Facility: HOSPITAL | Age: 53
End: 2023-04-04
Payer: COMMERCIAL

## 2023-04-04 DIAGNOSIS — M62.81 DECREASED MUSCLE STRENGTH: ICD-10-CM

## 2023-04-04 DIAGNOSIS — M25.611 DECREASED RANGE OF MOTION OF RIGHT SHOULDER: Primary | ICD-10-CM

## 2023-04-04 PROCEDURE — 97140 MANUAL THERAPY 1/> REGIONS: CPT | Performed by: PHYSICAL THERAPIST

## 2023-04-04 PROCEDURE — 97110 THERAPEUTIC EXERCISES: CPT | Performed by: PHYSICAL THERAPIST

## 2023-04-04 PROCEDURE — 97112 NEUROMUSCULAR REEDUCATION: CPT | Performed by: PHYSICAL THERAPIST

## 2023-04-04 NOTE — PROGRESS NOTES
Physical Therapy Treatment Note     Name: Sukumar Beal  [facility] Number: [id number]    Therapy Diagnosis:   Encounter Diagnoses   Name Primary?    Decreased range of motion of right shoulder Yes    Decreased muscle strength      Physician: Jh Sibley*    Visit Date: 4/4/2023    Physician Orders: PT Eval and Treat   Medical Diagnosis from Referral:   Diagnosis   M75.101 (ICD-10-CM) - Nontraumatic tear of right rotator cuff, unspecified tear extent   M75.21 (ICD-10-CM) - Biceps tendinitis of right upper extremity      Evaluation Date: 11/1/2022  Authorization Period Expiration: 12/31/2023  Plan of Care Expiration: 6/24/2023  Visit # / Visits authorized: 21/35 (37 total)    Initial FOTO: 58% (Predicted 27%)  FOTO 1st F/U: 45%  FOTO 2nd F/U: 35%      Time In: 910am  Time Out: 1000am  Total Billable Time: 50 minutes    Precautions: Standard and RCR     Date of Surgery: 10/14/2022  Weeks Post Op: 23 weeks, 6 days as of 3/30     From Surgeon:  Physical Therapy: Follow the >3 cm cuff repair rehab protocol. PROM starts after 2 weeks. AROM starts after 7 weeks. No cuff resistive activity x 12 weeks. No biceps resistive activity x 8 weeks. We will be just a bit slower in this case given the tear pattern and size in this case    A/P:  Arthroscopic pictures again reviewed with the patient.  May wean out of the sling.  Discussed ongoing lifting precautions.  No more than a Coke can or small book.  May begin the active phase of his range of motion.  Work on active and passive range of motion and continued scapular mechanics.  I will see him back in 6 weeks.  At that time we will begin the strengthening phase of recovery.  He understands that he will have some activity and lifting restrictions up to 5 months postop.  All questions answered.  Prescription for Naprosyn given.    Subjective     Pt reports: I was a little bit sore last time, but overall its getting better. The kink in the shoulder is getting better. I need  "to reschedule my appt    He was compliant with home exercise program.  Response to previous treatment: soreness  Functional change: improved overhead reach    Pain: 0/10  Location: right shoulder      Objective     Sukumar received therapeutic exercises to develop strength, endurance, ROM, flexibility, and posture for 20 minutes including:    Sidelyin ER 2# 3 X 15  OH carry 10# 5 laps   OH 90/90 ER pertubations 5x fatigue    NT  Supine chest press on ground 25# DB 3 x 15  IR with pulley 3'  ER with OH press GTB 3x fatigue  Supine lat stretch 30x   Sport cord serratus press Red 3 x 15  D2 extensions 17# CC 3 x 15  Lat stretch with cane at edge of table 5 x 20"  Elevated push ups at 24 in box x 20  External Rotation/Internal Rotation into full flexion 2 x 15    Sukumar received the following manual therapy techniques: Joint mobilizations and Soft tissue Mobilization were applied to the: right shoulder for 10 minutes, including:    Gr I-II oscillations relaxation  Inferior mob arm at 20 and 90 deg abd Gr III  External Rotation stretch at 90/90 grade III   Shoulder flexion stretch grade III  Rhythmic stabilization at end range of flexion     Not Performed Today:  Scapular upward rotation grade III  Cross body MET    Sukumar participated in neuromuscular re-education activities to improve: Coordination, Proprioception, and Posture for 20 minutes. The following activities were included:    Landmine press 45# 3 x 15  T/Y over swiss ball 2# 3 x 15  Med ball slams 10# 2 x 25    Not Performed Today:  pushup shoulder taps 2 x 15 20" box  CC ROW 15# 3 X 15  Prone over swiss ball T and Y 3 X 15   Arm in front IR/ER GTB 2 x 15  Body blade arm by side 30 " 3x   Plank position to down dog 1 x 15  Shoulder taps 3 x 20 taps  Standing row with ER at 90/90 OTB 3 x 15   Rhythmic stabilization with blue band at full flexion x 3 to fatigue    Sukumar participated in dynamic functional therapeutic activities to improve functional performance for 0  " minutes, including:      Home Exercises Provided and Patient Education Provided     Education provided:   - Home Exercise Program, precautions, progress    Written Home Exercises Provided: Patient instructed to cont prior HEP.  Exercises were reviewed and Sukumar was able to demonstrate them prior to the end of the session.  Sukumar demonstrated good  understanding of the education provided.     See EMR under Patient Instructions for exercises provided prior visit.    Assessment     Sukumar still unable to perform ER at 90/90 with sport cord or 5# on ancore machine, able to passively passively position and perform isometric pertubations. Improved OH strength, notes fatigue at end of session to the shoulder     Sukumar Is progressing well towards his goals.   Pt prognosis is Good.     Pt will continue to benefit from skilled outpatient physical therapy to address the deficits listed in the problem list box on initial evaluation, provide pt/family education and to maximize pt's level of independence in the home and community environment.     Pt's spiritual, cultural and educational needs considered and pt agreeable to plan of care and goals.     Anticipated barriers to physical therapy: none    Goals:   Short Term Goals:  6 weeks  Patient will be independent in Home Exercise Program for independent progression within protocol.Met  Patient will show improved Passive Range of Motion to 120 degrees of flexion to show progress within protocol.Met  Patient will show improved Passive Range of Motion to 30 degrees of External Rotation to show progress within protocol.Met     Long Term Goals: 12 weeks  Patient will be independent in Home Exercise Program to initiate strengthening and return to hobbies.-Progressing, not met   Patient will show improved full Passive Range of Motion in flexion and External Rotation. -Progressing, not met   Patient will show improved strength by demonstrating active flexion to 120 degrees.-Progressing, not  met   Patient goal: return to boot camp three times a week with no restrictions.-Progressing, not met   Patient will have improved limitation score to 27% limited on FOTO shoulder in order to demonstrate functional improvement.-Progressing, not met     Plan     Certification Period: 3/23/2023 to 6/24/2023    Improve overhead cuff strength    Therapist: Reji Szymanski, PT, DPT

## 2023-04-06 ENCOUNTER — CLINICAL SUPPORT (OUTPATIENT)
Dept: REHABILITATION | Facility: HOSPITAL | Age: 53
End: 2023-04-06
Payer: COMMERCIAL

## 2023-04-06 DIAGNOSIS — M62.81 DECREASED MUSCLE STRENGTH: ICD-10-CM

## 2023-04-06 DIAGNOSIS — M25.611 DECREASED RANGE OF MOTION OF RIGHT SHOULDER: Primary | ICD-10-CM

## 2023-04-06 PROCEDURE — 97110 THERAPEUTIC EXERCISES: CPT | Performed by: PHYSICAL THERAPIST

## 2023-04-06 PROCEDURE — 97112 NEUROMUSCULAR REEDUCATION: CPT | Performed by: PHYSICAL THERAPIST

## 2023-04-06 NOTE — PROGRESS NOTES
Physical Therapy Treatment Note     Name: Sukumar Beal  [facility] Number: [id number]    Therapy Diagnosis:   Encounter Diagnoses   Name Primary?    Decreased range of motion of right shoulder Yes    Decreased muscle strength      Physician: Jh Sibley*    Visit Date: 4/6/2023    Physician Orders: PT Eval and Treat   Medical Diagnosis from Referral:   Diagnosis   M75.101 (ICD-10-CM) - Nontraumatic tear of right rotator cuff, unspecified tear extent   M75.21 (ICD-10-CM) - Biceps tendinitis of right upper extremity      Evaluation Date: 11/1/2022  Authorization Period Expiration: 12/31/2023  Plan of Care Expiration: 6/24/2023  Visit # / Visits authorized: 22/35 (38 total)    Initial FOTO: 58% (Predicted 27%)  FOTO 1st F/U: 45%  FOTO 2nd F/U: 35%      Time In: 902am  Time Out: 0958am  Total Billable Time: 30 minutes    Precautions: Standard and RCR     Date of Surgery: 10/14/2022  Weeks Post Op: 23 weeks, 6 days as of 3/30     From Surgeon:  Physical Therapy: Follow the >3 cm cuff repair rehab protocol. PROM starts after 2 weeks. AROM starts after 7 weeks. No cuff resistive activity x 12 weeks. No biceps resistive activity x 8 weeks. We will be just a bit slower in this case given the tear pattern and size in this case    A/P:  Arthroscopic pictures again reviewed with the patient.  May wean out of the sling.  Discussed ongoing lifting precautions.  No more than a Coke can or small book.  May begin the active phase of his range of motion.  Work on active and passive range of motion and continued scapular mechanics.  I will see him back in 6 weeks.  At that time we will begin the strengthening phase of recovery.  He understands that he will have some activity and lifting restrictions up to 5 months postop.  All questions answered.  Prescription for Naprosyn given.    Subjective     Pt reports: I felt good, just a little stiff behind my back reach    He was compliant with home exercise program.  Response to  "previous treatment: soreness  Functional change: improved overhead reach    Pain: 0/10  Location: right shoulder      Objective     Sukumar received therapeutic exercises to develop strength, endurance, ROM, flexibility, and posture for 30 minutes including:    Y up wall lift off 1# 2 x 15  KB curl and press 2 x 20  -super set upside down KB carry across turf 2 laps  IR with pulley 3'  OH 90/90 ER BTB step backs 3x fatigue  Propped sitting ER 3# 3 x 15    NT  Sidelyin ER 2# 3 X 15  OH carry 10# 5 laps   Supine chest press on ground 25# DB 3 x 15  ER with OH press GTB 3x fatigue  Supine lat stretch 30x   Sport cord serratus press Red 3 x 15  D2 extensions 17# CC 3 x 15  Lat stretch with cane at edge of table 5 x 20"  Elevated push ups at 24 in box x 20  External Rotation/Internal Rotation into full flexion 2 x 15    Sukumar received the following manual therapy techniques: Joint mobilizations and Soft tissue Mobilization were applied to the: right shoulder for 10 minutes, including:    Gr I-II oscillations relaxation  Inferior mob arm at 20 and 90 deg abd Gr III  External Rotation stretch at 90/90 grade III   Shoulder flexion stretch grade III  Rhythmic stabilization at end range of flexion     Not Performed Today:  Scapular upward rotation grade III  Cross body MET    Sukumar participated in neuromuscular re-education activities to improve: Coordination, Proprioception, and Posture for 16 minutes. The following activities were included:    Landmine press 55# 3 x 15  D2 extension 17# 3 x 15  Tricep extension overhead 17# 3 x 15    Not Performed Today:  T/Y over swiss ball 2# 3 x 15  Med ball slams 10# 2 x 25  pushup shoulder taps 2 x 15 20" box  CC ROW 15# 3 X 15  Prone over swiss ball T and Y 3 X 15   Arm in front IR/ER GTB 2 x 15  Body blade arm by side 30 " 3x   Plank position to down dog 1 x 15  Shoulder taps 3 x 20 taps  Standing row with ER at 90/90 OTB 3 x 15   Rhythmic stabilization with blue band at full flexion x 3 " to fatigue    Sukumar participated in dynamic functional therapeutic activities to improve functional performance for 0  minutes, including:      Home Exercises Provided and Patient Education Provided     Education provided:   - Home Exercise Program, precautions, progress    Written Home Exercises Provided: Patient instructed to cont prior HEP.  Exercises were reviewed and Sukumar was able to demonstrate them prior to the end of the session.  Sukumar demonstrated good  understanding of the education provided.     See EMR under Patient Instructions for exercises provided prior visit.    Assessment     Sukumar is progressed cuff and OH strength without pain, doing well just needs to improve scap mechanics. He still struggles with band step backs, adjusted to seated with hand weight and did better. Soreness with exercise but able to complete all exercises today    Sukumar Is progressing well towards his goals.   Pt prognosis is Good.     Pt will continue to benefit from skilled outpatient physical therapy to address the deficits listed in the problem list box on initial evaluation, provide pt/family education and to maximize pt's level of independence in the home and community environment.     Pt's spiritual, cultural and educational needs considered and pt agreeable to plan of care and goals.     Anticipated barriers to physical therapy: none    Goals:   Short Term Goals:  6 weeks  Patient will be independent in Home Exercise Program for independent progression within protocol.Met  Patient will show improved Passive Range of Motion to 120 degrees of flexion to show progress within protocol.Met  Patient will show improved Passive Range of Motion to 30 degrees of External Rotation to show progress within protocol.Met     Long Term Goals: 12 weeks  Patient will be independent in Home Exercise Program to initiate strengthening and return to hobbies.-Progressing, not met   Patient will show improved full Passive Range of Motion in  flexion and External Rotation. -Progressing, not met   Patient will show improved strength by demonstrating active flexion to 120 degrees.-Progressing, not met   Patient goal: return to boot camp three times a week with no restrictions.-Progressing, not met   Patient will have improved limitation score to 27% limited on FOTO shoulder in order to demonstrate functional improvement.-Progressing, not met     Plan     Certification Period: 3/23/2023 to 6/24/2023    Improve overhead cuff strength    Therapist: Reji Szymanski, PT, DPT

## 2023-04-13 ENCOUNTER — CLINICAL SUPPORT (OUTPATIENT)
Dept: REHABILITATION | Facility: HOSPITAL | Age: 53
End: 2023-04-13
Payer: COMMERCIAL

## 2023-04-13 DIAGNOSIS — M62.81 DECREASED MUSCLE STRENGTH: ICD-10-CM

## 2023-04-13 DIAGNOSIS — M25.611 DECREASED RANGE OF MOTION OF RIGHT SHOULDER: Primary | ICD-10-CM

## 2023-04-13 PROCEDURE — 97112 NEUROMUSCULAR REEDUCATION: CPT | Performed by: PHYSICAL THERAPIST

## 2023-04-13 PROCEDURE — 97110 THERAPEUTIC EXERCISES: CPT | Performed by: PHYSICAL THERAPIST

## 2023-04-13 NOTE — PROGRESS NOTES
Physical Therapy Treatment Note     Name: Sukumar Beal  [facility] Number: [id number]    Therapy Diagnosis:   Encounter Diagnoses   Name Primary?    Decreased range of motion of right shoulder Yes    Decreased muscle strength      Physician: Jh Sibley*    Visit Date: 4/13/2023    Physician Orders: PT Eval and Treat   Medical Diagnosis from Referral:   Diagnosis   M75.101 (ICD-10-CM) - Nontraumatic tear of right rotator cuff, unspecified tear extent   M75.21 (ICD-10-CM) - Biceps tendinitis of right upper extremity      Evaluation Date: 11/1/2022  Authorization Period Expiration: 12/31/2023  Plan of Care Expiration: 6/24/2023  Visit # / Visits authorized: 23/35 (39 total)    Initial FOTO: 58% (Predicted 27%)  FOTO 1st F/U: 45%  FOTO 2nd F/U: 35%      Time In: 900am  Time Out: 0952am  Total Billable Time: 30 minutes    Precautions: Standard and RCR     Date of Surgery: 10/14/2022  Weeks Post Op: 23 weeks, 6 days as of 3/30     From Surgeon:  Physical Therapy: Follow the >3 cm cuff repair rehab protocol. PROM starts after 2 weeks. AROM starts after 7 weeks. No cuff resistive activity x 12 weeks. No biceps resistive activity x 8 weeks. We will be just a bit slower in this case given the tear pattern and size in this case    A/P:  Arthroscopic pictures again reviewed with the patient.  May wean out of the sling.  Discussed ongoing lifting precautions.  No more than a Coke can or small book.  May begin the active phase of his range of motion.  Work on active and passive range of motion and continued scapular mechanics.  I will see him back in 6 weeks.  At that time we will begin the strengthening phase of recovery.  He understands that he will have some activity and lifting restrictions up to 5 months postop.  All questions answered.  Prescription for Naprosyn given.    Subjective     Pt reports: I am tired this morning, out with friends last night     He was compliant with home exercise program.  Response to  "previous treatment: soreness  Functional change: improved overhead reach    Pain: 0/10  Location: right shoulder      Objective     Sukumar received therapeutic exercises to develop strength, endurance, ROM, flexibility, and posture for 30 minutes including:    Supine ER BTB 3 x 15  OH carries 10# 3 laps  IR with pulley 3'  Landmine press 55# 3 x 15  Propped sitting ER BTB 3 x 15    NT  OH 90/90 ER BTB step backs 3x fatigue  Propped sitting ER 3# 3 x 15  Y up wall lift off 1# 2 x 15  KB curl and press 2 x 20  -super set upside down KB carry across turf 2 laps  Sidelyin ER 2# 3 X 15  OH carry 10# 5 laps   Supine chest press on ground 25# DB 3 x 15    Sukumar received the following manual therapy techniques: Joint mobilizations and Soft tissue Mobilization were applied to the: right shoulder for 10 minutes, including:    Gr I-II oscillations relaxation  Inferior mob arm at 20 and 90 deg abd Gr III  External Rotation stretch at 90/90 grade III   Shoulder flexion stretch grade III  Rhythmic stabilization at end range of flexion     Not Performed Today:  Scapular upward rotation grade III  Cross body MET    Sukumar participated in neuromuscular re-education activities to improve: Coordination, Proprioception, and Posture for 18 minutes. The following activities were included:    Landmine press 55# 3 x 15  I/T/Y over swiss ball 2# 3 x 15  pushup shoulder taps 2 x 15 20" box    Not Performed Today:  D2 extension 17# 3 x 15  Tricep extension overhead 17# 3 x 15  Med ball slams 10# 2 x 25    CC ROW 15# 3 X 15  Prone over swiss ball T and Y 3 X 15   Arm in front IR/ER GTB 2 x 15  Body blade arm by side 30 " 3x   Plank position to down dog 1 x 15  Shoulder taps 3 x 20 taps  Standing row with ER at 90/90 OTB 3 x 15   Rhythmic stabilization with blue band at full flexion x 3 to fatigue    Sukumar participated in dynamic functional therapeutic activities to improve functional performance for 0  minutes, including:      Home Exercises " Provided and Patient Education Provided     Education provided:   - Home Exercise Program, precautions, progress    Written Home Exercises Provided: Patient instructed to cont prior HEP.  Exercises were reviewed and Sukumar was able to demonstrate them prior to the end of the session.  Sukumar demonstrated good  understanding of the education provided.     See EMR under Patient Instructions for exercises provided prior visit.    Assessment     Sukumar progressed with ER strengthening 90/90 supine for improved cuff strength. Limited endurance with exercise due to being tired. Able to super set exercises and strength is improving, PROM full today with lat shortness noted    Sukumar Is progressing well towards his goals.   Pt prognosis is Good.     Pt will continue to benefit from skilled outpatient physical therapy to address the deficits listed in the problem list box on initial evaluation, provide pt/family education and to maximize pt's level of independence in the home and community environment.     Pt's spiritual, cultural and educational needs considered and pt agreeable to plan of care and goals.     Anticipated barriers to physical therapy: none    Goals:   Short Term Goals:  6 weeks  Patient will be independent in Home Exercise Program for independent progression within protocol.Met  Patient will show improved Passive Range of Motion to 120 degrees of flexion to show progress within protocol.Met  Patient will show improved Passive Range of Motion to 30 degrees of External Rotation to show progress within protocol.Met     Long Term Goals: 12 weeks  Patient will be independent in Home Exercise Program to initiate strengthening and return to hobbies.-Progressing, not met   Patient will show improved full Passive Range of Motion in flexion and External Rotation. -Progressing, not met   Patient will show improved strength by demonstrating active flexion to 120 degrees.-Progressing, not met   Patient goal: return to boot camp  three times a week with no restrictions.-Progressing, not met   Patient will have improved limitation score to 27% limited on FOTO shoulder in order to demonstrate functional improvement.-Progressing, not met     Plan     Certification Period: 3/23/2023 to 6/24/2023    Improve overhead cuff strength    Therapist: Reji Szymanski, PT, DPT

## 2023-04-18 ENCOUNTER — CLINICAL SUPPORT (OUTPATIENT)
Dept: REHABILITATION | Facility: HOSPITAL | Age: 53
End: 2023-04-18
Payer: COMMERCIAL

## 2023-04-18 DIAGNOSIS — M62.81 DECREASED MUSCLE STRENGTH: ICD-10-CM

## 2023-04-18 DIAGNOSIS — M25.611 DECREASED RANGE OF MOTION OF RIGHT SHOULDER: Primary | ICD-10-CM

## 2023-04-18 PROCEDURE — 97110 THERAPEUTIC EXERCISES: CPT | Performed by: PHYSICAL THERAPIST

## 2023-04-18 PROCEDURE — 97140 MANUAL THERAPY 1/> REGIONS: CPT | Performed by: PHYSICAL THERAPIST

## 2023-04-18 PROCEDURE — 97112 NEUROMUSCULAR REEDUCATION: CPT | Performed by: PHYSICAL THERAPIST

## 2023-04-18 NOTE — PROGRESS NOTES
Physical Therapy Treatment Note     Name: Sukumar Beal  [facility] Number: [id number]    Therapy Diagnosis:   Encounter Diagnoses   Name Primary?    Decreased range of motion of right shoulder Yes    Decreased muscle strength      Physician: Jh Sibley*    Visit Date: 4/18/2023    Physician Orders: PT Eval and Treat   Medical Diagnosis from Referral:   Diagnosis   M75.101 (ICD-10-CM) - Nontraumatic tear of right rotator cuff, unspecified tear extent   M75.21 (ICD-10-CM) - Biceps tendinitis of right upper extremity      Evaluation Date: 11/1/2022  Authorization Period Expiration: 12/31/2023  Plan of Care Expiration: 6/24/2023  Visit # / Visits authorized: 23/35 (39 total)    Initial FOTO: 58% (Predicted 27%)  FOTO 1st F/U: 45%  FOTO 2nd F/U: 35%      Time In: 900am  Time Out: 0957am  Total Billable Time: 57 minutes    Precautions: Standard and RCR     Date of Surgery: 10/14/2022  Weeks Post Op: 26 weeks, 4 days as of 4/18     From Surgeon:  Physical Therapy: Follow the >3 cm cuff repair rehab protocol. PROM starts after 2 weeks. AROM starts after 7 weeks. No cuff resistive activity x 12 weeks. No biceps resistive activity x 8 weeks. We will be just a bit slower in this case given the tear pattern and size in this case    A/P:  Arthroscopic pictures again reviewed with the patient.  May wean out of the sling.  Discussed ongoing lifting precautions.  No more than a Coke can or small book.  May begin the active phase of his range of motion.  Work on active and passive range of motion and continued scapular mechanics.  I will see him back in 6 weeks.  At that time we will begin the strengthening phase of recovery.  He understands that he will have some activity and lifting restrictions up to 5 months postop.  All questions answered.  Prescription for Naprosyn given.    Subjective     Pt reports: Shoulder is doing really well, no pain but I am still not as strong in some positions    He was compliant with home  "exercise program.  Response to previous treatment: soreness  Functional change: improved overhead reach    Pain: 0/10  Location: right shoulder      Objective     Sukumar received therapeutic exercises to develop strength, endurance, ROM, flexibility, and posture for 26 minutes including:    Sidelying ER 3 x 15 2#  IR with pulley 3'  Supine bench press 35# 15x/12x/10x  CC tricep extension 17# 3 x 15    NT  Propped sitting ER BTB 3 x 15  Supine ER BTB 3 x 15  OH carries 10# 3 laps  OH 90/90 ER BTB step backs 3x fatigue  Propped sitting ER 3# 3 x 15  Y up wall lift off 1# 2 x 15  KB curl and press 2 x 20  -super set upside down KB carry across turf 2 laps  Sidelyin ER 2# 3 X 15  OH carry 10# 5 laps   Supine chest press on ground 25# DB 3 x 15    Sukumar received the following manual therapy techniques: Joint mobilizations and Soft tissue Mobilization were applied to the: right shoulder for 10 minutes, including:    Gr I-II oscillations relaxation  Inferior mob arm at 20 and 90 deg abd Gr III  External Rotation stretch at 90/90 grade III   Shoulder flexion stretch grade III    Not Performed Today:  Rhythmic stabilization at end range of flexion   Scapular upward rotation grade III  Cross body MET    Sukumar participated in neuromuscular re-education activities to improve: Coordination, Proprioception, and Posture for 24 minutes. The following activities were included:    D1 extensions BUE 20# 3 x 15  D2 extensions RUE 7# 3 x 10  Pushup 18" 15x/12x/10x- cue elbows in  Landmine press 55# 3 x 15    Not Performed Today:  D2 extension 17# 3 x 15  Tricep extension overhead 17# 3 x 15  Med ball slams 10# 2 x 25  Landmine press 55# 3 x 15  I/T/Y over swiss ball 2# 3 x 15  pushup shoulder taps 2 x 15 20" box  CC ROW 15# 3 X 15  Prone over swiss ball T and Y 3 X 15   Arm in front IR/ER GTB 2 x 15  Body blade arm by side 30 " 3x   Plank position to down dog 1 x 15  Shoulder taps 3 x 20 taps  Standing row with ER at 90/90 OTB 3 x 15 "   Rhythmic stabilization with blue band at full flexion x 3 to fatigue    Sukumar participated in dynamic functional therapeutic activities to improve functional performance for 0  minutes, including:      Home Exercises Provided and Patient Education Provided     Education provided:   - Home Exercise Program, precautions, progress    Written Home Exercises Provided: Patient instructed to cont prior HEP.  Exercises were reviewed and Sukumar was able to demonstrate them prior to the end of the session.  Sukumar demonstrated good  understanding of the education provided.     See EMR under Patient Instructions for exercises provided prior visit.    Assessment     Sukumar notes feeling a little weaker today with the pushup and bench press on the ground. Progress multiplane motions today. Excellent motion to the shoulder, progressing as expected 6 months post op     Sukumar Is progressing well towards his goals.   Pt prognosis is Good.     Pt will continue to benefit from skilled outpatient physical therapy to address the deficits listed in the problem list box on initial evaluation, provide pt/family education and to maximize pt's level of independence in the home and community environment.     Pt's spiritual, cultural and educational needs considered and pt agreeable to plan of care and goals.     Anticipated barriers to physical therapy: none    Goals:   Short Term Goals:  6 weeks  Patient will be independent in Home Exercise Program for independent progression within protocol.Met  Patient will show improved Passive Range of Motion to 120 degrees of flexion to show progress within protocol.Met  Patient will show improved Passive Range of Motion to 30 degrees of External Rotation to show progress within protocol.Met     Long Term Goals: 12 weeks  Patient will be independent in Home Exercise Program to initiate strengthening and return to hobbies.-Progressing, not met   Patient will show improved full Passive Range of Motion in  flexion and External Rotation. -Progressing, not met   Patient will show improved strength by demonstrating active flexion to 120 degrees.-Progressing, not met   Patient goal: return to boot camp three times a week with no restrictions.-Progressing, not met   Patient will have improved limitation score to 27% limited on FOTO shoulder in order to demonstrate functional improvement.-Progressing, not met     Plan     Certification Period: 3/23/2023 to 6/24/2023    Improve overhead cuff strength    Therapist: Reji Szymanski, PT, DPT

## 2023-04-27 ENCOUNTER — CLINICAL SUPPORT (OUTPATIENT)
Dept: REHABILITATION | Facility: HOSPITAL | Age: 53
End: 2023-04-27
Payer: COMMERCIAL

## 2023-04-27 DIAGNOSIS — M62.81 DECREASED MUSCLE STRENGTH: ICD-10-CM

## 2023-04-27 DIAGNOSIS — M25.611 DECREASED RANGE OF MOTION OF RIGHT SHOULDER: Primary | ICD-10-CM

## 2023-04-27 PROCEDURE — 97112 NEUROMUSCULAR REEDUCATION: CPT | Performed by: PHYSICAL THERAPIST

## 2023-04-27 PROCEDURE — 97110 THERAPEUTIC EXERCISES: CPT | Performed by: PHYSICAL THERAPIST

## 2023-04-27 NOTE — PROGRESS NOTES
Physical Therapy Treatment Note     Name: Sukumar Beal  [facility] Number: [id number]    Therapy Diagnosis:   Encounter Diagnoses   Name Primary?    Decreased range of motion of right shoulder Yes    Decreased muscle strength      Physician: Jh Sibley*    Visit Date: 4/27/2023    Physician Orders: PT Eval and Treat   Medical Diagnosis from Referral:   Diagnosis   M75.101 (ICD-10-CM) - Nontraumatic tear of right rotator cuff, unspecified tear extent   M75.21 (ICD-10-CM) - Biceps tendinitis of right upper extremity      Evaluation Date: 11/1/2022  Authorization Period Expiration: 12/31/2023  Plan of Care Expiration: 6/24/2023  Visit # / Visits authorized: 25/35 (41 total)    Initial FOTO: 58% (Predicted 27%)  FOTO 1st F/U: 45%  FOTO 2nd F/U: 35%      Time In: 900am  Time Out: 0957am  Total Billable Time: 57 minutes    Precautions: Standard and RCR     Date of Surgery: 10/14/2022  Weeks Post Op: 26 weeks, 4 days as of 4/18     From Surgeon:  Physical Therapy: Follow the >3 cm cuff repair rehab protocol. PROM starts after 2 weeks. AROM starts after 7 weeks. No cuff resistive activity x 12 weeks. No biceps resistive activity x 8 weeks. We will be just a bit slower in this case given the tear pattern and size in this case    A/P:  Arthroscopic pictures again reviewed with the patient.  May wean out of the sling.  Discussed ongoing lifting precautions.  No more than a Coke can or small book.  May begin the active phase of his range of motion.  Work on active and passive range of motion and continued scapular mechanics.  I will see him back in 6 weeks.  At that time we will begin the strengthening phase of recovery.  He understands that he will have some activity and lifting restrictions up to 5 months postop.  All questions answered.  Prescription for Naprosyn given.    Subjective     Pt reports: I went to [place] and shoulder did really well    He was compliant with home exercise program.  Response to  "previous treatment: soreness  Functional change: improved overhead reach    Pain: 0/10  Location: right shoulder      Objective     Sukumar received therapeutic exercises to develop strength, endurance, ROM, flexibility, and posture for 34 minutes including:    Sidelying ER 3 x 15 2#  IR with pulley 3'  Serratus with lift off YTB 3 x 15  10# KB upside down 4 laps   Pushup 12" box 3 x 15    NT  Supine bench press 35# 15x/12x/10x  CC tricep extension 17# 3 x 15  Propped sitting ER BTB 3 x 15  Supine ER BTB 3 x 15  OH carries 10# 3 laps  OH 90/90 ER BTB step backs 3x fatigue  Propped sitting ER 3# 3 x 15  Y up wall lift off 1# 2 x 15  KB curl and press 2 x 20  -super set upside down KB carry across turf 2 laps  Sidelyin ER 2# 3 X 15  OH carry 10# 5 laps   Supine chest press on ground 25# DB 3 x 15    Sukumar received the following manual therapy techniques: Joint mobilizations and Soft tissue Mobilization were applied to the: right shoulder for 10 minutes, including:    Gr I-II oscillations relaxation  Inferior mob arm at 20 and 90 deg abd Gr III  External Rotation stretch at 90/90 grade III   Shoulder flexion stretch grade III    Not Performed Today:  Rhythmic stabilization at end range of flexion   Scapular upward rotation grade III  Cross body MET    Sukumar participated in neuromuscular re-education activities to improve: Coordination, Proprioception, and Posture for 16 minutes. The following activities were included:    D1 extensions BUE 17# 3 x 15  Serratus pot stire CW/CCW 3x fatigue    Not Performed Today:  D2 extension 17# 3 x 15  Tricep extension overhead 17# 3 x 15  Med ball slams 10# 2 x 25  Landmine press 55# 3 x 15  I/T/Y over swiss ball 2# 3 x 15  pushup shoulder taps 2 x 15 20" box  CC ROW 15# 3 X 15  Prone over swiss ball T and Y 3 X 15   Arm in front IR/ER GTB 2 x 15  Body blade arm by side 30 " 3x   Plank position to down dog 1 x 15  Shoulder taps 3 x 20 taps  Standing row with ER at 90/90 OTB 3 x 15 "   Rhythmic stabilization with blue band at full flexion x 3 to fatigue    Sukumar participated in dynamic functional therapeutic activities to improve functional performance for 0  minutes, including:      Home Exercises Provided and Patient Education Provided     Education provided:   - Home Exercise Program, precautions, progress    Written Home Exercises Provided: Patient instructed to cont prior HEP.  Exercises were reviewed and Sukumar was able to demonstrate them prior to the end of the session.  Sukumar demonstrated good  understanding of the education provided.     See EMR under Patient Instructions for exercises provided prior visit.    Assessment     Sukumar progressed with strengthening OH, no issue with KB upside down. He is doing well with functional motion with CC, increasing weight each week    Sukumar Is progressing well towards his goals.   Pt prognosis is Good.     Pt will continue to benefit from skilled outpatient physical therapy to address the deficits listed in the problem list box on initial evaluation, provide pt/family education and to maximize pt's level of independence in the home and community environment.     Pt's spiritual, cultural and educational needs considered and pt agreeable to plan of care and goals.     Anticipated barriers to physical therapy: none    Goals:   Short Term Goals:  6 weeks  Patient will be independent in Home Exercise Program for independent progression within protocol.Met  Patient will show improved Passive Range of Motion to 120 degrees of flexion to show progress within protocol.Met  Patient will show improved Passive Range of Motion to 30 degrees of External Rotation to show progress within protocol.Met     Long Term Goals: 12 weeks  Patient will be independent in Home Exercise Program to initiate strengthening and return to hobbies.-Progressing, not met   Patient will show improved full Passive Range of Motion in flexion and External Rotation. -Progressing, not met    Patient will show improved strength by demonstrating active flexion to 120 degrees.-Progressing, not met   Patient goal: return to boot camp three times a week with no restrictions.-Progressing, not met   Patient will have improved limitation score to 27% limited on FOTO shoulder in order to demonstrate functional improvement.-Progressing, not met     Plan     Certification Period: 3/23/2023 to 6/24/2023    Improve overhead cuff strength    Therapist: Reji Szymanski, PT, DPT

## 2023-05-11 RX ORDER — LEVOTHYROXINE SODIUM 112 UG/1
TABLET ORAL
Qty: 90 TABLET | Refills: 1 | Status: SHIPPED | OUTPATIENT
Start: 2023-05-11 | End: 2023-11-05 | Stop reason: SDUPTHER

## 2023-05-11 NOTE — TELEPHONE ENCOUNTER
Refill Decision Note   Sukumar Emigdio  is requesting a refill authorization.  Brief Assessment and Rationale for Refill:  Approve     Medication Therapy Plan:         Comments:     Note composed:12:00 PM 05/11/2023

## 2023-05-11 NOTE — TELEPHONE ENCOUNTER
No care due was identified.  [facility] Embedded Care Due Messages. Reference number: [id number].   5/11/2023 6:47:09 AM CDT

## 2023-06-01 ENCOUNTER — CLINICAL SUPPORT (OUTPATIENT)
Dept: REHABILITATION | Facility: HOSPITAL | Age: 53
End: 2023-06-01
Payer: COMMERCIAL

## 2023-06-01 DIAGNOSIS — M25.611 DECREASED RANGE OF MOTION OF RIGHT SHOULDER: Primary | ICD-10-CM

## 2023-06-01 DIAGNOSIS — M62.81 DECREASED MUSCLE STRENGTH: ICD-10-CM

## 2023-06-01 PROCEDURE — 97112 NEUROMUSCULAR REEDUCATION: CPT | Performed by: PHYSICAL THERAPIST

## 2023-06-01 PROCEDURE — 97140 MANUAL THERAPY 1/> REGIONS: CPT | Performed by: PHYSICAL THERAPIST

## 2023-06-01 PROCEDURE — 97110 THERAPEUTIC EXERCISES: CPT | Performed by: PHYSICAL THERAPIST

## 2023-06-01 PROCEDURE — 97530 THERAPEUTIC ACTIVITIES: CPT | Performed by: PHYSICAL THERAPIST

## 2023-06-01 NOTE — PROGRESS NOTES
Physical Therapy Treatment Note     Name: Sukumar Beal  [facility] Number: [id number]    Therapy Diagnosis:   Encounter Diagnoses   Name Primary?    Decreased range of motion of right shoulder Yes    Decreased muscle strength      Physician: Jh Sibley*    Visit Date: 6/1/2023    Physician Orders: PT Eval and Treat   Medical Diagnosis from Referral:   Diagnosis   M75.101 (ICD-10-CM) - Nontraumatic tear of right rotator cuff, unspecified tear extent   M75.21 (ICD-10-CM) - Biceps tendinitis of right upper extremity      Evaluation Date: 11/1/2022  Authorization Period Expiration: 12/31/2023  Plan of Care Expiration: 6/24/2023  Visit # / Visits authorized: 26/35 (41 total)    Initial FOTO: 58% (Predicted 27%)  FOTO 1st F/U: 45%  FOTO 2nd F/U: 35%      Time In: 900am  Time Out: 0958am  Total Billable Time: 58 minutes    Precautions: Standard and RCR     Date of Surgery: 10/14/2022  Weeks Post Op: 26 weeks, 4 days as of 4/18     From Surgeon:  Physical Therapy: Follow the >3 cm cuff repair rehab protocol. PROM starts after 2 weeks. AROM starts after 7 weeks. No cuff resistive activity x 12 weeks. No biceps resistive activity x 8 weeks. We will be just a bit slower in this case given the tear pattern and size in this case    A/P:  Arthroscopic pictures again reviewed with the patient.  May wean out of the sling.  Discussed ongoing lifting precautions.  No more than a Coke can or small book.  May begin the active phase of his range of motion.  Work on active and passive range of motion and continued scapular mechanics.  I will see him back in 6 weeks.  At that time we will begin the strengthening phase of recovery.  He understands that he will have some activity and lifting restrictions up to 5 months postop.  All questions answered.  Prescription for Naprosyn given.    Subjective     Pt reports: Odalys been doing boot camp workout classes, using arm to cut down tree branches etc. It feels great, just limited with  "my reach behind my back    He was compliant with home exercise program.  Response to previous treatment: soreness  Functional change: improved overhead reach    Pain: 0/10  Location: right shoulder      Objective     Sukumar received therapeutic exercises to develop strength, endurance, ROM, flexibility, and posture for 11 minutes including:    UBE level 8 for 8'   IR with pulley 3'    NT  Serratus with lift off YTB 3 x 15  10# KB upside down 4 laps   Pushup 12" box 3 x 15  Sidelying ER 3 x 15 2#  Supine bench press 35# 15x/12x/10x  CC tricep extension 17# 3 x 15  Propped sitting ER BTB 3 x 15  Supine ER BTB 3 x 15  OH carries 10# 3 laps  OH 90/90 ER BTB step backs 3x fatigue  Propped sitting ER 3# 3 x 15  Y up wall lift off 1# 2 x 15  KB curl and press 2 x 20  -super set upside down KB carry across turf 2 laps  Sidelyin ER 2# 3 X 15  OH carry 10# 5 laps   Supine chest press on ground 25# DB 3 x 15    Sukumar received the following manual therapy techniques: Joint mobilizations and Soft tissue Mobilization were applied to the: right shoulder for 12 minutes, including:    Gr I-II oscillations relaxation  Inferior mob arm at 20 and 90 deg abd Gr III  External Rotation stretch at 90/90 grade III   Shoulder flexion stretch grade III    Not Performed Today:  Rhythmic stabilization at end range of flexion   Scapular upward rotation grade III  Cross body MET    Sukumar participated in neuromuscular re-education activities to improve: Coordination, Proprioception, and Posture for 10 minutes. The following activities were included:    Serratus pot stire CW/CCW 3x fatigue    Not Performed Today:  D1 extensions BUE 17# 3 x 15  D2 extension 17# 3 x 15  Tricep extension overhead 17# 3 x 15  Med ball slams 10# 2 x 25  Landmine press 55# 3 x 15  I/T/Y over swiss ball 2# 3 x 15  pushup shoulder taps 2 x 15 20" box  CC ROW 15# 3 X 15  Prone over swiss ball T and Y 3 X 15   Arm in front IR/ER GTB 2 x 15  Body blade arm by side 30 " 3x   Plank " "position to down dog 1 x 15  Shoulder taps 3 x 20 taps  Standing row with ER at 90/90 OTB 3 x 15   Rhythmic stabilization with blue band at full flexion x 3 to fatigue    Sukumar participated in dynamic functional therapeutic activities to improve functional performance for 25  minutes, including:    Mauritian get up 10# KB 15x  Pushup 6" box 3 x 12    Home Exercises Provided and Patient Education Provided     Education provided:   - Home Exercise Program, precautions, progress    Written Home Exercises Provided: Patient instructed to cont prior HEP.  Exercises were reviewed and Sukumar was able to demonstrate them prior to the end of the session.  Sukumar demonstrated good  understanding of the education provided.     See EMR under Patient Instructions for exercises provided prior visit.    Assessment     Sukumar progressed with overhead strengthening today and pushup form at 6" box. He notes pot stir was difficult today, perform 3 at a time CW/CCW. Will continue strengthening to cuff OH     Sukumar Is progressing well towards his goals.   Pt prognosis is Good.     Pt will continue to benefit from skilled outpatient physical therapy to address the deficits listed in the problem list box on initial evaluation, provide pt/family education and to maximize pt's level of independence in the home and community environment.     Pt's spiritual, cultural and educational needs considered and pt agreeable to plan of care and goals.     Anticipated barriers to physical therapy: none    Goals:   Short Term Goals:  6 weeks  Patient will be independent in Home Exercise Program for independent progression within protocol.Met  Patient will show improved Passive Range of Motion to 120 degrees of flexion to show progress within protocol.Met  Patient will show improved Passive Range of Motion to 30 degrees of External Rotation to show progress within protocol.Met     Long Term Goals: 12 weeks  Patient will be independent in Home Exercise Program to " initiate strengthening and return to hobbies.-Progressing, not met   Patient will show improved full Passive Range of Motion in flexion and External Rotation. -Progressing, not met   Patient will show improved strength by demonstrating active flexion to 120 degrees.-Progressing, not met   Patient goal: return to boot camp three times a week with no restrictions.-Progressing, not met   Patient will have improved limitation score to 27% limited on FOTO shoulder in order to demonstrate functional improvement.-Progressing, not met     Plan     Certification Period: 3/23/2023 to 6/24/2023    Improve overhead cuff strength    Therapist: Reji Szymanski, PT, DPT

## 2023-06-08 ENCOUNTER — CLINICAL SUPPORT (OUTPATIENT)
Dept: REHABILITATION | Facility: HOSPITAL | Age: 53
End: 2023-06-08
Payer: COMMERCIAL

## 2023-06-08 DIAGNOSIS — M25.611 DECREASED RANGE OF MOTION OF RIGHT SHOULDER: Primary | ICD-10-CM

## 2023-06-08 DIAGNOSIS — M62.81 DECREASED MUSCLE STRENGTH: ICD-10-CM

## 2023-06-08 PROCEDURE — 97530 THERAPEUTIC ACTIVITIES: CPT | Performed by: PHYSICAL THERAPIST

## 2023-06-08 PROCEDURE — 97112 NEUROMUSCULAR REEDUCATION: CPT | Performed by: PHYSICAL THERAPIST

## 2023-06-08 PROCEDURE — 97140 MANUAL THERAPY 1/> REGIONS: CPT | Performed by: PHYSICAL THERAPIST

## 2023-06-09 ENCOUNTER — PROCEDURE VISIT (OUTPATIENT)
Dept: DERMATOLOGY | Facility: CLINIC | Age: 53
End: 2023-06-09
Payer: COMMERCIAL

## 2023-06-09 DIAGNOSIS — Z41.1 ENCOUNTER FOR COSMETIC PROCEDURE: Primary | ICD-10-CM

## 2023-06-09 PROCEDURE — 99499 NO LOS: ICD-10-PCS | Mod: S$GLB,,, | Performed by: DERMATOLOGY

## 2023-06-09 PROCEDURE — 99499 UNLISTED E&M SERVICE: CPT | Mod: S$GLB,,, | Performed by: DERMATOLOGY

## 2023-06-09 NOTE — PROGRESS NOTES
Mr. Beal presents today for cosmetic treatment of fine lines and wrinkles on the face with the SkinPen microneedling device and kit (#3 of 3 package purchased).    Discussed the risks, benefits, and side effects of the microneedling procedure, including bruising, bleeding, redness, peeling, swelling, pain, tenderness, infection, scarring, and allergic reaction to stainless steel or topical anesthetic. Verbal and written consent were obtained from the patient.     Topical anesthesia with lidocaine 4% cream was applied to the patient's clean face for 45-60 minutes prior to the procedure. Face was then cleansed and prepped with alcohol.    Microneedling of the face was performed with three passes to each area using the provided gliding agent.    There were no complications, and the patient tolerated the procedure well. Aftercare instructions were reviewed with the patient.

## 2023-08-10 ENCOUNTER — OFFICE VISIT (OUTPATIENT)
Dept: INTERNAL MEDICINE | Facility: CLINIC | Age: 53
End: 2023-08-10
Payer: COMMERCIAL

## 2023-08-10 VITALS
SYSTOLIC BLOOD PRESSURE: 112 MMHG | BODY MASS INDEX: 29.77 KG/M2 | HEART RATE: 61 BPM | WEIGHT: 231.94 LBS | HEIGHT: 74 IN | DIASTOLIC BLOOD PRESSURE: 80 MMHG | OXYGEN SATURATION: 97 %

## 2023-08-10 DIAGNOSIS — E29.1 MALE HYPOGONADISM: ICD-10-CM

## 2023-08-10 DIAGNOSIS — E78.00 HIGH CHOLESTEROL: Primary | ICD-10-CM

## 2023-08-10 DIAGNOSIS — I10 HYPERTENSION, UNSPECIFIED TYPE: ICD-10-CM

## 2023-08-10 DIAGNOSIS — Z00.00 WELLNESS EXAMINATION: ICD-10-CM

## 2023-08-10 DIAGNOSIS — E03.9 ACQUIRED HYPOTHYROIDISM: ICD-10-CM

## 2023-08-10 PROBLEM — G60.9 IDIOPATHIC NEUROPATHY: Status: RESOLVED | Noted: 2022-03-15 | Resolved: 2023-08-10

## 2023-08-10 PROCEDURE — 99396 PREV VISIT EST AGE 40-64: CPT | Mod: S$GLB,,, | Performed by: INTERNAL MEDICINE

## 2023-08-10 PROCEDURE — 1159F PR MEDICATION LIST DOCUMENTED IN MEDICAL RECORD: ICD-10-PCS | Mod: CPTII,S$GLB,, | Performed by: INTERNAL MEDICINE

## 2023-08-10 PROCEDURE — 99999 PR PBB SHADOW E&M-EST. PATIENT-LVL IV: CPT | Mod: PBBFAC,,, | Performed by: INTERNAL MEDICINE

## 2023-08-10 PROCEDURE — 3079F PR MOST RECENT DIASTOLIC BLOOD PRESSURE 80-89 MM HG: ICD-10-PCS | Mod: CPTII,S$GLB,, | Performed by: INTERNAL MEDICINE

## 2023-08-10 PROCEDURE — 3008F BODY MASS INDEX DOCD: CPT | Mod: CPTII,S$GLB,, | Performed by: INTERNAL MEDICINE

## 2023-08-10 PROCEDURE — 99999 PR PBB SHADOW E&M-EST. PATIENT-LVL IV: ICD-10-PCS | Mod: PBBFAC,,, | Performed by: INTERNAL MEDICINE

## 2023-08-10 PROCEDURE — 4010F PR ACE/ARB THEARPY RXD/TAKEN: ICD-10-PCS | Mod: CPTII,S$GLB,, | Performed by: INTERNAL MEDICINE

## 2023-08-10 PROCEDURE — 3074F PR MOST RECENT SYSTOLIC BLOOD PRESSURE < 130 MM HG: ICD-10-PCS | Mod: CPTII,S$GLB,, | Performed by: INTERNAL MEDICINE

## 2023-08-10 PROCEDURE — 3079F DIAST BP 80-89 MM HG: CPT | Mod: CPTII,S$GLB,, | Performed by: INTERNAL MEDICINE

## 2023-08-10 PROCEDURE — 3074F SYST BP LT 130 MM HG: CPT | Mod: CPTII,S$GLB,, | Performed by: INTERNAL MEDICINE

## 2023-08-10 PROCEDURE — 4010F ACE/ARB THERAPY RXD/TAKEN: CPT | Mod: CPTII,S$GLB,, | Performed by: INTERNAL MEDICINE

## 2023-08-10 PROCEDURE — 99396 PR PREVENTIVE VISIT,EST,40-64: ICD-10-PCS | Mod: S$GLB,,, | Performed by: INTERNAL MEDICINE

## 2023-08-10 PROCEDURE — 3008F PR BODY MASS INDEX (BMI) DOCUMENTED: ICD-10-PCS | Mod: CPTII,S$GLB,, | Performed by: INTERNAL MEDICINE

## 2023-08-10 PROCEDURE — 1159F MED LIST DOCD IN RCRD: CPT | Mod: CPTII,S$GLB,, | Performed by: INTERNAL MEDICINE

## 2023-08-10 RX ORDER — LOSARTAN POTASSIUM 50 MG/1
50 TABLET ORAL DAILY
Qty: 90 TABLET | Refills: 3 | Status: SHIPPED | OUTPATIENT
Start: 2023-08-10

## 2023-08-10 RX ORDER — ATORVASTATIN CALCIUM 20 MG/1
20 TABLET, FILM COATED ORAL DAILY
Qty: 90 TABLET | Refills: 3 | Status: SHIPPED | OUTPATIENT
Start: 2023-08-10 | End: 2024-02-05 | Stop reason: SDUPTHER

## 2023-08-10 NOTE — PROGRESS NOTES
Subjective:       Patient ID: Sukumar Beal is a 52 y.o. male.    Chief Complaint: Annual Exam    HPIPt is doing well - exercising.  No CP or SOB.  Review of Systems   Respiratory:  Negative for shortness of breath.    Cardiovascular:  Negative for chest pain.   Gastrointestinal:  Negative for abdominal pain, diarrhea, nausea and vomiting.   Genitourinary:  Negative for dysuria.   Neurological:  Negative for seizures, syncope and headaches.       Objective:      Physical Exam  Constitutional:       General: He is not in acute distress.     Appearance: He is well-developed.   Eyes:      Pupils: Pupils are equal, round, and reactive to light.   Neck:      Thyroid: No thyromegaly.      Vascular: No JVD.   Cardiovascular:      Rate and Rhythm: Normal rate and regular rhythm.      Heart sounds: Normal heart sounds. No murmur heard.     No friction rub. No gallop.   Pulmonary:      Effort: Pulmonary effort is normal.      Breath sounds: Normal breath sounds. No wheezing or rales.   Abdominal:      General: Bowel sounds are normal. There is no distension.      Palpations: Abdomen is soft. There is no mass.      Tenderness: There is no abdominal tenderness. There is no guarding or rebound.   Genitourinary:     Comments: Pt declines - sees .  Musculoskeletal:      Cervical back: Neck supple.   Lymphadenopathy:      Cervical: No cervical adenopathy.   Skin:     General: Skin is warm and dry.   Neurological:      Mental Status: He is alert and oriented to person, place, and time.   Psychiatric:         Behavior: Behavior normal.         Thought Content: Thought content normal.         Judgment: Judgment normal.         Assessment:       1. High cholesterol    2. Male hypogonadism    3. Acquired hypothyroidism    4. Hypertension, unspecified type    5. Wellness examination        Plan:   High cholesterol  On meds  Male hypogonadism  On testosterone replacement  Acquired hypothyroidism  Check TSH  Hypertension, unspecified  type  Controlled - continue current [facility]    Wellness examination  -     Comprehensive Metabolic Panel; Future; Expected date: 08/10/2023  -     Lipid Panel; Future; Expected date: 08/10/2023  -     TSH; Future; Expected date: 08/10/2023  -     Hemoglobin A1C; Future; Expected date: 08/10/2023  -     Vitamin D; Future; Expected date: 08/10/2023  -     Microalbumin/Creatinine Ratio, Urine; Future; Expected date: 08/10/2023  -     Urinalysis; Future; Expected date: 08/10/2023  -     Ambulatory referral/consult to Endo Procedure ; Future; Expected date: 08/11/2023  -     Uric Acid; Future; Expected date: 08/10/2023    Other orders  -     atorvastatin (LIPITOR) 20 MG tablet; Take 1 tablet (20 mg total) by mouth once daily.  Dispense: 90 tablet; Refill: 3  -     losartan (COZAAR) 50 MG tablet; Take 1 tablet (50 mg total) by mouth once daily.  Dispense: 90 tablet; Refill: 3

## 2023-08-14 ENCOUNTER — TELEPHONE (OUTPATIENT)
Dept: ENDOSCOPY | Facility: HOSPITAL | Age: 53
End: 2023-08-14
Payer: COMMERCIAL

## 2023-08-14 VITALS — BODY MASS INDEX: 29.65 KG/M2 | HEIGHT: 74 IN | WEIGHT: 231 LBS

## 2023-08-14 DIAGNOSIS — Z12.11 COLON CANCER SCREENING: Primary | ICD-10-CM

## 2023-08-14 DIAGNOSIS — Z12.11 ENCOUNTER FOR SCREENING COLONOSCOPY: Primary | ICD-10-CM

## 2023-08-14 RX ORDER — SODIUM, POTASSIUM,MAG SULFATES 17.5-3.13G
1 SOLUTION, RECONSTITUTED, ORAL ORAL DAILY
Qty: 1 KIT | Refills: 0 | Status: SHIPPED | OUTPATIENT
Start: 2023-08-14 | End: 2023-08-16

## 2023-08-14 NOTE — TELEPHONE ENCOUNTER
----- Message from Masha García sent at 8/14/2023  8:27 AM CDT -----    ----- Message -----  From: Liam Rosario  Sent: 8/11/2023   3:14 PM CDT  To: [facility]    Type:  Patient Returning Call    Who Called:pt  Who Left Message for Patient:  Does the patient know what this is regarding?:colonoscopy  Would the patient rather a call back or a response via [facility]? Call  Best Call Back Number:[phone]  Additional Information: pt states he would like a call to schedule colonoscopy.

## 2023-09-07 ENCOUNTER — LAB VISIT (OUTPATIENT)
Dept: LAB | Facility: HOSPITAL | Age: 53
End: 2023-09-07
Payer: COMMERCIAL

## 2023-09-07 DIAGNOSIS — N40.0 BENIGN PROSTATIC HYPERPLASIA WITHOUT LOWER URINARY TRACT SYMPTOMS: ICD-10-CM

## 2023-09-07 DIAGNOSIS — E78.5 DYSLIPIDEMIA: ICD-10-CM

## 2023-09-07 DIAGNOSIS — E34.9 TESTOSTERONE DEFICIENCY: ICD-10-CM

## 2023-09-07 DIAGNOSIS — R53.83 FATIGUE, UNSPECIFIED TYPE: ICD-10-CM

## 2023-09-07 DIAGNOSIS — N52.9 ED (ERECTILE DYSFUNCTION) OF ORGANIC ORIGIN: ICD-10-CM

## 2023-09-07 DIAGNOSIS — E29.1 PRIMARY MALE HYPOGONADISM: ICD-10-CM

## 2023-09-07 LAB
ALBUMIN SERPL BCP-MCNC: 4.3 G/DL (ref 3.5–5.2)
ALP SERPL-CCNC: 81 U/L (ref 55–135)
ALT SERPL W/O P-5'-P-CCNC: 33 U/L (ref 10–44)
AST SERPL-CCNC: 26 U/L (ref 10–40)
BASOPHILS # BLD AUTO: 0.03 K/UL (ref 0–0.2)
BASOPHILS NFR BLD: 0.6 % (ref 0–1.9)
BILIRUB DIRECT SERPL-MCNC: 0.2 MG/DL (ref 0.1–0.3)
BILIRUB SERPL-MCNC: 0.7 MG/DL (ref 0.1–1)
COMPLEXED PSA SERPL-MCNC: 0.73 NG/ML (ref 0–4)
CREAT SERPL-MCNC: 1.1 MG/DL (ref 0.5–1.4)
DIFFERENTIAL METHOD: NORMAL
EOSINOPHIL # BLD AUTO: 0.1 K/UL (ref 0–0.5)
EOSINOPHIL NFR BLD: 2.2 % (ref 0–8)
ERYTHROCYTE [DISTWIDTH] IN BLOOD BY AUTOMATED COUNT: 12.5 % (ref 11.5–14.5)
EST. GFR  (NO RACE VARIABLE): >60 ML/MIN/1.73 M^2
HCT VFR BLD AUTO: 48.8 % (ref 40–54)
HGB BLD-MCNC: 15.9 G/DL (ref 14–18)
IMM GRANULOCYTES # BLD AUTO: 0.01 K/UL (ref 0–0.04)
IMM GRANULOCYTES NFR BLD AUTO: 0.2 % (ref 0–0.5)
LYMPHOCYTES # BLD AUTO: 1.6 K/UL (ref 1–4.8)
LYMPHOCYTES NFR BLD: 32.3 % (ref 18–48)
MCH RBC QN AUTO: 29.9 PG (ref 27–31)
MCHC RBC AUTO-ENTMCNC: 32.6 G/DL (ref 32–36)
MCV RBC AUTO: 92 FL (ref 82–98)
MONOCYTES # BLD AUTO: 0.5 K/UL (ref 0.3–1)
MONOCYTES NFR BLD: 10.1 % (ref 4–15)
NEUTROPHILS # BLD AUTO: 2.8 K/UL (ref 1.8–7.7)
NEUTROPHILS NFR BLD: 54.6 % (ref 38–73)
NRBC BLD-RTO: 0 /100 WBC
PLATELET # BLD AUTO: 199 K/UL (ref 150–450)
PMV BLD AUTO: 11.4 FL (ref 9.2–12.9)
PROT SERPL-MCNC: 7 G/DL (ref 6–8.4)
RBC # BLD AUTO: 5.32 M/UL (ref 4.6–6.2)
TESTOST SERPL-MCNC: 375 NG/DL (ref 304–1227)
WBC # BLD AUTO: 5.07 K/UL (ref 3.9–12.7)

## 2023-09-07 PROCEDURE — 82565 ASSAY OF CREATININE: CPT | Performed by: NURSE PRACTITIONER

## 2023-09-07 PROCEDURE — 84403 ASSAY OF TOTAL TESTOSTERONE: CPT | Performed by: NURSE PRACTITIONER

## 2023-09-07 PROCEDURE — 84153 ASSAY OF PSA TOTAL: CPT | Performed by: NURSE PRACTITIONER

## 2023-09-07 PROCEDURE — 85025 COMPLETE CBC W/AUTO DIFF WBC: CPT | Performed by: NURSE PRACTITIONER

## 2023-09-07 PROCEDURE — 80076 HEPATIC FUNCTION PANEL: CPT | Performed by: NURSE PRACTITIONER

## 2023-09-07 PROCEDURE — 36415 COLL VENOUS BLD VENIPUNCTURE: CPT | Performed by: NURSE PRACTITIONER

## 2023-09-11 ENCOUNTER — OFFICE VISIT (OUTPATIENT)
Dept: UROLOGY | Facility: CLINIC | Age: 53
End: 2023-09-11
Payer: COMMERCIAL

## 2023-09-11 VITALS
DIASTOLIC BLOOD PRESSURE: 96 MMHG | HEART RATE: 54 BPM | SYSTOLIC BLOOD PRESSURE: 137 MMHG | BODY MASS INDEX: 39.61 KG/M2 | WEIGHT: 308.63 LBS | HEIGHT: 74 IN

## 2023-09-11 DIAGNOSIS — E34.9 TESTOSTERONE DEFICIENCY: ICD-10-CM

## 2023-09-11 DIAGNOSIS — E29.1 PRIMARY MALE HYPOGONADISM: ICD-10-CM

## 2023-09-11 DIAGNOSIS — R53.83 FATIGUE, UNSPECIFIED TYPE: ICD-10-CM

## 2023-09-11 DIAGNOSIS — E78.5 DYSLIPIDEMIA: ICD-10-CM

## 2023-09-11 DIAGNOSIS — N52.9 ED (ERECTILE DYSFUNCTION) OF ORGANIC ORIGIN: ICD-10-CM

## 2023-09-11 DIAGNOSIS — N40.0 BENIGN PROSTATIC HYPERPLASIA WITHOUT LOWER URINARY TRACT SYMPTOMS: ICD-10-CM

## 2023-09-11 PROCEDURE — 1160F PR REVIEW ALL MEDS BY PRESCRIBER/CLIN PHARMACIST DOCUMENTED: ICD-10-PCS | Mod: CPTII,S$GLB,, | Performed by: NURSE PRACTITIONER

## 2023-09-11 PROCEDURE — 3075F SYST BP GE 130 - 139MM HG: CPT | Mod: CPTII,S$GLB,, | Performed by: NURSE PRACTITIONER

## 2023-09-11 PROCEDURE — 3066F PR DOCUMENTATION OF TREATMENT FOR NEPHROPATHY: ICD-10-PCS | Mod: CPTII,S$GLB,, | Performed by: NURSE PRACTITIONER

## 2023-09-11 PROCEDURE — 3075F PR MOST RECENT SYSTOLIC BLOOD PRESS GE 130-139MM HG: ICD-10-PCS | Mod: CPTII,S$GLB,, | Performed by: NURSE PRACTITIONER

## 2023-09-11 PROCEDURE — 3061F NEG MICROALBUMINURIA REV: CPT | Mod: CPTII,S$GLB,, | Performed by: NURSE PRACTITIONER

## 2023-09-11 PROCEDURE — 3008F BODY MASS INDEX DOCD: CPT | Mod: CPTII,S$GLB,, | Performed by: NURSE PRACTITIONER

## 2023-09-11 PROCEDURE — 1159F MED LIST DOCD IN RCRD: CPT | Mod: CPTII,S$GLB,, | Performed by: NURSE PRACTITIONER

## 2023-09-11 PROCEDURE — 3061F PR NEG MICROALBUMINURIA RESULT DOCUMENTED/REVIEW: ICD-10-PCS | Mod: CPTII,S$GLB,, | Performed by: NURSE PRACTITIONER

## 2023-09-11 PROCEDURE — 99214 PR OFFICE/OUTPT VISIT, EST, LEVL IV, 30-39 MIN: ICD-10-PCS | Mod: S$GLB,,, | Performed by: NURSE PRACTITIONER

## 2023-09-11 PROCEDURE — 1159F PR MEDICATION LIST DOCUMENTED IN MEDICAL RECORD: ICD-10-PCS | Mod: CPTII,S$GLB,, | Performed by: NURSE PRACTITIONER

## 2023-09-11 PROCEDURE — 3008F PR BODY MASS INDEX (BMI) DOCUMENTED: ICD-10-PCS | Mod: CPTII,S$GLB,, | Performed by: NURSE PRACTITIONER

## 2023-09-11 PROCEDURE — 4010F PR ACE/ARB THEARPY RXD/TAKEN: ICD-10-PCS | Mod: CPTII,S$GLB,, | Performed by: NURSE PRACTITIONER

## 2023-09-11 PROCEDURE — 3080F DIAST BP >= 90 MM HG: CPT | Mod: CPTII,S$GLB,, | Performed by: NURSE PRACTITIONER

## 2023-09-11 PROCEDURE — 3066F NEPHROPATHY DOC TX: CPT | Mod: CPTII,S$GLB,, | Performed by: NURSE PRACTITIONER

## 2023-09-11 PROCEDURE — 99214 OFFICE O/P EST MOD 30 MIN: CPT | Mod: S$GLB,,, | Performed by: NURSE PRACTITIONER

## 2023-09-11 PROCEDURE — 1160F RVW MEDS BY RX/DR IN RCRD: CPT | Mod: CPTII,S$GLB,, | Performed by: NURSE PRACTITIONER

## 2023-09-11 PROCEDURE — 99999 PR PBB SHADOW E&M-EST. PATIENT-LVL III: ICD-10-PCS | Mod: PBBFAC,,, | Performed by: NURSE PRACTITIONER

## 2023-09-11 PROCEDURE — 4010F ACE/ARB THERAPY RXD/TAKEN: CPT | Mod: CPTII,S$GLB,, | Performed by: NURSE PRACTITIONER

## 2023-09-11 PROCEDURE — 99999 PR PBB SHADOW E&M-EST. PATIENT-LVL III: CPT | Mod: PBBFAC,,, | Performed by: NURSE PRACTITIONER

## 2023-09-11 PROCEDURE — 3080F PR MOST RECENT DIASTOLIC BLOOD PRESSURE >= 90 MM HG: ICD-10-PCS | Mod: CPTII,S$GLB,, | Performed by: NURSE PRACTITIONER

## 2023-09-11 RX ORDER — SILDENAFIL 100 MG/1
100 TABLET, FILM COATED ORAL DAILY PRN
Qty: 10 TABLET | Refills: 12 | Status: SHIPPED | OUTPATIENT
Start: 2023-09-11 | End: 2024-09-10

## 2023-09-11 RX ORDER — TESTOSTERONE 40.5 MG/2.5G
5 GEL TOPICAL DAILY
Qty: 150 G | Refills: 5 | Status: SHIPPED | OUTPATIENT
Start: 2023-09-11 | End: 2024-03-13 | Stop reason: SDUPTHER

## 2023-09-11 NOTE — PROGRESS NOTES
CHIEF COMPLAINT:    Sukumar Beal is a 52 y.o. male presents today for Low Testosterone.     HISTORY OF PRESENTING ILLINESS:    Sukumar Beal is a 52 y.o. male who has a history of Low Testosterone. He has complaints are fatigue, loss of axillary hair.     He's on Androgel 1.62% (1.25 gms) using 3 pumps.  While on TRT, While on TRT, he has more energy.    He wanted testopel, but he has [employer].  Last clinic visit was 03/09/2023.   We switched to AndroGel 1.62% 2.5gm packets; 2 packets daily.   Successfully using the Viagra.     Here today for f/u visit.   Overall feeling good. Prefers the packets; does not run out like the pump.     TRT labs were completed 09/07/2023 at 10:32am  PSA was 0.73 (stable)  T level was 375  HCT was 48.8. (stable).     He is here today for 6 month TRT.   Overall feeling very well.           REVIEW OF SYSTEMS:  Review of Systems   Constitutional: Negative.  Negative for chills, fever and malaise/fatigue.        Feeling good since not running out of the AndroGel     Eyes:  Negative for double vision.   Respiratory:  Negative for cough and shortness of breath.    Cardiovascular:  Negative for chest pain.   Gastrointestinal:  Negative for abdominal pain, constipation, diarrhea, nausea and vomiting.   Genitourinary: Negative.  Negative for dysuria, flank pain and hematuria.        Ok with urination     Neurological:  Negative for dizziness and seizures.   Endo/Heme/Allergies:  Negative for polydipsia.         PATIENT HISTORY:    Past Medical History:   Diagnosis Date    Gout 7/2014    High cholesterol     Hypertension     Hypothyroid     Low testosterone     Staph aureus infection age 13    R leg       Past Surgical History:   Procedure Laterality Date    ARTHROSCOPIC DEBRIDEMENT OF SHOULDER  10/14/2022    Procedure: DEBRIDEMENT, SHOULDER, ARTHROSCOPIC;  Surgeon: LEONIDAS Carr MD;  Location: [facility] OR;  Service: Orthopedics;;    ARTHROSCOPIC REPAIR OF ROTATOR CUFF OF SHOULDER Right  10/14/2022    Procedure: REPAIR, ROTATOR CUFF, ARTHROSCOPIC - POLAR CARE;  Surgeon: LEONIDAS Carr MD;  Location: [facility] OR;  Service: Orthopedics;  Laterality: Right;  Regional w/o Catheter, Interscalene    ARTHROSCOPY,SHOULDER,WITH BICEPS TENODESIS Right 10/14/2022    Procedure: ARTHROSCOPY,SHOULDER,WITH BICEPS TENODESIS;  Surgeon: LEONIDAS Carr MD;  Location: [facility] OR;  Service: Orthopedics;  Laterality: Right;    COLONOSCOPY N/A 2020    Procedure: COLONOSCOPY;  Surgeon: VANI Newell MD;  Location: [facility] ENDO (4TH FLR);  Service: Endoscopy;  Laterality: N/A;  covid test 2nd floor surgery clinic     DECOMPRESSION OF SUBACROMIAL SPACE Right 10/14/2022    Procedure: DECOMPRESSION, SUBACROMIAL SPACE;  Surgeon: LEONIDAS Carr MD;  Location: [facility] OR;  Service: Orthopedics;  Laterality: Right;    FRACTURE SURGERY Left     wrist    Incision and drainage of tibia Right     SINUS SURGERY      x2       Family History   Problem Relation Age of Onset    Heart disease Father 73            Hypertension Sister     Hyperlipidemia Sister     Hypertension Brother     Hyperlipidemia Brother     Lupus Sister     Hypertension Mother     Cancer Maternal Grandfather     Cancer Maternal Aunt         lung - smoker    Melanoma Neg Hx        Social History     Socioeconomic History    Marital status: Single   Tobacco Use    Smoking status: Former     Current packs/day: 0.00     Average packs/day: 0.3 packs/day for 15.0 years (3.8 ttl pk-yrs)     Types: Cigarettes     Start date: 1/3/1998     Quit date: 1/3/2013     Years since quitting: 10.6    Smokeless tobacco: Never   Substance and Sexual Activity    Alcohol use: Yes     Alcohol/week: 5.0 standard drinks of alcohol     Types: 6 Standard drinks or equivalent per week     Comment: weekends    Drug use: No    Sexual activity: Yes     Social Determinants of Health     Financial Resource Strain: Low Risk  (2020)    Overall Financial Resource Strain  (CARDIA)     Difficulty of Paying Living Expenses: Not hard at all   Food Insecurity: No Food Insecurity (5/7/2020)    Hunger Vital Sign     Worried About Running Out of Food in the Last Year: Never true     Ran Out of Food in the Last Year: Never true   Transportation Needs: No Transportation Needs (5/7/2020)    PRAPARE - Transportation     Lack of Transportation (Medical): No     Lack of Transportation (Non-Medical): No   Physical Activity: Sufficiently Active (5/7/2020)    Exercise Vital Sign     Days of Exercise per Week: 4 days     Minutes of Exercise per Session: 60 min   Stress: Stress Concern Present (5/7/2020)    [employer] [place] of Occupational Health - Occupational Stress Questionnaire     Feeling of Stress : To some extent   Social Connections: Unknown (5/7/2020)    Social Connection and Isolation Panel [NHANES]     Frequency of Communication with Friends and Family: More than three times a week     Frequency of Social Gatherings with Friends and Family: Three times a week     Active Member of Clubs or Organizations: Yes     Attends Club or Organization Meetings: More than 4 times per year     Marital Status: Never        Allergies:  Codeine and Penicillins    Medications:    Current Outpatient Medications:     atorvastatin (LIPITOR) 20 MG tablet, Take 1 tablet (20 mg total) by mouth once daily., Disp: 90 tablet, Rfl: 3    B-complex with vitamin C (Z-BEC OR EQUIV) tablet, Take 1 tablet by mouth once daily., Disp: , Rfl:     CYANOCOBALAMIN, VITAMIN B-12, ORAL, Take 1 tablet by mouth once daily., Disp: , Rfl:     ergocalciferol, vitamin D2, (VITAMIN D ORAL), Take 2 capsules by mouth once daily., Disp: , Rfl:     levothyroxine (SYNTHROID) 112 MCG tablet, TAKE 1 TABLET(112 MCG) BY MOUTH EVERY DAY, Disp: 90 tablet, Rfl: 1    losartan (COZAAR) 50 MG tablet, Take 1 tablet (50 mg total) by mouth once daily., Disp: 90 tablet, Rfl: 3    vitamin E acetate (VITAMIN E ORAL), Take 1 tablet by mouth once  daily., Disp: , Rfl:     sildenafiL (VIAGRA) 100 MG tablet, Take 1 tablet (100 mg total) by mouth daily as needed for Erectile Dysfunction (take on an empty stomach 30-60 minutes before)., Disp: 10 tablet, Rfl: 12    testosterone (ANDROGEL) 1.62 % (40.5 mg/2.5 gram) GlPk, Place 5 g onto the skin once daily. 2 packets to shoulders daily., Disp: 150 g, Rfl: 5    PHYSICAL EXAMINATION:  Physical Exam  Vitals and nursing note reviewed.   Constitutional:       General: He is awake.      Appearance: Normal appearance.   HENT:      Head: Normocephalic.      Right Ear: External ear normal.      Left Ear: External ear normal.      Nose: Nose normal.   Cardiovascular:      Rate and Rhythm: Normal rate.   Pulmonary:      Effort: Pulmonary effort is normal. No respiratory distress.   Abdominal:      Tenderness: There is no abdominal tenderness. There is no right CVA tenderness or left CVA tenderness.   Musculoskeletal:         General: Normal range of motion.      Cervical back: Normal range of motion.   Skin:     General: Skin is warm and dry.   Neurological:      General: No focal deficit present.      Mental Status: He is alert and oriented to person, place, and time.   Psychiatric:         Mood and Affect: Mood normal.         Behavior: Behavior is cooperative.           LABS:          Lab Results   Component Value Date    PSA 0.81 09/22/2021    PSA 0.42 06/11/2018    PSA 0.41 05/10/2017    PSADIAG 0.73 09/07/2023    PSADIAG 0.61 03/10/2023    PSADIAG 0.76 08/31/2022       Lab Results   Component Value Date    CREATININE 1.1 09/07/2023    EGFRNORACEVR >60.0 09/07/2023             IMPRESSION:    Encounter Diagnoses   Name Primary?    Primary male hypogonadism     Fatigue, unspecified type     Dyslipidemia     ED (erectile dysfunction) of organic origin     Benign prostatic hyperplasia without lower urinary tract symptoms     Testosterone deficiency          Assessment:       1. Primary male hypogonadism    2. Fatigue,  unspecified type    3. Dyslipidemia    4. ED (erectile dysfunction) of organic origin    5. Benign prostatic hyperplasia without lower urinary tract symptoms    6. Testosterone deficiency        Plan:         I spent 30 minutes with the patient of which more than half was spent in direct consultation with the patient in regards to our treatment and plan.  We addressed the office findings and recent labs.   Education and recommendations of today's plan of care including home remedies and needed follow up with PCP.   We discussed the chief complaint; reviewed the LUTS and the possible contributory factors.   Reassurance with good labs.   I do recommend donating blood.   Recommended lifestyle modifications with a proper, healthy diet, good hydration but during the day. Reducing bladder irritants.   Benefits of regular exercise.  Refilled his AndroGel and Sildenafil.   RTC 6 months with TRT labs.

## 2023-10-09 ENCOUNTER — PATIENT MESSAGE (OUTPATIENT)
Dept: DERMATOLOGY | Facility: CLINIC | Age: 53
End: 2023-10-09
Payer: COMMERCIAL

## 2023-10-31 ENCOUNTER — PATIENT MESSAGE (OUTPATIENT)
Dept: DERMATOLOGY | Facility: CLINIC | Age: 53
End: 2023-10-31
Payer: COMMERCIAL

## 2023-11-01 ENCOUNTER — TELEPHONE (OUTPATIENT)
Dept: DERMATOLOGY | Facility: CLINIC | Age: 53
End: 2023-11-01
Payer: COMMERCIAL

## 2023-11-01 NOTE — TELEPHONE ENCOUNTER
----- Message from Sam Rhodes MA sent at 10/31/2023  3:11 PM CDT -----  Hello I would like to schedule a micro needling appointment. [phone]

## 2023-11-05 NOTE — TELEPHONE ENCOUNTER
Care Due:                  Date            Visit Type   Department     Provider  --------------------------------------------------------------------------------                                EP -                              PRIMARY      [facility] PRIMARY  Last Visit: 08-      CARE ([facility])   CARE           Katherine Turner  Next Visit: None Scheduled  None         None Found                                                            Last  Test          Frequency    Reason                     Performed    Due Date  --------------------------------------------------------------------------------    K...........  12 months..  losartan.................  08- 08-    [facility] Embedded Care Due Messages. Reference number: [id number].   11/05/2023 9:29:58 AM CST

## 2023-11-06 ENCOUNTER — TELEPHONE (OUTPATIENT)
Dept: SPINE | Facility: CLINIC | Age: 53
End: 2023-11-06
Payer: COMMERCIAL

## 2023-11-06 RX ORDER — LEVOTHYROXINE SODIUM 112 UG/1
112 TABLET ORAL DAILY
Qty: 90 TABLET | Refills: 1 | Status: SHIPPED | OUTPATIENT
Start: 2023-11-06

## 2023-11-06 NOTE — TELEPHONE ENCOUNTER
Pt would like to be scheduled for an appointment in back and spine .     Staff scheduled appointment with pt , and pt will be seen 11/8 with DANE Mueller .

## 2023-11-06 NOTE — TELEPHONE ENCOUNTER
Refill Routing Note     Refill Routing Note   Medication(s) are not appropriate for processing by [facility] for the following reason(s):      Required labs outdated    [facility] action(s):  Defer Care Due:  None identified     Medication Therapy Plan: FLOS      Appointments  past 12m or future 3m with PCP    Date Provider   Last Visit   8/10/2023 Katherine Turner MD   Next Visit   Visit date not found Katherine Turner MD   ED visits in past 90 days: 0        Note composed:8:12 AM 11/06/2023

## 2023-11-07 ENCOUNTER — ANESTHESIA (OUTPATIENT)
Dept: ENDOSCOPY | Facility: HOSPITAL | Age: 53
End: 2023-11-07
Payer: COMMERCIAL

## 2023-11-07 ENCOUNTER — HOSPITAL ENCOUNTER (OUTPATIENT)
Facility: HOSPITAL | Age: 53
Discharge: HOME | End: 2023-11-07
Attending: COLON & RECTAL SURGERY | Admitting: COLON & RECTAL SURGERY
Payer: COMMERCIAL

## 2023-11-07 ENCOUNTER — ANESTHESIA EVENT (OUTPATIENT)
Dept: ENDOSCOPY | Facility: HOSPITAL | Age: 53
End: 2023-11-07
Payer: COMMERCIAL

## 2023-11-07 VITALS
RESPIRATION RATE: 18 BRPM | HEIGHT: 74 IN | OXYGEN SATURATION: 99 % | WEIGHT: 230 LBS | BODY MASS INDEX: 29.52 KG/M2 | HEART RATE: 53 BPM | SYSTOLIC BLOOD PRESSURE: 133 MMHG | TEMPERATURE: 98 F | DIASTOLIC BLOOD PRESSURE: 83 MMHG

## 2023-11-07 DIAGNOSIS — Z12.11 SCREEN FOR COLON CANCER: Primary | ICD-10-CM

## 2023-11-07 PROCEDURE — 88305 TISSUE EXAM BY PATHOLOGIST: ICD-10-PCS | Mod: 26,,, | Performed by: STUDENT IN AN ORGANIZED HEALTH CARE EDUCATION/TRAINING PROGRAM

## 2023-11-07 PROCEDURE — 45385 PR COLONOSCOPY,REMV LESN,SNARE: ICD-10-PCS | Mod: 33,,, | Performed by: COLON & RECTAL SURGERY

## 2023-11-07 PROCEDURE — 25000003 PHARM REV CODE 250: Performed by: NURSE ANESTHETIST, CERTIFIED REGISTERED

## 2023-11-07 PROCEDURE — E9220 PRA ENDO ANESTHESIA: ICD-10-PCS | Mod: 33,,, | Performed by: NURSE ANESTHETIST, CERTIFIED REGISTERED

## 2023-11-07 PROCEDURE — 25000003 PHARM REV CODE 250: Performed by: COLON & RECTAL SURGERY

## 2023-11-07 PROCEDURE — 63600175 PHARM REV CODE 636 W HCPCS: Performed by: NURSE ANESTHETIST, CERTIFIED REGISTERED

## 2023-11-07 PROCEDURE — 37000008 HC ANESTHESIA 1ST 15 MINUTES: Performed by: COLON & RECTAL SURGERY

## 2023-11-07 PROCEDURE — E9220 PRA ENDO ANESTHESIA: HCPCS | Mod: 33,,, | Performed by: NURSE ANESTHETIST, CERTIFIED REGISTERED

## 2023-11-07 PROCEDURE — 37000009 HC ANESTHESIA EA ADD 15 MINS: Performed by: COLON & RECTAL SURGERY

## 2023-11-07 PROCEDURE — 45385 COLONOSCOPY W/LESION REMOVAL: CPT | Mod: 33,,, | Performed by: COLON & RECTAL SURGERY

## 2023-11-07 PROCEDURE — 88305 TISSUE EXAM BY PATHOLOGIST: CPT | Performed by: STUDENT IN AN ORGANIZED HEALTH CARE EDUCATION/TRAINING PROGRAM

## 2023-11-07 PROCEDURE — 45385 COLONOSCOPY W/LESION REMOVAL: CPT | Mod: PT | Performed by: COLON & RECTAL SURGERY

## 2023-11-07 PROCEDURE — 88305 TISSUE EXAM BY PATHOLOGIST: CPT | Mod: 26,,, | Performed by: STUDENT IN AN ORGANIZED HEALTH CARE EDUCATION/TRAINING PROGRAM

## 2023-11-07 RX ORDER — PROPOFOL 10 MG/ML
VIAL (ML) INTRAVENOUS CONTINUOUS PRN
Status: DISCONTINUED | OUTPATIENT
Start: 2023-11-07 | End: 2023-11-07

## 2023-11-07 RX ORDER — PROPOFOL 10 MG/ML
VIAL (ML) INTRAVENOUS
Status: DISCONTINUED | OUTPATIENT
Start: 2023-11-07 | End: 2023-11-07

## 2023-11-07 RX ORDER — SODIUM CHLORIDE 9 MG/ML
INJECTION, SOLUTION INTRAVENOUS CONTINUOUS
Status: DISCONTINUED | OUTPATIENT
Start: 2023-11-07 | End: 2023-11-07 | Stop reason: HOSPADM

## 2023-11-07 RX ORDER — LIDOCAINE HYDROCHLORIDE 20 MG/ML
INJECTION INTRAVENOUS
Status: DISCONTINUED | OUTPATIENT
Start: 2023-11-07 | End: 2023-11-07

## 2023-11-07 RX ADMIN — SODIUM CHLORIDE: 0.9 INJECTION, SOLUTION INTRAVENOUS at 08:11

## 2023-11-07 RX ADMIN — LIDOCAINE HYDROCHLORIDE 50 MG: 20 INJECTION INTRAVENOUS at 08:11

## 2023-11-07 RX ADMIN — Medication 30 MG: at 08:11

## 2023-11-07 RX ADMIN — Medication 70 MG: at 08:11

## 2023-11-07 RX ADMIN — PROPOFOL 150 MCG/KG/MIN: 10 INJECTION, EMULSION INTRAVENOUS at 08:11

## 2023-11-07 NOTE — PROVATION PATIENT INSTRUCTIONS
Discharge Summary/Instructions after an Endoscopic Procedure  Patient Name: Sukumar Beal  Patient MRN: [id number]  Patient YOB: 1970  Tuesday, November 7, 2023  Luis Daniel Newell MD  Dear patient,  As a result of recent federal legislation (The Federal Cures Act), you may   receive lab or pathology results from your procedure in your [facility]   account before your physician is able to contact you. Your physician or   their representative will relay the results to you with their   recommendations at their soonest availability.  Thank you,  RESTRICTIONS:  During your procedure today, you received medications for sedation.  These   medications may affect your judgment, balance and coordination.  Therefore,   for 24 hours, you have the following restrictions:   - DO NOT drive a car, operate machinery, make legal/financial decisions,   sign important papers or drink alcohol.    ACTIVITY:  Today: no heavy lifting, straining or running due to procedural   sedation/anesthesia.  The following day: return to full activity including work.  DIET:  Eat and drink normally unless instructed otherwise.     TREATMENT FOR COMMON SIDE EFFECTS:  - Mild abdominal pain, nausea, belching, bloating or excessive gas:  rest,   eat lightly and use a heating pad.  - Sore Throat: treat with throat lozenges and/or gargle with warm salt   water.  - Because air was used during the procedure, expelling large amounts of air   from your rectum or belching is normal.  - If a bowel prep was taken, you may not have a bowel movement for 1-3 days.    This is normal.  SYMPTOMS TO WATCH FOR AND REPORT TO YOUR PHYSICIAN:  1. Abdominal pain or bloating, other than gas cramps.  2. Chest pain.  3. Back pain.  4. Signs of infection such as: chills or fever occurring within 24 hours   after the procedure.  5. Rectal bleeding, which would show as bright red, maroon, or black stools.   (A tablespoon of blood from the rectum is not serious, especially if    hemorrhoids are present.)  6. Vomiting.  7. Weakness or dizziness.  GO DIRECTLY TO THE NEAREST EMERGENCY ROOM IF YOU HAVE ANY OF THE FOLLOWING:      Difficulty breathing              Chills and/or fever over 101 F   Persistent vomiting and/or vomiting blood   Severe abdominal pain   Severe chest pain   Black, tarry stools   Bleeding- more than one tablespoon   Any other symptom or condition that you feel may need urgent attention  Your doctor recommends these additional instructions:  If any biopsies were taken, your doctors clinic will contact you in 1 to 2   weeks with any results.  - Discharge patient to home (ambulatory).   - Patient has a contact number available for emergencies.  The signs and   symptoms of potential delayed complications were discussed with the   patient.  Return to normal activities tomorrow.  Written discharge   instructions were provided to the patient.   - Resume previous diet.   - Continue present medications.   - Await pathology results.   - Repeat colonoscopy in 5 years for surveillance based on pathology   results.  For questions, problems or results please call your physician - Luis Daniel Newell MD at Work:  [phone].  [facility], EMERGENCY ROOM PHONE NUMBER: [phone]  IF A COMPLICATION OR EMERGENCY SITUATION ARISES AND YOU ARE UNABLE TO REACH   YOUR PHYSICIAN - GO DIRECTLY TO THE EMERGENCY ROOM.  Luis Daniel Newell MD  11/7/2023 8:49:18 AM  This report has been verified and signed electronically.  Dear patient,  As a result of recent federal legislation (The Federal Cures Act), you may   receive lab or pathology results from your procedure in your [facility]   account before your physician is able to contact you. Your physician or   their representative will relay the results to you with their   recommendations at their soonest availability.  Thank you,  PROVATION

## 2023-11-07 NOTE — ANESTHESIA PREPROCEDURE EVALUATION
Pre-operative evaluation for Procedure(s) (LRB):  COLONOSCOPY (N/A)    Sukumar Beal is a 53 y.o. male w/ HLD who presents for screening colonoscopy.       Prev airway (10/14/22):    Induction:  Intravenous    Intubated:  Postinduction    Attempts:  1    Attempted By:  CRNA    Method of Intubation:  Direct    Blade:  Stout 2    Laryngeal View Grade: Grade I - full view of cords      Difficult Airway Encountered?: No      Complications:  None    Airway Device:  Oral endotracheal tube    Airway Device Size:  7.5    Style/Cuff Inflation:  Cuffed    Inflation Amount (mL):  7    Tube secured:  23    Secured at:  The lips    Placement Verified By:  Capnometry    Complicating Factors:  None    Findings Post-Intubation:  BS equal bilateral       EKG (9/2/2022):   Vent. Rate : 053 BPM     Atrial Rate : 053 BPM      P-R Int : 184 ms          QRS Dur : 088 ms       QT Int : 446 ms       P-R-T Axes : 075 036 054 degrees      QTc Int : 418 ms   Sinus bradycardia   Otherwise normal ECG       2D Echo: none on record    Patient Active Problem List   Diagnosis    High cholesterol    Hypothyroid    Gout    Male hypogonadism    Vitamin D deficiency    BPH with urinary obstruction    Screen for colon cancer    GI bleed    Acute lower GI bleeding    Hematochezia    Colon polyps    Acute blood loss anemia    Pain in both feet    Nontraumatic tear of right rotator cuff    Biceps tendinitis of right upper extremity    Decreased range of motion of right shoulder    Decreased muscle strength       Review of patient's allergies indicates:   Allergen Reactions    Codeine Rash    Penicillins Rash        No current facility-administered medications on file prior to encounter.     Current Outpatient Medications on File Prior to Encounter   Medication Sig Dispense Refill    atorvastatin (LIPITOR) 20 MG tablet Take 1 tablet (20 mg total) by mouth once daily. 90 tablet 3    B-complex with vitamin C (Z-BEC OR EQUIV)  tablet Take 1 tablet by mouth once daily.      CYANOCOBALAMIN, VITAMIN B-12, ORAL Take 1 tablet by mouth once daily.      ergocalciferol, vitamin D2, (VITAMIN D ORAL) Take 2 capsules by mouth once daily.      losartan (COZAAR) 50 MG tablet Take 1 tablet (50 mg total) by mouth once daily. 90 tablet 3    vitamin E acetate (VITAMIN E ORAL) Take 1 tablet by mouth once daily.         Past Surgical History:   Procedure Laterality Date    ARTHROSCOPIC DEBRIDEMENT OF SHOULDER  10/14/2022    Procedure: DEBRIDEMENT, SHOULDER, ARTHROSCOPIC;  Surgeon: LEONIDAS Carr MD;  Location: [facility] OR;  Service: Orthopedics;;    ARTHROSCOPIC REPAIR OF ROTATOR CUFF OF SHOULDER Right 10/14/2022    Procedure: REPAIR, ROTATOR CUFF, ARTHROSCOPIC - POLAR CARE;  Surgeon: LEONIDAS Carr MD;  Location: [facility] OR;  Service: Orthopedics;  Laterality: Right;  Regional w/o Catheter, Interscalene    ARTHROSCOPY,SHOULDER,WITH BICEPS TENODESIS Right 10/14/2022    Procedure: ARTHROSCOPY,SHOULDER,WITH BICEPS TENODESIS;  Surgeon: LEONIDAS Carr MD;  Location: [facility] OR;  Service: Orthopedics;  Laterality: Right;    COLONOSCOPY N/A 9/21/2020    Procedure: COLONOSCOPY;  Surgeon: VANI Newell MD;  [facility]  Service: Endoscopy;  Laterality: N/A;  covid test 2nd floor surgery clinic 9/18    DECOMPRESSION OF SUBACROMIAL SPACE Right 10/14/2022    Procedure: DECOMPRESSION, SUBACROMIAL SPACE;  Surgeon: LEONIDAS Carr MD;  Location: [facility] OR;  Service: Orthopedics;  Laterality: Right;    FRACTURE SURGERY Left 2004    wrist    Incision and drainage of tibia Right 1983    SINUS SURGERY      x2       Social History     Socioeconomic History    Marital status: Single   Tobacco Use    Smoking status: Former     Current packs/day: 0.00     Average packs/day: 0.3 packs/day for 15.0 years (3.8 ttl pk-yrs)     Types: Cigarettes     Start date: 1/3/1998     Quit date: 1/3/2013     Years since quitting: 10.8    Smokeless tobacco:  "Never   Substance and Sexual Activity    Alcohol use: Yes     Alcohol/week: 5.0 standard drinks of alcohol     Types: 6 Standard drinks or equivalent per week     Comment: weekends    Drug use: No    Sexual activity: Yes     Social Determinants of Health     Financial Resource Strain: Low Risk  (5/7/2020)    Overall Financial Resource Strain (CARDIA)     Difficulty of Paying Living Expenses: Not hard at all   Food Insecurity: No Food Insecurity (5/7/2020)    Hunger Vital Sign     Worried About Running Out of Food in the Last Year: Never true     Ran Out of Food in the Last Year: Never true   Transportation Needs: No Transportation Needs (5/7/2020)    PRAPARE - Transportation     Lack of Transportation (Medical): No     Lack of Transportation (Non-Medical): No   Physical Activity: Sufficiently Active (5/7/2020)    Exercise Vital Sign     Days of Exercise per Week: 4 days     Minutes of Exercise per Session: 60 min   Stress: Stress Concern Present (5/7/2020)    [facility] of Occupational Health - Occupational Stress Questionnaire     Feeling of Stress : To some extent   Social Connections: Unknown (5/7/2020)    Social Connection and Isolation Panel [NHANES]     Frequency of Communication with Friends and Family: More than three times a week     Frequency of Social Gatherings with Friends and Family: Three times a week     Active Member of Clubs or Organizations: Yes     Attends Club or Organization Meetings: More than 4 times per year     Marital Status: Never          Vital Signs Range (Last 24H):         CBC: No results for input(s): "WBC", "RBC", "HGB", "HCT", "PLT", "MCV", "MCH", "MCHC" in the last 72 hours.    CMP: No results for input(s): "NA", "K", "CL", "CO2", "BUN", "CREATININE", "GLU", "MG", "PHOS", "CALCIUM", "ALBUMIN", "PROT", "ALKPHOS", "ALT", "AST", "BILITOT" in the last 72 hours.    INR  No results for input(s): "PT", "INR", "PROTIME", "APTT" in the last 72 " hours.          Pre-op Assessment    I have reviewed the Patient Summary Reports.     I have reviewed the Nursing Notes. I have reviewed the NPO Status.   I have reviewed the Medications.     Review of Systems  Anesthesia Hx:  No problems with previous Anesthesia  Denies Family Hx of Anesthesia complications.   Denies Personal Hx of Anesthesia complications.   Hematology/Oncology:     Oncology Normal     Cardiovascular:   Exercise tolerance: good Denies Pacemaker. Hypertension  Denies Valvular problems/Murmurs.  Denies MI.   hyperlipidemia ECG has been reviewed.    Pulmonary:  Pulmonary Normal  Denies Asthma.    Renal/:  Renal/ Normal     Hepatic/GI:   Bowel Prep.    Neurological:  Neurology Normal  Denies CVA. Denies Seizures.    Endocrine:   Hypothyroidism  Obesity / BMI > 30      Physical Exam  General: Alert and Oriented    Airway:  Mallampati: II   Mouth Opening: Normal  TM Distance: Normal  Tongue: Normal    Dental:  Intact    Chest/Lungs:  Clear to auscultation, Normal Respiratory Rate    Heart:  Rate: Normal  Rhythm: Regular Rhythm        Anesthesia Plan  Type of Anesthesia, risks & benefits discussed:    Anesthesia Type: Gen Natural Airway  Intra-op Monitoring Plan: Standard ASA Monitors  Post Op Pain Control Plan: IV/PO Opioids PRN and multimodal analgesia  Induction:  IV  Informed Consent: Informed consent signed with the Patient and all parties understand the risks and agree with anesthesia plan.  All questions answered.   ASA Score: 2  Day of Surgery Review of History & Physical: H&P Update referred to the surgeon/provider.    Ready For Surgery From Anesthesia Perspective.     .

## 2023-11-07 NOTE — ANESTHESIA POSTPROCEDURE EVALUATION
Anesthesia Post Evaluation    Patient: Sukumar Beal    Procedure(s) Performed: Procedure(s) (LRB):  COLONOSCOPY (N/A)    Final Anesthesia Type: general      Patient location during evaluation: PACU  Patient participation: Yes- Able to Participate  Level of consciousness: awake  Post-procedure vital signs: reviewed and stable  Pain management: adequate  Airway patency: patent    PONV status at discharge: No PONV  Anesthetic complications: no      Cardiovascular status: blood pressure returned to baseline  Respiratory status: unassisted, spontaneous ventilation and room air            Vitals Value Taken Time   /83 11/07/23 0923   Temp 36.8 °C (98.2 °F) 11/07/23 0856   Pulse 53 11/07/23 0923   Resp 18 11/07/23 0923   SpO2 99 % 11/07/23 0923         Event Time   Out of Recovery 09:26:46         Pain/Renate Score: Renate Score: 10 (11/7/2023  9:09 AM)

## 2023-11-07 NOTE — H&P
COLONOSCOPY HISTORY AND PE    Procedure : Colonoscopy      INDICATIONS: asymptomatic screening exam and prior polyps      2019  Findings:   The perianal and digital rectal examinations were normal.   Two pedunculated and semi-pedunculated polyps were found in the   descending colon and splenic flexure. The polyps were 8 to 15 mm in size. These polyps were removed with a hot snare. Resection and   retrieval were complete. Verification of patient identification for    the specimen was done. Estimated blood loss was minimal.   A 3 mm polyp was found in the rectum. The polyp was semi-sessile.   The polyp was removed with a cold snare. Resection and retrieval were complete. Verification of patient identification for the specimen was done. Estimated blood loss was minimal.   The terminal ileum appeared normal. The retroflexed view of the distal rectum and anal verge was normal and showed no anal or rectal abnormalities.       Past Medical History:   Diagnosis Date    Gout 7/2014    High cholesterol     Hypertension     Hypothyroid     Low testosterone     Staph aureus infection age 13    R leg       Past Surgical History:   Procedure Laterality Date    ARTHROSCOPIC DEBRIDEMENT OF SHOULDER  10/14/2022    Procedure: DEBRIDEMENT, SHOULDER, ARTHROSCOPIC;  Surgeon: LEONIDAS Carr MD;  Location: [facility] OR;  Service: Orthopedics;;    ARTHROSCOPIC REPAIR OF ROTATOR CUFF OF SHOULDER Right 10/14/2022    Procedure: REPAIR, ROTATOR CUFF, ARTHROSCOPIC - POLAR CARE;  Surgeon: LEONIDAS Carr MD;  Location: [facility] OR;  Service: Orthopedics;  Laterality: Right;  Regional w/o Catheter, Interscalene    ARTHROSCOPY,SHOULDER,WITH BICEPS TENODESIS Right 10/14/2022    Procedure: ARTHROSCOPY,SHOULDER,WITH BICEPS TENODESIS;  Surgeon: LEONIDAS Carr MD;  Location: [facility] OR;  Service: Orthopedics;  Laterality: Right;    COLONOSCOPY N/A 9/21/2020    Procedure: COLONOSCOPY;  Surgeon: VANI Newell MD;  Location: [facility] ENDO (4TH FLR);  Service:  Endoscopy;  Laterality: N/A;  covid test 2nd floor surgery clinic     DECOMPRESSION OF SUBACROMIAL SPACE Right 10/14/2022    Procedure: DECOMPRESSION, SUBACROMIAL SPACE;  Surgeon: LEONIDAS Carr MD;  Location: [facility];  Service: Orthopedics;  Laterality: Right;    FRACTURE SURGERY Left     wrist    Incision and drainage of tibia Right     SINUS SURGERY      x2       Review of patient's allergies indicates:   Allergen Reactions    Codeine Rash    Penicillins Rash       No current facility-administered medications on file prior to encounter.     Current Outpatient Medications on File Prior to Encounter   Medication Sig Dispense Refill    atorvastatin (LIPITOR) 20 MG tablet Take 1 tablet (20 mg total) by mouth once daily. 90 tablet 3    B-complex with vitamin C (Z-BEC OR EQUIV) tablet Take 1 tablet by mouth once daily.      CYANOCOBALAMIN, VITAMIN B-12, ORAL Take 1 tablet by mouth once daily.      ergocalciferol, vitamin D2, (VITAMIN D ORAL) Take 2 capsules by mouth once daily.      losartan (COZAAR) 50 MG tablet Take 1 tablet (50 mg total) by mouth once daily. 90 tablet 3    vitamin E acetate (VITAMIN E ORAL) Take 1 tablet by mouth once daily.         Family History   Problem Relation Age of Onset    Heart disease Father 73            Hypertension Sister     Hyperlipidemia Sister     Hypertension Brother     Hyperlipidemia Brother     Lupus Sister     Hypertension Mother     Cancer Maternal Grandfather     Cancer Maternal Aunt         lung - smoker    Melanoma Neg Hx        Social History     Socioeconomic History    Marital status: Single   Tobacco Use    Smoking status: Former     Current packs/day: 0.00     Average packs/day: 0.3 packs/day for 15.0 years (3.8 ttl pk-yrs)     Types: Cigarettes     Start date: 1/3/1998     Quit date: 1/3/2013     Years since quitting: 10.8    Smokeless tobacco: Never   Substance and Sexual Activity    Alcohol use: Yes     Alcohol/week: 5.0 standard drinks of  alcohol     Types: 6 Standard drinks or equivalent per week     Comment: weekends    Drug use: No    Sexual activity: Yes     Social Determinants of Health     Financial Resource Strain: Low Risk  (5/7/2020)    Overall Financial Resource Strain (CARDIA)     Difficulty of Paying Living Expenses: Not hard at all   Food Insecurity: No Food Insecurity (5/7/2020)    Hunger Vital Sign     Worried About Running Out of Food in the Last Year: Never true     Ran Out of Food in the Last Year: Never true   Transportation Needs: No Transportation Needs (5/7/2020)    PRAPARE - Transportation     Lack of Transportation (Medical): No     Lack of Transportation (Non-Medical): No   Physical Activity: Sufficiently Active (5/7/2020)    Exercise Vital Sign     Days of Exercise per Week: 4 days     Minutes of Exercise per Session: 60 min   Stress: Stress Concern Present (5/7/2020)    [employer] [place] [employer] - Occupational Stress Questionnaire     Feeling of Stress : To some extent   Social Connections: Unknown (5/7/2020)    Social Connection and Isolation Panel [NHANES]     Frequency of Communication with Friends and Family: More than three times a week     Frequency of Social Gatherings with Friends and Family: Three times a week     Active Member of Clubs or Organizations: Yes     Attends Club or Organization Meetings: More than 4 times per year     Marital Status: Never        Review of Systems -    Respiratory : no cough, shortness of breath, or wheezing  Cardiovascular  no chest pain or dyspnea on exertion  Gastrointestinal no abdominal pain, change in bowel habits, or black or bloody stools  Musculoskeletal no deformities, swelling  Neurological no TIA or stroke symptoms        Physical Exam:  General: NAD  AT NC EOMI  Mallampati Score   Neck supple, trachea midline  Lungs: nl excursions, no retractions.  Breathing comfortably  Abdomen ND soft NT.  No masses  Extremities: No CCE.      ASA:   II    PLAN  COLONOSCOPY.  The details of the procedure, the possible need for biopsy or polypectomy and the potential risks including bleeding, perforation, missed polyps were discussed in detail.

## 2023-11-07 NOTE — TRANSFER OF CARE
"Anesthesia Transfer of Care Note    Patient: Sukumar Beal    Procedure(s) Performed: Procedure(s) (LRB):  COLONOSCOPY (N/A)    Patient location: GI    Anesthesia Type: general    Transport from OR: Transported from OR on room air with adequate spontaneous ventilation    Post pain: adequate analgesia    Post assessment: no apparent anesthetic complications and tolerated procedure well    Post vital signs: stable    Level of consciousness: awake, alert and oriented    Nausea/Vomiting: no nausea/vomiting    Complications: none    Transfer of care protocol was followed    Last vitals: Visit Vitals  /68   Pulse (!) 59   Temp 36.8 °C (98.2 °F)   Resp 16   Ht 6' 2" (1.88 m)   Wt 104.3 kg (230 lb)   SpO2 98%   BMI 29.53 kg/m²     "

## 2023-11-08 ENCOUNTER — TELEPHONE (OUTPATIENT)
Dept: SPORTS MEDICINE | Facility: CLINIC | Age: 53
End: 2023-11-08
Payer: COMMERCIAL

## 2023-11-08 ENCOUNTER — HOSPITAL ENCOUNTER (OUTPATIENT)
Dept: RADIOLOGY | Facility: OTHER | Age: 53
Discharge: HOME | End: 2023-11-08
Attending: NURSE PRACTITIONER
Payer: COMMERCIAL

## 2023-11-08 ENCOUNTER — OFFICE VISIT (OUTPATIENT)
Dept: SPINE | Facility: CLINIC | Age: 53
End: 2023-11-08
Payer: COMMERCIAL

## 2023-11-08 VITALS
OXYGEN SATURATION: 100 % | HEART RATE: 56 BPM | SYSTOLIC BLOOD PRESSURE: 133 MMHG | WEIGHT: 230.19 LBS | DIASTOLIC BLOOD PRESSURE: 86 MMHG | RESPIRATION RATE: 18 BRPM | BODY MASS INDEX: 29.55 KG/M2 | TEMPERATURE: 99 F

## 2023-11-08 DIAGNOSIS — M47.812 SPONDYLOSIS OF CERVICAL REGION WITHOUT MYELOPATHY OR RADICULOPATHY: ICD-10-CM

## 2023-11-08 DIAGNOSIS — M25.511 ACUTE PAIN OF RIGHT SHOULDER: ICD-10-CM

## 2023-11-08 DIAGNOSIS — M54.12 CERVICAL RADICULOPATHY: Primary | ICD-10-CM

## 2023-11-08 DIAGNOSIS — R29.898 SHOULDER WEAKNESS: Primary | ICD-10-CM

## 2023-11-08 DIAGNOSIS — M54.2 CERVICALGIA: ICD-10-CM

## 2023-11-08 DIAGNOSIS — R29.898 SHOULDER WEAKNESS: ICD-10-CM

## 2023-11-08 PROCEDURE — 4010F PR ACE/ARB THEARPY RXD/TAKEN: ICD-10-PCS | Mod: CPTII,S$GLB,, | Performed by: NURSE PRACTITIONER

## 2023-11-08 PROCEDURE — 3079F DIAST BP 80-89 MM HG: CPT | Mod: CPTII,S$GLB,, | Performed by: NURSE PRACTITIONER

## 2023-11-08 PROCEDURE — 3008F PR BODY MASS INDEX (BMI) DOCUMENTED: ICD-10-PCS | Mod: CPTII,S$GLB,, | Performed by: NURSE PRACTITIONER

## 2023-11-08 PROCEDURE — 3061F NEG MICROALBUMINURIA REV: CPT | Mod: CPTII,S$GLB,, | Performed by: NURSE PRACTITIONER

## 2023-11-08 PROCEDURE — 99204 PR OFFICE/OUTPT VISIT, NEW, LEVL IV, 45-59 MIN: ICD-10-PCS | Mod: S$GLB,,, | Performed by: NURSE PRACTITIONER

## 2023-11-08 PROCEDURE — 3061F PR NEG MICROALBUMINURIA RESULT DOCUMENTED/REVIEW: ICD-10-PCS | Mod: CPTII,S$GLB,, | Performed by: NURSE PRACTITIONER

## 2023-11-08 PROCEDURE — 72052 X-RAY EXAM NECK SPINE 6/>VWS: CPT | Mod: 26,,, | Performed by: RADIOLOGY

## 2023-11-08 PROCEDURE — 1159F MED LIST DOCD IN RCRD: CPT | Mod: CPTII,S$GLB,, | Performed by: NURSE PRACTITIONER

## 2023-11-08 PROCEDURE — 3066F PR DOCUMENTATION OF TREATMENT FOR NEPHROPATHY: ICD-10-PCS | Mod: CPTII,S$GLB,, | Performed by: NURSE PRACTITIONER

## 2023-11-08 PROCEDURE — 3008F BODY MASS INDEX DOCD: CPT | Mod: CPTII,S$GLB,, | Performed by: NURSE PRACTITIONER

## 2023-11-08 PROCEDURE — 99999 PR PBB SHADOW E&M-EST. PATIENT-LVL V: ICD-10-PCS | Mod: PBBFAC,,, | Performed by: NURSE PRACTITIONER

## 2023-11-08 PROCEDURE — 1159F PR MEDICATION LIST DOCUMENTED IN MEDICAL RECORD: ICD-10-PCS | Mod: CPTII,S$GLB,, | Performed by: NURSE PRACTITIONER

## 2023-11-08 PROCEDURE — 73030 X-RAY EXAM OF SHOULDER: CPT | Mod: TC,FY,RT

## 2023-11-08 PROCEDURE — 3066F NEPHROPATHY DOC TX: CPT | Mod: CPTII,S$GLB,, | Performed by: NURSE PRACTITIONER

## 2023-11-08 PROCEDURE — 3079F PR MOST RECENT DIASTOLIC BLOOD PRESSURE 80-89 MM HG: ICD-10-PCS | Mod: CPTII,S$GLB,, | Performed by: NURSE PRACTITIONER

## 2023-11-08 PROCEDURE — 73030 XR SHOULDER COMPLETE 2 OR MORE VIEWS RIGHT: ICD-10-PCS | Mod: 26,RT,, | Performed by: RADIOLOGY

## 2023-11-08 PROCEDURE — 99999 PR PBB SHADOW E&M-EST. PATIENT-LVL V: CPT | Mod: PBBFAC,,, | Performed by: NURSE PRACTITIONER

## 2023-11-08 PROCEDURE — 72052 X-RAY EXAM NECK SPINE 6/>VWS: CPT | Mod: TC,FY

## 2023-11-08 PROCEDURE — 3075F PR MOST RECENT SYSTOLIC BLOOD PRESS GE 130-139MM HG: ICD-10-PCS | Mod: CPTII,S$GLB,, | Performed by: NURSE PRACTITIONER

## 2023-11-08 PROCEDURE — 1160F PR REVIEW ALL MEDS BY PRESCRIBER/CLIN PHARMACIST DOCUMENTED: ICD-10-PCS | Mod: CPTII,S$GLB,, | Performed by: NURSE PRACTITIONER

## 2023-11-08 PROCEDURE — 4010F ACE/ARB THERAPY RXD/TAKEN: CPT | Mod: CPTII,S$GLB,, | Performed by: NURSE PRACTITIONER

## 2023-11-08 PROCEDURE — 72052 XR CERVICAL SPINE 5 VIEW WITH FLEX AND EXT: ICD-10-PCS | Mod: 26,,, | Performed by: RADIOLOGY

## 2023-11-08 PROCEDURE — 1160F RVW MEDS BY RX/DR IN RCRD: CPT | Mod: CPTII,S$GLB,, | Performed by: NURSE PRACTITIONER

## 2023-11-08 PROCEDURE — 99204 OFFICE O/P NEW MOD 45 MIN: CPT | Mod: S$GLB,,, | Performed by: NURSE PRACTITIONER

## 2023-11-08 PROCEDURE — 73030 X-RAY EXAM OF SHOULDER: CPT | Mod: 26,RT,, | Performed by: RADIOLOGY

## 2023-11-08 PROCEDURE — 3075F SYST BP GE 130 - 139MM HG: CPT | Mod: CPTII,S$GLB,, | Performed by: NURSE PRACTITIONER

## 2023-11-08 RX ORDER — METHOCARBAMOL 500 MG/1
500 TABLET, FILM COATED ORAL 3 TIMES DAILY PRN
Qty: 90 TABLET | Refills: 0 | Status: SHIPPED | OUTPATIENT
Start: 2023-11-08 | End: 2023-12-09

## 2023-11-08 RX ORDER — METHYLPREDNISOLONE 4 MG/1
TABLET ORAL
Qty: 21 EACH | Refills: 0 | Status: SHIPPED | OUTPATIENT
Start: 2023-11-08

## 2023-11-08 RX ORDER — CELECOXIB 200 MG/1
200 CAPSULE ORAL 2 TIMES DAILY
Qty: 40 CAPSULE | Refills: 1 | Status: SHIPPED | OUTPATIENT
Start: 2023-11-08

## 2023-11-08 NOTE — PROGRESS NOTES
Subjective:     Patient ID: Sukumar Beal is a 53 y.o. male.    Chief Complaint: Arm Pain and Neck Pain    HPI Mr. Beal is a 53-year-old male here for evaluation of neck pain    Complaints of neck pain for the last few weeks  Notes the range of motion when turning his head  Denies arm pain and tingling but does but does note heaviness in the right arm  Feels that chiropractic care made it worse  No PT or injections in the past  Did have right rotator cuff repair in October 2022 with subsequent therapy  Taking Robaxin Tylenol and Advil when needed    Past Medical History:   Diagnosis Date    Gout 7/2014    High cholesterol     Hypertension     Hypothyroid     Low testosterone     Staph aureus infection age 13    R leg       Past Surgical History:   Procedure Laterality Date    ARTHROSCOPIC DEBRIDEMENT OF SHOULDER  10/14/2022    Procedure: DEBRIDEMENT, SHOULDER, ARTHROSCOPIC;  Surgeon: LEONIDAS Carr MD;  Location: [facility] OR;  Service: Orthopedics;;    ARTHROSCOPIC REPAIR OF ROTATOR CUFF OF SHOULDER Right 10/14/2022    Procedure: REPAIR, ROTATOR CUFF, ARTHROSCOPIC - POLAR CARE;  Surgeon: LEONIDAS Carr MD;  Location: [facility] OR;  Service: Orthopedics;  Laterality: Right;  Regional w/o Catheter, Interscalene    ARTHROSCOPY,SHOULDER,WITH BICEPS TENODESIS Right 10/14/2022    Procedure: ARTHROSCOPY,SHOULDER,WITH BICEPS TENODESIS;  Surgeon: LEONIDAS Carr MD;  Location: [facility] OR;  Service: Orthopedics;  Laterality: Right;    COLONOSCOPY N/A 9/21/2020    Procedure: COLONOSCOPY;  Surgeon: VANI Newell MD;  Location: [facility] ([address] [place] [address]);  Service: Endoscopy;  Laterality: N/A;  covid test 2nd floor surgery clinic 9/18    COLONOSCOPY N/A 11/7/2023    Procedure: COLONOSCOPY;  Surgeon: VANI Newell MD;  [facility]  Service: Endoscopy;  Laterality: N/A;  8/14- referred by Dr. Katherine reynaga/ [facility]  7 day hold/ Prep instructions to the portal. AS  10/31-precall complee-pt states he has not  taken [facility] in several months-MS    DECOMPRESSION OF SUBACROMIAL SPACE Right 10/14/2022    Procedure: DECOMPRESSION, SUBACROMIAL SPACE;  Surgeon: LEONIDAS Carr MD;  Location: [facility];  Service: Orthopedics;  Laterality: Right;    FRACTURE SURGERY Left     wrist    Incision and drainage of tibia Right     SINUS SURGERY      x2       Family History   Problem Relation Age of Onset    Heart disease Father 73            Hypertension Sister     Hyperlipidemia Sister     Hypertension Brother     Hyperlipidemia Brother     Lupus Sister     Hypertension Mother     Cancer Maternal Grandfather     Cancer Maternal Aunt         lung - smoker    Melanoma Neg Hx        Social History     Socioeconomic History    Marital status: Single   Tobacco Use    Smoking status: Former     Current packs/day: 0.00     Average packs/day: 0.3 packs/day for 15.0 years (3.8 ttl pk-yrs)     Types: Cigarettes     Start date: 1/3/1998     Quit date: 1/3/2013     Years since quitting: 10.8    Smokeless tobacco: Never   Substance and Sexual Activity    Alcohol use: Yes     Alcohol/week: 5.0 standard drinks of alcohol     Types: 6 Standard drinks or equivalent per week     Comment: weekends    Drug use: No    Sexual activity: Yes     Social Determinants of Health     Financial Resource Strain: Low Risk  (2020)    Overall Financial Resource Strain (CARDIA)     Difficulty of Paying Living Expenses: Not hard at all   Food Insecurity: No Food Insecurity (2020)    Hunger Vital Sign     Worried About Running Out of Food in the Last Year: Never true     Ran Out of Food in the Last Year: Never true   Transportation Needs: No Transportation Needs (2020)    PRAPARE - Transportation     Lack of Transportation (Medical): No     Lack of Transportation (Non-Medical): No   Physical Activity: Sufficiently Active (2020)    Exercise Vital Sign     Days of Exercise per Week: 4 days     Minutes of Exercise per Session: 60 min   Stress:  Stress Concern Present (5/7/2020)    [employer] [place] - Occupational Stress Questionnaire     Feeling of Stress : To some extent   Social Connections: Unknown (5/7/2020)    Social Connection and Isolation Panel [NHANES]     Frequency of Communication with Friends and Family: More than three times a week     Frequency of Social Gatherings with Friends and Family: Three times a week     Active Member of Clubs or Organizations: Yes     Attends Club or Organization Meetings: More than 4 times per year     Marital Status: Never        Current Outpatient Medications   Medication Sig Dispense Refill    atorvastatin (LIPITOR) 20 MG tablet Take 1 tablet (20 mg total) by mouth once daily. 90 tablet 3    B-complex with vitamin C (Z-BEC OR EQUIV) tablet Take 1 tablet by mouth once daily.      CYANOCOBALAMIN, VITAMIN B-12, ORAL Take 1 tablet by mouth once daily.      ergocalciferol, vitamin D2, (VITAMIN D ORAL) Take 2 capsules by mouth once daily.      levothyroxine (SYNTHROID) 112 MCG tablet Take 1 tablet (112 mcg total) by mouth once daily. 90 tablet 1    losartan (COZAAR) 50 MG tablet Take 1 tablet (50 mg total) by mouth once daily. 90 tablet 3    sildenafiL (VIAGRA) 100 MG tablet Take 1 tablet (100 mg total) by mouth daily as needed for Erectile Dysfunction (take on an empty stomach 30-60 minutes before). 10 tablet 12    testosterone (ANDROGEL) 1.62 % (40.5 mg/2.5 gram) GlPk Place 5 g onto the skin once daily. 2 packets to shoulders daily. 150 g 5    vitamin E acetate (VITAMIN E ORAL) Take 1 tablet by mouth once daily.       No current facility-administered medications for this visit.       Review of patient's allergies indicates:   Allergen Reactions    Codeine Rash    Penicillins Rash         Review of Systems   Constitutional: Negative for fever.   Cardiovascular:  Negative for chest pain.   Respiratory:  Negative for shortness of breath.    Musculoskeletal:  Positive for neck pain and  stiffness. Negative for falls.   Gastrointestinal:  Negative for bowel incontinence.   Genitourinary:  Negative for bladder incontinence.   Neurological:  Negative for numbness and paresthesias.      Objective:     General: Sukumar is well-developed, well-nourished, appears stated age, in no acute distress, alert and oriented to time, place and person.     General    Vitals reviewed.  Constitutional: He is oriented to person, place, and time. He appears well-developed and well-nourished.   HENT:   Head: Atraumatic.   Nose: Nose normal.   Eyes: Conjunctivae are normal.   Cardiovascular:  Normal rate.            Pulmonary/Chest: Effort normal.   Neurological: He is alert and oriented to person, place, and time.   Psychiatric: He has a normal mood and affect. His behavior is normal. Judgment and thought content normal.     General Musculoskeletal Exam   Gait: normal     Back (L-Spine & T-Spine) / Neck (C-Spine) Exam     Tenderness   The patient is tender to palpation of the right trapezial.     Neck (C-Spine) Range of Motion   Flexion:      Limited  Extension:  Limited  Right Lateral Bend: abnormal  Left Lateral Bend: abnormal  Right Rotation: abnormal  Left Rotation: abnormal    Spinal Sensation   Right Side Sensation  C-Spine Level: normal   Left Side Sensation  C-Spine Level: normal    Other   He has no scoliosis .  Spinal Kyphosis:  Absent  Right Shoulder Exam     Range of Motion   Active abduction:  normal     Left Shoulder Exam     Range of Motion   Active abduction:  normal        Muscle Strength   Right Upper Extremity   Biceps: 5/5   Deltoid:  5/5  Triceps:  5/5  Wrist extension: 5/5   Finger Extensors:  5/5  Left Upper Extremity  Biceps: 5/5   Deltoid:  5/5  Triceps:  5/5  Wrist extension: 5/5   Finger Extensors:  5/5  Right Lower Extremity   Hip Flexion: 5/5   Quadriceps:  5/5   Anterior tibial:  5/5   EHL:  5/5  Left Lower Extremity   Hip Flexion: 5/5   Quadriceps:  5/5   Anterior tibial:  5/5   EHL:   5/5    Reflexes     Left Side  Biceps:  2+  Brachioradialis:  2+    Right Side   Biceps:  2+  Brachioradialis:  2+    Vascular Exam     Right Pulses        Carotid:                  2+    Left Pulses        Carotid:                  2+          Assessment:     1. Shoulder weakness    2. Cervicalgia    3. Acute pain of right shoulder    4. Spondylosis of cervical region without myelopathy or radiculopathy         Plan:        Prior records reviewed today  We discussed neck pain and the nature of neck pain.  We discussed that it is not one thing that causes the pain but an accumulation of multiple things that we do.   Order cervical x-ray and right shoulder x-ray  Referral to physical therapy for evaluation and treatment of neck and shoulder pain   Refill Robaxin 500 mg as needed for muscle spasms  We discussed posture sitting and the importance of trying to sit better.    We discussed the benefits of therapy and exercise and continuing to move.   Consider cervical MRI if no improvement with PT  Return for follow-up in 8 weeks    More than 50% of the total time  of 45 minutes was spent face to face in counseling on diagnosis and treatment options. I also counseled patient  on common and most usual side effect of prescribed medications.  I reviewed Primary care , and other specialty's notes to better coordinate patient's care. All questions were answered, and patient voiced understanding.      Follow-up: No follow-ups on file. If there are any questions prior to this, the patient was instructed to contact the office.

## 2023-11-08 NOTE — TELEPHONE ENCOUNTER
I called and spoke to the patient.  The spine mid-level provider reach out to me regarding his recent complaints of neck pain and shoulder/arm weakness.  Symptom onset earlier this week.  He states he really has no shoulder pain or arm pain in particular.  Chief complaint of neck pain with subjective weakness involving the entire arm.  No treatment to this point.  I have given him a Medrol Dosepak along with Celebrex which he will take after the Medrol Dosepak is complete.  The spine mid-level provider provided a prescription for a muscle relaxant.  Education provided regarding red flag symptoms.  We will treat this conservatively for now.  No need for further workup or treatment of the shoulder.  I will get him in with his prior physical therapist for some neck therapy.  Cervical traction.    Right shoulder x-ray reviewed.  Normal postoperative change.  Cervical spine x-rays reviewed showing significant multiple-level cervical spondylosis and degenerative disc disease.

## 2023-11-09 LAB
FINAL PATHOLOGIC DIAGNOSIS: NORMAL
GROSS: NORMAL
Lab: NORMAL

## 2023-11-15 ENCOUNTER — TELEPHONE (OUTPATIENT)
Dept: DERMATOLOGY | Facility: CLINIC | Age: 53
End: 2023-11-15
Payer: COMMERCIAL

## 2023-11-16 ENCOUNTER — CLINICAL SUPPORT (OUTPATIENT)
Dept: REHABILITATION | Facility: HOSPITAL | Age: 53
End: 2023-11-16
Payer: COMMERCIAL

## 2023-11-16 DIAGNOSIS — M47.812 SPONDYLOSIS OF CERVICAL REGION WITHOUT MYELOPATHY OR RADICULOPATHY: ICD-10-CM

## 2023-11-16 DIAGNOSIS — R29.898 SHOULDER WEAKNESS: ICD-10-CM

## 2023-11-16 DIAGNOSIS — M54.2 NECK PAIN: Primary | ICD-10-CM

## 2023-11-16 DIAGNOSIS — M25.511 ACUTE PAIN OF RIGHT SHOULDER: ICD-10-CM

## 2023-11-16 PROCEDURE — 97161 PT EVAL LOW COMPLEX 20 MIN: CPT | Performed by: PHYSICAL THERAPIST

## 2023-11-16 PROCEDURE — 97112 NEUROMUSCULAR REEDUCATION: CPT | Performed by: PHYSICAL THERAPIST

## 2023-11-16 PROCEDURE — 97140 MANUAL THERAPY 1/> REGIONS: CPT | Performed by: PHYSICAL THERAPIST

## 2023-11-16 NOTE — PROGRESS NOTES
[facility]   Physical Therapy Initial Evaluation      Name: Sukumar Beal  [facility] Number: [id number]    Therapy Diagnosis:   Encounter Diagnoses   Name Primary?    Shoulder weakness     Acute pain of right shoulder     Spondylosis of cervical region without myelopathy or radiculopathy     Neck pain Yes        Physician: Marylou Mueller, NP    Physician Orders: PT Eval and Treat   Medical Diagnosis from Referral:   Diagnosis   R29.898 (ICD-10-CM) - Shoulder weakness   M25.511 (ICD-10-CM) - Acute pain of right shoulder   M47.812 (ICD-10-CM) - Spondylosis of cervical region without myelopathy or radiculopathy     Evaluation Date: 11/16/2023  Authorization Period Expiration: 11/7/2024  Plan of Care Expiration: 3/16/2024  Progress Note Due: 2/16/2024  Visit # / Visits authorized: 1/ 1   FOTO: 1/1    Precautions: Standard     Time In: 1300  Time Out: 1400  Total Appointment Time (timed & untimed codes): 60 minutes    Subjective     Date of onset: 3 weeks  History of current condition - Sukumar reports: Patient presents to therapy with neck pain that began 3 weeks ago. He went to a chiropractor when it started and notes it got worse after first treatment. He reports he can no longer lift his arm and is concerned. He continues to exercise with boot camp at his office. He works in sales for furniture. Prior medical history includes R RCR which he completed rehab and was doing very well. No numbness and tingling down the arm today.   Falls: None    Imaging: xray:     Impression:     FINDINGS:  There is no acute fracture or subluxation of the cervical spine.  Vertebral body heights are maintained.  There is mild multilevel disc height loss.  There is a mild right C4-C5 neural foraminal narrowing secondary to facet arthropathy.  There is mild grade 1 fixed anterolisthesis of C7 on T1, measuring 2 mm.  Otherwise alignment is unremarkable.  There is no dynamic instability.  There are multilevel  degenerative changes.  No acute fracture or subluxation.     Degenerative changes as above.    Prior Therapy:  R RCR  Social History: She lives with their family  Occupation: Furniture sales   Prior Level of Function: Independent  Current Level of Function: Ind unable to lift arm     Pain:  Current 6/10, worst 8/10, best 2/10   Location: right neck  and shoulder   Description: Aching  Aggravating Factors: Lifting  Easing Factors: rest    Patients goals: return to exercise and using RUE     Medical History:   Past Medical History:   Diagnosis Date    Gout 7/2014    High cholesterol     Hypertension     Hypothyroid     Low testosterone     Staph aureus infection age 13    R leg       Surgical History:   Sukumar Beal  has a past surgical history that includes Incision and drainage of tibia (Right, 1983); Fracture surgery (Left, 2004); Sinus surgery; Colonoscopy (N/A, 9/21/2020); Arthroscopic repair of rotator cuff of shoulder (Right, 10/14/2022); arthroscopy,shoulder,with biceps tenodesis (Right, 10/14/2022); Decompression of subacromial space (Right, 10/14/2022); Arthroscopic debridement of shoulder (10/14/2022); and Colonoscopy (N/A, 11/7/2023).    Medications:   Sukumar has a current medication list which includes the following prescription(s): atorvastatin, b-complex with vitamin c, celecoxib, cyanocobalamin (vitamin b-12), ergocalciferol (vitamin d2), levothyroxine, losartan, methocarbamol, methylprednisolone, sildenafil, testosterone, and vitamin e acetate.    Allergies:   Review of patient's allergies indicates:   Allergen Reactions    Codeine Rash    Penicillins Rash        Objective      Observation: Patient is a 53 y.o. male alert and oriented    Posture: Right shoulder depressed and downwardly rotated    Cervical Range of Motion:    Degrees(%) Pain   Flexion 80 -     Extension 50 CT junction     Right Rotation 50 Pinching R     Left Rotation 75 Pulling R     Right Side Bending 50 -   Left Side Bending 50  "-   C0-C1 Flex 100 -   C0-C1 Ext 100 -   C0-C1 Sidebending 100 -   C1-2 Rotation 80 -   C1-3 Rotation 80 -      Shoulder Range of Motion:   Shoulder Left Right   Flexion 180 80   Abduction 180 70   ER 50 10   IR nt nt     Upper Extremity Strength  (R) UE  (L) UE    Shoulder flexion: 2/5 Shoulder flexion: 5/5   Shoulder Abduction: 3-/5 Shoulder abduction: 5/5   Shoulder ER 3-/5 Shoulder ER 5/5   Shoulder IR 5/5 Shoulder IR 5/5   Elbow flexion: 5/5 Elbow flexion: 5/5   Elbow extension: 5/5 Elbow extension: 5/5   Wrist flexion: 5/5 Wrist flexion: 5/5   Wrist extension: 5/5 Wrist extension: 5/5    5/5 : 5/5   Lower Trap 4-/5 Lower Trap 4-/5   Middle Trap 4-/5 Middle Trap 4-/5       Special Tests:  Distraction relief   Spurlings -   Vertebral Artery -   Conor -   Lateral Flexion Alar Ligament -   Transverse Ligament -   Shoulder Abduction Test -   Quadrant Testing -     Cervical joint mobility: Limited CT junction mobility       Reflexes:  -Bicep (C5): 2+  -Brachioradialis (C6): 2+  -Tricep (C7): 2+    Thoracic mobility: Limited CT extension    Palpation: Tender 1st rib and CT junction on the R at C7-T1      Sensation: Intact    Flexibility: nt    Neural tension:   -ULTT Median: -  -ULTT Ulnar: -  -ULTT Radial: -   Intake Outcome Measure for FOTO Neck Survey    Therapist reviewed FOTO scores for Sukumar Beal on 11/16/2023.   FOTO documents entered into [employer] - see Media section.    Intake Score: 45%       Treatment     Total Treatment time (time-based codes) separate from Evaluation: 18 minutes     Sukumar received the treatments listed below:        manual therapy techniques: Joint mobilizations were applied to the: cervical spine for 8 minutes, including:  Cervical distraction  C5-C6 opening on the R  Prone gapping    neuromuscular re-education activities to improve: Coordination, Proprioception, and Posture for 10 minutes. The following activities were included:  Open books 15x B   DNF 10x 3"  Upper trap " level 2 10x        Patient Education and Home Exercises     Education provided:   - DNF endurance, thoracic and CT junction mobility     Written Home Exercises Provided: yes. Exercises were reviewed and Sukumar was able to demonstrate them prior to the end of the session.  Sukumar demonstrated good understanding of the education provided. See EMR under Patient Instructions for exercises provided during therapy sessions.    Assessment     Sukumar is a 53 y.o. male referred to outpatient Physical Therapy with a medical diagnosis of cervical spondylolysis. Patient presents with limited neck and shoulder ROM, strength deficits, pain with ADL and exercise class, and adverse neural tension. Sukumar presented with no cuff resistance and active flexion lost against gravity. Will speak to MD about possible further imaging and progressing CT junction/1st rib mobility    Patient prognosis is Good.   Patient will benefit from skilled outpatient Physical Therapy to address the deficits stated above and in the chart below, provide patient /family education, and to maximize patientt's level of independence.     Plan of care discussed with patient: Yes  Patient's spiritual, cultural and educational needs considered and patient is agreeable to the plan of care and goals as stated below:     Anticipated Barriers for therapy: work schedule    Medical Necessity is demonstrated by the following  History  Co-morbidities and personal factors that may impact the plan of care [x] LOW: no personal factors / co-morbidities  [] MODERATE: 1-2 personal factors / co-morbidities  [] HIGH: 3+ personal factors / co-morbidities    Moderate / High Support Documentation:   Co-morbidities affecting plan of care:     Personal Factors:   no deficits     Examination  Body Structures and Functions, activity limitations and participation restrictions that may impact the plan of care [x] LOW: addressing 1-2 elements  [] MODERATE: 3+ elements  [] HIGH: 4+ elements (please  support below)    Moderate / High Support Documentation:      Clinical Presentation [x] LOW: stable  [] MODERATE: Evolving  [] HIGH: Unstable     Decision Making/ Complexity Score: Low         GOALS: Short Term Goals: 4 weeks  1.Report decreased neck pain  <   / =  3  /10  to increase tolerance for work  2. Increase cervical ROM by 5-10 degrees in order to perform ADLs with decreased difficulty.  3. Increase strength in B shoulders/scapular stabilizers by 1/3 MMT grade to increase tolerance for ADL and work activities.  4. Pt to tolerate HEP to improve ROM and independence with ADL's    Long Term Goals: 8 weeks  1.Report decreased neck pain  <   / =  0  /10  to increase tolerance for return to exercise  2. Increase UE/neck flexibility in order to improve posture.    3.Increased strength in B shoulders/scapular stabilizers to >/= 4/5 MMT grade to increase tolerance for ADL and work activities.  4.  Pt will report at goals level on FOTO neck score for neck pain disability to demonstrate decrease in disability and improvement in neck pain.   Plan     Plan of care Certification: 11/16/2023 to 3/16/2024.    Outpatient Physical Therapy 2 times weekly for 10 weeks to include the following interventions: Cervical/Lumbar Traction, Manual Therapy, Moist Heat/ Ice, Neuromuscular Re-ed, Patient Education, Self Care, Therapeutic Activities, and Therapeutic Exercise.     Reji Szymanski, PT, DPT, OCS, FAAOMPT

## 2023-11-16 NOTE — PLAN OF CARE
[facility]   Physical Therapy Initial Evaluation      Name: Sukumar Beal  [facility] Number: [id number]    Therapy Diagnosis:   Encounter Diagnoses   Name Primary?    Shoulder weakness     Acute pain of right shoulder     Spondylosis of cervical region without myelopathy or radiculopathy     Neck pain Yes        Physician: Marylou Mueller, NP    Physician Orders: PT Eval and Treat   Medical Diagnosis from Referral:   Diagnosis   R29.898 (ICD-10-CM) - Shoulder weakness   M25.511 (ICD-10-CM) - Acute pain of right shoulder   M47.812 (ICD-10-CM) - Spondylosis of cervical region without myelopathy or radiculopathy     Evaluation Date: 11/16/2023  Authorization Period Expiration: 11/7/2024  Plan of Care Expiration: 3/16/2024  Progress Note Due: 2/16/2024  Visit # / Visits authorized: 1/ 1   FOTO: 1/1    Precautions: Standard     Time In: 1300  Time Out: 1400  Total Appointment Time (timed & untimed codes): 60 minutes    Subjective     Date of onset: 3 weeks  History of current condition - Sukumar reports: Patient presents to therapy with neck pain that began 3 weeks ago. He went to a chiropractor when it started and notes it got worse after first treatment. He reports he can no longer lift his arm and is concerned. He continues to exercise with boot camp at his office. He works in sales for furniture. Prior medical history includes R RCR which he completed rehab and was doing very well. No numbness and tingling down the arm today.   Falls: None    Imaging: xray:     Impression:     FINDINGS:  There is no acute fracture or subluxation of the cervical spine.  Vertebral body heights are maintained.  There is mild multilevel disc height loss.  There is a mild right C4-C5 neural foraminal narrowing secondary to facet arthropathy.  There is mild grade 1 fixed anterolisthesis of C7 on T1, measuring 2 mm.  Otherwise alignment is unremarkable.  There is no dynamic instability.  There are multilevel  degenerative changes.  No acute fracture or subluxation.     Degenerative changes as above.    Prior Therapy:  R RCR  Social History: She lives with their family  Occupation: Furniture sales   Prior Level of Function: Independent  Current Level of Function: Ind unable to lift arm     Pain:  Current 6/10, worst 8/10, best 2/10   Location: right neck  and shoulder   Description: Aching  Aggravating Factors: Lifting  Easing Factors: rest    Patients goals: return to exercise and using RUE     Medical History:   Past Medical History:   Diagnosis Date    Gout 7/2014    High cholesterol     Hypertension     Hypothyroid     Low testosterone     Staph aureus infection age 13    R leg       Surgical History:   Sukumar Beal  has a past surgical history that includes Incision and drainage of tibia (Right, 1983); Fracture surgery (Left, 2004); Sinus surgery; Colonoscopy (N/A, 9/21/2020); Arthroscopic repair of rotator cuff of shoulder (Right, 10/14/2022); arthroscopy,shoulder,with biceps tenodesis (Right, 10/14/2022); Decompression of subacromial space (Right, 10/14/2022); Arthroscopic debridement of shoulder (10/14/2022); and Colonoscopy (N/A, 11/7/2023).    Medications:   Sukumar has a current medication list which includes the following prescription(s): atorvastatin, b-complex with vitamin c, celecoxib, cyanocobalamin (vitamin b-12), ergocalciferol (vitamin d2), levothyroxine, losartan, methocarbamol, methylprednisolone, sildenafil, testosterone, and vitamin e acetate.    Allergies:   Review of patient's allergies indicates:   Allergen Reactions    Codeine Rash    Penicillins Rash        Objective      Observation: Patient is a 53 y.o. male alert and oriented    Posture: Right shoulder depressed and downwardly rotated    Cervical Range of Motion:    Degrees(%) Pain   Flexion 80 -     Extension 50 CT junction     Right Rotation 50 Pinching R     Left Rotation 75 Pulling R     Right Side Bending 50 -   Left Side Bending 50  "-   C0-C1 Flex 100 -   C0-C1 Ext 100 -   C0-C1 Sidebending 100 -   C1-2 Rotation 80 -   C1-3 Rotation 80 -      Shoulder Range of Motion:   Shoulder Left Right   Flexion 180 80   Abduction 180 70   ER 50 10   IR nt nt     Upper Extremity Strength  (R) UE  (L) UE    Shoulder flexion: 2/5 Shoulder flexion: 5/5   Shoulder Abduction: 3-/5 Shoulder abduction: 5/5   Shoulder ER 3-/5 Shoulder ER 5/5   Shoulder IR 5/5 Shoulder IR 5/5   Elbow flexion: 5/5 Elbow flexion: 5/5   Elbow extension: 5/5 Elbow extension: 5/5   Wrist flexion: 5/5 Wrist flexion: 5/5   Wrist extension: 5/5 Wrist extension: 5/5    5/5 : 5/5   Lower Trap 4-/5 Lower Trap 4-/5   Middle Trap 4-/5 Middle Trap 4-/5       Special Tests:  Distraction relief   Spurlings -   Vertebral Artery -   Conor -   Lateral Flexion Alar Ligament -   Transverse Ligament -   Shoulder Abduction Test -   Quadrant Testing -     Cervical joint mobility: Limited CT junction mobility       Reflexes:  -Bicep (C5): 2+  -Brachioradialis (C6): 2+  -Tricep (C7): 2+    Thoracic mobility: Limited CT extension    Palpation: Tender 1st rib and CT junction on the R at C7-T1      Sensation: Intact    Flexibility: nt    Neural tension:   -ULTT Median: -  -ULTT Ulnar: -  -ULTT Radial: -   Intake Outcome Measure for FOTO Neck Survey    Therapist reviewed FOTO scores for Sukumar Beal on 11/16/2023.   FOTO documents entered into [email or web address] - see Media section.    Intake Score: 45%       Treatment     Total Treatment time (time-based codes) separate from Evaluation: 18 minutes     Sukumar received the treatments listed below:        manual therapy techniques: Joint mobilizations were applied to the: cervical spine for 8 minutes, including:  Cervical distraction  C5-C6 opening on the R  Prone gapping    neuromuscular re-education activities to improve: Coordination, Proprioception, and Posture for 10 minutes. The following activities were included:  Open books 15x B   DNF 10x 3"  Upper trap " level 2 10x        Patient Education and Home Exercises     Education provided:   - DNF endurance, thoracic and CT junction mobility     Written Home Exercises Provided: yes. Exercises were reviewed and Sukumar was able to demonstrate them prior to the end of the session.  Sukumar demonstrated good understanding of the education provided. See EMR under Patient Instructions for exercises provided during therapy sessions.    Assessment     Sukumar is a 53 y.o. male referred to outpatient Physical Therapy with a medical diagnosis of cervical spondylolysis. Patient presents with limited neck and shoulder ROM, strength deficits, pain with ADL and exercise class, and adverse neural tension. Sukumar presented with no cuff resistance and active flexion lost against gravity. Will speak to MD about possible further imaging and progressing CT junction/1st rib mobility    Patient prognosis is Good.   Patient will benefit from skilled outpatient Physical Therapy to address the deficits stated above and in the chart below, provide patient /family education, and to maximize patientt's level of independence.     Plan of care discussed with patient: Yes  Patient's spiritual, cultural and educational needs considered and patient is agreeable to the plan of care and goals as stated below:     Anticipated Barriers for therapy: work schedule    Medical Necessity is demonstrated by the following  History  Co-morbidities and personal factors that may impact the plan of care [x] LOW: no personal factors / co-morbidities  [] MODERATE: 1-2 personal factors / co-morbidities  [] HIGH: 3+ personal factors / co-morbidities    Moderate / High Support Documentation:   Co-morbidities affecting plan of care:     Personal Factors:   no deficits     Examination  Body Structures and Functions, activity limitations and participation restrictions that may impact the plan of care [x] LOW: addressing 1-2 elements  [] MODERATE: 3+ elements  [] HIGH: 4+ elements (please  support below)    Moderate / High Support Documentation:      Clinical Presentation [x] LOW: stable  [] MODERATE: Evolving  [] HIGH: Unstable     Decision Making/ Complexity Score: Low         GOALS: Short Term Goals: 4 weeks  1.Report decreased neck pain  <   / =  3  /10  to increase tolerance for work  2. Increase cervical ROM by 5-10 degrees in order to perform ADLs with decreased difficulty.  3. Increase strength in B shoulders/scapular stabilizers by 1/3 MMT grade to increase tolerance for ADL and work activities.  4. Pt to tolerate HEP to improve ROM and independence with ADL's    Long Term Goals: 8 weeks  1.Report decreased neck pain  <   / =  0  /10  to increase tolerance for return to exercise  2. Increase UE/neck flexibility in order to improve posture.    3.Increased strength in B shoulders/scapular stabilizers to >/= 4/5 MMT grade to increase tolerance for ADL and work activities.  4.  Pt will report at goals level on FOTO neck score for neck pain disability to demonstrate decrease in disability and improvement in neck pain.   Plan     Plan of care Certification: 11/16/2023 to 3/16/2024.    Outpatient Physical Therapy 2 times weekly for 10 weeks to include the following interventions: Cervical/Lumbar Traction, Manual Therapy, Moist Heat/ Ice, Neuromuscular Re-ed, Patient Education, Self Care, Therapeutic Activities, and Therapeutic Exercise.     Reji Szymanski, PT, DPT, OCS, FAAOMPT

## 2023-11-17 ENCOUNTER — PATIENT MESSAGE (OUTPATIENT)
Dept: SPORTS MEDICINE | Facility: CLINIC | Age: 53
End: 2023-11-17
Payer: COMMERCIAL

## 2023-11-21 ENCOUNTER — TELEPHONE (OUTPATIENT)
Dept: SPORTS MEDICINE | Facility: CLINIC | Age: 53
End: 2023-11-21
Payer: COMMERCIAL

## 2023-11-21 ENCOUNTER — OFFICE VISIT (OUTPATIENT)
Dept: SPORTS MEDICINE | Facility: CLINIC | Age: 53
End: 2023-11-21
Payer: COMMERCIAL

## 2023-11-21 VITALS
HEART RATE: 90 BPM | SYSTOLIC BLOOD PRESSURE: 132 MMHG | DIASTOLIC BLOOD PRESSURE: 90 MMHG | WEIGHT: 229.25 LBS | BODY MASS INDEX: 29.42 KG/M2 | HEIGHT: 74 IN

## 2023-11-21 DIAGNOSIS — M54.2 CERVICALGIA: ICD-10-CM

## 2023-11-21 DIAGNOSIS — R29.898 SHOULDER WEAKNESS: Primary | ICD-10-CM

## 2023-11-21 DIAGNOSIS — Z98.890 S/P RIGHT ROTATOR CUFF REPAIR: ICD-10-CM

## 2023-11-21 PROCEDURE — 3066F NEPHROPATHY DOC TX: CPT | Mod: CPTII,S$GLB,, | Performed by: ORTHOPAEDIC SURGERY

## 2023-11-21 PROCEDURE — 99999 PR PBB SHADOW E&M-EST. PATIENT-LVL III: CPT | Mod: PBBFAC,,, | Performed by: ORTHOPAEDIC SURGERY

## 2023-11-21 PROCEDURE — 4010F PR ACE/ARB THEARPY RXD/TAKEN: ICD-10-PCS | Mod: CPTII,S$GLB,, | Performed by: ORTHOPAEDIC SURGERY

## 2023-11-21 PROCEDURE — 3061F NEG MICROALBUMINURIA REV: CPT | Mod: CPTII,S$GLB,, | Performed by: ORTHOPAEDIC SURGERY

## 2023-11-21 PROCEDURE — 99214 PR OFFICE/OUTPT VISIT, EST, LEVL IV, 30-39 MIN: ICD-10-PCS | Mod: S$GLB,,, | Performed by: ORTHOPAEDIC SURGERY

## 2023-11-21 PROCEDURE — 3075F SYST BP GE 130 - 139MM HG: CPT | Mod: CPTII,S$GLB,, | Performed by: ORTHOPAEDIC SURGERY

## 2023-11-21 PROCEDURE — 3061F PR NEG MICROALBUMINURIA RESULT DOCUMENTED/REVIEW: ICD-10-PCS | Mod: CPTII,S$GLB,, | Performed by: ORTHOPAEDIC SURGERY

## 2023-11-21 PROCEDURE — 3066F PR DOCUMENTATION OF TREATMENT FOR NEPHROPATHY: ICD-10-PCS | Mod: CPTII,S$GLB,, | Performed by: ORTHOPAEDIC SURGERY

## 2023-11-21 PROCEDURE — 3008F BODY MASS INDEX DOCD: CPT | Mod: CPTII,S$GLB,, | Performed by: ORTHOPAEDIC SURGERY

## 2023-11-21 PROCEDURE — 4010F ACE/ARB THERAPY RXD/TAKEN: CPT | Mod: CPTII,S$GLB,, | Performed by: ORTHOPAEDIC SURGERY

## 2023-11-21 PROCEDURE — 99999 PR PBB SHADOW E&M-EST. PATIENT-LVL III: ICD-10-PCS | Mod: PBBFAC,,, | Performed by: ORTHOPAEDIC SURGERY

## 2023-11-21 PROCEDURE — 99214 OFFICE O/P EST MOD 30 MIN: CPT | Mod: S$GLB,,, | Performed by: ORTHOPAEDIC SURGERY

## 2023-11-21 PROCEDURE — 3080F DIAST BP >= 90 MM HG: CPT | Mod: CPTII,S$GLB,, | Performed by: ORTHOPAEDIC SURGERY

## 2023-11-21 PROCEDURE — 3075F PR MOST RECENT SYSTOLIC BLOOD PRESS GE 130-139MM HG: ICD-10-PCS | Mod: CPTII,S$GLB,, | Performed by: ORTHOPAEDIC SURGERY

## 2023-11-21 PROCEDURE — 3080F PR MOST RECENT DIASTOLIC BLOOD PRESSURE >= 90 MM HG: ICD-10-PCS | Mod: CPTII,S$GLB,, | Performed by: ORTHOPAEDIC SURGERY

## 2023-11-21 PROCEDURE — 3008F PR BODY MASS INDEX (BMI) DOCUMENTED: ICD-10-PCS | Mod: CPTII,S$GLB,, | Performed by: ORTHOPAEDIC SURGERY

## 2023-11-21 NOTE — PROGRESS NOTES
DATE OF PROCEDURE: 10/14/2022   OPERATION:   Right  1. Shoulder arthroscopic rotator cuff repair, transosseous equivalent double row (CPT 46635)  2. Shoulder open subpectoral biceps tenodesis (CPT 63781)  3. Shoulder arthroscopic extensive debridement (anterior, posterior glenohumeral joint, subacromial space) (CPT 15722)    4. Shoulder arthroscopic subacromial decompression     Sukumar Beal, a 53 y.o. male who returns for his right shoulder.  He was doing quite well really without any issues or problems until about a month ago.  He was driving and felt acute onset severe pain in his neck when he was turning around to reach for something in his car.  The next day he developed some subjective weakness and difficulty with his shoulder and arm in addition to the neck pain.  He was seen by a chiropractor and he feels that the treatment he received their exacerbated things.  He ultimately was seen in the spine/PMR clinic for possible cervical radiculopathy.  I received a message from the spine provider and then reached out to the patient to discuss.  The Medrol Dosepak was of some relief.  His neck is feeling better but not normal.  Interestingly, he denies any numbness or tingling down the arm.  He does have some generalized heaviness and difficulty of significance with shoulder range of motion.  He denies any pain in the shoulder.  He has not had any re-injury mechanism to the shoulder.  No unusual activity.  Again had full overhead range of motion, functional strength and was without symptoms in his shoulder until about a month ago.  He is right-hand dominant.      Recent treatment has included formal physical therapy along with the oral medication.  Not making progress in formal PT.    PAST MEDICAL HISTORY:   Past Medical History:   Diagnosis Date    Gout 7/2014    High cholesterol     Hypertension     Hypothyroid     Low testosterone     Staph aureus infection age 13    R leg     PAST SURGICAL HISTORY:  Past  Surgical History:   Procedure Laterality Date    ARTHROSCOPIC DEBRIDEMENT OF SHOULDER  10/14/2022    Procedure: DEBRIDEMENT, SHOULDER, ARTHROSCOPIC;  Surgeon: LEONIDAS Carr MD;  Location: [facility] OR;  Service: Orthopedics;;    ARTHROSCOPIC REPAIR OF ROTATOR CUFF OF SHOULDER Right 10/14/2022    Procedure: REPAIR, ROTATOR CUFF, ARTHROSCOPIC - [facility];  Surgeon: LEONIDAS Carr MD;  Location: [facility] OR;  Service: Orthopedics;  Laterality: Right;  Regional w/o Catheter, Interscalene    ARTHROSCOPY,SHOULDER,WITH BICEPS TENODESIS Right 10/14/2022    Procedure: ARTHROSCOPY,SHOULDER,WITH BICEPS TENODESIS;  Surgeon: LEONIDAS Carr MD;  Location: [facility] OR;  Service: Orthopedics;  Laterality: Right;    COLONOSCOPY N/A 2020    Procedure: COLONOSCOPY;  Surgeon: VANI Newell MD;  Location: [facility] (4TH FLR);  Service: Endoscopy;  Laterality: N/A;  covid test 2nd floor surgery clinic     COLONOSCOPY N/A 2023    Procedure: COLONOSCOPY;  Surgeon: VANI Newell MD;  Location: [facility] (4TH FLR);  Service: Endoscopy;  Laterality: N/A;  - referred by Dr. Katherine reynaga/ [facility]  7 day hold/ Prep instructions to the portal. AS  10/31-precall complee-pt states he has not taken WLM in several months-MS    DECOMPRESSION OF SUBACROMIAL SPACE Right 10/14/2022    Procedure: DECOMPRESSION, SUBACROMIAL SPACE;  Surgeon: LEONIDAS Carr MD;  Location: [facility] OR;  Service: Orthopedics;  Laterality: Right;    FRACTURE SURGERY Left     wrist    Incision and drainage of tibia Right     SINUS SURGERY      x2     FAMILY HISTORY:  Family History   Problem Relation Age of Onset    Heart disease Father 73            Hypertension Sister     Hyperlipidemia Sister     Hypertension Brother     Hyperlipidemia Brother     Lupus Sister     Hypertension Mother     Cancer Maternal Grandfather     Cancer Maternal Aunt         lung - smoker    Melanoma Neg Hx      MEDICATIONS:    Current Outpatient Medications:      atorvastatin (LIPITOR) 20 MG tablet, Take 1 tablet (20 mg total) by mouth once daily., Disp: 90 tablet, Rfl: 3    B-complex with vitamin C (Z-BEC OR EQUIV) tablet, Take 1 tablet by mouth once daily., Disp: , Rfl:     celecoxib (CELEBREX) 200 MG capsule, Take 1 capsule (200 mg total) by mouth 2 (two) times daily., Disp: 40 capsule, Rfl: 1    CYANOCOBALAMIN, VITAMIN B-12, ORAL, Take 1 tablet by mouth once daily., Disp: , Rfl:     ergocalciferol, vitamin D2, (VITAMIN D ORAL), Take 2 capsules by mouth once daily., Disp: , Rfl:     levothyroxine (SYNTHROID) 112 MCG tablet, Take 1 tablet (112 mcg total) by mouth once daily., Disp: 90 tablet, Rfl: 1    losartan (COZAAR) 50 MG tablet, Take 1 tablet (50 mg total) by mouth once daily., Disp: 90 tablet, Rfl: 3    methocarbamoL (ROBAXIN) 500 MG Tab, Take 1 tablet (500 mg total) by mouth 3 (three) times daily as needed (muscle pain)., Disp: 90 tablet, Rfl: 0    methylPREDNISolone (MEDROL DOSEPACK) 4 mg tablet, use as directed, Disp: 21 each, Rfl: 0    sildenafiL (VIAGRA) 100 MG tablet, Take 1 tablet (100 mg total) by mouth daily as needed for Erectile Dysfunction (take on an empty stomach 30-60 minutes before)., Disp: 10 tablet, Rfl: 12    testosterone (ANDROGEL) 1.62 % (40.5 mg/2.5 gram) GlPk, Place 5 g onto the skin once daily. 2 packets to shoulders daily., Disp: 150 g, Rfl: 5    vitamin E acetate (VITAMIN E ORAL), Take 1 tablet by mouth once daily., Disp: , Rfl:     ALLERGIES:  Review of patient's allergies indicates:   Allergen Reactions    Codeine Rash    Penicillins Rash     REVIEW OF SYSTEMS:  Constitution: Negative. Negative for chills, fever and night sweats.    Hematologic/Lymphatic: Negative for bleeding problem. Does not bruise/bleed easily.   Skin: Negative for dry skin, itching and rash.   Musculoskeletal: Negative for falls. Neg for right shoulder pain.  He does have fairly significant right shoulder weakness.     PHYSICAL EXAMINATION:  Vitals:  BP (!) 132/90    "Pulse 90   Ht 6' 2" (1.88 m)   Wt 104 kg (229 lb 4.5 oz)   BMI 29.44 kg/m²    General: Well-developed well-nourished 53 y.o. malein no acute distress   Cardiovascular: Regular rhythm by palpation of distal pulse, normal color and temperature, no concerning varicosities on symptomatic side   Lungs: No labored breathing or wheezing appreciated   Neuro: Alert and oriented ×3   Psychiatric: well oriented to person, place and time, demonstrates normal mood and affect   Skin: No rashes, lesions or ulcers, normal temperature, turgor, and texture on uninvolved extremity    Ortho/SPM Exam  Exam of the right shoulder shows well-healed incisions.  Some muscle atrophy generalized on the right compared to left.  Nontender over Codman's point, lateral subdeltoid recess and AC joint.  Demonstrates significant shoulder dysfunction with regards to range of motion.  Shoulder hiking with active forward elevation only to about 10-15 degrees.  Limited active external rotation as well.  Deltoid fires over the posterior mid and anterior aspects but is limited with regards to demonstrated strength today.2/5 SS. +ER lag. 2+/5 IS.  Negative belly press test.  Negative bear hug.  Negative lift-off.  Mildly reduced cervical neck range of motion.  Negative Spurling's maneuver.  5/5 biceps, triceps, wrist extensor, wrist flexor, intrinsics.  Negative Jerry's.  Negative pathologic reflexes.  Mild right-sided trapezius tenderness and midline neck tenderness.    IMAGING:  Prior right shoulder and cervical spine radiographs were reviewed and show evidence of prior shoulder surgery.  No proximal humeral migration or subluxation of the humeral head.  Multilevel degenerative changes of the cervical spine with right-sided neural foraminal narrowing at the C4-5 level.    ASSESSMENT:      ICD-10-CM ICD-9-CM   1. Shoulder weakness  R29.898 719.61   2. Cervicalgia  M54.2 723.1   3. S/P right rotator cuff repair  Z98.890 V45.89     PLAN:     Sukumar " returns for nonpainful significant weakness and dysfunction of his right shoulder of 1 month duration.  No trauma.  He does have a history of significant atraumatic onset of neck pain which is associated with this presentation.  Given his dramatic weakness and dysfunction, I would like to get an MRI of the right shoulder to assess the rotator cuff repair site.  Prior repair involved primarily the supraspinatus.  If he had a re-tear of his cuff I would expect some pain at some point which he has not had.  His dysfunction however is concerning for involvement of the supraspinatus and infraspinatus.  Additionally I would like to get an MRI of his cervical spine.  He has quite significant dysfunction and weakness and given the circumstances we need to rule out a significant disc herniation which may be contributory.  I will see him back after the studies to discuss results and treatment options.  Continue current treatment.  I will notify the spine/PMR team of the plan for additional workup.    Procedures

## 2023-11-21 NOTE — TELEPHONE ENCOUNTER
Spoke c pt. Informed him of c-spine & R shoulder MRI scheduled 11/25/23. Confirmed appt date, time, location. Pt will call c additional questions/concerns in interim. Pt expressed understanding & was thankful.

## 2023-11-25 ENCOUNTER — HOSPITAL ENCOUNTER (OUTPATIENT)
Dept: RADIOLOGY | Facility: HOSPITAL | Age: 53
Discharge: HOME | End: 2023-11-25
Attending: ORTHOPAEDIC SURGERY
Payer: COMMERCIAL

## 2023-11-25 DIAGNOSIS — M54.2 CERVICALGIA: ICD-10-CM

## 2023-11-25 DIAGNOSIS — Z98.890 S/P RIGHT ROTATOR CUFF REPAIR: ICD-10-CM

## 2023-11-25 PROCEDURE — 72141 MRI NECK SPINE W/O DYE: CPT | Mod: 26,,, | Performed by: RADIOLOGY

## 2023-11-25 PROCEDURE — 72141 MRI CERVICAL SPINE WITHOUT CONTRAST: ICD-10-PCS | Mod: 26,,, | Performed by: RADIOLOGY

## 2023-11-25 PROCEDURE — 73221 MRI SHOULDER WITHOUT CONTRAST RIGHT: ICD-10-PCS | Mod: 26,RT,, | Performed by: RADIOLOGY

## 2023-11-25 PROCEDURE — 73221 MRI JOINT UPR EXTREM W/O DYE: CPT | Mod: 26,RT,, | Performed by: RADIOLOGY

## 2023-11-25 PROCEDURE — 73221 MRI JOINT UPR EXTREM W/O DYE: CPT | Mod: TC,RT

## 2023-11-25 PROCEDURE — 72141 MRI NECK SPINE W/O DYE: CPT | Mod: TC

## 2023-11-27 ENCOUNTER — TELEPHONE (OUTPATIENT)
Dept: SPORTS MEDICINE | Facility: CLINIC | Age: 53
End: 2023-11-27
Payer: COMMERCIAL

## 2023-11-27 DIAGNOSIS — M54.12 CERVICAL RADICULOPATHY: ICD-10-CM

## 2023-11-27 DIAGNOSIS — M54.2 CERVICALGIA: Primary | ICD-10-CM

## 2023-11-27 NOTE — TELEPHONE ENCOUNTER
I received a text message from the radiologist on Saturday regarding the patient's MRI imaging.  I then contacted the patient via telephone and also contacted my spine colleague as well to discuss results.  We are going to work on getting him in to see the spine team as soon as possible this week.  The patient has had prominent right shoulder specific weakness now for a month, it sounds like it has been progressive.  He has denied any significant shoulder pain.  He has had some neck pain which initially was quite bad but has improved also to some extent.  MRI results documented as below.  I also discussed the case with him via telephone this morning and certainly the concern is for significant motor weakness and dysfunction associated with cervical radiculopathy.  The spine surgery referral is in place.    MRI right shoulder:  1. Structurally intact rotator cuff repair.  No MR imaging findings to suggest a retear.  2. Status post labral debridement, biceps tenodesis and subacromial decompression.  3. Mild glenohumeral osteoarthritis with new high-grade partial-thickness chondral loss overlying the superior humeral head.  4. Moderate acromioclavicular osteoarthritis.    MRI C-spine:  1. Advanced cervical spondylosis as detailed above.  Mild-to-moderate spinal canal stenosis from C4-C5 through C6-C7.  Moderate to severe neural foraminal narrowing from C3-C4 through C6-C7.  2. Right degenerative facet edema at C4-C5.

## 2023-11-29 ENCOUNTER — OFFICE VISIT (OUTPATIENT)
Dept: ORTHOPEDICS | Facility: CLINIC | Age: 53
End: 2023-11-29
Payer: COMMERCIAL

## 2023-11-29 VITALS — HEIGHT: 74 IN | BODY MASS INDEX: 30.83 KG/M2 | WEIGHT: 240.19 LBS

## 2023-11-29 DIAGNOSIS — M54.12 CERVICAL RADICULOPATHY: Primary | ICD-10-CM

## 2023-11-29 DIAGNOSIS — M54.2 CERVICALGIA: ICD-10-CM

## 2023-11-29 PROCEDURE — 3061F NEG MICROALBUMINURIA REV: CPT | Mod: CPTII,S$GLB,, | Performed by: ORTHOPAEDIC SURGERY

## 2023-11-29 PROCEDURE — 99204 PR OFFICE/OUTPT VISIT, NEW, LEVL IV, 45-59 MIN: ICD-10-PCS | Mod: S$GLB,,, | Performed by: ORTHOPAEDIC SURGERY

## 2023-11-29 PROCEDURE — 99999 PR PBB SHADOW E&M-EST. PATIENT-LVL IV: CPT | Mod: PBBFAC,,, | Performed by: ORTHOPAEDIC SURGERY

## 2023-11-29 PROCEDURE — 4010F ACE/ARB THERAPY RXD/TAKEN: CPT | Mod: CPTII,S$GLB,, | Performed by: ORTHOPAEDIC SURGERY

## 2023-11-29 PROCEDURE — 3066F NEPHROPATHY DOC TX: CPT | Mod: CPTII,S$GLB,, | Performed by: ORTHOPAEDIC SURGERY

## 2023-11-29 PROCEDURE — 4010F PR ACE/ARB THEARPY RXD/TAKEN: ICD-10-PCS | Mod: CPTII,S$GLB,, | Performed by: ORTHOPAEDIC SURGERY

## 2023-11-29 PROCEDURE — 99204 OFFICE O/P NEW MOD 45 MIN: CPT | Mod: S$GLB,,, | Performed by: ORTHOPAEDIC SURGERY

## 2023-11-29 PROCEDURE — 3066F PR DOCUMENTATION OF TREATMENT FOR NEPHROPATHY: ICD-10-PCS | Mod: CPTII,S$GLB,, | Performed by: ORTHOPAEDIC SURGERY

## 2023-11-29 PROCEDURE — 3008F PR BODY MASS INDEX (BMI) DOCUMENTED: ICD-10-PCS | Mod: CPTII,S$GLB,, | Performed by: ORTHOPAEDIC SURGERY

## 2023-11-29 PROCEDURE — 1159F PR MEDICATION LIST DOCUMENTED IN MEDICAL RECORD: ICD-10-PCS | Mod: CPTII,S$GLB,, | Performed by: ORTHOPAEDIC SURGERY

## 2023-11-29 PROCEDURE — 3008F BODY MASS INDEX DOCD: CPT | Mod: CPTII,S$GLB,, | Performed by: ORTHOPAEDIC SURGERY

## 2023-11-29 PROCEDURE — 1159F MED LIST DOCD IN RCRD: CPT | Mod: CPTII,S$GLB,, | Performed by: ORTHOPAEDIC SURGERY

## 2023-11-29 PROCEDURE — 3061F PR NEG MICROALBUMINURIA RESULT DOCUMENTED/REVIEW: ICD-10-PCS | Mod: CPTII,S$GLB,, | Performed by: ORTHOPAEDIC SURGERY

## 2023-11-29 PROCEDURE — 99999 PR PBB SHADOW E&M-EST. PATIENT-LVL IV: ICD-10-PCS | Mod: PBBFAC,,, | Performed by: ORTHOPAEDIC SURGERY

## 2023-11-29 NOTE — PROGRESS NOTES
DATE: 11/29/2023  PATIENT: Sukumar Beal    Attending Physician: Phuc Fofana M.D.    CHIEF COMPLAINT: neck pain and right shoulder pain    HISTORY:  Sukumar Beal is a 53 y.o. male with history of right rotator cuff repair 1 year ago here for initial evaluation of neck and right shoulder pain (Neck - 0, Arm - 0). The pain began after he twisted his neck in his car.  The pain has been present for 6 weeks, but largely resolved over the past couple of days. The patient describes the pain as sharp with aching components.  It also radiates to the right shoulder.  The pain is worse with movement and improved by rest and medications. There is no associated numbness and tingling. There is subjective weakness in the right deltoid. Prior treatments have included Tylenol, NSAIDs, muscle relaxants, and a Medrol Dosepak, but no physical therapy, injections, or surgery.  Today he states that his pain has significantly improved.  His only complaint is decreased shoulder range of motion that was not present until 6 weeks ago.    The Patient denies myelopathic symptoms such as handwriting changes or difficulty with buttons/coins/keys. Denies perineal paresthesias, bowel/bladder dysfunction.    PAST MEDICAL/SURGICAL HISTORY:  Past Medical History:   Diagnosis Date    Gout 7/2014    High cholesterol     Hypertension     Hypothyroid     Low testosterone     Staph aureus infection age 13    R leg     Past Surgical History:   Procedure Laterality Date    ARTHROSCOPIC DEBRIDEMENT OF SHOULDER  10/14/2022    Procedure: DEBRIDEMENT, SHOULDER, ARTHROSCOPIC;  Surgeon: LEONIDAS Carr MD;  Location: [facility] OR;  Service: Orthopedics;;    ARTHROSCOPIC REPAIR OF ROTATOR CUFF OF SHOULDER Right 10/14/2022    Procedure: REPAIR, ROTATOR CUFF, ARTHROSCOPIC - [facility];  Surgeon: LEONIDAS Carr MD;  Location: [facility] OR;  Service: Orthopedics;  Laterality: Right;  Regional w/o Catheter, Interscalene    ARTHROSCOPY,SHOULDER,WITH BICEPS  TENODESIS Right 10/14/2022    Procedure: ARTHROSCOPY,SHOULDER,WITH BICEPS TENODESIS;  Surgeon: LEONIDAS Carr MD;  Location: [facility] OR;  Service: Orthopedics;  Laterality: Right;    COLONOSCOPY N/A 9/21/2020    Procedure: COLONOSCOPY;  Surgeon: VANI Newell MD;  Location: [facility] ENDO (4TH FLR);  Service: Endoscopy;  Laterality: N/A;  covid test 2nd floor surgery clinic 9/18    COLONOSCOPY N/A 11/7/2023    Procedure: COLONOSCOPY;  Surgeon: VANI Newell MD;  Location: [facility] ENDO (4TH FLR);  Service: Endoscopy;  Laterality: N/A;  8/14- referred by Dr. Katherine reynaga/ [facility]  7 day hold/ Prep instructions to the portal. AS  10/31-precall complee-pt states he has not taken WLM in several months-MS    DECOMPRESSION OF SUBACROMIAL SPACE Right 10/14/2022    Procedure: DECOMPRESSION, SUBACROMIAL SPACE;  Surgeon: LEONIDAS Carr MD;  Location: [facility] OR;  Service: Orthopedics;  Laterality: Right;    FRACTURE SURGERY Left 2004    wrist    Incision and drainage of tibia Right 1983    SINUS SURGERY      x2       Current Medications:   Current Outpatient Medications:     atorvastatin (LIPITOR) 20 MG tablet, Take 1 tablet (20 mg total) by mouth once daily., Disp: 90 tablet, Rfl: 3    B-complex with vitamin C (Z-BEC OR EQUIV) tablet, Take 1 tablet by mouth once daily., Disp: , Rfl:     celecoxib (CELEBREX) 200 MG capsule, Take 1 capsule (200 mg total) by mouth 2 (two) times daily., Disp: 40 capsule, Rfl: 1    CYANOCOBALAMIN, VITAMIN B-12, ORAL, Take 1 tablet by mouth once daily., Disp: , Rfl:     ergocalciferol, vitamin D2, (VITAMIN D ORAL), Take 2 capsules by mouth once daily., Disp: , Rfl:     levothyroxine (SYNTHROID) 112 MCG tablet, Take 1 tablet (112 mcg total) by mouth once daily., Disp: 90 tablet, Rfl: 1    losartan (COZAAR) 50 MG tablet, Take 1 tablet (50 mg total) by mouth once daily., Disp: 90 tablet, Rfl: 3    methocarbamoL (ROBAXIN) 500 MG Tab, Take 1 tablet (500 mg total) by mouth 3 (three) times daily as  needed (muscle pain)., Disp: 90 tablet, Rfl: 0    methylPREDNISolone (MEDROL DOSEPACK) 4 mg tablet, use as directed, Disp: 21 each, Rfl: 0    sildenafiL (VIAGRA) 100 MG tablet, Take 1 tablet (100 mg total) by mouth daily as needed for Erectile Dysfunction (take on an empty stomach 30-60 minutes before)., Disp: 10 tablet, Rfl: 12    testosterone (ANDROGEL) 1.62 % (40.5 mg/2.5 gram) GlPk, Place 5 g onto the skin once daily. 2 packets to shoulders daily., Disp: 150 g, Rfl: 5    vitamin E acetate (VITAMIN E ORAL), Take 1 tablet by mouth once daily., Disp: , Rfl:     Social History:   Social History     Socioeconomic History    Marital status: Single   Tobacco Use    Smoking status: Former     Current packs/day: 0.00     Average packs/day: 0.3 packs/day for 15.0 years (3.8 ttl pk-yrs)     Types: Cigarettes     Start date: 1/3/1998     Quit date: 1/3/2013     Years since quitting: 10.9    Smokeless tobacco: Never   Substance and Sexual Activity    Alcohol use: Yes     Alcohol/week: 5.0 standard drinks of alcohol     Types: 6 Standard drinks or equivalent per week     Comment: weekends    Drug use: No    Sexual activity: Yes     Social Determinants of Health     Financial Resource Strain: Low Risk  (11/29/2023)    Overall Financial Resource Strain (CARDIA)     Difficulty of Paying Living Expenses: Not hard at all   Food Insecurity: No Food Insecurity (11/29/2023)    Hunger Vital Sign     Worried About Running Out of Food in the Last Year: Never true     Ran Out of Food in the Last Year: Never true   Transportation Needs: No Transportation Needs (11/29/2023)    PRAPARE - Transportation     Lack of Transportation (Medical): No     Lack of Transportation (Non-Medical): No   Physical Activity: Sufficiently Active (11/29/2023)    Exercise Vital Sign     Days of Exercise per Week: 3 days     Minutes of Exercise per Session: 60 min   Stress: Stress Concern Present (11/29/2023)    [employer] -  "Occupational Stress Questionnaire     Feeling of Stress : Rather much   Social Connections: Unknown (11/29/2023)    Social Connection and Isolation Panel [NHANES]     Frequency of Communication with Friends and Family: More than three times a week     Frequency of Social Gatherings with Friends and Family: More than three times a week     Active Member of Clubs or Organizations: Yes     Attends Club or Organization Meetings: More than 4 times per year     Marital Status: Never    Housing Stability: Unknown (11/29/2023)    Housing Stability Vital Sign     Unable to Pay for Housing in the Last Year: No     Unstable Housing in the Last Year: No        EXAM:  Ht 6' 2" (1.88 m)   Wt 109 kg (240 lb 3.1 oz)   BMI 30.84 kg/m²     Gait: Normal station and gait, no difficulty with toe or heel walk.   Skin: Cervical skin negative for rashes, lesions, hairy patches and surgical scars.  Range of motion: Cervical range of motion is acceptable. There is no tenderness to palpation.  Spinal Balance: Global saggital and coronal spinal balance acceptable, no significant for scoliosis and kyphosis.  Musculoskeletal: No pain with the range of motion of the bilateral shoulders and elbows. Normal bulk and contour of the bilateral hands.  Neurological: Normal strength and tone in all major motor groups in the bilateral upper and lower extremities. Normal sensation to light touch in the C5-T1 and L2-S1 dermatomes bilaterally.  Deep tendon reflexes symmetric in the bilateral upper and lower extremities.  Negative Inverted Radial Reflex and Castano's bilaterally. Negative Babinski bilaterally.     IMAGING:   Today I personally reviewed AP, Lat and Flex/Ex  upright C-spine films that demonstrate some mild loss of disc height.      MRI of the cervical spine demonstrates a disc/osteophyte complex at C3-4 causing moderate left-sided foraminal stenosis and additional disc/osteophyte complex at C5/6 causing moderate right-sided foraminal " stenosis    Body mass index is 30.84 kg/m².  Hemoglobin A1C   Date Value Ref Range Status   08/31/2022 5.2 4.0 - 5.6 % Final     Comment:     ADA Screening Guidelines:  5.7-6.4%  Consistent with prediabetes  >or=6.5%  Consistent with diabetes    High levels of fetal hemoglobin interfere with the HbA1C  assay. Heterozygous hemoglobin variants (HbS, HgC, etc)do  not significantly interfere with this assay.   However, presence of multiple variants may affect accuracy.     09/22/2021 5.3 4.0 - 5.6 % Final     Comment:     ADA Screening Guidelines:  5.7-6.4%  Consistent with prediabetes  >or=6.5%  Consistent with diabetes    High levels of fetal hemoglobin interfere with the HbA1C  assay. Heterozygous hemoglobin variants (HbS, HgC, etc)do  not significantly interfere with this assay.   However, presence of multiple variants may affect accuracy.     05/13/2020 5.1 4.0 - 5.6 % Final     Comment:     ADA Screening Guidelines:  5.7-6.4%  Consistent with prediabetes  >or=6.5%  Consistent with diabetes  High levels of fetal hemoglobin interfere with the HbA1C  assay. Heterozygous hemoglobin variants (HbS, HgC, etc)do  not significantly interfere with this assay.   However, presence of multiple variants may affect accuracy.         ASSESSMENT/PLAN:  Cervical radiculopathy  -     Ambulatory referral/consult to [facility]    Cervicalgia  -     Ambulatory referral/consult to [facility]      No follow-ups on file.    Sukumar Beal is a 53 y.o. male with cervical radiculopathy affecting the right shoulder.  He does have some mild weakness in his deltoid, but it is improving.  No myelopathic symptoms.  No significant central stenosis on MRI.  I discussed with the patient this is likely to continue to improve with conservative management.  He will continue to take his medications including anti-inflammatories.  I have also messaged his physical therapist to get him set up with more PT so he can continue to work  on strength and range of motion of his shoulder and neck.  He will follow up on an as-needed basis.

## 2023-11-30 ENCOUNTER — CLINICAL SUPPORT (OUTPATIENT)
Dept: REHABILITATION | Facility: HOSPITAL | Age: 53
End: 2023-11-30
Payer: COMMERCIAL

## 2023-11-30 DIAGNOSIS — M54.2 NECK PAIN: Primary | ICD-10-CM

## 2023-11-30 PROCEDURE — 97112 NEUROMUSCULAR REEDUCATION: CPT | Performed by: PHYSICAL THERAPIST

## 2023-11-30 PROCEDURE — 97140 MANUAL THERAPY 1/> REGIONS: CPT | Performed by: PHYSICAL THERAPIST

## 2023-11-30 NOTE — PROGRESS NOTES
Physical Therapy Treatment Note     Name: Sukumar Beal  [facility] Number: [id number]    Therapy Diagnosis:   Encounter Diagnosis   Name Primary?    Neck pain Yes     Physician: Jh Sibley*    Visit Date: 11/30/2023    Physician Orders: PT Eval and Treat   Medical Diagnosis from Referral:   Diagnosis   R29.898 (ICD-10-CM) - Shoulder weakness   M25.511 (ICD-10-CM) - Acute pain of right shoulder   M47.812 (ICD-10-CM) - Spondylosis of cervical region without myelopathy or radiculopathy      Evaluation Date: 11/16/2023  Authorization Period Expiration: 11/7/2024  Plan of Care Expiration: 3/16/2024  Progress Note Due: 2/16/2024  Visit # / Visits authorized: 28/35  2nd visit for current condition  FOTO: 1/1      Time In: 300pm  Time Out: 400pm  Total Billable Time: 52 minutes    Precautions: Standard and RCR     Date of Surgery: 10/14/2022    Subjective     Pt reports: Odalys been everywhere. He notes the shoulder motion is a little better. Glad his cuff not torn. He is walking at [employer] camp still    He was compliant with home exercise program.  Response to previous treatment: soreness  Functional change: improved overhead reach    Pain: 0/10  Location: right shoulder      Objective     Sukumar received therapeutic exercises to develop strength, endurance, ROM, flexibility, and posture for 3 minutes including:      Sukumar received the following manual therapy techniques: Joint mobilizations and Soft tissue Mobilization were applied to the: right shoulder for 12 minutes, including:    Cervical flexion segmental opening  Cervical flexion FMP    Sukumar participated in neuromuscular re-education activities to improve: Coordination, Proprioception, and Posture for 48 minutes. The following activities were included:    Russian NMES bent arm deltoid abd 10'  Sidelying abduction bent elbow 3 x 8  Towel slide up the wall 30x  Adduction GTB eccentric retrun    Sukumar participated in dynamic functional therapeutic activities to improve  functional performance for 0 minutes, including:        Home Exercises Provided and Patient Education Provided     Education provided:   - Home Exercise Program, precautions, progress    Written Home Exercises Provided: Patient instructed to cont prior HEP.  Exercises were reviewed and Sukumar was able to demonstrate them prior to the end of the session.  Sukumar demonstrated good  understanding of the education provided.     See EMR under Patient Instructions for exercises provided prior visit.    Assessment     Sukumar was educated on strengthening to deltoid with NMES and flexion based exercise for stenosis in cervical spine. He was found to be weaker in C5 today than prior treatment but deltoid was activating better     Sukumar Is progressing well towards his goals.   Pt prognosis is Good.     Pt will continue to benefit from skilled outpatient physical therapy to address the deficits listed in the problem list box on initial evaluation, provide pt/family education and to maximize pt's level of independence in the home and community environment.     Pt's spiritual, cultural and educational needs considered and pt agreeable to plan of care and goals.     Anticipated barriers to physical therapy: none    GOALS: Short Term Goals: 4 weeks(Progressing, not met)  1.Report decreased neck pain  <   / =  3  /10  to increase tolerance for work  2. Increase cervical ROM by 5-10 degrees in order to perform ADLs with decreased difficulty.  3. Increase strength in B shoulders/scapular stabilizers by 1/3 MMT grade to increase tolerance for ADL and work activities.  4. Pt to tolerate HEP to improve ROM and independence with ADL's     Long Term Goals: 8 weeks(Progressing, not met)  1.Report decreased neck pain  <   / =  0  /10  to increase tolerance for return to exercise  2. Increase UE/neck flexibility in order to improve posture.    3.Increased strength in B shoulders/scapular stabilizers to >/= 4/5 MMT grade to increase tolerance for  ADL and work activities.  4.  Pt will report at goals level on FOTO neck score for neck pain disability to demonstrate decrease in disability and improvement in neck pain.     Plan     Plan of care Certification: 11/16/2023 to 3/16/2024.     Improve overhead cuff strength    Therapist: Reji Szymanski, PT, DPT

## 2023-12-02 NOTE — PROGRESS NOTES
Adenoma or history of polyps    Repeat colonoscopy in 5 years       If you have any questions or if I can clarify any of the above please contact me:    [employer] [phone]   Pager [phone]  email [email or web address]  Nurse Windy Vang [phone]  :  [phone]    Sincerely  H, Luis Daniel Newell MD, FACS, FASCRS  Staff Surgeon  Dept of Colon and Rectal Surgery

## 2023-12-05 ENCOUNTER — CLINICAL SUPPORT (OUTPATIENT)
Dept: REHABILITATION | Facility: HOSPITAL | Age: 53
End: 2023-12-05
Payer: COMMERCIAL

## 2023-12-05 DIAGNOSIS — M54.2 NECK PAIN: Primary | ICD-10-CM

## 2023-12-05 PROCEDURE — 97140 MANUAL THERAPY 1/> REGIONS: CPT | Performed by: PHYSICAL THERAPIST

## 2023-12-05 PROCEDURE — 97112 NEUROMUSCULAR REEDUCATION: CPT | Performed by: PHYSICAL THERAPIST

## 2023-12-05 NOTE — PROGRESS NOTES
"Physical Therapy Treatment Note     Name: Sukumar Beal  [facility] Number: [id number]    Therapy Diagnosis:   Encounter Diagnosis   Name Primary?    Neck pain Yes     Physician: Jh Sibley*    Visit Date: 12/5/2023    Physician Orders: PT Eval and Treat   Medical Diagnosis from Referral:   Diagnosis   R29.898 (ICD-10-CM) - Shoulder weakness   M25.511 (ICD-10-CM) - Acute pain of right shoulder   M47.812 (ICD-10-CM) - Spondylosis of cervical region without myelopathy or radiculopathy      Evaluation Date: 11/16/2023  Authorization Period Expiration: 11/7/2024  Plan of Care Expiration: 3/16/2024  Progress Note Due: 2/16/2024  Visit # / Visits authorized: 29/35  2nd visit for current condition  FOTO: 1/1      Time In: 900am  Time Out: 1000am  Total Billable Time: 60 minutes    Precautions: Standard and RCR     Date of Surgery: 10/14/2022    Subjective     Pt reports: I can only curl 15# Notes doing his exercises and maybe a little better this week, hard to tell with just 1 treatment    He was compliant with home exercise program.  Response to previous treatment: soreness  Functional change: improved overhead reach    Pain: 0/10  Location: right shoulder      Objective     Sukumar received therapeutic exercises to develop strength, endurance, ROM, flexibility, and posture for 3 minutes including:      Sukumar received the following manual therapy techniques: Joint mobilizations and Soft tissue Mobilization were applied to the: right shoulder for 14 minutes, including:    Cervical flexion segmental opening  Cervical flexion FMP  C5-6 opening on R Gr IV  Scalene 1st rib mob with left rotation    Sukumar participated in neuromuscular re-education activities to improve: Coordination, Proprioception, and Posture for 46 minutes. The following activities were included:    Russian NMES bent arm deltoid abd with cable cord 10# 10'  DNF lifts 10x 10"  Sidelying ER 2 x 15  Bicep curl 15# 3 x 15    Sukumar participated in dynamic " functional therapeutic activities to improve functional performance for 0 minutes, including:        Home Exercises Provided and Patient Education Provided     Education provided:   - Home Exercise Program, precautions, progress    Written Home Exercises Provided: Patient instructed to cont prior HEP.  Exercises were reviewed and Sukumar was able to demonstrate them prior to the end of the session.  Sukumar demonstrated good  understanding of the education provided.     See EMR under Patient Instructions for exercises provided prior visit.    Assessment     Sukumar progressed with Sammarinese to deltoid, notes 1st rib discomfort and progressive biceps strength loss. Progressing load and reeducated proper DNF activation for HEP    Sukumar Is progressing well towards his goals.   Pt prognosis is Good.     Pt will continue to benefit from skilled outpatient physical therapy to address the deficits listed in the problem list box on initial evaluation, provide pt/family education and to maximize pt's level of independence in the home and community environment.     Pt's spiritual, cultural and educational needs considered and pt agreeable to plan of care and goals.     Anticipated barriers to physical therapy: none    GOALS: Short Term Goals: 4 weeks(Progressing, not met)  1.Report decreased neck pain  <   / =  3  /10  to increase tolerance for work  2. Increase cervical ROM by 5-10 degrees in order to perform ADLs with decreased difficulty.  3. Increase strength in B shoulders/scapular stabilizers by 1/3 MMT grade to increase tolerance for ADL and work activities.  4. Pt to tolerate HEP to improve ROM and independence with ADL's     Long Term Goals: 8 weeks(Progressing, not met)  1.Report decreased neck pain  <   / =  0  /10  to increase tolerance for return to exercise  2. Increase UE/neck flexibility in order to improve posture.    3.Increased strength in B shoulders/scapular stabilizers to >/= 4/5 MMT grade to increase tolerance for  ADL and work activities.  4.  Pt will report at goals level on FOTO neck score for neck pain disability to demonstrate decrease in disability and improvement in neck pain.     Plan     Plan of care Certification: 11/16/2023 to 3/16/2024.     Improve overhead cuff strength    Therapist: Reji Szymanski, PT, DPT

## 2023-12-07 ENCOUNTER — CLINICAL SUPPORT (OUTPATIENT)
Dept: REHABILITATION | Facility: HOSPITAL | Age: 53
End: 2023-12-07
Payer: COMMERCIAL

## 2023-12-07 DIAGNOSIS — M54.2 NECK PAIN: Primary | ICD-10-CM

## 2023-12-07 PROCEDURE — 97112 NEUROMUSCULAR REEDUCATION: CPT | Performed by: PHYSICAL THERAPIST

## 2023-12-08 NOTE — PROGRESS NOTES
"Physical Therapy Treatment Note     Name: Sukumar Beal  [facility] Number: [id number]    Therapy Diagnosis:   Encounter Diagnosis   Name Primary?    Neck pain Yes     Physician: Jh Sibley*    Visit Date: 12/7/2023    Physician Orders: PT Eval and Treat   Medical Diagnosis from Referral:   Diagnosis   R29.898 (ICD-10-CM) - Shoulder weakness   M25.511 (ICD-10-CM) - Acute pain of right shoulder   M47.812 (ICD-10-CM) - Spondylosis of cervical region without myelopathy or radiculopathy      Evaluation Date: 11/16/2023  Authorization Period Expiration: 11/7/2024  Plan of Care Expiration: 3/16/2024  Progress Note Due: 2/16/2024  Visit # / Visits authorized: 30/35  2nd visit for current condition  FOTO: 1/1      Time In: 900am  Time Out: 1000am  Total Billable Time: 60 minutes    Precautions: Standard and RCR     Date of Surgery: 10/14/2022    Subjective     Pt reports: It feels about the same. Maybe some numbness in the arm but mostly just feel achy     He was compliant with home exercise program.  Response to previous treatment: soreness  Functional change: improved overhead reach    Pain: 0/10  Location: right shoulder      Objective     Sukumar received therapeutic exercises to develop strength, endurance, ROM, flexibility, and posture for 0 minutes including:      Sukumar received the following manual therapy techniques: Joint mobilizations and Soft tissue Mobilization were applied to the: right shoulder for 0 minutes, including:    Cervical flexion segmental opening  Cervical flexion FMP  C5-6 opening on R Gr IV  Scalene 1st rib mob with left rotation    Sukumar participated in neuromuscular re-education activities to improve: Coordination, Proprioception, and Posture for 60 minutes. The following activities were included:    Russian NMES bent arm deltoid abd with cable cord 10# 15'  Serratus wall slides 3 x 15  Scaptions CC 10# 3 x 15  Landmine press 15# BUE 3 x 5    NT  DNF lifts 10x 10"  Sidelying ER 2 x " 15  Bicep curl 15# 3 x 15    Sukumar participated in dynamic functional therapeutic activities to improve functional performance for 0 minutes, including:        Home Exercises Provided and Patient Education Provided     Education provided:   - Home Exercise Program, precautions, progress    Written Home Exercises Provided: Patient instructed to cont prior HEP.  Exercises were reviewed and Sukumar was able to demonstrate them prior to the end of the session.  Sukumar demonstrated good  understanding of the education provided.     See EMR under Patient Instructions for exercises provided prior visit.    Assessment     Sukumar continues to progress with strengthening to deltoid and upper trap/serratus with wall slide. Fatigued quickly today with OH motion. Able to isometric hold arm overhead, but unable to control at all against gravity when lowering    Sukumar Is progressing well towards his goals.   Pt prognosis is Good.     Pt will continue to benefit from skilled outpatient physical therapy to address the deficits listed in the problem list box on initial evaluation, provide pt/family education and to maximize pt's level of independence in the home and community environment.     Pt's spiritual, cultural and educational needs considered and pt agreeable to plan of care and goals.     Anticipated barriers to physical therapy: none    GOALS: Short Term Goals: 4 weeks(Progressing, not met)  1.Report decreased neck pain  <   / =  3  /10  to increase tolerance for work  2. Increase cervical ROM by 5-10 degrees in order to perform ADLs with decreased difficulty.  3. Increase strength in B shoulders/scapular stabilizers by 1/3 MMT grade to increase tolerance for ADL and work activities.  4. Pt to tolerate HEP to improve ROM and independence with ADL's     Long Term Goals: 8 weeks(Progressing, not met)  1.Report decreased neck pain  <   / =  0  /10  to increase tolerance for return to exercise  2. Increase UE/neck flexibility in order to  improve posture.    3.Increased strength in B shoulders/scapular stabilizers to >/= 4/5 MMT grade to increase tolerance for ADL and work activities.  4.  Pt will report at goals level on FOTO neck score for neck pain disability to demonstrate decrease in disability and improvement in neck pain.     Plan     Plan of care Certification: 11/16/2023 to 3/16/2024.     Improve overhead cuff strength    Therapist: Reji Szymanski, PT, DPT

## 2023-12-12 ENCOUNTER — CLINICAL SUPPORT (OUTPATIENT)
Dept: REHABILITATION | Facility: HOSPITAL | Age: 53
End: 2023-12-12
Payer: COMMERCIAL

## 2023-12-12 DIAGNOSIS — M54.2 NECK PAIN: Primary | ICD-10-CM

## 2023-12-12 PROCEDURE — 97112 NEUROMUSCULAR REEDUCATION: CPT | Performed by: PHYSICAL THERAPIST

## 2023-12-12 NOTE — PROGRESS NOTES
Physical Therapy Treatment Note     Name: Sukumar Beal  [facility] Number: [id number]    Therapy Diagnosis:   Encounter Diagnosis   Name Primary?    Neck pain Yes     Physician: Jh Sibley*    Visit Date: 12/12/2023    Physician Orders: PT Eval and Treat   Medical Diagnosis from Referral:   Diagnosis   R29.898 (ICD-10-CM) - Shoulder weakness   M25.511 (ICD-10-CM) - Acute pain of right shoulder   M47.812 (ICD-10-CM) - Spondylosis of cervical region without myelopathy or radiculopathy      Evaluation Date: 11/16/2023  Authorization Period Expiration: 11/7/2024  Plan of Care Expiration: 3/16/2024  Progress Note Due: 2/16/2024  Visit # / Visits authorized: 31/35  2nd visit for current condition  FOTO: 1/1      Time In: 800am  Time Out: 900am  Total Billable Time: 60 minutes    Precautions: Standard and RCR     Date of Surgery: 10/14/2022    Subjective     Pt reports: Motion is better today and I can lower my arm slowly without it dropping    He was compliant with home exercise program.  Response to previous treatment: soreness  Functional change: improved overhead reach    Pain: 0/10  Location: right shoulder      Objective     Sukumar received therapeutic exercises to develop strength, endurance, ROM, flexibility, and posture for 0 minutes including:      Sukumar received the following manual therapy techniques: Joint mobilizations and Soft tissue Mobilization were applied to the: right shoulder for 0 minutes, including:    Cervical flexion segmental opening  Cervical flexion FMP  C5-6 opening on R Gr IV  Scalene 1st rib mob with left rotation    Sukumar participated in neuromuscular re-education activities to improve: Coordination, Proprioception, and Posture for 60 minutes. The following activities were included:    Afghan NMES abduction with cable cord/ scaption 10# 20'  Swiss ball pot stir 3 x 20 CW/CCW  Landmine press 25# BUE 3 x 5  Serratus wall slides  YTB shrug at top 3 x 15    NT    Scaptions CC 10# 3 x  "15    DNF lifts 10x 10"  Sidelying ER 2 x 15  Bicep curl 15# 3 x 15    Sukumar participated in dynamic functional therapeutic activities to improve functional performance for 0 minutes, including:    UBE forward/backward level 5 for cardiovascular endurance training and shoulder ROM    Home Exercises Provided and Patient Education Provided     Education provided:   - Home Exercise Program, precautions, progress    Written Home Exercises Provided: Patient instructed to cont prior HEP.  Exercises were reviewed and Sukumar was able to demonstrate them prior to the end of the session.  Sukumar demonstrated good  understanding of the education provided.     See EMR under Patient Instructions for exercises provided prior visit.    Assessment     Sukumar presented with improved ROM to the shoulder and notes it was even better last night. Spoke to Dr. Carr about his progress who was able to come over an speak to patient. Progressing C5 strengthening, noted he cannot bicep curl keeping the arm supinated    Sukumar Is progressing well towards his goals.   Pt prognosis is Good.     Pt will continue to benefit from skilled outpatient physical therapy to address the deficits listed in the problem list box on initial evaluation, provide pt/family education and to maximize pt's level of independence in the home and community environment.     Pt's spiritual, cultural and educational needs considered and pt agreeable to plan of care and goals.     Anticipated barriers to physical therapy: none    GOALS: Short Term Goals: 4 weeks(Progressing, not met)  1.Report decreased neck pain  <   / =  3  /10  to increase tolerance for work  2. Increase cervical ROM by 5-10 degrees in order to perform ADLs with decreased difficulty.  3. Increase strength in B shoulders/scapular stabilizers by 1/3 MMT grade to increase tolerance for ADL and work activities.  4. Pt to tolerate HEP to improve ROM and independence with ADL's     Long Term Goals: 8 " weeks(Progressing, not met)  1.Report decreased neck pain  <   / =  0  /10  to increase tolerance for return to exercise  2. Increase UE/neck flexibility in order to improve posture.    3.Increased strength in B shoulders/scapular stabilizers to >/= 4/5 MMT grade to increase tolerance for ADL and work activities.  4.  Pt will report at goals level on FOTO neck score for neck pain disability to demonstrate decrease in disability and improvement in neck pain.     Plan     Plan of care Certification: 11/16/2023 to 3/16/2024.     Improve overhead cuff strength    Therapist: Reji Szymanski, PT, DPT

## 2023-12-14 ENCOUNTER — CLINICAL SUPPORT (OUTPATIENT)
Dept: REHABILITATION | Facility: HOSPITAL | Age: 53
End: 2023-12-14
Attending: INTERNAL MEDICINE
Payer: COMMERCIAL

## 2023-12-14 DIAGNOSIS — M54.2 NECK PAIN: Primary | ICD-10-CM

## 2023-12-14 PROCEDURE — 97112 NEUROMUSCULAR REEDUCATION: CPT | Performed by: PHYSICAL THERAPIST

## 2023-12-14 NOTE — PROGRESS NOTES
"Physical Therapy Treatment Note     Name: Sukumar Beal  [facility] Number: [id number]    Therapy Diagnosis:   No diagnosis found.    Physician: Jh Sibley*    Visit Date: 12/14/2023    Physician Orders: PT Eval and Treat   Medical Diagnosis from Referral:   Diagnosis   R29.898 (ICD-10-CM) - Shoulder weakness   M25.511 (ICD-10-CM) - Acute pain of right shoulder   M47.812 (ICD-10-CM) - Spondylosis of cervical region without myelopathy or radiculopathy      Evaluation Date: 11/16/2023  Authorization Period Expiration: 11/7/2024  Plan of Care Expiration: 3/16/2024  Progress Note Due: 2/16/2024  Visit # / Visits authorized: 31/35  2nd visit for current condition  FOTO: 1/1      Time In: 800am  Time Out: 900am  Total Billable Time: 60 minutes    Precautions: Standard and RCR     Date of Surgery: 10/14/2022    Subjective     Pt reports: Motion is better today and I can lower my arm slowly without it dropping    He was compliant with home exercise program.  Response to previous treatment: soreness  Functional change: improved overhead reach    Pain: 0/10  Location: right shoulder      Objective     Sukumar received therapeutic exercises to develop strength, endurance, ROM, flexibility, and posture for 0 minutes including:      Sukumar received the following manual therapy techniques: Joint mobilizations and Soft tissue Mobilization were applied to the: right shoulder for 0 minutes, including:    Cervical flexion segmental opening  Cervical flexion FMP  C5-6 opening on R Gr IV  Scalene 1st rib mob with left rotation    Sukumar participated in neuromuscular re-education activities to improve: Coordination, Proprioception, and Posture for 60 minutes. The following activities were included:    [place] Islander NMES abduction with cable cord/ scaption 10# 20'  Swiss ball pot stir 3 x 20 CW/CCW  Landmine press 25# BUE 3 x 5  Serratus wall slides  YTB shrug at top 3 x 15    NT    Scaptions CC 10# 3 x 15    DNF lifts 10x 10"  Sidelying ER " 2 x 15  Bicep curl 15# 3 x 15    Sukumar participated in dynamic functional therapeutic activities to improve functional performance for 0 minutes, including:    UBE forward/backward level 5 for cardiovascular endurance training and shoulder ROM    Home Exercises Provided and Patient Education Provided     Education provided:   - Home Exercise Program, precautions, progress    Written Home Exercises Provided: Patient instructed to cont prior HEP.  Exercises were reviewed and Sukumar was able to demonstrate them prior to the end of the session.  Sukumar demonstrated good  understanding of the education provided.     See EMR under Patient Instructions for exercises provided prior visit.    Assessment     Sukumar presented with improved ROM to the shoulder and notes it was even better last night. Spoke to Dr. Carr about his progress who was able to come over an speak to patient. Progressing C5 strengthening, noted he cannot bicep curl keeping the arm supinated    Sukumar Is progressing well towards his goals.   Pt prognosis is Good.     Pt will continue to benefit from skilled outpatient physical therapy to address the deficits listed in the problem list box on initial evaluation, provide pt/family education and to maximize pt's level of independence in the home and community environment.     Pt's spiritual, cultural and educational needs considered and pt agreeable to plan of care and goals.     Anticipated barriers to physical therapy: none    GOALS: Short Term Goals: 4 weeks(Progressing, not met)  1.Report decreased neck pain  <   / =  3  /10  to increase tolerance for work  2. Increase cervical ROM by 5-10 degrees in order to perform ADLs with decreased difficulty.  3. Increase strength in B shoulders/scapular stabilizers by 1/3 MMT grade to increase tolerance for ADL and work activities.  4. Pt to tolerate HEP to improve ROM and independence with ADL's     Long Term Goals: 8 weeks(Progressing, not met)  1.Report decreased neck  pain  <   / =  0  /10  to increase tolerance for return to exercise  2. Increase UE/neck flexibility in order to improve posture.    3.Increased strength in B shoulders/scapular stabilizers to >/= 4/5 MMT grade to increase tolerance for ADL and work activities.  4.  Pt will report at goals level on FOTO neck score for neck pain disability to demonstrate decrease in disability and improvement in neck pain.     Plan     Plan of care Certification: 11/16/2023 to 3/16/2024.     Improve overhead cuff strength    Therapist: Kimberlee Hayward, PT, DPT

## 2023-12-14 NOTE — PROGRESS NOTES
"Physical Therapy Treatment Note     Name: Sukumar Beal  [facility] Number: [id number]    Therapy Diagnosis:   Encounter Diagnosis   Name Primary?    Neck pain Yes     Physician: Jh Sibley*    Visit Date: 12/14/2023    Physician Orders: PT Eval and Treat   Medical Diagnosis from Referral:   Diagnosis   R29.898 (ICD-10-CM) - Shoulder weakness   M25.511 (ICD-10-CM) - Acute pain of right shoulder   M47.812 (ICD-10-CM) - Spondylosis of cervical region without myelopathy or radiculopathy      Evaluation Date: 11/16/2023  Authorization Period Expiration: 11/7/2024  Plan of Care Expiration: 3/16/2024  Progress Note Due: 2/16/2024  Visit # / Visits authorized: 32/35  2nd visit for current condition  FOTO: 1/1      Time In: 800am  Time Out: 900am  Total Billable Time: 60 minutes    Precautions: Standard and RCR     Date of Surgery: 10/14/2022    Subjective     Pt reports: Getting stronger and I was sore after last time. Reports he is going to join the gym     He was compliant with home exercise program.  Response to previous treatment: soreness  Functional change: improved overhead reach    Pain: 0/10  Location: right shoulder      Objective     Sukumar received therapeutic exercises to develop strength, endurance, ROM, flexibility, and posture for 0 minutes including:      Sukumar received the following manual therapy techniques: Joint mobilizations and Soft tissue Mobilization were applied to the: right shoulder for 0 minutes, including:    Cervical flexion segmental opening  Cervical flexion FMP  C5-6 opening on R Gr IV  Scalene 1st rib mob with left rotation    Sukumar participated in neuromuscular re-education activities to improve: Coordination, Proprioception, and Posture for 60 minutes. The following activities were included:    Greek NMES abduction with cable cord/ scaption 13# 20'  Pushup at 24" box 3 x 8  Landmine press 25# BUE 3 x 5  Serratus wall slides  2 x 20    NT  Swiss ball pot stir 3 x 20 " "CW/CCW  Scaptions CC 10# 3 x 15    DNF lifts 10x 10"  Sidelying ER 2 x 15  Bicep curl 15# 3 x 15    Sukumar participated in dynamic functional therapeutic activities to improve functional performance for 0 minutes, including:    UBE forward/backward level 5 for cardiovascular endurance training and shoulder ROM    Home Exercises Provided and Patient Education Provided     Education provided:   - Home Exercise Program, precautions, progress    Written Home Exercises Provided: Patient instructed to cont prior HEP.  Exercises were reviewed and Sukumar was able to demonstrate them prior to the end of the session.  Sukumar demonstrated good  understanding of the education provided.     See EMR under Patient Instructions for exercises provided prior visit.    Assessment     Sukumar progressed with deltoid loading. Able perform scaptions with less pain and progressed loading through the shoulder in pushup position. Plan to join the gym next week     Sukumar Is progressing well towards his goals.   Pt prognosis is Good.     Pt will continue to benefit from skilled outpatient physical therapy to address the deficits listed in the problem list box on initial evaluation, provide pt/family education and to maximize pt's level of independence in the home and community environment.     Pt's spiritual, cultural and educational needs considered and pt agreeable to plan of care and goals.     Anticipated barriers to physical therapy: none    GOALS: Short Term Goals: 4 weeks(Progressing, not met)  1.Report decreased neck pain  <   / =  3  /10  to increase tolerance for work  2. Increase cervical ROM by 5-10 degrees in order to perform ADLs with decreased difficulty.  3. Increase strength in B shoulders/scapular stabilizers by 1/3 MMT grade to increase tolerance for ADL and work activities.  4. Pt to tolerate HEP to improve ROM and independence with ADL's     Long Term Goals: 8 weeks(Progressing, not met)  1.Report decreased neck pain  <   / =  0  " /10  to increase tolerance for return to exercise  2. Increase UE/neck flexibility in order to improve posture.    3.Increased strength in B shoulders/scapular stabilizers to >/= 4/5 MMT grade to increase tolerance for ADL and work activities.  4.  Pt will report at goals level on FOTO neck score for neck pain disability to demonstrate decrease in disability and improvement in neck pain.     Plan     Plan of care Certification: 11/16/2023 to 3/16/2024.     Improve overhead cuff strength    Therapist: Reji Szymanski, PT, DPT

## 2023-12-19 ENCOUNTER — CLINICAL SUPPORT (OUTPATIENT)
Dept: REHABILITATION | Facility: HOSPITAL | Age: 53
End: 2023-12-19
Payer: COMMERCIAL

## 2023-12-19 DIAGNOSIS — M54.2 NECK PAIN: Primary | ICD-10-CM

## 2023-12-19 PROCEDURE — 97112 NEUROMUSCULAR REEDUCATION: CPT | Performed by: PHYSICAL THERAPIST

## 2023-12-19 PROCEDURE — 97530 THERAPEUTIC ACTIVITIES: CPT | Performed by: PHYSICAL THERAPIST

## 2023-12-19 NOTE — PROGRESS NOTES
"Physical Therapy Treatment Note     Name: Sukumar Beal  [facility] Number: [id number]    Therapy Diagnosis:   Encounter Diagnosis   Name Primary?    Neck pain Yes     Physician: Jh Sibley*    Visit Date: 12/19/2023    Physician Orders: PT Eval and Treat   Medical Diagnosis from Referral:   Diagnosis   R29.898 (ICD-10-CM) - Shoulder weakness   M25.511 (ICD-10-CM) - Acute pain of right shoulder   M47.812 (ICD-10-CM) - Spondylosis of cervical region without myelopathy or radiculopathy      Evaluation Date: 11/16/2023  Authorization Period Expiration: 11/7/2024  Plan of Care Expiration: 3/16/2024  Progress Note Due: 2/16/2024  Visit # / Visits authorized: 33/35 2nd visit for current condition  FOTO: 1/1      Time In: 800am  Time Out: 900am  Total Billable Time: 60 minutes    Precautions: Standard and RCR     Date of Surgery: 10/14/2022    Subjective     Pt reports: A little more stiffness in the neck today on the L side    He was compliant with home exercise program.  Response to previous treatment: soreness  Functional change: improved overhead reach    Pain: 0/10  Location: right shoulder      Objective     Sukumar received therapeutic exercises to develop strength, endurance, ROM, flexibility, and posture for 0 minutes including:      Sukumar received the following manual therapy techniques: Joint mobilizations and Soft tissue Mobilization were applied to the: right shoulder for 0 minutes, including:    Cervical flexion segmental opening  Cervical flexion FMP  C5-6 opening on R Gr IV  Scalene 1st rib mob with left rotation    Sukumar participated in neuromuscular re-education activities to improve: Coordination, Proprioception, and Posture for 50 minutes. The following activities were included:    Serratus HHR 30x  Swazi NMES abduction with cable cord/ scaption 13# 20'  Landmine press 45# BUE 3 x 5 with retro lunge  Bicep curl 15# 3 x 15  Serratus wall slides  2 x 20    NT  Pushup at 24" box 3 x 8  Swiss ball " "pot stir 3 x 20 CW/CCW  Scaptions CC 10# 3 x 15  DNF lifts 10x 10"  Sidelying ER 2 x 15      Sukumar participated in dynamic functional therapeutic activities to improve functional performance for 10 minutes, including:    UBE forward/backward level 5 for cardiovascular endurance training and shoulder ROM    Home Exercises Provided and Patient Education Provided     Education provided:   - Home Exercise Program, precautions, progress    Written Home Exercises Provided: Patient instructed to cont prior HEP.  Exercises were reviewed and Sukumar was able to demonstrate them prior to the end of the session.  Sukumar demonstrated good  understanding of the education provided.     See EMR under Patient Instructions for exercises provided prior visit.    Assessment     Sukumar progressed with loading today, retro lunge added to serratus press for continued strengthening. Discussed over the door traction device rather than over spending for a traction device. Will continue to strengthening UT and deltoid as tolerated. Still no neural symptoms     Sukumar Is progressing well towards his goals.   Pt prognosis is Good.     Pt will continue to benefit from skilled outpatient physical therapy to address the deficits listed in the problem list box on initial evaluation, provide pt/family education and to maximize pt's level of independence in the home and community environment.     Pt's spiritual, cultural and educational needs considered and pt agreeable to plan of care and goals.     Anticipated barriers to physical therapy: none    GOALS: Short Term Goals: 4 weeks(Progressing, not met)  1.Report decreased neck pain  <   / =  3  /10  to increase tolerance for work  2. Increase cervical ROM by 5-10 degrees in order to perform ADLs with decreased difficulty.  3. Increase strength in B shoulders/scapular stabilizers by 1/3 MMT grade to increase tolerance for ADL and work activities.  4. Pt to tolerate HEP to improve ROM and independence with " ADL's     Long Term Goals: 8 weeks(Progressing, not met)  1.Report decreased neck pain  <   / =  0  /10  to increase tolerance for return to exercise  2. Increase UE/neck flexibility in order to improve posture.    3.Increased strength in B shoulders/scapular stabilizers to >/= 4/5 MMT grade to increase tolerance for ADL and work activities.  4.  Pt will report at goals level on FOTO neck score for neck pain disability to demonstrate decrease in disability and improvement in neck pain.     Plan     Plan of care Certification: 11/16/2023 to 3/16/2024.     Improve overhead cuff strength    Therapist: Reji Szymanski, PT, DPT

## 2023-12-21 ENCOUNTER — CLINICAL SUPPORT (OUTPATIENT)
Dept: REHABILITATION | Facility: HOSPITAL | Age: 53
End: 2023-12-21
Payer: COMMERCIAL

## 2023-12-21 DIAGNOSIS — M54.2 NECK PAIN: Primary | ICD-10-CM

## 2023-12-21 PROCEDURE — 97112 NEUROMUSCULAR REEDUCATION: CPT | Performed by: PHYSICAL THERAPIST

## 2023-12-21 PROCEDURE — 97530 THERAPEUTIC ACTIVITIES: CPT | Performed by: PHYSICAL THERAPIST

## 2023-12-21 NOTE — PROGRESS NOTES
Physical Therapy Treatment Note     Name: Sukumar Beal  [facility] Number: [id number]    Therapy Diagnosis:   Encounter Diagnosis   Name Primary?    Neck pain Yes     Physician: Jh Sibley*    Visit Date: 12/21/2023    Physician Orders: PT Eval and Treat   Medical Diagnosis from Referral:   Diagnosis   R29.898 (ICD-10-CM) - Shoulder weakness   M25.511 (ICD-10-CM) - Acute pain of right shoulder   M47.812 (ICD-10-CM) - Spondylosis of cervical region without myelopathy or radiculopathy      Evaluation Date: 11/16/2023  Authorization Period Expiration: 11/7/2024  Plan of Care Expiration: 3/16/2024  Progress Note Due: 2/16/2024  Visit # / Visits authorized: 34/35 2nd visit for current condition  FOTO: 1/1      Time In: 850am  Time Out: 955am  Total Billable Time: 65 minutes    Precautions: Standard and RCR     Date of Surgery: 10/14/2022    Subjective     Pt reports: I have been more sore in the neck lately. Shoulder was sore after last session. He notes he can lift his arm overhead without as much difficulty    He was compliant with home exercise program.  Response to previous treatment: soreness  Functional change: improved overhead reach    Pain: 0/10  Location: right shoulder      Objective     Sukumar received therapeutic exercises to develop strength, endurance, ROM, flexibility, and posture for 0 minutes including:      Sukumar received the following manual therapy techniques: Joint mobilizations and Soft tissue Mobilization were applied to the: right shoulder for 0 minutes, including:    Cervical flexion segmental opening  Cervical flexion FMP  C5-6 opening on R Gr IV  Scalene 1st rib mob with left rotation    Sukumar participated in neuromuscular re-education activities to improve: Coordination, Proprioception, and Posture for 55 minutes. The following activities were included:    Serratus stabilization UE bird dog 30x  Russian NMES towel slide wall 12'  Landmine press 45# BUE 3 x 5 with retro lunge  Bicep  "curl 15# 3 x 15  Serratus wall slides  2 x 20  Pushup at 12" box 3 x 8  ER walkouts YTB 2 x 15  Ball on wall 3x to fatigue    NT  Swiss ball pot stir 3 x 20 CW/CCW  Scaptions CC 10# 3 x 15  DNF lifts 10x 10"  Sidelying ER 2 x 15      Sukumar participated in dynamic functional therapeutic activities to improve functional performance for 10 minutes, including:    UBE forward/backward level 5 for cardiovascular endurance training and shoulder ROM    Home Exercises Provided and Patient Education Provided     Education provided:   - Home Exercise Program, precautions, progress    Written Home Exercises Provided: Patient instructed to cont prior HEP.  Exercises were reviewed and Sukumar was able to demonstrate them prior to the end of the session.  Sukumar demonstrated good  understanding of the education provided.     See EMR under Patient Instructions for exercises provided prior visit.    Assessment     Sukumar progressed with strengthening to serratus. Added chin tuck with UE flexion. He notes strength OH press improving. Progressed towel slide up the wall rather than CC, progressing with conc exercise against gravity    Sukumar Is progressing well towards his goals.   Pt prognosis is Good.     Pt will continue to benefit from skilled outpatient physical therapy to address the deficits listed in the problem list box on initial evaluation, provide pt/family education and to maximize pt's level of independence in the home and community environment.     Pt's spiritual, cultural and educational needs considered and pt agreeable to plan of care and goals.     Anticipated barriers to physical therapy: none    GOALS: Short Term Goals: 4 weeks(Progressing, not met)  1.Report decreased neck pain  <   / =  3  /10  to increase tolerance for work  2. Increase cervical ROM by 5-10 degrees in order to perform ADLs with decreased difficulty.  3. Increase strength in B shoulders/scapular stabilizers by 1/3 MMT grade to increase tolerance for ADL " and work activities.  4. Pt to tolerate HEP to improve ROM and independence with ADL's     Long Term Goals: 8 weeks(Progressing, not met)  1.Report decreased neck pain  <   / =  0  /10  to increase tolerance for return to exercise  2. Increase UE/neck flexibility in order to improve posture.    3.Increased strength in B shoulders/scapular stabilizers to >/= 4/5 MMT grade to increase tolerance for ADL and work activities.  4.  Pt will report at goals level on FOTO neck score for neck pain disability to demonstrate decrease in disability and improvement in neck pain.     Plan     Plan of care Certification: 11/16/2023 to 3/16/2024.     Improve overhead cuff strength    Therapist: Reji Szymanski, PT, DPT

## 2023-12-27 ENCOUNTER — CLINICAL SUPPORT (OUTPATIENT)
Dept: REHABILITATION | Facility: HOSPITAL | Age: 53
End: 2023-12-27
Payer: COMMERCIAL

## 2023-12-27 DIAGNOSIS — M54.2 NECK PAIN: Primary | ICD-10-CM

## 2023-12-27 PROCEDURE — 97112 NEUROMUSCULAR REEDUCATION: CPT | Performed by: PHYSICAL THERAPIST

## 2023-12-27 PROCEDURE — 97530 THERAPEUTIC ACTIVITIES: CPT | Performed by: PHYSICAL THERAPIST

## 2023-12-27 NOTE — PROGRESS NOTES
Physical Therapy Treatment Note     Name: Sukumar Beal  [facility] Number: [id number]    Therapy Diagnosis:   Encounter Diagnosis   Name Primary?    Neck pain Yes     Physician: Jh Sibley*    Visit Date: 12/27/2023    Physician Orders: PT Eval and Treat   Medical Diagnosis from Referral:   Diagnosis   R29.898 (ICD-10-CM) - Shoulder weakness   M25.511 (ICD-10-CM) - Acute pain of right shoulder   M47.812 (ICD-10-CM) - Spondylosis of cervical region without myelopathy or radiculopathy      Evaluation Date: 11/16/2023  Authorization Period Expiration: 11/7/2024  Plan of Care Expiration: 3/16/2024  Progress Note Due: 2/16/2024  Visit # / Visits authorized: 35/35  2nd visit for current condition  FOTO: 1/1      Time In: 1000am  Time Out: 1100am  Total Billable Time: 60 minutes    Precautions: Standard and RCR     Date of Surgery: 10/14/2022    Subjective     Pt reports: Same soreness in the neck today. Does not feel any stronger or different, but it's hard to tell    He was compliant with home exercise program.  Response to previous treatment: soreness  Functional change: improved overhead reach    Pain: 0/10  Location: right shoulder      Objective     Sukumar received therapeutic exercises to develop strength, endurance, ROM, flexibility, and posture for 0 minutes including:      Sukumar received the following manual therapy techniques: Joint mobilizations and Soft tissue Mobilization were applied to the: right shoulder for 0 minutes, including:    Cervical flexion segmental opening  Cervical flexion FMP  C5-6 opening on R Gr IV  Scalene 1st rib mob with left rotation    Sukumar participated in neuromuscular re-education activities to improve: Coordination, Proprioception, and Posture for 50 minutes. The following activities were included:    Serratus stabilization UE bird dog 30x  Russian NMES Scaptions/Deltoid CC 17# 20'  Landmine press 45# BUE 3 x 5 with retro lunge  Bicep curl 15# 3 x 15  Serratus wall slides  2 x  "20  Pushup at 12" box 3 x 8  Pot stir swiss ball at wall 3x to fatigue     NT  Swiss ball pot stir 3 x 20 CW/CCW  ER walkouts YTB 2 x 15  Ball on wall 3x to fatigue  DNF lifts 10x 10"  Sidelying ER 2 x 15      Sukumar participated in dynamic functional therapeutic activities to improve functional performance for 10 minutes, including:    UBE forward/backward level 5 for cardiovascular endurance training and shoulder ROM    Home Exercises Provided and Patient Education Provided     Education provided:   - Home Exercise Program, precautions, progress    Written Home Exercises Provided: Patient instructed to cont prior HEP.  Exercises were reviewed and Sukumar was able to demonstrate them prior to the end of the session.  Sukumar demonstrated good  understanding of the education provided.     See EMR under Patient Instructions for exercises provided prior visit.    Assessment     Sukumar progressed deltoid and bicep strengthening, he notes better curl and landmine press strength today compared to last week. Progressed serratus with foam roll, will continue pushup to 20" box next visit as well.     Sukumar Is progressing well towards his goals.   Pt prognosis is Good.     Pt will continue to benefit from skilled outpatient physical therapy to address the deficits listed in the problem list box on initial evaluation, provide pt/family education and to maximize pt's level of independence in the home and community environment.     Pt's spiritual, cultural and educational needs considered and pt agreeable to plan of care and goals.     Anticipated barriers to physical therapy: none    GOALS: Short Term Goals: 4 weeks(Progressing, not met)  1.Report decreased neck pain  <   / =  3  /10  to increase tolerance for work  2. Increase cervical ROM by 5-10 degrees in order to perform ADLs with decreased difficulty.  3. Increase strength in B shoulders/scapular stabilizers by 1/3 MMT grade to increase tolerance for ADL and work activities.  4. Pt " to tolerate HEP to improve ROM and independence with ADL's     Long Term Goals: 8 weeks(Progressing, not met)  1.Report decreased neck pain  <   / =  0  /10  to increase tolerance for return to exercise  2. Increase UE/neck flexibility in order to improve posture.    3.Increased strength in B shoulders/scapular stabilizers to >/= 4/5 MMT grade to increase tolerance for ADL and work activities.  4.  Pt will report at goals level on FOTO neck score for neck pain disability to demonstrate decrease in disability and improvement in neck pain.     Plan     Plan of care Certification: 11/16/2023 to 3/16/2024.     Improve overhead cuff strength    Therapist: Reji Szymanski, PT, DPT

## 2023-12-29 ENCOUNTER — CLINICAL SUPPORT (OUTPATIENT)
Dept: REHABILITATION | Facility: HOSPITAL | Age: 53
End: 2023-12-29
Payer: COMMERCIAL

## 2023-12-29 DIAGNOSIS — M54.2 NECK PAIN: Primary | ICD-10-CM

## 2023-12-29 PROCEDURE — 97112 NEUROMUSCULAR REEDUCATION: CPT | Performed by: PHYSICAL THERAPIST

## 2023-12-29 PROCEDURE — 97530 THERAPEUTIC ACTIVITIES: CPT | Performed by: PHYSICAL THERAPIST

## 2023-12-29 NOTE — PROGRESS NOTES
"Physical Therapy Treatment Note     Name: Sukumar Beal  [facility] Number: [id number]    Therapy Diagnosis:   Encounter Diagnosis   Name Primary?    Neck pain Yes     Physician: No ref. provider found    Visit Date: 12/29/2023    Physician Orders: PT Eval and Treat   Medical Diagnosis from Referral:   Diagnosis   R29.898 (ICD-10-CM) - Shoulder weakness   M25.511 (ICD-10-CM) - Acute pain of right shoulder   M47.812 (ICD-10-CM) - Spondylosis of cervical region without myelopathy or radiculopathy      Evaluation Date: 11/16/2023  Authorization Period Expiration: 11/7/2024  Plan of Care Expiration: 3/16/2024  Progress Note Due: 2/16/2024  Visit # / Visits authorized: 36/35  FOTO: 1/1      Time In: 831am  Time Out: 925am  Total Billable Time: 54 minutes    Precautions: Standard and RCR     Date of Surgery: 10/14/2022    Subjective     Pt reports: Slight progress, just been busy with friends in town for the holidays     He was compliant with home exercise program.  Response to previous treatment: soreness  Functional change: improved overhead reach    Pain: 0/10  Location: right shoulder      Objective     Sukumar received therapeutic exercises to develop strength, endurance, ROM, flexibility, and posture for 0 minutes including:      Sukumar received the following manual therapy techniques: Joint mobilizations and Soft tissue Mobilization were applied to the: right shoulder for 0 minutes, including:    Cervical flexion segmental opening  Cervical flexion FMP  C5-6 opening on R Gr IV  Scalene 1st rib mob with left rotation    Sukumar participated in neuromuscular re-education activities to improve: Coordination, Proprioception, and Posture for 44 minutes. The following activities were included:      Russian NMES Scaptions/Deltoid CC 17# 20'  Landmine press 45# BUE 3 x 5 with retro lunge  Bicep curl 15# 3 x 15  Serratus wall slides  2 x 20  Pushup at 20" box 3 x 8  Pot stir swiss ball at wall 3x to fatigue     NT  Serratus " "stabilization UE bird dog 30x  Swiss ball pot stir 3 x 20 CW/CCW  ER walkouts YTB 2 x 15  Ball on wall 3x to fatigue  DNF lifts 10x 10"  Sidelying ER 2 x 15      Sukumar participated in dynamic functional therapeutic activities to improve functional performance for 10 minutes, including:    UBE forward/backward level 5 for cardiovascular endurance training and shoulder ROM    Home Exercises Provided and Patient Education Provided     Education provided:   - Home Exercise Program, precautions, progress    Written Home Exercises Provided: Patient instructed to cont prior HEP.  Exercises were reviewed and Sukumar was able to demonstrate them prior to the end of the session.  Sukumar demonstrated good  understanding of the education provided.     See EMR under Patient Instructions for exercises provided prior visit.    Assessment     Improved active scaption, does not have to keep his arm in as tight. Discussed with MD his minimal progress yesterday and he said he'll likely order a nerve conduction study. Increased weight to tolerance on the cable cords today    Sukumar Is progressing well towards his goals.   Pt prognosis is Good.     Pt will continue to benefit from skilled outpatient physical therapy to address the deficits listed in the problem list box on initial evaluation, provide pt/family education and to maximize pt's level of independence in the home and community environment.     Pt's spiritual, cultural and educational needs considered and pt agreeable to plan of care and goals.     Anticipated barriers to physical therapy: none    GOALS: Short Term Goals: 4 weeks(Progressing, not met)  1.Report decreased neck pain  <   / =  3  /10  to increase tolerance for work  2. Increase cervical ROM by 5-10 degrees in order to perform ADLs with decreased difficulty.  3. Increase strength in B shoulders/scapular stabilizers by 1/3 MMT grade to increase tolerance for ADL and work activities.  4. Pt to tolerate HEP to improve ROM " and independence with ADL's     Long Term Goals: 8 weeks(Progressing, not met)  1.Report decreased neck pain  <   / =  0  /10  to increase tolerance for return to exercise  2. Increase UE/neck flexibility in order to improve posture.    3.Increased strength in B shoulders/scapular stabilizers to >/= 4/5 MMT grade to increase tolerance for ADL and work activities.  4.  Pt will report at goals level on FOTO neck score for neck pain disability to demonstrate decrease in disability and improvement in neck pain.     Plan     Plan of care Certification: 11/16/2023 to 3/16/2024.     Improve overhead cuff strength    Therapist: Reji Szymanski, PT, DPT

## 2024-01-03 ENCOUNTER — CLINICAL SUPPORT (OUTPATIENT)
Dept: REHABILITATION | Facility: HOSPITAL | Age: 54
End: 2024-01-03
Payer: COMMERCIAL

## 2024-01-03 DIAGNOSIS — M54.2 NECK PAIN: Primary | ICD-10-CM

## 2024-01-03 PROCEDURE — 97530 THERAPEUTIC ACTIVITIES: CPT | Performed by: PHYSICAL THERAPIST

## 2024-01-03 PROCEDURE — 97112 NEUROMUSCULAR REEDUCATION: CPT | Performed by: PHYSICAL THERAPIST

## 2024-01-03 NOTE — PROGRESS NOTES
Physical Therapy Treatment Note     Name: Sukumar Beal  [facility] Number: [id number]    Therapy Diagnosis:   Encounter Diagnosis   Name Primary?    Neck pain Yes     Physician: Marylou Mueller NP    Visit Date: 1/3/2024    Physician Orders: PT Eval and Treat   Medical Diagnosis from Referral:   Diagnosis   R29.898 (ICD-10-CM) - Shoulder weakness   M25.511 (ICD-10-CM) - Acute pain of right shoulder   M47.812 (ICD-10-CM) - Spondylosis of cervical region without myelopathy or radiculopathy      Evaluation Date: 11/16/2023  Authorization Period Expiration: 11/7/2024  Plan of Care Expiration: 3/16/2024  Progress Note Due: 2/16/2024  Visit # / Visits authorized: 1/20  FOTO: 1/1      Time In: 805am  Time Out: 900am  Total Billable Time: 55 minutes    Precautions: Standard and RCR     Date of Surgery: 10/14/2022    Subjective     Pt reports: My friends exercising with me say I am getting better, but I am ready to try something now    He was compliant with home exercise program.  Response to previous treatment: soreness  Functional change: improved overhead reach    Pain: 0/10  Location: right shoulder      Objective     Sukumar received therapeutic exercises to develop strength, endurance, ROM, flexibility, and posture for 0 minutes including:      Sukumar received the following manual therapy techniques: Joint mobilizations and Soft tissue Mobilization were applied to the: right shoulder for 0 minutes, including:    Cervical flexion segmental opening  Cervical flexion FMP  C5-6 opening on R Gr IV  Scalene 1st rib mob with left rotation    Sukumar participated in neuromuscular re-education activities to improve: Coordination, Proprioception, and Posture for 45 minutes. The following activities were included:    Russian NMES Scaptions/Deltoid 2# 8'  Pushups at bench 3 x 12/8/7  Wall clock YTB 3 x 5  TRX rows 3 x 15  Prone over EOT T 2 x 15  Prone over EOT touchdown 2 x 15    NT  Landmine press 45# BUE 3 x 5 with retro  "lunge  Bicep curl 15# 3 x 15  Serratus wall slides  2 x 20  Pushup at 20" box 3 x 8  Pot stir swiss ball at wall 3x to fatigue   Serratus stabilization UE bird dog 30x  Swiss ball pot stir 3 x 20 CW/CCW  ER walkouts YTB 2 x 15  Ball on wall 3x to fatigue  DNF lifts 10x 10"  Sidelying ER 2 x 15      Sukumar participated in dynamic functional therapeutic activities to improve functional performance for 10 minutes, including:    UBE forward/backward level 5 for cardiovascular endurance training and shoulder ROM    Home Exercises Provided and Patient Education Provided     Education provided:   - Home Exercise Program, precautions, progress    Written Home Exercises Provided: Patient instructed to cont prior HEP.  Exercises were reviewed and Sukumar was able to demonstrate them prior to the end of the session.  Sukumar demonstrated good  understanding of the education provided.     See EMR under Patient Instructions for exercises provided prior visit.    Assessment     Patient with improved active motion and less hiking of the shoulder/UT activation. Continue to progress with free weights, noted he feels weaker with the [place] stem firing than when it is not. Progressed mid trap and serratus prone over EOT and pushup    Sukumar Is progressing well towards his goals.   Pt prognosis is Good.     Pt will continue to benefit from skilled outpatient physical therapy to address the deficits listed in the problem list box on initial evaluation, provide pt/family education and to maximize pt's level of independence in the home and community environment.     Pt's spiritual, cultural and educational needs considered and pt agreeable to plan of care and goals.     Anticipated barriers to physical therapy: none    GOALS: Short Term Goals: 4 weeks(Progressing, not met)  1.Report decreased neck pain  <   / =  3  /10  to increase tolerance for work  2. Increase cervical ROM by 5-10 degrees in order to perform ADLs with decreased difficulty.  3. " Increase strength in B shoulders/scapular stabilizers by 1/3 MMT grade to increase tolerance for ADL and work activities.  4. Pt to tolerate HEP to improve ROM and independence with ADL's     Long Term Goals: 8 weeks(Progressing, not met)  1.Report decreased neck pain  <   / =  0  /10  to increase tolerance for return to exercise  2. Increase UE/neck flexibility in order to improve posture.    3.Increased strength in B shoulders/scapular stabilizers to >/= 4/5 MMT grade to increase tolerance for ADL and work activities.  4.  Pt will report at goals level on FOTO neck score for neck pain disability to demonstrate decrease in disability and improvement in neck pain.     Plan     Plan of care Certification: 11/16/2023 to 3/16/2024.     Improve overhead cuff strength    Therapist: Reji Szymanski, PT, DPT

## 2024-01-10 ENCOUNTER — CLINICAL SUPPORT (OUTPATIENT)
Dept: REHABILITATION | Facility: HOSPITAL | Age: 54
End: 2024-01-10
Payer: COMMERCIAL

## 2024-01-10 DIAGNOSIS — M54.2 NECK PAIN: Primary | ICD-10-CM

## 2024-01-10 PROCEDURE — 97112 NEUROMUSCULAR REEDUCATION: CPT | Performed by: PHYSICAL THERAPIST

## 2024-01-10 PROCEDURE — 97530 THERAPEUTIC ACTIVITIES: CPT | Performed by: PHYSICAL THERAPIST

## 2024-01-10 NOTE — PROGRESS NOTES
Physical Therapy Treatment Note     Name: Sukumar Beal  [facility] Number: [id number]    Therapy Diagnosis:   Encounter Diagnosis   Name Primary?    Neck pain Yes     Physician: Marylou Mueller NP    Visit Date: 1/10/2024    Physician Orders: PT Eval and Treat   Medical Diagnosis from Referral:   Diagnosis   R29.898 (ICD-10-CM) - Shoulder weakness   M25.511 (ICD-10-CM) - Acute pain of right shoulder   M47.812 (ICD-10-CM) - Spondylosis of cervical region without myelopathy or radiculopathy      Evaluation Date: 11/16/2023  Authorization Period Expiration: 11/7/2024  Plan of Care Expiration: 3/16/2024  Progress Note Due: 2/16/2024  Visit # / Visits authorized: 2/20  FOTO: 1/1      Time In: 805am  Time Out: 900am  Total Billable Time: 55 minutes    Precautions: Standard and RCR     Date of Surgery: 10/14/2022    Subjective     Pt reports: Shoulder is doing better today. Had a death in the family so missed a few visits    He was compliant with home exercise program.  Response to previous treatment: soreness  Functional change: improved overhead reach    Pain: 0/10  Location: right shoulder      Objective     Sukumar received therapeutic exercises to develop strength, endurance, ROM, flexibility, and posture for 0 minutes including:      Sukumar received the following manual therapy techniques: Joint mobilizations and Soft tissue Mobilization were applied to the: right shoulder for 0 minutes, including:    Cervical flexion segmental opening  Cervical flexion FMP  C5-6 opening on R Gr IV  Scalene 1st rib mob with left rotation    Sukumar participated in neuromuscular re-education activities to improve: Coordination, Proprioception, and Posture for 45 minutes. The following activities were included:    Russian NMES Scaptions/Deltoid 20# 8' and rows 17# - cable column  Pushups at bench 3 x 12/8/7  Wall clock YTB 3 x 5  Standing high ro OTB 3 x 15  Serratus press yellow sport cord 30x  Prone T 3 x 15    NT  TRX rows 3 x 15  Prone  "over EOT T 2 x 15  Prone over EOT touchdown 2 x 15  Landmine press 45# BUE 3 x 5 with retro lunge  Bicep curl 15# 3 x 15  Serratus wall slides  2 x 20  Pushup at 20" box 3 x 8  Pot stir swiss ball at wall 3x to fatigue   Serratus stabilization UE bird dog 30x  Swiss ball pot stir 3 x 20 CW/CCW  ER walkouts YTB 2 x 15  Ball on wall 3x to fatigue  DNF lifts 10x 10"  Sidelying ER 2 x 15      Sukumar participated in dynamic functional therapeutic activities to improve functional performance for 10 minutes, including:    UBE forward/backward level 5 for cardiovascular endurance training and shoulder ROM    Home Exercises Provided and Patient Education Provided     Education provided:   - Home Exercise Program, precautions, progress    Written Home Exercises Provided: Patient instructed to cont prior HEP.  Exercises were reviewed and Sukumar was able to demonstrate them prior to the end of the session.  Sukumar demonstrated good  understanding of the education provided.     See EMR under Patient Instructions for exercises provided prior visit.    Assessment     Patient presented today with better motion and encouraged by recent progress. Notes last night he could control eccentric lowering of the arm. Progressing with strengthening as tolerated to serratus, most difficulty still with the wall clock    Sukumar Is progressing well towards his goals.   Pt prognosis is Good.     Pt will continue to benefit from skilled outpatient physical therapy to address the deficits listed in the problem list box on initial evaluation, provide pt/family education and to maximize pt's level of independence in the home and community environment.     Pt's spiritual, cultural and educational needs considered and pt agreeable to plan of care and goals.     Anticipated barriers to physical therapy: none    GOALS: Short Term Goals: 4 weeks(Progressing, not met)  1.Report decreased neck pain  <   / =  3  /10  to increase tolerance for work  2. Increase " cervical ROM by 5-10 degrees in order to perform ADLs with decreased difficulty.  3. Increase strength in B shoulders/scapular stabilizers by 1/3 MMT grade to increase tolerance for ADL and work activities.  4. Pt to tolerate HEP to improve ROM and independence with ADL's     Long Term Goals: 8 weeks(Progressing, not met)  1.Report decreased neck pain  <   / =  0  /10  to increase tolerance for return to exercise  2. Increase UE/neck flexibility in order to improve posture.    3.Increased strength in B shoulders/scapular stabilizers to >/= 4/5 MMT grade to increase tolerance for ADL and work activities.  4.  Pt will report at goals level on FOTO neck score for neck pain disability to demonstrate decrease in disability and improvement in neck pain.     Plan     Plan of care Certification: 11/16/2023 to 3/16/2024.     Improve overhead cuff strength    Therapist: Reji Szymanski, PT, DPT

## 2024-01-12 ENCOUNTER — CLINICAL SUPPORT (OUTPATIENT)
Dept: REHABILITATION | Facility: HOSPITAL | Age: 54
End: 2024-01-12
Payer: COMMERCIAL

## 2024-01-12 DIAGNOSIS — M54.2 NECK PAIN: Primary | ICD-10-CM

## 2024-01-12 PROCEDURE — 97112 NEUROMUSCULAR REEDUCATION: CPT | Performed by: PHYSICAL THERAPIST

## 2024-01-12 PROCEDURE — 97530 THERAPEUTIC ACTIVITIES: CPT | Performed by: PHYSICAL THERAPIST

## 2024-01-12 NOTE — PROGRESS NOTES
Physical Therapy Treatment Note     Name: Sukumar Beal  [facility] Number: [id number]    Therapy Diagnosis:   Encounter Diagnosis   Name Primary?    Neck pain Yes     Physician: Marylou Mueller NP    Visit Date: 1/12/2024    Physician Orders: PT Eval and Treat   Medical Diagnosis from Referral:   Diagnosis   R29.898 (ICD-10-CM) - Shoulder weakness   M25.511 (ICD-10-CM) - Acute pain of right shoulder   M47.812 (ICD-10-CM) - Spondylosis of cervical region without myelopathy or radiculopathy      Evaluation Date: 11/16/2023  Authorization Period Expiration: 11/7/2024  Plan of Care Expiration: 3/16/2024  Progress Note Due: 2/16/2024  Visit # / Visits authorized: 3/20  FOTO: 1/1    Time In: 1000am  Time Out: 1100am  Total Billable Time: 60 minutes    Precautions: Standard and RCR     Date of Surgery: 10/14/2022    Subjective     Pt reports: My shoulder continues to get better, I am getting some muscle definition back in the shoulder    He was compliant with home exercise program.  Response to previous treatment: soreness  Functional change: improved overhead reach    Pain: 0/10  Location: right shoulder      Objective     Sukumar received therapeutic exercises to develop strength, endurance, ROM, flexibility, and posture for 0 minutes including:      Sukumar received the following manual therapy techniques: Joint mobilizations and Soft tissue Mobilization were applied to the: right shoulder for 0 minutes, including:    Cervical flexion segmental opening  Cervical flexion FMP  C5-6 opening on R Gr IV  Scalene 1st rib mob with left rotation    Sukumar participated in neuromuscular re-education activities to improve: Coordination, Proprioception, and Posture for 50 minutes. The following activities were included:    Tanzanian NMES 20'  -Wall slides overhead 3#  -Serratus wall slide YTB  - Standing scaption 1#  Pushups at bench 3 x 12/8/7  Wall clock YTB 3 x 5  IR/ER OTB 3 x 15  Adduction CC with eccentric control 20# cable column 2  "x 15    NT  TRX rows 3 x 15  Prone over EOT T 2 x 15  Prone over EOT touchdown 2 x 15  Landmine press 45# BUE 3 x 5 with retro lunge  Bicep curl 15# 3 x 15  Serratus wall slides  2 x 20  Pushup at 20" box 3 x 8  Pot stir swiss ball at wall 3x to fatigue   Serratus stabilization UE bird dog 30x  Swiss ball pot stir 3 x 20 CW/CCW  ER walkouts YTB 2 x 15  Ball on wall 3x to fatigue  DNF lifts 10x 10"  Sidelying ER 2 x 15      Sukumar participated in dynamic functional therapeutic activities to improve functional performance for 10 minutes, including:    UBE forward/backward level 5 for cardiovascular endurance training and shoulder ROM    Home Exercises Provided and Patient Education Provided     Education provided:   - Home Exercise Program, precautions, progress    Written Home Exercises Provided: Patient instructed to cont prior HEP.  Exercises were reviewed and Sukumar was able to demonstrate them prior to the end of the session.  Sukumar demonstrated good  understanding of the education provided.     See EMR under Patient Instructions for exercises provided prior visit.    Assessment     Sukumar continues to feel stronger and notes muscle hypertrophy in the deltoid the past couple weeks. Progressing with OH motion, less assistance from cable column. Limited cuff strength present. Continues to progress active motion     Sukumar Is progressing well towards his goals.   Pt prognosis is Good.     Pt will continue to benefit from skilled outpatient physical therapy to address the deficits listed in the problem list box on initial evaluation, provide pt/family education and to maximize pt's level of independence in the home and community environment.     Pt's spiritual, cultural and educational needs considered and pt agreeable to plan of care and goals.     Anticipated barriers to physical therapy: none    GOALS: Short Term Goals: 4 weeks(Progressing, not met)  1.Report decreased neck pain  <   / =  3  /10  to increase tolerance for " work  2. Increase cervical ROM by 5-10 degrees in order to perform ADLs with decreased difficulty.  3. Increase strength in B shoulders/scapular stabilizers by 1/3 MMT grade to increase tolerance for ADL and work activities.  4. Pt to tolerate HEP to improve ROM and independence with ADL's     Long Term Goals: 8 weeks(Progressing, not met)  1.Report decreased neck pain  <   / =  0  /10  to increase tolerance for return to exercise  2. Increase UE/neck flexibility in order to improve posture.    3.Increased strength in B shoulders/scapular stabilizers to >/= 4/5 MMT grade to increase tolerance for ADL and work activities.  4.  Pt will report at goals level on FOTO neck score for neck pain disability to demonstrate decrease in disability and improvement in neck pain.     Plan     Plan of care Certification: 11/16/2023 to 3/16/2024.     Improve overhead cuff strength    Therapist: Reji Szymanski, PT, DPT

## 2024-01-17 ENCOUNTER — CLINICAL SUPPORT (OUTPATIENT)
Dept: REHABILITATION | Facility: HOSPITAL | Age: 54
End: 2024-01-17
Payer: COMMERCIAL

## 2024-01-17 DIAGNOSIS — M54.2 NECK PAIN: Primary | ICD-10-CM

## 2024-01-17 PROCEDURE — 97530 THERAPEUTIC ACTIVITIES: CPT | Performed by: PHYSICAL THERAPIST

## 2024-01-17 PROCEDURE — 97112 NEUROMUSCULAR REEDUCATION: CPT | Performed by: PHYSICAL THERAPIST

## 2024-01-17 NOTE — PROGRESS NOTES
Physical Therapy Treatment Note     Name: Sukumar Beal  [facility] Number: [id number]    Therapy Diagnosis:   Encounter Diagnosis   Name Primary?    Neck pain Yes     Physician: Marylou Mueller NP    Visit Date: 1/17/2024    Physician Orders: PT Eval and Treat   Medical Diagnosis from Referral:   Diagnosis   R29.898 (ICD-10-CM) - Shoulder weakness   M25.511 (ICD-10-CM) - Acute pain of right shoulder   M47.812 (ICD-10-CM) - Spondylosis of cervical region without myelopathy or radiculopathy      Evaluation Date: 11/16/2023  Authorization Period Expiration: 11/7/2024  Plan of Care Expiration: 3/16/2024  Progress Note Due: 2/16/2024  Visit # / Visits authorized: 4/20  FOTO: 1/1    Time In: 1000am  Time Out: 1100am  Total Billable Time: 60 minutes    Precautions: Standard and RCR     Date of Surgery: 10/14/2022    Subjective     Pt reports: Sore for 3 days after moving 500 lb plants for the weather    He was compliant with home exercise program.  Response to previous treatment: soreness  Functional change: improved overhead reach    Pain: 0/10  Location: right shoulder      Objective     Sukumar received therapeutic exercises to develop strength, endurance, ROM, flexibility, and posture for 0 minutes including:      Sukumar received the following manual therapy techniques: Joint mobilizations and Soft tissue Mobilization were applied to the: right shoulder for 0 minutes, including:    Cervical flexion segmental opening  Cervical flexion FMP  C5-6 opening on R Gr IV  Scalene 1st rib mob with left rotation    Sukumar participated in neuromuscular re-education activities to improve: Coordination, Proprioception, and Posture for 50 minutes. The following activities were included:    Pushups at bench 3 x 12/8/7  Wall clock YTB 3 x 5  Serratus wall slides YTB 3 x 10  Landmine press 45# BUE 3 x 5 with retro lunge  Serratus press sport cord (orange) 3 x 10  Pot stir 5 x 5 on wall yellow ball  Weighted ball serratus press wall 3 x  15  ER walkouts YTB 3 x 15    NT  IR/ER OTB 3 x 15  Adduction CC with eccentric control 20# cable column 2 x 15  Georgian NMES 20'  -Wall slides overhead 3#  -Serratus wall slide YTB  - Standing scaption 1#  TRX rows 3 x 15  Prone over EOT T 2 x 15  Prone over EOT touchdown 2 x 15  Bicep curl 15# 3 x 15    Sukumar participated in dynamic functional therapeutic activities to improve functional performance for 10 minutes, including:    UBE forward/backward level 5 for cardiovascular endurance training and shoulder ROM    Home Exercises Provided and Patient Education Provided     Education provided:   - Home Exercise Program, precautions, progress    Written Home Exercises Provided: Patient instructed to cont prior HEP.  Exercises were reviewed and Sukumar was able to demonstrate them prior to the end of the session.  Sukumar demonstrated good  understanding of the education provided.     See EMR under Patient Instructions for exercises provided prior visit.    Assessment     Sukumar was sore on the L the past couple of days from lifting weights at home. Added scaption at wall with towel, trying to incorporate more deltoid activation and continue to remind patient on cuff strengthening with isometrics as tolerated. Continues to be pain free just limited in his AROM     Sukumar Is progressing well towards his goals.   Pt prognosis is Good.     Pt will continue to benefit from skilled outpatient physical therapy to address the deficits listed in the problem list box on initial evaluation, provide pt/family education and to maximize pt's level of independence in the home and community environment.     Pt's spiritual, cultural and educational needs considered and pt agreeable to plan of care and goals.     Anticipated barriers to physical therapy: none    GOALS: Short Term Goals: 4 weeks(Progressing, not met)  1.Report decreased neck pain  <   / =  3  /10  to increase tolerance for work  2. Increase cervical ROM by 5-10 degrees in order  to perform ADLs with decreased difficulty.  3. Increase strength in B shoulders/scapular stabilizers by 1/3 MMT grade to increase tolerance for ADL and work activities.  4. Pt to tolerate HEP to improve ROM and independence with ADL's     Long Term Goals: 8 weeks(Progressing, not met)  1.Report decreased neck pain  <   / =  0  /10  to increase tolerance for return to exercise  2. Increase UE/neck flexibility in order to improve posture.    3.Increased strength in B shoulders/scapular stabilizers to >/= 4/5 MMT grade to increase tolerance for ADL and work activities.  4.  Pt will report at goals level on FOTO neck score for neck pain disability to demonstrate decrease in disability and improvement in neck pain.     Plan     Plan of care Certification: 11/16/2023 to 3/16/2024.     Improve overhead cuff strength    Therapist: Reji Szymanski, PT, DPT

## 2024-01-19 ENCOUNTER — CLINICAL SUPPORT (OUTPATIENT)
Dept: REHABILITATION | Facility: HOSPITAL | Age: 54
End: 2024-01-19
Payer: COMMERCIAL

## 2024-01-19 DIAGNOSIS — M54.2 NECK PAIN: Primary | ICD-10-CM

## 2024-01-19 PROCEDURE — 97112 NEUROMUSCULAR REEDUCATION: CPT | Mod: CQ

## 2024-01-19 PROCEDURE — 97530 THERAPEUTIC ACTIVITIES: CPT | Mod: CQ

## 2024-01-19 NOTE — PROGRESS NOTES
Physical Therapy Treatment Note     Name: Sukumar Beal  [facility] Number: [id number]    Therapy Diagnosis:   Encounter Diagnosis   Name Primary?    Neck pain Yes     Physician: Marylou Mueller NP    Visit Date: 1/19/2024    Physician Orders: PT Eval and Treat   Medical Diagnosis from Referral:   Diagnosis   R29.898 (ICD-10-CM) - Shoulder weakness   M25.511 (ICD-10-CM) - Acute pain of right shoulder   M47.812 (ICD-10-CM) - Spondylosis of cervical region without myelopathy or radiculopathy      Evaluation Date: 11/16/2023  Authorization Period Expiration: 11/7/2024  Plan of Care Expiration: 3/16/2024  Progress Note Due: 2/16/2024  Visit # / Visits authorized: 5/20  FOTO: 1/1    Time In: 1003  Time Out: 1102  Total Billable Time: 56 minutes    Precautions: Standard and RCR     Date of Surgery: 10/14/2022    Subjective     Pt reports: muscle soreness p previous tx, but no pain.     He was compliant with home exercise program.  Response to previous treatment: soreness  Functional change: improved overhead reach    Pain: 0/10  Location: right shoulder      Objective     Sukumar received therapeutic exercises to develop strength, endurance, ROM, flexibility, and posture for 00 minutes including:      Sukumar received the following manual therapy techniques: Joint mobilizations and Soft tissue Mobilization were applied to the: right shoulder for 00 minutes, including:    Cervical flexion segmental opening  Cervical flexion FMP  C5-6 opening on R Gr IV  Scalene 1st rib mob with left rotation    Sukumar participated in neuromuscular re-education activities to improve: Coordination, Proprioception, and Posture for 38 minutes. The following activities were included:    Wall clock YTB 3 x 15 namrata   AA scaption wall slides 4x to fatigue  ER walkouts OTB 3x to fatigue    Circuit: 3 rounds   Pot stirrers on wall to fatigue  Bicep curls 15# x 15 namrata     Circuit: 3 rounds   Wall ball ABC's (red ball) x 1  Bent over rows 15-20# to fatigue  namrata     NT  IR/ER OTB 3 x 15  Adduction CC with eccentric control 20# cable column 2 x 15  TRX rows 3 x 15  Prone over EOT T 2 x 15  Prone over EOT touchdown 2 x 15  Serratus wall slides YTB 3 x 10  Serratus press sport cord (orange) 3 x 10  Weighted ball serratus press wall 3 x 15    Sukumar participated in dynamic functional therapeutic activities to improve functional performance for 18 minutes, including:    UBE forward/backward lvl 5 x 8' for cardiovascular endurance training and shoulder ROM    Circuit: 3 rounds  SA landmines 45#bar x 8-12 namrata   20in push ups x 8-12      Home Exercises Provided and Patient Education Provided     Education provided:   - Home Exercise Program, precautions, progress    Written Home Exercises Provided: Patient instructed to cont prior HEP.  Exercises were reviewed and Sukumar was able to demonstrate them prior to the end of the session.  Sukumar demonstrated good  understanding of the education provided.     See EMR under Patient Instructions for exercises provided prior visit.    Assessment     Sukumar did well today. He was challenged by [place] activities, especially scaption wall slides. Pt did well with circuit training today with fatigue reported at end of tx. No adverse effects reported p tx.     Sukumar is progressing well towards his goals.   Pt prognosis is Good.     Pt will continue to benefit from skilled outpatient physical therapy to address the deficits listed in the problem list box on initial evaluation, provide pt/family education and to maximize pt's level of independence in the home and community environment.     Pt's spiritual, cultural and educational needs considered and pt agreeable to plan of care and goals.     Anticipated barriers to physical therapy: none    GOALS: Short Term Goals: 4 weeks(Progressing, not met)  1.Report decreased neck pain  <   / =  3  /10  to increase tolerance for work  2. Increase cervical ROM by 5-10 degrees in order to perform ADLs with decreased  difficulty.  3. Increase strength in B shoulders/scapular stabilizers by 1/3 MMT grade to increase tolerance for ADL and work activities.  4. Pt to tolerate HEP to improve ROM and independence with ADL's     Long Term Goals: 8 weeks(Progressing, not met)  1.Report decreased neck pain  <   / =  0  /10  to increase tolerance for return to exercise  2. Increase UE/neck flexibility in order to improve posture.    3.Increased strength in B shoulders/scapular stabilizers to >/= 4/5 MMT grade to increase tolerance for ADL and work activities.  4.  Pt will report at goals level on FOTO neck score for neck pain disability to demonstrate decrease in disability and improvement in neck pain.     Plan     Plan of care Certification: 11/16/2023 to 3/16/2024.     Improve overhead cuff strength    Therapist: Shavonne Storm PTA

## 2024-01-24 ENCOUNTER — CLINICAL SUPPORT (OUTPATIENT)
Dept: REHABILITATION | Facility: HOSPITAL | Age: 54
End: 2024-01-24
Payer: COMMERCIAL

## 2024-01-24 DIAGNOSIS — M54.2 NECK PAIN: Primary | ICD-10-CM

## 2024-01-24 PROCEDURE — 97530 THERAPEUTIC ACTIVITIES: CPT | Performed by: PHYSICAL THERAPIST

## 2024-01-24 PROCEDURE — 97112 NEUROMUSCULAR REEDUCATION: CPT | Performed by: PHYSICAL THERAPIST

## 2024-01-24 NOTE — PROGRESS NOTES
Physical Therapy Treatment Note     Name: Sukumar Beal  [facility] Number: [id number]    Therapy Diagnosis:   Encounter Diagnosis   Name Primary?    Neck pain Yes     Physician: Marylou Mueller NP    Visit Date: 1/24/2024    Physician Orders: PT Eval and Treat   Medical Diagnosis from Referral:   Diagnosis   R29.898 (ICD-10-CM) - Shoulder weakness   M25.511 (ICD-10-CM) - Acute pain of right shoulder   M47.812 (ICD-10-CM) - Spondylosis of cervical region without myelopathy or radiculopathy      Evaluation Date: 11/16/2023  Authorization Period Expiration: 11/7/2024  Plan of Care Expiration: 3/16/2024  Progress Note Due: 2/16/2024  Visit # / Visits authorized: 6/20  FOTO: 1/1    Time In: 0956  Time Out: 1100  Total Billable Time: 64 minutes    Precautions: Standard and RCR     Date of Surgery: 10/14/2022    Subjective     Pt reports: some days it does not feel any better but today it is feeling a little stronger and better range     He was compliant with home exercise program.  Response to previous treatment: soreness  Functional change: improved overhead reach    Pain: 0/10  Location: right shoulder      Objective     Sukumar received therapeutic exercises to develop strength, endurance, ROM, flexibility, and posture for 00 minutes including:      Sukumar received the following manual therapy techniques: Joint mobilizations and Soft tissue Mobilization were applied to the: right shoulder for 00 minutes, including:    Cervical flexion segmental opening  Cervical flexion FMP  C5-6 opening on R Gr IV  Scalene 1st rib mob with left rotation    Sukumar participated in neuromuscular re-education activities to improve: Coordination, Proprioception, and Posture for 37 minutes. The following activities were included:    Wall clock YTB 3 x 15 namrata   AA scaption wall slides 4x to fatigue  ER walkouts OTB 3x to fatigue    Circuit: 3 rounds   Pot stirrers on wall to fatigue  Pushups 3 x 15    Circuit: 3 rounds   Wall ball ABC's (red  ball) x 1  Bicep curls 10# cable column x 15 namrata     NT  Bent over rows 15-20# to fatigue namrata   IR/ER OTB 3 x 15  Adduction CC with eccentric control 20# cable column 2 x 15  TRX rows 3 x 15  Prone over EOT T 2 x 15  Prone over EOT touchdown 2 x 15  Serratus wall slides YTB 3 x 10  Serratus press sport cord (orange) 3 x 10  Weighted ball serratus press wall 3 x 15    Sukumar participated in dynamic functional therapeutic activities to improve functional performance for 27 minutes, including:    UBE forward/backward lvl 5 x 8' for cardiovascular endurance training and shoulder ROM  Chest press 20# 3 x 15  Incline press 10# 3 x 15      Home Exercises Provided and Patient Education Provided     Education provided:   - Home Exercise Program, precautions, progress    Written Home Exercises Provided: Patient instructed to cont prior HEP.  Exercises were reviewed and Sukumar was able to demonstrate them prior to the end of the session.  Sukumar demonstrated good  understanding of the education provided.     See EMR under Patient Instructions for exercises provided prior visit.    Assessment     Able to perform wall scaptions today with 1# and progressed chest press and incline press without pain. Notes being stronger than expected with some exercises. Does well with circuits in clinic.    Sukumar is progressing well towards his goals.   Pt prognosis is Good.     Pt will continue to benefit from skilled outpatient physical therapy to address the deficits listed in the problem list box on initial evaluation, provide pt/family education and to maximize pt's level of independence in the home and community environment.     Pt's spiritual, cultural and educational needs considered and pt agreeable to plan of care and goals.     Anticipated barriers to physical therapy: none    GOALS: Short Term Goals: 4 weeks(Progressing, not met)  1.Report decreased neck pain  <   / =  3  /10  to increase tolerance for work  2. Increase cervical ROM by  5-10 degrees in order to perform ADLs with decreased difficulty.  3. Increase strength in B shoulders/scapular stabilizers by 1/3 MMT grade to increase tolerance for ADL and work activities.  4. Pt to tolerate HEP to improve ROM and independence with ADL's     Long Term Goals: 8 weeks(Progressing, not met)  1.Report decreased neck pain  <   / =  0  /10  to increase tolerance for return to exercise  2. Increase UE/neck flexibility in order to improve posture.    3.Increased strength in B shoulders/scapular stabilizers to >/= 4/5 MMT grade to increase tolerance for ADL and work activities.  4.  Pt will report at goals level on FOTO neck score for neck pain disability to demonstrate decrease in disability and improvement in neck pain.     Plan     Plan of care Certification: 11/16/2023 to 3/16/2024.     Improve overhead cuff strength    Therapist: Reji Szymanski, PT

## 2024-01-26 ENCOUNTER — CLINICAL SUPPORT (OUTPATIENT)
Dept: REHABILITATION | Facility: HOSPITAL | Age: 54
End: 2024-01-26
Payer: COMMERCIAL

## 2024-01-26 DIAGNOSIS — M54.2 NECK PAIN: Primary | ICD-10-CM

## 2024-01-26 PROCEDURE — 97112 NEUROMUSCULAR REEDUCATION: CPT | Performed by: PHYSICAL THERAPIST

## 2024-01-26 PROCEDURE — 97530 THERAPEUTIC ACTIVITIES: CPT | Performed by: PHYSICAL THERAPIST

## 2024-01-26 NOTE — PROGRESS NOTES
"Physical Therapy Treatment Note     Name: Sukumar Beal  [facility] Number: [id number]    Therapy Diagnosis:   Encounter Diagnosis   Name Primary?    Neck pain Yes     Physician: Marylou Mueller NP    Visit Date: 2024    Physician Orders: PT Eval and Treat   Medical Diagnosis from Referral:   Diagnosis   R29.898 (ICD-10-CM) - Shoulder weakness   M25.511 (ICD-10-CM) - Acute pain of right shoulder   M47.812 (ICD-10-CM) - Spondylosis of cervical region without myelopathy or radiculopathy      Evaluation Date: 2023  Authorization Period Expiration: 2024  Plan of Care Expiration: 3/16/2024  Progress Note Due: 2024  Visit # / Visits authorized:   FOTO:     Time In: 0945  Time Out: 1051  Total Billable Time: 66 minutes    Precautions: Standard and RCR     Date of Surgery: 10/14/2022    Subjective     Pt reports:emotional, not in the best mood coming from planning mom's .     He was compliant with home exercise program.  Response to previous treatment: soreness  Functional change: improved overhead reach    Pain: 0/10  Location: right shoulder      Objective     Sukumar received therapeutic exercises to develop strength, endurance, ROM, flexibility, and posture for 00 minutes including:      Sukumar received the following manual therapy techniques: Joint mobilizations and Soft tissue Mobilization were applied to the: right shoulder for 00 minutes, including:    Cervical flexion segmental opening  Cervical flexion FMP  C5-6 opening on R Gr IV  Scalene 1st rib mob with left rotation    Sukumar participated in neuromuscular re-education activities to improve: Coordination, Proprioception, and Posture for 38 minutes. The following activities were included:    Wall clock YTB 3 x 15 namrata   AA scaption wall slides 4x to fatigue  ER walkouts OTB 3x to fatigue  Serratus press orange sport cord 3 x 12    Circuit: 3 rounds   Pot stirrers on wall to fatigue  Pushups 20" box 3 x 15    Circuit: 3 rounds   Wall " ball ABC's (yellow weighted ball) x 1  Bicep curls 10# cable column x 15 namrata     NT  Bent over rows 15-20# to fatigue namrata   IR/ER OTB 3 x 15  Adduction CC with eccentric control 20# cable column 2 x 15  TRX rows 3 x 15  Prone over EOT T 2 x 15  Prone over EOT touchdown 2 x 15  Serratus wall slides YTB 3 x 10  Serratus press sport cord (orange) 3 x 10  Weighted ball serratus press wall 3 x 15    Sukumar participated in dynamic functional therapeutic activities to improve functional performance for 28 minutes, including:    UBE forward/backward lvl 5 x 10' for cardiovascular endurance training and shoulder ROM  Chest press 20# 3 x 15  Incline press 10# 3 x 15      Home Exercises Provided and Patient Education Provided     Education provided:   - Home Exercise Program, precautions, progress    Written Home Exercises Provided: Patient instructed to cont prior HEP.  Exercises were reviewed and Sukumar was able to demonstrate them prior to the end of the session.  Sukumar demonstrated good  understanding of the education provided.     See EMR under Patient Instructions for exercises provided prior visit.    Assessment     Tolerated circuit well for strengthening. Incline unable to perform 15# due to performing at end of session. Attempted some serratus press with the red sport cord. Fatigued by end of session     Sukumar is progressing well towards his goals.   Pt prognosis is Good.     Pt will continue to benefit from skilled outpatient physical therapy to address the deficits listed in the problem list box on initial evaluation, provide pt/family education and to maximize pt's level of independence in the home and community environment.     Pt's spiritual, cultural and educational needs considered and pt agreeable to plan of care and goals.     Anticipated barriers to physical therapy: none    GOALS: Short Term Goals: 4 weeks(Progressing, not met)  1.Report decreased neck pain  <   / =  3  /10  to increase tolerance for work  2.  Increase cervical ROM by 5-10 degrees in order to perform ADLs with decreased difficulty.  3. Increase strength in B shoulders/scapular stabilizers by 1/3 MMT grade to increase tolerance for ADL and work activities.  4. Pt to tolerate HEP to improve ROM and independence with ADL's     Long Term Goals: 8 weeks(Progressing, not met)  1.Report decreased neck pain  <   / =  0  /10  to increase tolerance for return to exercise  2. Increase UE/neck flexibility in order to improve posture.    3.Increased strength in B shoulders/scapular stabilizers to >/= 4/5 MMT grade to increase tolerance for ADL and work activities.  4.  Pt will report at goals level on FOTO neck score for neck pain disability to demonstrate decrease in disability and improvement in neck pain.     Plan     Plan of care Certification: 11/16/2023 to 3/16/2024.     Improve overhead cuff strength    Therapist: Reji Szymanski, PT

## 2024-01-29 ENCOUNTER — CLINICAL SUPPORT (OUTPATIENT)
Dept: REHABILITATION | Facility: HOSPITAL | Age: 54
End: 2024-01-29
Payer: COMMERCIAL

## 2024-01-29 DIAGNOSIS — M54.2 NECK PAIN: Primary | ICD-10-CM

## 2024-01-29 PROCEDURE — 97530 THERAPEUTIC ACTIVITIES: CPT | Performed by: PHYSICAL THERAPIST

## 2024-01-29 PROCEDURE — 97112 NEUROMUSCULAR REEDUCATION: CPT | Performed by: PHYSICAL THERAPIST

## 2024-01-29 NOTE — PROGRESS NOTES
Physical Therapy Treatment Note     Name: Sukumar Beal  [facility] Number: [id number]    Therapy Diagnosis:   Encounter Diagnosis   Name Primary?    Neck pain Yes     Physician: Marylou Mueller NP    Visit Date: 1/29/2024    Physician Orders: PT Eval and Treat   Medical Diagnosis from Referral:   Diagnosis   R29.898 (ICD-10-CM) - Shoulder weakness   M25.511 (ICD-10-CM) - Acute pain of right shoulder   M47.812 (ICD-10-CM) - Spondylosis of cervical region without myelopathy or radiculopathy      Evaluation Date: 11/16/2023  Authorization Period Expiration: 11/7/2024  Plan of Care Expiration: 3/16/2024  Progress Note Due: 2/16/2024  Visit # / Visits authorized: 8/20  FOTO: 1/1    Time In: 0855  Time Out: 1000  Total Billable Time: 65 minutes    Precautions: Standard and RCR     Date of Surgery: 10/14/2022    Subjective     Pt reports:soreness in the shoulder after Friday. I was working in the yard this weekend, needed assistance with changing overhead light bulb    He was compliant with home exercise program.  Response to previous treatment: soreness  Functional change: improved overhead reach    Pain: 0/10  Location: right shoulder      Objective     Sukumar received therapeutic exercises to develop strength, endurance, ROM, flexibility, and posture for 00 minutes including:      Sukumar received the following manual therapy techniques: Joint mobilizations and Soft tissue Mobilization were applied to the: right shoulder for 00 minutes, including:    Cervical flexion segmental opening  Cervical flexion FMP  C5-6 opening on R Gr IV  Scalene 1st rib mob with left rotation    Sukumar participated in neuromuscular re-education activities to improve: Coordination, Proprioception, and Posture for 40 minutes. The following activities were included:    Wall clock YTB 3 x 15 namrata   AA scaption wall slides 4x 2#  ER walkouts OTB 3x to fatigue-nt  Serratus press red sport cord 3 x 12    Circuit: 3 rounds   Pot stirrers on wall to  "fatigue  Pushups 12" box 3 x 15    Circuit: 3 rounds   Wall ball ABC's (yellow weighted ball) x 1  Bicep curls 10# cable column x 15 namrata     NT  Bent over rows 15-20# to fatigue namrata   IR/ER OTB 3 x 15  Adduction CC with eccentric control 20# cable column 2 x 15  TRX rows 3 x 15  Prone over EOT T 2 x 15  Prone over EOT touchdown 2 x 15  Serratus wall slides YTB 3 x 10  Serratus press sport cord (orange) 3 x 10  Weighted ball serratus press wall 3 x 15    Sukumar participated in dynamic functional therapeutic activities to improve functional performance for 25 minutes, including:    UBE forward/backward lvl 5 x 10' for cardiovascular endurance training and shoulder ROM  Chest press 20# 3 x 15  Incline press 10# 3 x 15      Home Exercises Provided and Patient Education Provided     Education provided:   - Home Exercise Program, precautions, progress    Written Home Exercises Provided: Patient instructed to cont prior HEP.  Exercises were reviewed and Sukumar was able to demonstrate them prior to the end of the session.  Sukumar demonstrated good  understanding of the education provided.     See EMR under Patient Instructions for exercises provided prior visit.    Assessment     Noted more muscle burn with exercise today, able to progress active scaption at the wall. Fatigue end of session, needed to perform circuits as he was unable to chest press 10# due to fatigue. Progressed serratus to red band and noted better tolerance to serratus wall slides today    Sukumar is progressing well towards his goals.   Pt prognosis is Good.     Pt will continue to benefit from skilled outpatient physical therapy to address the deficits listed in the problem list box on initial evaluation, provide pt/family education and to maximize pt's level of independence in the home and community environment.     Pt's spiritual, cultural and educational needs considered and pt agreeable to plan of care and goals.     Anticipated barriers to physical " therapy: none    GOALS: Short Term Goals: 4 weeks(Progressing, not met)  1.Report decreased neck pain  <   / =  3  /10  to increase tolerance for work  2. Increase cervical ROM by 5-10 degrees in order to perform ADLs with decreased difficulty.  3. Increase strength in B shoulders/scapular stabilizers by 1/3 MMT grade to increase tolerance for ADL and work activities.  4. Pt to tolerate HEP to improve ROM and independence with ADL's     Long Term Goals: 8 weeks(Progressing, not met)  1.Report decreased neck pain  <   / =  0  /10  to increase tolerance for return to exercise  2. Increase UE/neck flexibility in order to improve posture.    3.Increased strength in B shoulders/scapular stabilizers to >/= 4/5 MMT grade to increase tolerance for ADL and work activities.  4.  Pt will report at goals level on FOTO neck score for neck pain disability to demonstrate decrease in disability and improvement in neck pain.     Plan     Plan of care Certification: 11/16/2023 to 3/16/2024.     Improve overhead cuff strength    Therapist: Reji Szymanski, PT

## 2024-01-31 ENCOUNTER — CLINICAL SUPPORT (OUTPATIENT)
Dept: REHABILITATION | Facility: HOSPITAL | Age: 54
End: 2024-01-31
Payer: COMMERCIAL

## 2024-01-31 DIAGNOSIS — M54.2 NECK PAIN: Primary | ICD-10-CM

## 2024-01-31 PROCEDURE — 97530 THERAPEUTIC ACTIVITIES: CPT | Performed by: PHYSICAL THERAPIST

## 2024-01-31 PROCEDURE — 97112 NEUROMUSCULAR REEDUCATION: CPT | Performed by: PHYSICAL THERAPIST

## 2024-01-31 NOTE — PROGRESS NOTES
Physical Therapy Treatment Note     Name: Sukumar Beal  [employer] Number: [id number]    Therapy Diagnosis:   Encounter Diagnosis   Name Primary?    Neck pain Yes     Physician: Marylou Mueller NP    Visit Date: 1/31/2024    Physician Orders: PT Eval and Treat   Medical Diagnosis from Referral:   Diagnosis   R29.898 (ICD-10-CM) - Shoulder weakness   M25.511 (ICD-10-CM) - Acute pain of right shoulder   M47.812 (ICD-10-CM) - Spondylosis of cervical region without myelopathy or radiculopathy      Evaluation Date: 11/16/2023  Authorization Period Expiration: 11/7/2024  Plan of Care Expiration: 3/16/2024  Progress Note Due: 2/16/2024  Visit # / Visits authorized: 9/20  FOTO: 1/1    Time In: 0800  Time Out: 900  Total Billable Time: 60 minutes    Precautions: Standard and RCR     Date of Surgery: 10/14/2022    Subjective     Pt reports:my shoulder feels like it is waking up now. Notes he hurt his neck laying on his stomach looking up trying to plug in a light.    He was compliant with home exercise program.  Response to previous treatment: soreness  Functional change: improved overhead reach    Pain: 0/10  Location: right shoulder      Objective     Sukumar received therapeutic exercises to develop strength, endurance, ROM, flexibility, and posture for 00 minutes including:      Sukumar received the following manual therapy techniques: Joint mobilizations and Soft tissue Mobilization were applied to the: right shoulder for 00 minutes, including:    Cervical flexion segmental opening  Cervical flexion FMP  C5-6 opening on R Gr IV  Scalene 1st rib mob with left rotation    Sukumar participated in neuromuscular re-education activities to improve: Coordination, Proprioception, and Posture for 35 minutes. The following activities were included:    Wall clock YTB 3 x 15 narmata   AA scaption wall slides 4x 2#  ER walkouts OTB 3x to fatigue-nt  Serratus press red sport cord 3 x 12    Circuit: 3 rounds   Pot stirrers on wall to  "fatigue  Pushups 12" box 3 x 15    Circuit: 3 rounds   Wall ball ABC's (yellow weighted ball) x 1  Bicep curls 10# cable column x 15 namrata     Circuit: 3 rounds  Bent over rows 15-20# to fatigue namrata   Prone extensions 5# 3 x 15    NT  IR/ER OTB 3 x 15  Adduction CC with eccentric control 20# cable column 2 x 15  TRX rows 3 x 15  Prone over EOT T 2 x 15  Prone over EOT touchdown 2 x 15  Serratus wall slides YTB 3 x 10  Serratus press sport cord (orange) 3 x 10  Weighted ball serratus press wall 3 x 15    Sukumar participated in dynamic functional therapeutic activities to improve functional performance for 25 minutes, including:    UBE forward/backward lvl 5 x 10' for cardiovascular endurance training and shoulder ROM  Chest press 20# 3 x 15  Incline press 10# 3 x 15      Home Exercises Provided and Patient Education Provided     Education provided:   - Home Exercise Program, precautions, progress    Written Home Exercises Provided: Patient instructed to cont prior HEP.  Exercises were reviewed and Sukumar was able to demonstrate them prior to the end of the session.  Sukumar demonstrated good  understanding of the education provided.     See EMR under Patient Instructions for exercises provided prior visit.    Assessment     Sukumar progressed with functional training to the shoulder today. Added the bent over row today and prone extensions without pain. Will continue progress loading, notes muscle fatigue now within session and hypertrophy to the deltoid seen    Sukumar is progressing well towards his goals.   Pt prognosis is Good.     Pt will continue to benefit from skilled outpatient physical therapy to address the deficits listed in the problem list box on initial evaluation, provide pt/family education and to maximize pt's level of independence in the home and community environment.     Pt's spiritual, cultural and educational needs considered and pt agreeable to plan of care and goals.     Anticipated barriers to physical " therapy: none    GOALS: Short Term Goals: 4 weeks(Progressing, not met)  1.Report decreased neck pain  <   / =  3  /10  to increase tolerance for work  2. Increase cervical ROM by 5-10 degrees in order to perform ADLs with decreased difficulty.  3. Increase strength in B shoulders/scapular stabilizers by 1/3 MMT grade to increase tolerance for ADL and work activities.  4. Pt to tolerate HEP to improve ROM and independence with ADL's     Long Term Goals: 8 weeks(Progressing, not met)  1.Report decreased neck pain  <   / =  0  /10  to increase tolerance for return to exercise  2. Increase UE/neck flexibility in order to improve posture.    3.Increased strength in B shoulders/scapular stabilizers to >/= 4/5 MMT grade to increase tolerance for ADL and work activities.  4.  Pt will report at goals level on FOTO neck score for neck pain disability to demonstrate decrease in disability and improvement in neck pain.     Plan     Plan of care Certification: 11/16/2023 to 3/16/2024.     Improve overhead cuff strength    Therapist: Reji Szymanski, PT

## 2024-02-05 RX ORDER — ATORVASTATIN CALCIUM 20 MG/1
20 TABLET, FILM COATED ORAL DAILY
Qty: 90 TABLET | Refills: 0 | Status: SHIPPED | OUTPATIENT
Start: 2024-02-05

## 2024-02-05 NOTE — TELEPHONE ENCOUNTER
Care Due:                  Date            Visit Type   Department     Provider  --------------------------------------------------------------------------------                                EP -                              PRIMARY      [facility] PRIMARY  Last Visit: 08-      CARE ([facility])   KEVIN Turner  Next Visit: None Scheduled  None         None Found                                                            Last  Test          Frequency    Reason                     Performed    Due Date  --------------------------------------------------------------------------------    K...........  12 months..  losartan.................  08- 08-    Lipid Panel.  12 months..  atorvastatin.............  03-   03-    TSH.........  12 months..  levothyroxine............  08- 08-    [facility] Embedded Care Due Messages. Reference number: [id number].   2/05/2024 7:12:17 AM CST

## 2024-02-05 NOTE — TELEPHONE ENCOUNTER
Provider Staff:  Action required for this patient    Requires labs      Please see care gap opportunities below in Care Due Message.    Thanks!  [facility]     Appointments      Date Provider   Last Visit   8/10/2023 Katherine Turner MD   Next Visit   Visit date not found Katherine Turner MD     Refill Decision Note   Sukumar Beal  is requesting a refill authorization.  Brief Assessment and Rationale for Refill:  Approve     Medication Therapy Plan:         Comments:     Note composed:2:02 PM 02/05/2024

## 2024-02-07 ENCOUNTER — CLINICAL SUPPORT (OUTPATIENT)
Dept: REHABILITATION | Facility: HOSPITAL | Age: 54
End: 2024-02-07
Payer: COMMERCIAL

## 2024-02-07 DIAGNOSIS — M54.2 NECK PAIN: Primary | ICD-10-CM

## 2024-02-07 PROCEDURE — 97530 THERAPEUTIC ACTIVITIES: CPT | Performed by: PHYSICAL THERAPIST

## 2024-02-07 PROCEDURE — 97112 NEUROMUSCULAR REEDUCATION: CPT | Performed by: PHYSICAL THERAPIST

## 2024-02-07 NOTE — PROGRESS NOTES
"Physical Therapy Treatment Note     Name: Sukumar Beal  [facility] Number: [id number]    Therapy Diagnosis:   Encounter Diagnosis   Name Primary?    Neck pain Yes     Physician: Marylou Mueller NP    Visit Date: 2/7/2024    Physician Orders: PT Eval and Treat   Medical Diagnosis from Referral:   Diagnosis   R29.898 (ICD-10-CM) - Shoulder weakness   M25.511 (ICD-10-CM) - Acute pain of right shoulder   M47.812 (ICD-10-CM) - Spondylosis of cervical region without myelopathy or radiculopathy      Evaluation Date: 11/16/2023  Authorization Period Expiration: 11/7/2024  Plan of Care Expiration: 3/16/2024  Progress Note Due: 2/16/2024  Visit # / Visits authorized: 10/20  FOTO: 1/1    Time In: 0800  Time Out: 900  Total Billable Time: 60 minutes    Precautions: Standard and RCR     Date of Surgery: 10/14/2022    Subjective     Pt reports: Noted a lump on top of his shoulder. Been busy with moms wake    He was compliant with home exercise program.  Response to previous treatment: soreness  Functional change: improved overhead reach    Pain: 0/10  Location: right shoulder      Objective     Sukumar received therapeutic exercises to develop strength, endurance, ROM, flexibility, and posture for 00 minutes including:      Sukumar received the following manual therapy techniques: Joint mobilizations and Soft tissue Mobilization were applied to the: right shoulder for 00 minutes, including:    Cervical flexion segmental opening  Cervical flexion FMP  C5-6 opening on R Gr IV  Scalene 1st rib mob with left rotation    Sukumar participated in neuromuscular re-education activities to improve: Coordination, Proprioception, and Posture for 35 minutes. The following activities were included:    Wall clock GTB 3 x 15 namrata   AA scaption wall slides 4x 2#  ER walkouts OTB 3x to fatigue-nt  Serratus press red sport cord 3 x 12    Circuit: 3 rounds   Pot stirrers on wall to fatigue  Pushups 12" box 3 x 15    Circuit: 3 rounds   Wall ball ABC's " (blue weighted ball) x 1  Bicep curls 10# cable column x 15 namrata     Circuit: 3 rounds  Bent over rows 15-20# to fatigue namrata   Swiss ball T/I 3# 3 x 15    NT  IR/ER OTB 3 x 15  Adduction CC with eccentric control 20# cable column 2 x 15  TRX rows 3 x 15  Prone over EOT T 2 x 15  Prone over EOT touchdown 2 x 15  Serratus wall slides YTB 3 x 10  Serratus press sport cord (orange) 3 x 10  Weighted ball serratus press wall 3 x 15    Sukumar participated in dynamic functional therapeutic activities to improve functional performance for 25 minutes, including:    UBE forward/backward lvl 5 x 10' for cardiovascular endurance training and shoulder ROM  Chest press 20# 3 x 15  Incline press 10# 3 x 15      Home Exercises Provided and Patient Education Provided     Education provided:   - Home Exercise Program, precautions, progress    Written Home Exercises Provided: Patient instructed to cont prior HEP.  Exercises were reviewed and Sukumar was able to demonstrate them prior to the end of the session.  Sukumar demonstrated good  understanding of the education provided.     See EMR under Patient Instructions for exercises provided prior visit.    Assessment     Progressed today periscapular strengthening with prone T/I today. He reports limited strength and concerned with new lump on top of his shoulder, educated to speak with his PCP about this. Fatigued with GTB progression slide up the wall    Sukumar is progressing well towards his goals.   Pt prognosis is Good.     Pt will continue to benefit from skilled outpatient physical therapy to address the deficits listed in the problem list box on initial evaluation, provide pt/family education and to maximize pt's level of independence in the home and community environment.     Pt's spiritual, cultural and educational needs considered and pt agreeable to plan of care and goals.     Anticipated barriers to physical therapy: none    GOALS: Short Term Goals: 4 weeks(Progressing, not  met)  1.Report decreased neck pain  <   / =  3  /10  to increase tolerance for work  2. Increase cervical ROM by 5-10 degrees in order to perform ADLs with decreased difficulty.  3. Increase strength in B shoulders/scapular stabilizers by 1/3 MMT grade to increase tolerance for ADL and work activities.  4. Pt to tolerate HEP to improve ROM and independence with ADL's     Long Term Goals: 8 weeks(Progressing, not met)  1.Report decreased neck pain  <   / =  0  /10  to increase tolerance for return to exercise  2. Increase UE/neck flexibility in order to improve posture.    3.Increased strength in B shoulders/scapular stabilizers to >/= 4/5 MMT grade to increase tolerance for ADL and work activities.  4.  Pt will report at goals level on FOTO neck score for neck pain disability to demonstrate decrease in disability and improvement in neck pain.     Plan     Plan of care Certification: 11/16/2023 to 3/16/2024.     Improve overhead cuff strength    Therapist: Reji Szymanski, PT

## 2024-02-14 ENCOUNTER — CLINICAL SUPPORT (OUTPATIENT)
Dept: REHABILITATION | Facility: HOSPITAL | Age: 54
End: 2024-02-14
Payer: COMMERCIAL

## 2024-02-14 DIAGNOSIS — M54.2 NECK PAIN: Primary | ICD-10-CM

## 2024-02-14 PROCEDURE — 97530 THERAPEUTIC ACTIVITIES: CPT | Performed by: PHYSICAL THERAPIST

## 2024-02-14 PROCEDURE — 97112 NEUROMUSCULAR REEDUCATION: CPT | Performed by: PHYSICAL THERAPIST

## 2024-02-14 NOTE — PROGRESS NOTES
"Physical Therapy Treatment Note     Name: Sukumar Beal  [facility] Number: [id number]    Therapy Diagnosis:   Encounter Diagnosis   Name Primary?    Neck pain Yes     Physician: Marylou Mueller NP    Visit Date: 2/14/2024    Physician Orders: PT Eval and Treat   Medical Diagnosis from Referral:   Diagnosis   R29.898 (ICD-10-CM) - Shoulder weakness   M25.511 (ICD-10-CM) - Acute pain of right shoulder   M47.812 (ICD-10-CM) - Spondylosis of cervical region without myelopathy or radiculopathy      Evaluation Date: 11/16/2023  Authorization Period Expiration: 11/7/2024  Plan of Care Expiration: 3/16/2024  Progress Note Due: 2/16/2024  Visit # / Visits authorized: 11/20  FOTO: 1/1    Time In: 0940  Time Out: 1040  Total Billable Time: 55 minutes    Precautions: Standard and RCR     Date of Surgery: 10/14/2022    Subjective     Pt reports: Shoulder is really feeling it today in a good way. I rested yesterday, spent the day at home.     He was compliant with home exercise program.  Response to previous treatment: soreness  Functional change: improved overhead reach    Pain: 0/10  Location: right shoulder      Objective     Sukumar received therapeutic exercises to develop strength, endurance, ROM, flexibility, and posture for 00 minutes including:      Sukumar received the following manual therapy techniques: Joint mobilizations and Soft tissue Mobilization were applied to the: right shoulder for 00 minutes, including:    Cervical flexion segmental opening  Cervical flexion FMP  C5-6 opening on R Gr IV  Scalene 1st rib mob with left rotation    Sukumar participated in neuromuscular re-education activities to improve: Coordination, Proprioception, and Posture for 35 minutes. The following activities were included:    Wall clock GTB 3 x 15 namrata   AA scaption wall slides 4x 2#  ER walkouts GTB 3x to fatigue  Serratus press red sport cord 3 x 12  TRX rows 3 x 15    Circuit: 3 rounds   Pot stirrers on wall to fatigue  Pushups 12" box 3 " x 15    Circuit: 3 rounds   Wall ball ABC's (blue weighted ball) x 1  Bicep curls 10# x 15 namrata     Circuit: 3 rounds  Bent over rows 15-20# to fatigue namrata   Swiss ball T/I  3 x 15    NT  IR/ER OTB 3 x 15  Adduction CC with eccentric control 20# cable column 2 x 15    Prone over EOT T 2 x 15  Prone over EOT touchdown 2 x 15  Serratus wall slides YTB 3 x 10  Serratus press sport cord (orange) 3 x 10  Weighted ball serratus press wall 3 x 15    Sukumar participated in dynamic functional therapeutic activities to improve functional performance for 25 minutes, including:    UBE forward/backward lvl 5 x 10' for cardiovascular endurance training and shoulder ROM  Chest press 20# 3 x 15  Incline press 10# 3 x 15      Home Exercises Provided and Patient Education Provided     Education provided:   - Home Exercise Program, precautions, progress    Written Home Exercises Provided: Patient instructed to cont prior HEP.  Exercises were reviewed and Sukumar was able to demonstrate them prior to the end of the session.  Sukumar demonstrated good  understanding of the education provided.     See EMR under Patient Instructions for exercises provided prior visit.    Assessment     Sukumar noted more fatigue to the shoulder today with exercise, but he does note the muscle hypertrophy. Progressed weight AAROM and AROM as tolerated. Notes at home he is still using the LUE out of habit for ADLs    Sukumar is progressing well towards his goals.   Pt prognosis is Good.     Pt will continue to benefit from skilled outpatient physical therapy to address the deficits listed in the problem list box on initial evaluation, provide pt/family education and to maximize pt's level of independence in the home and community environment.     Pt's spiritual, cultural and educational needs considered and pt agreeable to plan of care and goals.     Anticipated barriers to physical therapy: none    GOALS: Short Term Goals: 4 weeks(Progressing, not met)  1.Report  decreased neck pain  <   / =  3  /10  to increase tolerance for work  2. Increase cervical ROM by 5-10 degrees in order to perform ADLs with decreased difficulty.  3. Increase strength in B shoulders/scapular stabilizers by 1/3 MMT grade to increase tolerance for ADL and work activities.  4. Pt to tolerate HEP to improve ROM and independence with ADL's     Long Term Goals: 8 weeks(Progressing, not met)  1.Report decreased neck pain  <   / =  0  /10  to increase tolerance for return to exercise  2. Increase UE/neck flexibility in order to improve posture.    3.Increased strength in B shoulders/scapular stabilizers to >/= 4/5 MMT grade to increase tolerance for ADL and work activities.  4.  Pt will report at goals level on FOTO neck score for neck pain disability to demonstrate decrease in disability and improvement in neck pain.     Plan     Plan of care Certification: 11/16/2023 to 3/16/2024.     Improve overhead cuff strength    Therapist: Reji Szymanski, PT

## 2024-02-16 ENCOUNTER — CLINICAL SUPPORT (OUTPATIENT)
Dept: REHABILITATION | Facility: HOSPITAL | Age: 54
End: 2024-02-16
Payer: COMMERCIAL

## 2024-02-16 DIAGNOSIS — M54.2 NECK PAIN: Primary | ICD-10-CM

## 2024-02-16 PROCEDURE — 97112 NEUROMUSCULAR REEDUCATION: CPT | Mod: CQ

## 2024-02-16 PROCEDURE — 97530 THERAPEUTIC ACTIVITIES: CPT | Mod: CQ

## 2024-02-16 NOTE — PROGRESS NOTES
Physical Therapy Treatment Note     Name: Sukumar Beal  [facility] Number: [id number]    Therapy Diagnosis:   Encounter Diagnosis   Name Primary?    Neck pain Yes     Physician: Marylou Mueller NP    Visit Date: 2/16/2024    Physician Orders: PT Eval and Treat   Medical Diagnosis from Referral:   Diagnosis   R29.898 (ICD-10-CM) - Shoulder weakness   M25.511 (ICD-10-CM) - Acute pain of right shoulder   M47.812 (ICD-10-CM) - Spondylosis of cervical region without myelopathy or radiculopathy      Evaluation Date: 11/16/2023  Authorization Period Expiration: 11/7/2024  Plan of Care Expiration: 3/16/2024  Progress Note Due: 2/16/2024  Visit # / Visits authorized: 12/20    Time In: 0801  Time Out: 0904  Total Billable Time: 64 minutes    Precautions: Standard and RCR     Date of Surgery: 10/14/2022    Subjective     Pt reports: no new complaints.     He was compliant with home exercise program.  Response to previous treatment: soreness  Functional change: improved overhead reach    Pain: 0/10  Location: right shoulder      Objective     Sukumar received therapeutic exercises to develop strength, endurance, ROM, flexibility, and posture for 00 minutes including:      Sukumar received the following manual therapy techniques: Joint mobilizations and Soft tissue Mobilization were applied to the: right shoulder for 00 minutes, including:    Cervical flexion segmental opening  Cervical flexion FMP  C5-6 opening on R Gr IV  Scalene 1st rib mob with left rotation    Sukumar participated in neuromuscular re-education activities to improve: Coordination, Proprioception, and Posture for 32 minutes. The following activities were included:    AA scaption wall slides 4x 2#    Circuit: 3 rounds  Floor press 25# x 10-15  Standing A (60deg ABD) GTB to fatigue    Circuit: 3 rounds   Wall ball ABC's (blue weighted ball) x 1  Wall clock GTB     Circuit: 3 rounds  SB T 3# to fatigue  Bicep curls 20# x 10-15    NP:  ER walkouts GTB 3x to  "fatigue  Serratus press red sport cord 3 x 12  TRX rows 3 x 15    Sukumar participated in dynamic functional therapeutic activities to improve functional performance for 30 minutes, including:    UBE forward/backward lvl 5 x 10' for cardiovascular endurance training and shoulder ROM    Circuit: 3 rounds   Bent over rows 15-20# to fatigue namrata   Pushups 12" box 3 x 15    Circuit: 3 rounds   Incline press 15# x 10  Standing alternating front/lateral raises 3# x 8 each      Home Exercises Provided and Patient Education Provided     Education provided:   - Home Exercise Program, precautions, progress    Written Home Exercises Provided: Patient instructed to cont prior HEP.  Exercises were reviewed and Sukumar was able to demonstrate them prior to the end of the session.  Sukumar demonstrated good  understanding of the education provided.     See EMR under Patient Instructions for exercises provided prior visit.    Assessment     Sukumar did great today. He was challenged by initiation of resisted ext and front/lateral raises. Will continue to work on OH strengthening/functional reaching. No adverse effects reported p tx.     Sukumar is progressing well towards his goals.   Pt prognosis is Good.     Pt will continue to benefit from skilled outpatient physical therapy to address the deficits listed in the problem list box on initial evaluation, provide pt/family education and to maximize pt's level of independence in the home and community environment.     Pt's spiritual, cultural and educational needs considered and pt agreeable to plan of care and goals.     Anticipated barriers to physical therapy: none    GOALS: Short Term Goals: 4 weeks(Progressing, not met)  1.Report decreased neck pain  <   / =  3  /10  to increase tolerance for work  2. Increase cervical ROM by 5-10 degrees in order to perform ADLs with decreased difficulty.  3. Increase strength in B shoulders/scapular stabilizers by 1/3 MMT grade to increase tolerance for ADL " and work activities.  4. Pt to tolerate HEP to improve ROM and independence with ADL's     Long Term Goals: 8 weeks(Progressing, not met)  1.Report decreased neck pain  <   / =  0  /10  to increase tolerance for return to exercise  2. Increase UE/neck flexibility in order to improve posture.    3.Increased strength in B shoulders/scapular stabilizers to >/= 4/5 MMT grade to increase tolerance for ADL and work activities.  4.  Pt will report at goals level on FOTO neck score for neck pain disability to demonstrate decrease in disability and improvement in neck pain.     Plan     Plan of care Certification: 11/16/2023 to 3/16/2024.     Improve overhead cuff strength    Therapist: Shavonne Storm PTA

## 2024-02-19 ENCOUNTER — CLINICAL SUPPORT (OUTPATIENT)
Dept: REHABILITATION | Facility: HOSPITAL | Age: 54
End: 2024-02-19
Payer: COMMERCIAL

## 2024-02-19 DIAGNOSIS — M54.2 NECK PAIN: Primary | ICD-10-CM

## 2024-02-19 PROCEDURE — 97530 THERAPEUTIC ACTIVITIES: CPT | Performed by: PHYSICAL THERAPIST

## 2024-02-19 PROCEDURE — 97112 NEUROMUSCULAR REEDUCATION: CPT | Performed by: PHYSICAL THERAPIST

## 2024-02-19 NOTE — PROGRESS NOTES
Physical Therapy Treatment Note     Name: Sukumar Beal  [facility] Number: [id number]    Therapy Diagnosis:   Encounter Diagnosis   Name Primary?    Neck pain Yes     Physician: Marylou Mueller NP    Visit Date: 2/19/2024    Physician Orders: PT Eval and Treat   Medical Diagnosis from Referral:   Diagnosis   R29.898 (ICD-10-CM) - Shoulder weakness   M25.511 (ICD-10-CM) - Acute pain of right shoulder   M47.812 (ICD-10-CM) - Spondylosis of cervical region without myelopathy or radiculopathy      Evaluation Date: 11/16/2023  Authorization Period Expiration: 11/7/2024  Plan of Care Expiration: 3/16/2024  Progress Note Due: 2/16/2024  Visit # / Visits authorized: 13/20    Time In: 0900  Time Out: 0954  Total Billable Time: 54 minutes    Precautions: Standard and RCR     Date of Surgery: 10/14/2022    Subjective     Pt reports: My shoudler is getting stronger just a slowprocess    He was compliant with home exercise program.  Response to previous treatment: soreness  Functional change: improved overhead reach    Pain: 0/10  Location: right shoulder      Objective     Sukumar received therapeutic exercises to develop strength, endurance, ROM, flexibility, and posture for 00 minutes including:      Sukumar received the following manual therapy techniques: Joint mobilizations and Soft tissue Mobilization were applied to the: right shoulder for 00 minutes, including:    Cervical flexion segmental opening  Cervical flexion FMP  C5-6 opening on R Gr IV  Scalene 1st rib mob with left rotation    Sukumar participated in neuromuscular re-education activities to improve: Coordination, Proprioception, and Posture for 30 minutes. The following activities were included:    AA scaption wall slides 4x 2#    Circuit: 3 rounds  Floor press 25# x 10-15  Standing A (60deg ABD) GTB to fatigue    Circuit: 3 rounds   Wall ball ABC's (blue weighted ball) x 1  Wall clock GTB     Circuit: 3 rounds  SB T 3# to fatigue  Bicep curls 20# x 10-15    Circuit  "3 rounds:  Serratus press red sport cord 3 x 12  TRX rows 3 x 15    NP:  ER walkouts GTB 3x to fatigue      Sukumar participated in dynamic functional therapeutic activities to improve functional performance for 24 minutes, including:    UBE forward/backward lvl 5 x 10' for cardiovascular endurance training and shoulder ROM    Circuit: 3 rounds   Bent over rows 15-20# to fatigue namrata   Pushups 12" box 3 x 15    Circuit: 3 rounds   Incline press 15# x 10  Standing alternating front/lateral raises 3# x 8 each      Home Exercises Provided and Patient Education Provided     Education provided:   - Home Exercise Program, precautions, progress    Written Home Exercises Provided: Patient instructed to cont prior HEP.  Exercises were reviewed and Sukumar was able to demonstrate them prior to the end of the session.  Sukumar demonstrated good  understanding of the education provided.     See EMR under Patient Instructions for exercises provided prior visit.    Assessment     Returned to TRX and serratus sport cord loading today. Notes not having much energy this Monday or motivation. Progressing strength, on assessment he can not resist with deltoid and ER. Previous no activation in acute phase of injury    Sukumar is progressing well towards his goals.   Pt prognosis is Good.     Pt will continue to benefit from skilled outpatient physical therapy to address the deficits listed in the problem list box on initial evaluation, provide pt/family education and to maximize pt's level of independence in the home and community environment.     Pt's spiritual, cultural and educational needs considered and pt agreeable to plan of care and goals.     Anticipated barriers to physical therapy: none    GOALS: Short Term Goals: 4 weeks(Progressing, not met)  1.Report decreased neck pain  <   / =  3  /10  to increase tolerance for work  2. Increase cervical ROM by 5-10 degrees in order to perform ADLs with decreased difficulty.  3. Increase strength in " B shoulders/scapular stabilizers by 1/3 MMT grade to increase tolerance for ADL and work activities.  4. Pt to tolerate HEP to improve ROM and independence with ADL's     Long Term Goals: 8 weeks(Progressing, not met)  1.Report decreased neck pain  <   / =  0  /10  to increase tolerance for return to exercise  2. Increase UE/neck flexibility in order to improve posture.    3.Increased strength in B shoulders/scapular stabilizers to >/= 4/5 MMT grade to increase tolerance for ADL and work activities.  4.  Pt will report at goals level on FOTO neck score for neck pain disability to demonstrate decrease in disability and improvement in neck pain.     Plan     Plan of care Certification: 11/16/2023 to 3/16/2024.     Improve overhead cuff strength    Therapist: Reji Szymanski, PT

## 2024-02-23 ENCOUNTER — CLINICAL SUPPORT (OUTPATIENT)
Dept: REHABILITATION | Facility: HOSPITAL | Age: 54
End: 2024-02-23
Payer: COMMERCIAL

## 2024-02-23 DIAGNOSIS — M54.2 NECK PAIN: Primary | ICD-10-CM

## 2024-02-23 PROCEDURE — 97530 THERAPEUTIC ACTIVITIES: CPT | Mod: CQ

## 2024-02-23 PROCEDURE — 97112 NEUROMUSCULAR REEDUCATION: CPT | Mod: CQ

## 2024-02-23 NOTE — PROGRESS NOTES
Physical Therapy Treatment Note     Name: Sukumar Beal  [facility] Number: [id number]    Therapy Diagnosis:   Encounter Diagnosis   Name Primary?    Neck pain Yes     Physician: Marylou Mueller NP    Visit Date: 2/23/2024    Physician Orders: PT Eval and Treat   Medical Diagnosis from Referral:   Diagnosis   R29.898 (ICD-10-CM) - Shoulder weakness   M25.511 (ICD-10-CM) - Acute pain of right shoulder   M47.812 (ICD-10-CM) - Spondylosis of cervical region without myelopathy or radiculopathy      Evaluation Date: 11/16/2023  Authorization Period Expiration: 11/7/2024  Plan of Care Expiration: 3/16/2024  Progress Note Due: 2/16/2024  Visit # / Visits authorized: 14/20    Time In: 0846  Time Out: 0951  Total Billable Time: 55 minutes    Precautions: Standard and RCR     Date of Surgery: 10/14/2022    Subjective     Pt reports: he went to a [employer] last night, and it went well. He was surprised by what he was able to do. He wants to work on delt strength and being able to lift his arm without shrugging too much.     He was compliant with home exercise program.  Response to previous treatment: soreness  Functional change: improved overhead reach    Pain: 0/10  Location: right shoulder      Objective     Sukumar received therapeutic exercises to develop strength, endurance, ROM, flexibility, and posture for 00 minutes including:      Sukumar received the following manual therapy techniques: Joint mobilizations and Soft tissue Mobilization were applied to the: right shoulder for 00 minutes, including:    Cervical flexion segmental opening  Cervical flexion FMP  C5-6 opening on R Gr IV  Scalene 1st rib mob with left rotation    Sukumar participated in neuromuscular re-education activities to improve: Coordination, Proprioception, and Posture for 32 minutes. The following activities were included:    Wall slides with YTB for ER 3x to fatigue    Circuit: 3 rounds  SB T 3# to fatigue  Standing alternating front/lateral raises 3# to  "fatigue    Circuit: 3 rounds  Wall ball ABC's x 1  Bicep curls 20# x 10-15  ER OTB to fatigue    NP:  ER walkouts GTB 3x to fatigue  AA scaption wall slides 4x 2#  TRX rows 3 x 15  Floor press 25# x 10-15  Standing A (60deg ABD) GTB to fatigue  Wall clock GTB     Sukumar participated in dynamic functional therapeutic activities to improve functional performance for 23 minutes, including:    UBE forward/backward lvl 5 x 10' for cardiovascular endurance training and shoulder ROM    Circuit 3 rounds:  Serratus press red sport cord 3 x 12  Incline press 15# x 10    NP:  Circuit: 3 rounds   Bent over rows 15-20# to fatigue namrata   Pushups 12" box 3 x 15      Home Exercises Provided and Patient Education Provided     Education provided:   - Home Exercise Program, precautions, progress    Written Home Exercises Provided: Patient instructed to cont prior HEP.  Exercises were reviewed and Sukumar was able to demonstrate them prior to the end of the session.  Sukumar demonstrated good  understanding of the education provided.     See EMR under Patient Instructions for exercises provided prior visit.    Assessment     Sukumar did great today. He was challenged by front/lateral raises and ER therex. He is unable to maintain ER during wall slides at this time. Reviewed RTC role in [place] motion and HEP. No adverse effects reported p tx.      Sukumar is progressing well towards his goals.   Pt prognosis is Good.     Pt will continue to benefit from skilled outpatient physical therapy to address the deficits listed in the problem list box on initial evaluation, provide pt/family education and to maximize pt's level of independence in the home and community environment.     Pt's spiritual, cultural and educational needs considered and pt agreeable to plan of care and goals.     Anticipated barriers to physical therapy: none    GOALS: Short Term Goals: 4 weeks(Progressing, not met)  1.Report decreased neck pain  <   / =  3  /10  to increase tolerance " for work  2. Increase cervical ROM by 5-10 degrees in order to perform ADLs with decreased difficulty.  3. Increase strength in B shoulders/scapular stabilizers by 1/3 MMT grade to increase tolerance for ADL and work activities.  4. Pt to tolerate HEP to improve ROM and independence with ADL's     Long Term Goals: 8 weeks(Progressing, not met)  1.Report decreased neck pain  <   / =  0  /10  to increase tolerance for return to exercise  2. Increase UE/neck flexibility in order to improve posture.    3.Increased strength in B shoulders/scapular stabilizers to >/= 4/5 MMT grade to increase tolerance for ADL and work activities.  4.  Pt will report at goals level on FOTO neck score for neck pain disability to demonstrate decrease in disability and improvement in neck pain.     Plan     Plan of care Certification: 11/16/2023 to 3/16/2024.     Improve overhead cuff strength    Therapist: Shavonne Storm PTA

## 2024-02-26 ENCOUNTER — CLINICAL SUPPORT (OUTPATIENT)
Dept: REHABILITATION | Facility: HOSPITAL | Age: 54
End: 2024-02-26
Payer: COMMERCIAL

## 2024-02-26 DIAGNOSIS — M54.2 NECK PAIN: Primary | ICD-10-CM

## 2024-02-26 PROCEDURE — 97112 NEUROMUSCULAR REEDUCATION: CPT | Mod: CQ

## 2024-02-26 PROCEDURE — 97530 THERAPEUTIC ACTIVITIES: CPT | Mod: CQ

## 2024-02-26 NOTE — PROGRESS NOTES
Physical Therapy Treatment Note     Name: Sukumar Beal  [facility] Number: [id number]    Therapy Diagnosis:   Encounter Diagnosis   Name Primary?    Neck pain Yes     Physician: Marylou Mueller NP    Visit Date: 2/26/2024    Physician Orders: PT Eval and Treat   Medical Diagnosis from Referral:   Diagnosis   R29.898 (ICD-10-CM) - Shoulder weakness   M25.511 (ICD-10-CM) - Acute pain of right shoulder   M47.812 (ICD-10-CM) - Spondylosis of cervical region without myelopathy or radiculopathy      Evaluation Date: 11/16/2023  Authorization Period Expiration: 11/7/2024  Plan of Care Expiration: 3/16/2024  Progress Note Due: 2/16/2024  Visit # / Visits authorized: 15/20    Time In: 0958  Time Out: 1100  Total Billable Time: 57 minutes    Precautions: Standard and RCR     Date of Surgery: 10/14/2022    Subjective     Pt reports: 12in pushups are getting easier.      He was compliant with home exercise program.  Response to previous treatment: soreness  Functional change: improved overhead reach    Pain: 0/10  Location: right shoulder      Objective     Sukumar received therapeutic exercises to develop strength, endurance, ROM, flexibility, and posture for 00 minutes including:      Sukumar received the following manual therapy techniques: Joint mobilizations and Soft tissue Mobilization were applied to the: right shoulder for 00 minutes, including:    Cervical flexion segmental opening  Cervical flexion FMP  C5-6 opening on R Gr IV  Scalene 1st rib mob with left rotation    Sukumar participated in neuromuscular re-education activities to improve: Coordination, Proprioception, and Posture for 30 minutes. The following activities were included:    Wall slides with YTB for ER 3x to fatigue    Circuit: 3 rounds  SB alternating T/Y 3# x 8 each  Standing alternating front/lateral raises 3# x 8 each    Circuit: 3 rounds  Bicep curls 20# x 10-15  ER OTB to fatigue  Standing A (45deg ABD) GTB to fatigue    NP:  ER walkouts GTB 3x to  "fatigue  AA scaption wall slides 4x 2#  TRX rows 3 x 15  Floor press 25# x 10-15  Wall clock GTB     Sukumar participated in dynamic functional therapeutic activities to improve functional performance for 27 minutes, including:    UBE forward/backward lvl 5 x 10' for cardiovascular endurance training and shoulder ROM    Circuit 3 rounds:  Serratus press red sport cord x 12  Incline press 25# x 10    Circuit: 3 rounds   Bent over rows 15-20# to fatigue namrata   Pushups 12" box 3 x 15 - progress to 6in box next tx  Wall ball ABC's x 1      Home Exercises Provided and Patient Education Provided     Education provided:   - Home Exercise Program, precautions, progress    Written Home Exercises Provided: Patient instructed to cont prior HEP.  Exercises were reviewed and Sukumar was able to demonstrate them prior to the end of the session.  Sukumar demonstrated good  understanding of the education provided.     See EMR under Patient Instructions for exercises provided prior visit.    Assessment     Sukumar did great today. He was again challenged by front/lateral raises and progressed SB T/Y. ER remains weak, but he demonstrated improved form during resisted ER. He could likely progress pushups to 6in box next tx. No adverse effects reported p tx.      Sukumar is progressing well towards his goals.   Pt prognosis is Good.     Pt will continue to benefit from skilled outpatient physical therapy to address the deficits listed in the problem list box on initial evaluation, provide pt/family education and to maximize pt's level of independence in the home and community environment.     Pt's spiritual, cultural and educational needs considered and pt agreeable to plan of care and goals.     Anticipated barriers to physical therapy: none    GOALS: Short Term Goals: 4 weeks(Progressing, not met)  1.Report decreased neck pain  <   / =  3  /10  to increase tolerance for work  2. Increase cervical ROM by 5-10 degrees in order to perform ADLs with " decreased difficulty.  3. Increase strength in B shoulders/scapular stabilizers by 1/3 MMT grade to increase tolerance for ADL and work activities.  4. Pt to tolerate HEP to improve ROM and independence with ADL's     Long Term Goals: 8 weeks(Progressing, not met)  1.Report decreased neck pain  <   / =  0  /10  to increase tolerance for return to exercise  2. Increase UE/neck flexibility in order to improve posture.    3.Increased strength in B shoulders/scapular stabilizers to >/= 4/5 MMT grade to increase tolerance for ADL and work activities.  4.  Pt will report at goals level on FOTO neck score for neck pain disability to demonstrate decrease in disability and improvement in neck pain.     Plan     Plan of care Certification: 11/16/2023 to 3/16/2024.     Improve overhead cuff strength    Therapist: Shavonne Storm PTA

## 2024-02-28 ENCOUNTER — CLINICAL SUPPORT (OUTPATIENT)
Dept: REHABILITATION | Facility: HOSPITAL | Age: 54
End: 2024-02-28
Payer: COMMERCIAL

## 2024-02-28 DIAGNOSIS — M54.2 NECK PAIN: Primary | ICD-10-CM

## 2024-02-28 PROCEDURE — 97530 THERAPEUTIC ACTIVITIES: CPT | Performed by: PHYSICAL THERAPIST

## 2024-02-28 PROCEDURE — 97112 NEUROMUSCULAR REEDUCATION: CPT | Performed by: PHYSICAL THERAPIST

## 2024-02-28 NOTE — PROGRESS NOTES
Physical Therapy Treatment Note     Name: Sukumar Beal  [facility] Number: [id number]    Therapy Diagnosis:   Encounter Diagnosis   Name Primary?    Neck pain Yes     Physician: Marylou Mueller NP    Visit Date: 2/28/2024    Physician Orders: PT Eval and Treat   Medical Diagnosis from Referral:   Diagnosis   R29.898 (ICD-10-CM) - Shoulder weakness   M25.511 (ICD-10-CM) - Acute pain of right shoulder   M47.812 (ICD-10-CM) - Spondylosis of cervical region without myelopathy or radiculopathy      Evaluation Date: 11/16/2023  Authorization Period Expiration: 11/7/2024  Plan of Care Expiration: 3/16/2024  Progress Note Due: 2/16/2024  Visit # / Visits authorized: 16/20    Time In: 1002  Time Out: 1100  Total Billable Time: 58 minutes    Precautions: Standard and RCR     Date of Surgery: 10/14/2022    Subjective     Pt reports: I am feeling much stronger, feel like I've mad a big jump in strength    He was compliant with home exercise program.  Response to previous treatment: soreness  Functional change: improved overhead reach    Pain: 0/10  Location: right shoulder      Objective     Sukumar received therapeutic exercises to develop strength, endurance, ROM, flexibility, and posture for 00 minutes including:      Sukumar received the following manual therapy techniques: Joint mobilizations and Soft tissue Mobilization were applied to the: right shoulder for 00 minutes, including:    Cervical flexion segmental opening  Cervical flexion FMP  C5-6 opening on R Gr IV  Scalene 1st rib mob with left rotation    Sukumar participated in neuromuscular re-education activities to improve: Coordination, Proprioception, and Posture for 30 minutes. The following activities were included:    Wall slides with YTB for ER 3x to fatigue  Wall thoracic rotatiosn with YTB 3 x 12    Circuit: 3 rounds  SB alternating T/Y 3# x 8 each  Standing alternating front/lateral raises 3# x 8 each    Circuit: 3 rounds  Bicep curls 20# x 10-15  ER OTB to  "fatigue  Standing A (45deg ABD) GTB to fatigue    Floor press 35# x 10-15      NP:  ER walkouts GTB 3x to fatigue  AA scaption wall slides 4x 2#  TRX rows 3 x 15      Sukumar participated in dynamic functional therapeutic activities to improve functional performance for 28 minutes, including:    UBE forward/backward lvl 5 x 10' for cardiovascular endurance training and shoulder ROM    Circuit 3 rounds:  Serratus press red sport cord x 12  Incline press 25# x 10    Circuit: 3 rounds   Bent over rows 15-20# to fatigue namrata   Pushups 12" box 3 x 15 - progress to 6in box next tx  Wall ball ABC's x 1      Home Exercises Provided and Patient Education Provided     Education provided:   - Home Exercise Program, precautions, progress    Written Home Exercises Provided: Patient instructed to cont prior HEP.  Exercises were reviewed and Sukumar was able to demonstrate them prior to the end of the session.  Sukumar demonstrated good  understanding of the education provided.     See EMR under Patient Instructions for exercises provided prior visit.    Assessment     Sukumar progressed chest press weights today and noted some exercises are getting much easier. Added wall thoracic rotations. 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   Pt prognosis is Good.     Pt will continue to benefit from skilled outpatient physical therapy to address the deficits listed in the problem list box on initial evaluation, provide pt/family education and to maximize pt's level of independence in the home and community environment.     Pt's spiritual, cultural and educational needs considered and pt agreeable to plan of care and goals.     Anticipated barriers to physical therapy: none    GOALS: Short Term Goals: 4 weeks(Progressing, not met)  1.Report decreased neck pain  <   / =  3  /10  to increase tolerance for work  2. " Increase cervical ROM by 5-10 degrees in order to perform ADLs with decreased difficulty.  3. Increase strength in B shoulders/scapular stabilizers by 1/3 MMT grade to increase tolerance for ADL and work activities.  4. Pt to tolerate HEP to improve ROM and independence with ADL's     Long Term Goals: 8 weeks(Progressing, not met)  1.Report decreased neck pain  <   / =  0  /10  to increase tolerance for return to exercise  2. Increase UE/neck flexibility in order to improve posture.    3.Increased strength in B shoulders/scapular stabilizers to >/= 4/5 MMT grade to increase tolerance for ADL and work activities.  4.  Pt will report at goals level on FOTO neck score for neck pain disability to demonstrate decrease in disability and improvement in neck pain.     Plan     Plan of care Certification: 11/16/2023 to 3/16/2024.     Improve overhead cuff strength    Therapist: Reji Szymanski, PT

## 2024-03-01 ENCOUNTER — PROCEDURE VISIT (OUTPATIENT)
Dept: DERMATOLOGY | Facility: CLINIC | Age: 54
End: 2024-03-01
Payer: COMMERCIAL

## 2024-03-01 DIAGNOSIS — Z41.1 ENCOUNTER FOR COSMETIC PROCEDURE: Primary | ICD-10-CM

## 2024-03-01 PROCEDURE — 99499 UNLISTED E&M SERVICE: CPT | Mod: CSM,S$GLB,, | Performed by: DERMATOLOGY

## 2024-03-01 PROCEDURE — 17999 UNLISTD PX SKN MUC MEMB SUBQ: CPT | Mod: CSM,,, | Performed by: DERMATOLOGY

## 2024-03-01 NOTE — PROGRESS NOTES
Mr. Beal presents today for cosmetic treatment of fine lines and wrinkles on the face with the SkinPen microneedling device and kit (package #1 of 3).    Discussed the risks, benefits, and side effects of the microneedling procedure, including bruising, bleeding, redness, peeling, swelling, pain, tenderness, infection, scarring, and allergic reaction to stainless steel or topical anesthetic. Verbal and written consent were obtained from the patient.     Topical anesthesia with lidocaine 4% cream was applied to the patient's clean face for 45-60 minutes prior to the procedure. Face was then cleansed and prepped with alcohol.    Microneedling of the face and neck was performed with three passes to each area using the provided gliding agent.    There were no complications, and the patient tolerated the procedure well. Aftercare instructions were reviewed with the patient.

## 2024-03-08 ENCOUNTER — CLINICAL SUPPORT (OUTPATIENT)
Dept: REHABILITATION | Facility: HOSPITAL | Age: 54
End: 2024-03-08
Payer: COMMERCIAL

## 2024-03-08 DIAGNOSIS — M54.2 NECK PAIN: Primary | ICD-10-CM

## 2024-03-08 PROCEDURE — 97112 NEUROMUSCULAR REEDUCATION: CPT | Mod: CQ

## 2024-03-08 PROCEDURE — 97530 THERAPEUTIC ACTIVITIES: CPT | Mod: CQ

## 2024-03-08 NOTE — PROGRESS NOTES
Physical Therapy Treatment Note     Name: Sukumar Beal  [facility] Number: [id number]    Therapy Diagnosis:   Encounter Diagnosis   Name Primary?    Neck pain Yes     Physician: Marylou Mueller NP    Visit Date: 3/8/2024    Physician Orders: PT Eval and Treat   Medical Diagnosis from Referral:   Diagnosis   R29.898 (ICD-10-CM) - Shoulder weakness   M25.511 (ICD-10-CM) - Acute pain of right shoulder   M47.812 (ICD-10-CM) - Spondylosis of cervical region without myelopathy or radiculopathy      Evaluation Date: 11/16/2023  Authorization Period Expiration: 11/7/2024  Plan of Care Expiration: 3/16/2024  Progress Note Due: 2/16/2024  Visit # / Visits authorized: 17/40    Time In: 0855  Time Out: 1000  Total Billable Time: 46 minutes    Precautions: Standard and RCR     Date of Surgery: 10/14/2022    Subjective     Pt reports: he is tired today. His workouts are going well, and he is able to do push ups on the ground. He hasn't done burpees yet.     He was compliant with home exercise program.  Response to previous treatment: soreness  Functional change: improved overhead reach    Pain: 0/10  Location: right shoulder      Objective     Sukumar received therapeutic exercises to develop strength, endurance, ROM, flexibility, and posture for 00 minutes including:      Sukumar received the following manual therapy techniques: Joint mobilizations and Soft tissue Mobilization were applied to the: right shoulder for 00 minutes, including:    Cervical flexion segmental opening  Cervical flexion FMP  C5-6 opening on R Gr IV  Scalene 1st rib mob with left rotation    Sukumar participated in neuromuscular re-education activities to improve: Coordination, Proprioception, and Posture for 30 minutes. The following activities were included:    Wall slides with YTB for ER 3x to fatigue    Circuit: 3 rounds  Wall thoracic rotations with RTB x 5-8 namrata, last 2 sets sans resistance  Wall ball ABC's 4#MB x 1    Circuit: 3 rounds  ER OTB to  fatigue  Standing A (45deg ABD) GTB to fatigue    NP:  ER walkouts GTB 3x to fatigue  AA scaption wall slides 4x 2#  TRX rows 3 x 15    Sukumar participated in dynamic functional therapeutic activities to improve functional performance for 28 minutes, including:    UBE forward/backward lvl 5 x 10' for cardiovascular endurance training and shoulder ROM    Circuit: 3 rounds   Bent over rows 25# to fatigue namrata   Pushups x 10  Bicep curls 20# x 10-15    Circuit: 3 rounds  SB alternating T/Y 3# x 8 each  Standing alternating front/lateral raises 3# x 8 each    NP:  Floor press 35# x 10-15   Serratus press red sport cord x 12  Incline press 25# x 10      Home Exercises Provided and Patient Education Provided     Education provided:   - Home Exercise Program, precautions, progress    Written Home Exercises Provided: Patient instructed to cont prior HEP.  Exercises were reviewed and Sukumar was able to demonstrate them prior to the end of the session.  Sukumar demonstrated good  understanding of the education provided.     See EMR under Patient Instructions for exercises provided prior visit.    Assessment     Sukumar did well today. His resisted wall slides form has improved significantly since 2/23 appt with me. He was very challenged by thoracic rotations at the wall (R > L) and had to remove resistance for final 2 sets. Added to HEP. No adverse effects reported p tx.     Sukumar is progressing well towards his goals.   Pt prognosis is Good.     Pt will continue to benefit from skilled outpatient physical therapy to address the deficits listed in the problem list box on initial evaluation, provide pt/family education and to maximize pt's level of independence in the home and community environment.     Pt's spiritual, cultural and educational needs considered and pt agreeable to plan of care and goals.     Anticipated barriers to physical therapy: none    GOALS: Short Term Goals: 4 weeks(Progressing, not met)  1.Report decreased neck  pain  <   / =  3  /10  to increase tolerance for work  2. Increase cervical ROM by 5-10 degrees in order to perform ADLs with decreased difficulty.  3. Increase strength in B shoulders/scapular stabilizers by 1/3 MMT grade to increase tolerance for ADL and work activities.  4. Pt to tolerate HEP to improve ROM and independence with ADL's     Long Term Goals: 8 weeks(Progressing, not met)  1.Report decreased neck pain  <   / =  0  /10  to increase tolerance for return to exercise  2. Increase UE/neck flexibility in order to improve posture.    3.Increased strength in B shoulders/scapular stabilizers to >/= 4/5 MMT grade to increase tolerance for ADL and work activities.  4.  Pt will report at goals level on FOTO neck score for neck pain disability to demonstrate decrease in disability and improvement in neck pain.     Plan     Plan of care Certification: 11/16/2023 to 3/16/2024.     Improve overhead cuff strength    Therapist: Shavonne Storm PTA

## 2024-03-11 ENCOUNTER — LAB VISIT (OUTPATIENT)
Dept: LAB | Facility: HOSPITAL | Age: 54
End: 2024-03-11
Payer: COMMERCIAL

## 2024-03-11 DIAGNOSIS — E78.5 DYSLIPIDEMIA: ICD-10-CM

## 2024-03-11 DIAGNOSIS — E34.9 TESTOSTERONE DEFICIENCY: ICD-10-CM

## 2024-03-11 DIAGNOSIS — E29.1 PRIMARY MALE HYPOGONADISM: ICD-10-CM

## 2024-03-11 DIAGNOSIS — R53.83 FATIGUE, UNSPECIFIED TYPE: ICD-10-CM

## 2024-03-11 DIAGNOSIS — N40.0 BENIGN PROSTATIC HYPERPLASIA WITHOUT LOWER URINARY TRACT SYMPTOMS: ICD-10-CM

## 2024-03-11 DIAGNOSIS — N52.9 ED (ERECTILE DYSFUNCTION) OF ORGANIC ORIGIN: ICD-10-CM

## 2024-03-11 LAB
ALBUMIN SERPL BCP-MCNC: 4.2 G/DL (ref 3.5–5.2)
ALP SERPL-CCNC: 86 U/L (ref 55–135)
ALT SERPL W/O P-5'-P-CCNC: 30 U/L (ref 10–44)
ANION GAP SERPL CALC-SCNC: 4 MMOL/L (ref 8–16)
AST SERPL-CCNC: 28 U/L (ref 10–40)
BASOPHILS # BLD AUTO: 0.04 K/UL (ref 0–0.2)
BASOPHILS NFR BLD: 1 % (ref 0–1.9)
BILIRUB SERPL-MCNC: 0.7 MG/DL (ref 0.1–1)
BUN SERPL-MCNC: 19 MG/DL (ref 6–20)
CALCIUM SERPL-MCNC: 9.8 MG/DL (ref 8.7–10.5)
CHLORIDE SERPL-SCNC: 105 MMOL/L (ref 95–110)
CHOLEST SERPL-MCNC: 174 MG/DL (ref 120–199)
CHOLEST/HDLC SERPL: 3.1 {RATIO} (ref 2–5)
CO2 SERPL-SCNC: 29 MMOL/L (ref 23–29)
COMPLEXED PSA SERPL-MCNC: 1.1 NG/ML (ref 0–4)
CREAT SERPL-MCNC: 1 MG/DL (ref 0.5–1.4)
DIFFERENTIAL METHOD BLD: ABNORMAL
EOSINOPHIL # BLD AUTO: 0.1 K/UL (ref 0–0.5)
EOSINOPHIL NFR BLD: 2.2 % (ref 0–8)
ERYTHROCYTE [DISTWIDTH] IN BLOOD BY AUTOMATED COUNT: 12.5 % (ref 11.5–14.5)
EST. GFR  (NO RACE VARIABLE): >60 ML/MIN/1.73 M^2
GLUCOSE SERPL-MCNC: 86 MG/DL (ref 70–110)
HCT VFR BLD AUTO: 46.6 % (ref 40–54)
HDLC SERPL-MCNC: 57 MG/DL (ref 40–75)
HDLC SERPL: 32.8 % (ref 20–50)
HGB BLD-MCNC: 15.5 G/DL (ref 14–18)
IMM GRANULOCYTES # BLD AUTO: 0.01 K/UL (ref 0–0.04)
IMM GRANULOCYTES NFR BLD AUTO: 0.2 % (ref 0–0.5)
LDLC SERPL CALC-MCNC: 95.8 MG/DL (ref 63–159)
LYMPHOCYTES # BLD AUTO: 1.4 K/UL (ref 1–4.8)
LYMPHOCYTES NFR BLD: 34.3 % (ref 18–48)
MCH RBC QN AUTO: 31.1 PG (ref 27–31)
MCHC RBC AUTO-ENTMCNC: 33.3 G/DL (ref 32–36)
MCV RBC AUTO: 93 FL (ref 82–98)
MONOCYTES # BLD AUTO: 0.4 K/UL (ref 0.3–1)
MONOCYTES NFR BLD: 10.5 % (ref 4–15)
NEUTROPHILS # BLD AUTO: 2.1 K/UL (ref 1.8–7.7)
NEUTROPHILS NFR BLD: 51.8 % (ref 38–73)
NONHDLC SERPL-MCNC: 117 MG/DL
NRBC BLD-RTO: 0 /100 WBC
PLATELET # BLD AUTO: 193 K/UL (ref 150–450)
PMV BLD AUTO: 11.4 FL (ref 9.2–12.9)
POTASSIUM SERPL-SCNC: 4 MMOL/L (ref 3.5–5.1)
PROT SERPL-MCNC: 6.9 G/DL (ref 6–8.4)
RBC # BLD AUTO: 4.99 M/UL (ref 4.6–6.2)
SODIUM SERPL-SCNC: 138 MMOL/L (ref 136–145)
TESTOST SERPL-MCNC: 440 NG/DL (ref 304–1227)
TRIGL SERPL-MCNC: 106 MG/DL (ref 30–150)
WBC # BLD AUTO: 4.11 K/UL (ref 3.9–12.7)

## 2024-03-11 PROCEDURE — 36415 COLL VENOUS BLD VENIPUNCTURE: CPT | Performed by: NURSE PRACTITIONER

## 2024-03-11 PROCEDURE — 84403 ASSAY OF TOTAL TESTOSTERONE: CPT | Performed by: NURSE PRACTITIONER

## 2024-03-11 PROCEDURE — 80053 COMPREHEN METABOLIC PANEL: CPT | Performed by: NURSE PRACTITIONER

## 2024-03-11 PROCEDURE — 85025 COMPLETE CBC W/AUTO DIFF WBC: CPT | Performed by: NURSE PRACTITIONER

## 2024-03-11 PROCEDURE — 80061 LIPID PANEL: CPT | Performed by: NURSE PRACTITIONER

## 2024-03-11 PROCEDURE — 84153 ASSAY OF PSA TOTAL: CPT | Performed by: NURSE PRACTITIONER

## 2024-03-12 ENCOUNTER — CLINICAL SUPPORT (OUTPATIENT)
Dept: REHABILITATION | Facility: HOSPITAL | Age: 54
End: 2024-03-12
Payer: COMMERCIAL

## 2024-03-12 DIAGNOSIS — M54.2 NECK PAIN: Primary | ICD-10-CM

## 2024-03-12 PROCEDURE — 97530 THERAPEUTIC ACTIVITIES: CPT | Performed by: PHYSICAL THERAPIST

## 2024-03-12 PROCEDURE — 97112 NEUROMUSCULAR REEDUCATION: CPT | Performed by: PHYSICAL THERAPIST

## 2024-03-12 NOTE — PROGRESS NOTES
Physical Therapy Treatment Note     Name: Sukumar Beal  [facility] Number: [id number]    Therapy Diagnosis:   Encounter Diagnosis   Name Primary?    Neck pain Yes     Physician: Marylou Mueller NP    Visit Date: 3/12/2024    Physician Orders: PT Eval and Treat   Medical Diagnosis from Referral:   Diagnosis   R29.898 (ICD-10-CM) - Shoulder weakness   M25.511 (ICD-10-CM) - Acute pain of right shoulder   M47.812 (ICD-10-CM) - Spondylosis of cervical region without myelopathy or radiculopathy      Evaluation Date: 11/16/2023  Authorization Period Expiration: 11/7/2024  Plan of Care Expiration: 3/16/2024  Progress Note Due: 2/16/2024  Visit # / Visits authorized: 18/40    Time In: 0850  Time Out: 945  Total Billable Time: 55 minutes    Precautions: Standard and RCR     Date of Surgery: 10/14/2022    Subjective     Pt reports: I liked the new exercise at the wall. Motion and strength are getting better, just need to get back in the gym     He was compliant with home exercise program.  Response to previous treatment: soreness  Functional change: improved overhead reach    Pain: 0/10  Location: right shoulder      Objective     Sukumar received therapeutic exercises to develop strength, endurance, ROM, flexibility, and posture for 00 minutes including:      Sukumar received the following manual therapy techniques: Joint mobilizations and Soft tissue Mobilization were applied to the: right shoulder for 00 minutes, including:    Cervical flexion segmental opening  Cervical flexion FMP  C5-6 opening on R Gr IV  Scalene 1st rib mob with left rotation    Sukumar participated in neuromuscular re-education activities to improve: Coordination, Proprioception, and Posture for 25 minutes. The following activities were included:    Wall slides with YTB for ER 3x to fatigue    Circuit: 3 rounds  Wall thoracic rotations with RTB x 5-8 namrata, last 2 sets sans resistance  Wall ball ABC's 4#MB x 1    Circuit: 3 rounds  ER GTB to fatigue  Standing  A (45deg ABD) GTB to fatigue  UT press 3# 3 x 12    NP:  ER walkouts GTB 3x to fatigue  AA scaption wall slides 4x 2#  TRX rows 3 x 15    Sukumar participated in dynamic functional therapeutic activities to improve functional performance for 30 minutes, including:    UBE forward/backward lvl 8 x 10' for cardiovascular endurance training and shoulder ROM    Circuit: 3 rounds   Bent over rows 25# to fatigue namrata   Pushups x 10  Bicep curls 20# x 10-15    Circuit: 3 rounds  SB alternating T/Y 3# x 8 each  Standing alternating front/lateral raises 3# x 8 each    Circuit: 3 rounds  Incline press 25# x 10  Floor press 35# x 10-15     NP:  Serratus press red sport cord x 12    Home Exercises Provided and Patient Education Provided     Education provided:   - Home Exercise Program, precautions, progress    Written Home Exercises Provided: Patient instructed to cont prior HEP.  Exercises were reviewed and Sukumar was able to demonstrate them prior to the end of the session.  Sukumar demonstrated good  understanding of the education provided.     See EMR under Patient Instructions for exercises provided prior visit.    Assessment     Progressed today with UT press for Oh strengthening. Atrophy feels like its is coming from the cuff, progressed band ER as vignesh today. He was progressed with loading to the shoulder without increase in symptoms, need to improve his thoracic mobility to prevent recurrence of cervical symptoms    Sukumar is progressing well towards his goals.   Pt prognosis is Good.     Pt will continue to benefit from skilled outpatient physical therapy to address the deficits listed in the problem list box on initial evaluation, provide pt/family education and to maximize pt's level of independence in the home and community environment.     Pt's spiritual, cultural and educational needs considered and pt agreeable to plan of care and goals.     Anticipated barriers to physical therapy: none    GOALS: Short Term Goals: 4  weeks(Progressing, not met)  1.Report decreased neck pain  <   / =  3  /10  to increase tolerance for work  2. Increase cervical ROM by 5-10 degrees in order to perform ADLs with decreased difficulty.  3. Increase strength in B shoulders/scapular stabilizers by 1/3 MMT grade to increase tolerance for ADL and work activities.  4. Pt to tolerate HEP to improve ROM and independence with ADL's     Long Term Goals: 8 weeks(Progressing, not met)  1.Report decreased neck pain  <   / =  0  /10  to increase tolerance for return to exercise  2. Increase UE/neck flexibility in order to improve posture.    3.Increased strength in B shoulders/scapular stabilizers to >/= 4/5 MMT grade to increase tolerance for ADL and work activities.  4.  Pt will report at goals level on FOTO neck score for neck pain disability to demonstrate decrease in disability and improvement in neck pain.     Plan     Plan of care Certification: 11/16/2023 to 3/16/2024.     Improve overhead cuff strength    Therapist: Reji Szymanski, PT

## 2024-03-12 NOTE — PROGRESS NOTES
CHIEF COMPLAINT:  Male hypogonadism       HISTORY OF PRESENTING ILLINESS:  Sukumar Beal is a 52 y.o. male who has a history of Low Testosterone. His complaints are fatigue, loss of axillary hair.  Although his testosterone level is stable, he still feels fatigued. He would like to discuss alternatives to testosterone gel. He would also like to get an apt for therapeutic phlebotomy.     He's on Androgel 1.62% (1.25 gms) using 3 pumps. While on TRT, While on TRT, he has more energy.    He wanted testopel, but he has [employer].  Last clinic visit was 9/11/23 with ANTONIO Rhodes NP  Switched to AndroGel 1.62% 2.5gm packets; 2 packets daily.   Successfully using the Viagra.      Here today for f/u visit.   Overall feeling good. Prefers the packets; does not run out like the pump.      TRT labs were completed 3/11/24 at 0950  PSA was 1.1 stable  T level 440  HCT was 46.6 (stable).     He is here today for 6 month TRT.   Overall feeling very well, but fatigued.      REVIEW OF SYSTEMS:  Review of Systems   Constitutional:  Negative for chills and fever.   HENT:  Negative for congestion and sore throat.    Respiratory:  Negative for cough and shortness of breath.    Cardiovascular:  Negative for chest pain and palpitations.   Gastrointestinal:  Negative for nausea and vomiting.   Genitourinary:  Negative for dysuria, flank pain, frequency, hematuria and urgency.   Neurological:  Negative for dizziness and headaches.         PATIENT HISTORY:    Past Medical History:   Diagnosis Date    Gout 7/2014    High cholesterol     Hypertension     Hypothyroid     Low testosterone     Staph aureus infection age 13    R leg       Past Surgical History:   Procedure Laterality Date    ARTHROSCOPIC DEBRIDEMENT OF SHOULDER  10/14/2022    Procedure: DEBRIDEMENT, SHOULDER, ARTHROSCOPIC;  Surgeon: LEONIDAS Carr MD;  Location: [facility] OR;  Service: Orthopedics;;    ARTHROSCOPIC REPAIR OF ROTATOR CUFF OF SHOULDER Right 10/14/2022    Procedure:  REPAIR, ROTATOR CUFF, ARTHROSCOPIC - POLAR CARE;  Surgeon: LEONIDAS Carr MD;  Location: [facility] OR;  Service: Orthopedics;  Laterality: Right;  Regional w/o Catheter, Interscalene    ARTHROSCOPY,SHOULDER,WITH BICEPS TENODESIS Right 10/14/2022    Procedure: ARTHROSCOPY,SHOULDER,WITH BICEPS TENODESIS;  Surgeon: LEONIDAS Carr MD;  Location: [facility] OR;  Service: Orthopedics;  Laterality: Right;    COLONOSCOPY N/A 2020    Procedure: COLONOSCOPY;  Surgeon: VANI Newell MD;  Location: [facility] ENDO (4TH FLR);  Service: Endoscopy;  Laterality: N/A;  covid test 2nd floor surgery clinic     COLONOSCOPY N/A 2023    Procedure: COLONOSCOPY;  Surgeon: VANI Newell MD;  Location: [facility] ENDO (4TH FLR);  Service: Endoscopy;  Laterality: N/A;  - referred by Dr. Katherine reynaga/ [facility]  7 day hold/ Prep instructions to the portal. AS  10/31-precall complee-pt states he has not taken [facility] in several months-MS    DECOMPRESSION OF SUBACROMIAL SPACE Right 10/14/2022    Procedure: DECOMPRESSION, SUBACROMIAL SPACE;  Surgeon: LEONIDAS Carr MD;  Location: [facility] OR;  Service: Orthopedics;  Laterality: Right;    FRACTURE SURGERY Left     wrist    Incision and drainage of tibia Right     SINUS SURGERY      x2       Family History   Problem Relation Age of Onset    Heart disease Father 73            Hypertension Sister     Hyperlipidemia Sister     Hypertension Brother     Hyperlipidemia Brother     Lupus Sister     Hypertension Mother     Cancer Maternal Grandfather     Cancer Maternal Aunt         lung - smoker    Melanoma Neg Hx        Social History     Socioeconomic History    Marital status: Single   Tobacco Use    Smoking status: Former     Current packs/day: 0.00     Average packs/day: 0.3 packs/day for 15.0 years (3.8 ttl pk-yrs)     Types: Cigarettes     Start date: 1/3/1998     Quit date: 1/3/2013     Years since quittin.1    Smokeless tobacco: Never   Substance and Sexual Activity     Alcohol use: Yes     Alcohol/week: 5.0 standard drinks of alcohol     Types: 6 Standard drinks or equivalent per week     Comment: weekends    Drug use: No    Sexual activity: Yes     Social Determinants of Health     Financial Resource Strain: Low Risk  (11/29/2023)    Overall Financial Resource Strain (CARDIA)     Difficulty of Paying Living Expenses: Not hard at all   Food Insecurity: No Food Insecurity (11/29/2023)    Hunger Vital Sign     Worried About Running Out of Food in the Last Year: Never true     Ran Out of Food in the Last Year: Never true   Transportation Needs: No Transportation Needs (11/29/2023)    PRAPARE - Transportation     Lack of Transportation (Medical): No     Lack of Transportation (Non-Medical): No   Physical Activity: Sufficiently Active (11/29/2023)    Exercise Vital Sign     Days of Exercise per Week: 3 days     Minutes of Exercise per Session: 60 min   Stress: Stress Concern Present (11/29/2023)    [employer] - Occupational Stress Questionnaire     Feeling of Stress : Rather much   Social Connections: Unknown (11/29/2023)    Social Connection and Isolation Panel [NHANES]     Frequency of Communication with Friends and Family: More than three times a week     Frequency of Social Gatherings with Friends and Family: More than three times a week     Active Member of Clubs or Organizations: Yes     Attends Club or Organization Meetings: More than 4 times per year     Marital Status: Never    Housing Stability: Unknown (11/29/2023)    Housing Stability Vital Sign     Unable to Pay for Housing in the Last Year: No     Unstable Housing in the Last Year: No       Allergies:  Codeine and Penicillins    Medications:    Current Outpatient Medications:     atorvastatin (LIPITOR) 20 MG tablet, Take 1 tablet (20 mg total) by mouth once daily., Disp: 90 tablet, Rfl: 0    B-complex with vitamin C (Z-BEC OR EQUIV) tablet, Take 1 tablet by mouth once daily., Disp: ,  Rfl:     celecoxib (CELEBREX) 200 MG capsule, Take 1 capsule (200 mg total) by mouth 2 (two) times daily., Disp: 40 capsule, Rfl: 1    CYANOCOBALAMIN, VITAMIN B-12, ORAL, Take 1 tablet by mouth once daily., Disp: , Rfl:     ergocalciferol, vitamin D2, (VITAMIN D ORAL), Take 2 capsules by mouth once daily., Disp: , Rfl:     levothyroxine (SYNTHROID) 112 MCG tablet, Take 1 tablet (112 mcg total) by mouth once daily., Disp: 90 tablet, Rfl: 1    losartan (COZAAR) 50 MG tablet, Take 1 tablet (50 mg total) by mouth once daily., Disp: 90 tablet, Rfl: 3    methylPREDNISolone (MEDROL DOSEPACK) 4 mg tablet, use as directed, Disp: 21 each, Rfl: 0    sildenafiL (VIAGRA) 100 MG tablet, Take 1 tablet (100 mg total) by mouth daily as needed for Erectile Dysfunction (take on an empty stomach 30-60 minutes before)., Disp: 10 tablet, Rfl: 12    vitamin E acetate (VITAMIN E ORAL), Take 1 tablet by mouth once daily., Disp: , Rfl:     PHYSICAL EXAMINATION:  Physical Exam  Constitutional:       Appearance: Normal appearance.   HENT:      Head: Normocephalic and atraumatic.      Right Ear: External ear normal.      Left Ear: External ear normal.      Nose: Nose normal.      Mouth/Throat:      Mouth: Mucous membranes are moist.   Pulmonary:      Effort: Pulmonary effort is normal. No respiratory distress.   Skin:     General: Skin is warm and dry.   Neurological:      General: No focal deficit present.      Mental Status: He is alert and oriented to person, place, and time.   Psychiatric:         Mood and Affect: Mood normal.         Behavior: Behavior normal.             Lab Results   Component Value Date    PSA 0.81 09/22/2021    PSA 0.42 06/11/2018    PSA 0.41 05/10/2017    PSADIAG 1.1 03/11/2024    PSADIAG 0.73 09/07/2023    PSADIAG 0.61 03/10/2023       Lab Results   Component Value Date    CREATININE 1.0 03/11/2024    EGFRNORACEVR >60.0 03/11/2024             IMPRESSION:  Encounter Diagnoses   Name Primary?    Primary male  hypogonadism Yes    Fatigue, unspecified type     Dyslipidemia     ED (erectile dysfunction) of organic origin     Benign prostatic hyperplasia without lower urinary tract symptoms     Testosterone deficiency          Assessment:       1. Primary male hypogonadism    2. Fatigue, unspecified type    3. Dyslipidemia    4. ED (erectile dysfunction) of organic origin    5. Benign prostatic hyperplasia without lower urinary tract symptoms    6. Testosterone deficiency        Plan:   I spent 30 minutes with the patient of which more than half was spent in direct consultation with the patient in regards to our treatment and plan.  We addressed the office findings and recent labs.   Education and recommendations of today's plan of care including home remedies and needed follow up with PCP.   We discussed the chief complaint; reviewed the LUTS and the possible contributory factors.   Reassurance with good labs.   I do recommend donating blood.   Recommended lifestyle modifications with a proper, healthy diet, good hydration but during the day. Reducing bladder irritants.   Benefits of regular exercise.  Refilled his AndroGel, placed orders for first 2 AVEED injections with PA.   Therapeutic phlebotomy ordered placed, scheduled 3/14/24 at 1000.   RTC 2-3 weeks for initial AVEED injection.

## 2024-03-13 ENCOUNTER — OFFICE VISIT (OUTPATIENT)
Dept: UROLOGY | Facility: CLINIC | Age: 54
End: 2024-03-13
Payer: COMMERCIAL

## 2024-03-13 VITALS
DIASTOLIC BLOOD PRESSURE: 92 MMHG | WEIGHT: 231.94 LBS | HEART RATE: 59 BPM | BODY MASS INDEX: 29.77 KG/M2 | SYSTOLIC BLOOD PRESSURE: 148 MMHG | HEIGHT: 74 IN

## 2024-03-13 DIAGNOSIS — E34.9 TESTOSTERONE DEFICIENCY: ICD-10-CM

## 2024-03-13 DIAGNOSIS — E29.1 PRIMARY MALE HYPOGONADISM: Primary | ICD-10-CM

## 2024-03-13 DIAGNOSIS — R53.83 FATIGUE, UNSPECIFIED TYPE: ICD-10-CM

## 2024-03-13 DIAGNOSIS — R71.8 ELEVATED HEMATOCRIT: ICD-10-CM

## 2024-03-13 DIAGNOSIS — N52.9 ED (ERECTILE DYSFUNCTION) OF ORGANIC ORIGIN: ICD-10-CM

## 2024-03-13 DIAGNOSIS — E78.5 DYSLIPIDEMIA: ICD-10-CM

## 2024-03-13 DIAGNOSIS — N40.0 BENIGN PROSTATIC HYPERPLASIA WITHOUT LOWER URINARY TRACT SYMPTOMS: ICD-10-CM

## 2024-03-13 PROCEDURE — 1159F MED LIST DOCD IN RCRD: CPT | Mod: CPTII,S$GLB,,

## 2024-03-13 PROCEDURE — 3008F BODY MASS INDEX DOCD: CPT | Mod: CPTII,S$GLB,,

## 2024-03-13 PROCEDURE — 99214 OFFICE O/P EST MOD 30 MIN: CPT | Mod: S$GLB,,,

## 2024-03-13 PROCEDURE — 3077F SYST BP >= 140 MM HG: CPT | Mod: CPTII,S$GLB,,

## 2024-03-13 PROCEDURE — 3080F DIAST BP >= 90 MM HG: CPT | Mod: CPTII,S$GLB,,

## 2024-03-13 PROCEDURE — 99999 PR PBB SHADOW E&M-EST. PATIENT-LVL IV: CPT | Mod: PBBFAC,,,

## 2024-03-13 RX ORDER — TESTOSTERONE 40.5 MG/2.5G
5 GEL TOPICAL DAILY
Qty: 150 G | Refills: 1 | Status: SHIPPED | OUTPATIENT
Start: 2024-03-13 | End: 2024-03-20 | Stop reason: SDUPTHER

## 2024-03-13 NOTE — PATIENT INSTRUCTIONS
Here in urology, we have the option of administering AVEED for testosterone replacement therapy. AVEED contains testosterone in castor oil. I will inject AVEED in the muscle of your buttocks over 90 seconds. AVEED is then slowly released from your muscle into your body. You can only get AVEED in your doctors office, clinic, or hospital because of the possible serious side effects.     What are the Most Serious Side Effects with AVEED Treatment?   Pulmonary oil microembolism (POME), a serious lung problem. Tiny droplets of the castor oil in AVEED  could travel from your muscle to your lungs after you get AVEED treatment. You can have symptoms such as  coughing, trouble breathing, chest pain, or other symptoms. POME is not a blood clot that travels to your lungs.   Severe or life-threatening allergic reaction (anaphylaxis). Signs and symptoms of a life-threatening  allergic reaction are listed below.  These reactions can occur with any AVEED injection during your treatment, even if you have not had a  reaction occur before.  Each time you get AVEED, stay in the doctors office, clinic, or hospital for 30 minutes to be sure you  do not have symptoms of POME or a severe allergic reaction.     You will get 1 injection when you start, 1 injection 4 weeks later, and then 1 injection every 10 weeks thereafter.      Labs will be obtained in 3 months after initial injection of AVEED. These labs include: CBC, CMP, lipids, PSA and Testosterone.      If you are interested in this type of testosterone replacement let me know and I will place the orders. It may take a couple of weeks for your insurance to approve.      [email or web address]   
0 = understands/communicates without difficulty

## 2024-03-15 ENCOUNTER — CLINICAL SUPPORT (OUTPATIENT)
Dept: REHABILITATION | Facility: HOSPITAL | Age: 54
End: 2024-03-15
Payer: COMMERCIAL

## 2024-03-15 DIAGNOSIS — M54.2 NECK PAIN: Primary | ICD-10-CM

## 2024-03-15 PROCEDURE — 97112 NEUROMUSCULAR REEDUCATION: CPT | Performed by: PHYSICAL THERAPIST

## 2024-03-15 PROCEDURE — 97530 THERAPEUTIC ACTIVITIES: CPT | Performed by: PHYSICAL THERAPIST

## 2024-03-15 NOTE — PROGRESS NOTES
Physical Therapy Treatment Note     Name: Sukumar Beal  [facility] Number: [id number]    Therapy Diagnosis:   Encounter Diagnosis   Name Primary?    Neck pain Yes     Physician: Marylou Mueller NP    Visit Date: 3/15/2024    Physician Orders: PT Eval and Treat   Medical Diagnosis from Referral:   Diagnosis   R29.898 (ICD-10-CM) - Shoulder weakness   M25.511 (ICD-10-CM) - Acute pain of right shoulder   M47.812 (ICD-10-CM) - Spondylosis of cervical region without myelopathy or radiculopathy      Evaluation Date: 11/16/2023  Authorization Period Expiration: 11/7/2024  Plan of Care Expiration: 3/16/2024  Progress Note Due: 2/16/2024  Visit # / Visits authorized: 19/40    Time In: 954  Time Out: 1100  Total Billable Time: 66 minutes    Precautions: Standard and RCR     Date of Surgery: 10/14/2022    Subjective     Pt reports: Shoulder is getting stronger, using it more. Planning a party in early April    He was compliant with home exercise program.  Response to previous treatment: soreness  Functional change: improved overhead reach    Pain: 0/10  Location: right shoulder      Objective     Sukumar received therapeutic exercises to develop strength, endurance, ROM, flexibility, and posture for 00 minutes including:      Sukumar received the following manual therapy techniques: Joint mobilizations and Soft tissue Mobilization were applied to the: right shoulder for 00 minutes, including:    Cervical flexion segmental opening  Cervical flexion FMP  C5-6 opening on R Gr IV  Scalene 1st rib mob with left rotation    Sukumar participated in neuromuscular re-education activities to improve: Coordination, Proprioception, and Posture for 26 minutes. The following activities were included:    Wall slides with YTB for ER 3x to fatigue  ER walkouts GTB 3x to fatigue  OH carries 3#   Hanging 10# KB 3 laps turf      Circuit: 3 rounds  ER GTB to fatigue  Standing A (45deg ABD) GTB to fatigue  UT press 3# 3 x 12    NP:  Circuit: 3  rounds  Wall thoracic rotations with RTB x 5-8 namrata, last 2 sets sans resistance  Wall ball ABC's 4#MB x 1    AA scaption wall slides 4x 2#  TRX rows 3 x 15    Sukumar participated in dynamic functional therapeutic activities to improve functional performance for 40 minutes, including:    UBE forward/backward lvl 8 x 10' for cardiovascular endurance training and shoulder ROM    Circuit: 3 rounds   Bent over rows 25# to fatigue namrata   Pushups x 10  Bicep curls 20# x 10-15    Circuit: 3 rounds  SB alternating T/Y 4# x 8 each  Standing alternating front/lateral raises 4# x 8 each    Circuit: 3 rounds  Incline press 25# x 10  Floor press 35# x 10-15     NP:  Serratus press red sport cord x 12    Home Exercises Provided and Patient Education Provided     Education provided:   - Home Exercise Program, precautions, progress    Written Home Exercises Provided: Patient instructed to cont prior HEP.  Exercises were reviewed and Sukumar was able to demonstrate them prior to the end of the session.  Sukumar demonstrated good  understanding of the education provided.     See EMR under Patient Instructions for exercises provided prior visit.    Assessment     Progressed overhead strengthening today. Unable perform OH carries with 5# KB, lacks endurance and arm slowly falls. He progressed with 4# scap stabilization. Corrected form with ER to avoid thoracic rotation     Sukumar is progressing well towards his goals.   Pt prognosis is Good.     Pt will continue to benefit from skilled outpatient physical therapy to address the deficits listed in the problem list box on initial evaluation, provide pt/family education and to maximize pt's level of independence in the home and community environment.     Pt's spiritual, cultural and educational needs considered and pt agreeable to plan of care and goals.     Anticipated barriers to physical therapy: none    GOALS: Short Term Goals: 4 weeks(Progressing, not met)  1.Report decreased neck pain  <   / =   3  /10  to increase tolerance for work  2. Increase cervical ROM by 5-10 degrees in order to perform ADLs with decreased difficulty.  3. Increase strength in B shoulders/scapular stabilizers by 1/3 MMT grade to increase tolerance for ADL and work activities.  4. Pt to tolerate HEP to improve ROM and independence with ADL's     Long Term Goals: 8 weeks(Progressing, not met)  1.Report decreased neck pain  <   / =  0  /10  to increase tolerance for return to exercise  2. Increase UE/neck flexibility in order to improve posture.    3.Increased strength in B shoulders/scapular stabilizers to >/= 4/5 MMT grade to increase tolerance for ADL and work activities.  4.  Pt will report at goals level on FOTO neck score for neck pain disability to demonstrate decrease in disability and improvement in neck pain.     Plan     Plan of care Certification: 11/16/2023 to 3/16/2024.     Improve overhead cuff strength    Therapist: Reji Szymanski, PT

## 2024-03-18 ENCOUNTER — CLINICAL SUPPORT (OUTPATIENT)
Dept: REHABILITATION | Facility: HOSPITAL | Age: 54
End: 2024-03-18
Payer: COMMERCIAL

## 2024-03-18 DIAGNOSIS — M54.2 NECK PAIN: Primary | ICD-10-CM

## 2024-03-18 PROCEDURE — 97112 NEUROMUSCULAR REEDUCATION: CPT | Mod: CQ

## 2024-03-18 PROCEDURE — 97530 THERAPEUTIC ACTIVITIES: CPT | Mod: CQ

## 2024-03-18 NOTE — PROGRESS NOTES
Physical Therapy Treatment Note     Name: Sukumar Beal  [facility] Number: [id number]    Therapy Diagnosis:   Encounter Diagnosis   Name Primary?    Neck pain Yes     Physician: Marylou Mueller NP    Visit Date: 3/18/2024    Physician Orders: PT Eval and Treat   Medical Diagnosis from Referral:   Diagnosis   R29.898 (ICD-10-CM) - Shoulder weakness   M25.511 (ICD-10-CM) - Acute pain of right shoulder   M47.812 (ICD-10-CM) - Spondylosis of cervical region without myelopathy or radiculopathy      Evaluation Date: 11/16/2023  Authorization Period Expiration: 11/7/2024  Plan of Care Expiration: 3/16/2024  Progress Note Due: 2/16/2024  Visit # / Visits authorized: 20/40    Time In: 0955  Time Out: 1055  Total Billable Time: 46 minutes    Precautions: Standard and RCR     Date of Surgery: 10/14/2022    Subjective     Pt reports: no new complaints.     He was compliant with home exercise program.  Response to previous treatment: soreness  Functional change: improved overhead reach    Pain: 0/10  Location: right shoulder      Objective     Sukumar received therapeutic exercises to develop strength, endurance, ROM, flexibility, and posture for 00 minutes including:      Sukumar received the following manual therapy techniques: Joint mobilizations and Soft tissue Mobilization were applied to the: right shoulder for 00 minutes, including:    Cervical flexion segmental opening  Cervical flexion FMP  C5-6 opening on R Gr IV  Scalene 1st rib mob with left rotation    Sukumar participated in neuromuscular re-education activities to improve: Coordination, Proprioception, and Posture for 26 minutes. The following activities were included:    Circuit: 3 rounds  ER GTB to fatigue   carry 5#kb to fatigue    Circuit: 3 rounds  Wall thoracic rotations with RTB x 5-8 namrata  Wall ball ABC's 4#MB x 1    NP:  Standing A (45deg ABD) GTB to fatigue  UT press 3# 3 x 12  AA scaption wall slides 4x 2#  TRX rows 3 x 15  OH carries 3#   Hanging 10# KB  3 laps turf  Wall slides with YTB for ER 3x to fatigue  ER walkouts GTB 3x to fatigue    Sukumar participated in dynamic functional therapeutic activities to improve functional performance for 28 minutes, including:    UBE forward/backward lvl 8 x 10' for cardiovascular endurance training and shoulder ROM    Circuit: 3 rounds   Standing jose to shoulder height OTB to fatigue  Pushups x 10    Circuit: 3 rounds  SB alternating T/Y 4# x 8 each  Standing alternating front/lateral raises 4# x 8 each    NP:  Serratus press red sport cord x 12  Bent over rows 25# to fatigue namrata   Bicep curls 20# x 10-15  Circuit: 3 rounds  Incline press 25# x 10  Floor press 35# x 10-15       Home Exercises Provided and Patient Education Provided     Education provided:   - Home Exercise Program, precautions, progress    Written Home Exercises Provided: Patient instructed to cont prior HEP.  Exercises were reviewed and Sukumar was able to demonstrate them prior to the end of the session.  Sukumar demonstrated good  understanding of the education provided.     See EMR under Patient Instructions for exercises provided prior visit.    Assessment     Sukumar did well today. He was challenged by resisted thoracic rotation,  carry holds, and initiation of resisted standing angels. Pt was fatigued by end of tx.    Sukumar is progressing well towards his goals.   Pt prognosis is Good.     Pt will continue to benefit from skilled outpatient physical therapy to address the deficits listed in the problem list box on initial evaluation, provide pt/family education and to maximize pt's level of independence in the home and community environment.     Pt's spiritual, cultural and educational needs considered and pt agreeable to plan of care and goals.     Anticipated barriers to physical therapy: none    GOALS: Short Term Goals: 4 weeks(Progressing, not met)  1.Report decreased neck pain  <   / =  3  /10  to increase tolerance for work  2. Increase cervical ROM  by 5-10 degrees in order to perform ADLs with decreased difficulty.  3. Increase strength in B shoulders/scapular stabilizers by 1/3 MMT grade to increase tolerance for ADL and work activities.  4. Pt to tolerate HEP to improve ROM and independence with ADL's     Long Term Goals: 8 weeks(Progressing, not met)  1.Report decreased neck pain  <   / =  0  /10  to increase tolerance for return to exercise  2. Increase UE/neck flexibility in order to improve posture.    3.Increased strength in B shoulders/scapular stabilizers to >/= 4/5 MMT grade to increase tolerance for ADL and work activities.  4.  Pt will report at goals level on FOTO neck score for neck pain disability to demonstrate decrease in disability and improvement in neck pain.     Plan     Plan of care Certification: 11/16/2023 to 3/16/2024.     Improve overhead cuff strength    Therapist: Shavonne Storm PTA

## 2024-03-20 ENCOUNTER — CLINICAL SUPPORT (OUTPATIENT)
Dept: REHABILITATION | Facility: HOSPITAL | Age: 54
End: 2024-03-20
Payer: COMMERCIAL

## 2024-03-20 DIAGNOSIS — M54.2 NECK PAIN: Primary | ICD-10-CM

## 2024-03-20 PROCEDURE — 97530 THERAPEUTIC ACTIVITIES: CPT | Performed by: PHYSICAL THERAPIST

## 2024-03-20 PROCEDURE — 97112 NEUROMUSCULAR REEDUCATION: CPT | Performed by: PHYSICAL THERAPIST

## 2024-03-20 NOTE — PROGRESS NOTES
Physical Therapy Treatment Note     Name: Sukumar Beal  [facility] Number: [id number]    Therapy Diagnosis:   Encounter Diagnosis   Name Primary?    Neck pain Yes     Physician: Marylou Mueller NP    Visit Date: 3/20/2024    Physician Orders: PT Eval and Treat   Medical Diagnosis from Referral:   Diagnosis   R29.898 (ICD-10-CM) - Shoulder weakness   M25.511 (ICD-10-CM) - Acute pain of right shoulder   M47.812 (ICD-10-CM) - Spondylosis of cervical region without myelopathy or radiculopathy      Evaluation Date: 11/16/2023  Authorization Period Expiration: 11/7/2024  Plan of Care Expiration: 3/16/2024  Progress Note Due: 2/16/2024  Visit # / Visits authorized: 21/40    Time In: 0955  Time Out: 1055  Total Billable Time: 60 minutes    Precautions: Standard and RCR     Date of Surgery: 10/14/2022    Subjective     Pt reports: I am lifting overhead, working out M/T/TH at the office. He feels like atrophy still present to posterior shoulder    He was compliant with home exercise program.  Response to previous treatment: soreness  Functional change: improved overhead reach    Pain: 0/10  Location: right shoulder      Objective     Sukumar received therapeutic exercises to develop strength, endurance, ROM, flexibility, and posture for 00 minutes including:      Sukumar received the following manual therapy techniques: Joint mobilizations and Soft tissue Mobilization were applied to the: right shoulder for 00 minutes, including:    Cervical flexion segmental opening  Cervical flexion FMP  C5-6 opening on R Gr IV  Scalene 1st rib mob with left rotation    Sukumar participated in neuromuscular re-education activities to improve: Coordination, Proprioception, and Posture for 25 minutes. The following activities were included:    Circuit: 3 rounds  ER GTB to fatigue   carry 5#kb to fatigue    Circuit: 3 rounds  Wall thoracic rotations with RTB x 5-8 namrata  Wall ball ABC's 6#MB x 1    OH carries 3#  3 laps      NP:  Standing A  (45deg ABD) GTB to fatigue  UT press 3# 3 x 12  AA scaption wall slides 4x 2#  TRX rows 3 x 15  Hanging 10# KB 3 laps turf  ER walkouts GTB 3x to fatigue    Sukumar participated in dynamic functional therapeutic activities to improve functional performance for 28 minutes, including:    UBE forward/backward lvl 8 x 10' for cardiovascular endurance training and shoulder ROM    Circuit: 3 rounds   Standing jose to shoulder height OTB to fatigue  Wall slides with YTB for ER 3x to fatigue  OH 20# med ball press    Circuit: 3 rounds  SB alternating T/Y 4# x 8 each  Standing alternating front/lateral raises 4# x 8 each    NP:  Serratus press red sport cord x 12  Bent over rows 25# to fatigue namrata   Bicep curls 20# x 10-15  Circuit: 3 rounds  Incline press 25# x 10  Floor press 35# x 10-15       Home Exercises Provided and Patient Education Provided     Education provided:   - Home Exercise Program, precautions, progress    Written Home Exercises Provided: Patient instructed to cont prior HEP.  Exercises were reviewed and Sukumar was able to demonstrate them prior to the end of the session.  Sukumar demonstrated good  understanding of the education provided.     See EMR under Patient Instructions for exercises provided prior visit.    Assessment     Sukumar progressed loading today, began overhead press with 20#. Notes atrophy to his posterior cuff. Today he noted lack of motivation to exercise, took more breaks but did get his exercise in the past 2 days     Sukumar is progressing well towards his goals.   Pt prognosis is Good.     Pt will continue to benefit from skilled outpatient physical therapy to address the deficits listed in the problem list box on initial evaluation, provide pt/family education and to maximize pt's level of independence in the home and community environment.     Pt's spiritual, cultural and educational needs considered and pt agreeable to plan of care and goals.     Anticipated barriers to physical therapy:  none    GOALS: Short Term Goals: 4 weeks(Progressing, not met)  1.Report decreased neck pain  <   / =  3  /10  to increase tolerance for work  2. Increase cervical ROM by 5-10 degrees in order to perform ADLs with decreased difficulty.  3. Increase strength in B shoulders/scapular stabilizers by 1/3 MMT grade to increase tolerance for ADL and work activities.  4. Pt to tolerate HEP to improve ROM and independence with ADL's     Long Term Goals: 8 weeks(Progressing, not met)  1.Report decreased neck pain  <   / =  0  /10  to increase tolerance for return to exercise  2. Increase UE/neck flexibility in order to improve posture.    3.Increased strength in B shoulders/scapular stabilizers to >/= 4/5 MMT grade to increase tolerance for ADL and work activities.  4.  Pt will report at goals level on FOTO neck score for neck pain disability to demonstrate decrease in disability and improvement in neck pain.     Plan     Plan of care Certification: 11/16/2023 to 3/16/2024.     Improve overhead cuff strength    Therapist: Reji Szymanski, PT

## 2024-03-21 ENCOUNTER — TELEPHONE (OUTPATIENT)
Dept: PHARMACY | Facility: CLINIC | Age: 54
End: 2024-03-21
Payer: COMMERCIAL

## 2024-03-27 ENCOUNTER — CLINICAL SUPPORT (OUTPATIENT)
Dept: REHABILITATION | Facility: HOSPITAL | Age: 54
End: 2024-03-27
Payer: COMMERCIAL

## 2024-03-27 DIAGNOSIS — M54.2 NECK PAIN: Primary | ICD-10-CM

## 2024-03-27 PROCEDURE — 97530 THERAPEUTIC ACTIVITIES: CPT | Performed by: PHYSICAL THERAPIST

## 2024-03-27 PROCEDURE — 97112 NEUROMUSCULAR REEDUCATION: CPT | Performed by: PHYSICAL THERAPIST

## 2024-03-27 NOTE — PROGRESS NOTES
Physical Therapy Treatment Note     Name: Sukumar Beal  [facility] Number: [id number]    Therapy Diagnosis:   Encounter Diagnosis   Name Primary?    Neck pain Yes     Physician: Marylou Mueller NP    Visit Date: 3/27/2024    Physician Orders: PT Eval and Treat   Medical Diagnosis from Referral:   Diagnosis   R29.898 (ICD-10-CM) - Shoulder weakness   M25.511 (ICD-10-CM) - Acute pain of right shoulder   M47.812 (ICD-10-CM) - Spondylosis of cervical region without myelopathy or radiculopathy      Evaluation Date: 11/16/2023  Authorization Period Expiration: 11/7/2024  Plan of Care Expiration: 3/16/2024  Progress Note Due: 2/16/2024  Visit # / Visits authorized: 22/40    Time In: 0955  Time Out: 1055  Total Billable Time: 60 minutes    Precautions: Standard and RCR     Date of Surgery: 10/14/2022    Subjective     Pt reports: Feels pretty good today, ready for some new stuff. No pain, using it more working out    He was compliant with home exercise program.  Response to previous treatment: soreness  Functional change: improved overhead reach    Pain: 0/10  Location: right shoulder      Objective     Sukumar received therapeutic exercises to develop strength, endurance, ROM, flexibility, and posture for 00 minutes including:      Sukumar received the following manual therapy techniques: Joint mobilizations and Soft tissue Mobilization were applied to the: right shoulder for 00 minutes, including:    Cervical flexion segmental opening  Cervical flexion FMP  C5-6 opening on R Gr IV  Scalene 1st rib mob with left rotation    Sukumar participated in neuromuscular re-education activities to improve: Coordination, Proprioception, and Posture for 32 minutes. The following activities were included:    Circuit: 3 rounds  ER GTB to fatigue   carry 5#kb to fatigue    Circuit: 3 rounds  Wall thoracic rotations with RTB x 5-8 namrata  Wall ball ABC's 6#MB x 1    OH carries KB 5#  3 laps    Circuit:   Golf  rotation press light  band 3 x 15  Golf  overhead lift/lat stretch 3 x 15      NP:  Standing A (45deg ABD) GTB to fatigue  UT press 3# 3 x 12  AA scaption wall slides 4x 2#  TRX rows 3 x 15  Hanging 10# KB 3 laps turf  ER walkouts GTB 3x to fatigue    Sukumar participated in dynamic functional therapeutic activities to improve functional performance for 28 minutes, including:    UBE forward/backward lvl 8 x 10' for cardiovascular endurance training and shoulder ROM    Circuit: 3 rounds   Standing jose to shoulder height OTB to fatigue  Wall slides with YTB for ER 3x to fatigue  OH 20# med ball press    Circuit: 3 rounds  SB alternating T/Y 4# x 8 each  Standing alternating front/lateral raises 4# x 8 each    NP:  Serratus press red sport cord x 12  Bent over rows 25# to fatigue namrata   Bicep curls 20# x 10-15  Circuit: 3 rounds  Incline press 25# x 10  Floor press 35# x 10-15       Home Exercises Provided and Patient Education Provided     Education provided:   - Home Exercise Program, precautions, progress    Written Home Exercises Provided: Patient instructed to cont prior HEP.  Exercises were reviewed and Sukumar was able to demonstrate them prior to the end of the session.  Sukumar demonstrated good  understanding of the education provided.     See EMR under Patient Instructions for exercises provided prior visit.    Assessment     Sukumar returned today with better strength, prior weeks arm falls when walking across turf but able to make it full distance demonstrating improved endurance. Added in golf training for more resisted pressing and thoracic mobility and OH stretch for strength and AAROM resistance in increased shoulder flexion range of motion.     Sukumar is progressing well towards his goals.   Pt prognosis is Good.     Pt will continue to benefit from skilled outpatient physical therapy to address the deficits listed in the problem list box on initial evaluation, provide pt/family education and to maximize pt's level of  independence in the home and community environment.     Pt's spiritual, cultural and educational needs considered and pt agreeable to plan of care and goals.     Anticipated barriers to physical therapy: none    GOALS: Short Term Goals: 4 weeks(Progressing, not met)  1.Report decreased neck pain  <   / =  3  /10  to increase tolerance for work  2. Increase cervical ROM by 5-10 degrees in order to perform ADLs with decreased difficulty.  3. Increase strength in B shoulders/scapular stabilizers by 1/3 MMT grade to increase tolerance for ADL and work activities.  4. Pt to tolerate HEP to improve ROM and independence with ADL's     Long Term Goals: 8 weeks(Progressing, not met)  1.Report decreased neck pain  <   / =  0  /10  to increase tolerance for return to exercise  2. Increase UE/neck flexibility in order to improve posture.    3.Increased strength in B shoulders/scapular stabilizers to >/= 4/5 MMT grade to increase tolerance for ADL and work activities.  4.  Pt will report at goals level on FOTO neck score for neck pain disability to demonstrate decrease in disability and improvement in neck pain.     Plan     Plan of care Certification: 11/16/2023 to 3/16/2024.     Improve overhead cuff strength    Therapist: Reji Szymanski, PT

## 2024-04-01 ENCOUNTER — CLINICAL SUPPORT (OUTPATIENT)
Dept: REHABILITATION | Facility: HOSPITAL | Age: 54
End: 2024-04-01
Payer: COMMERCIAL

## 2024-04-01 DIAGNOSIS — M54.2 NECK PAIN: Primary | ICD-10-CM

## 2024-04-01 PROCEDURE — 97530 THERAPEUTIC ACTIVITIES: CPT | Performed by: PHYSICAL THERAPIST

## 2024-04-01 PROCEDURE — 97112 NEUROMUSCULAR REEDUCATION: CPT | Performed by: PHYSICAL THERAPIST

## 2024-04-02 NOTE — PROGRESS NOTES
Physical Therapy Treatment Note     Name: Sukumar Beal  [facility] Number: [id number]    Therapy Diagnosis:   Encounter Diagnosis   Name Primary?    Neck pain Yes     Physician: Marylou Mueller NP    Visit Date: 4/1/2024    Physician Orders: PT Eval and Treat   Medical Diagnosis from Referral:   Diagnosis   R29.898 (ICD-10-CM) - Shoulder weakness   M25.511 (ICD-10-CM) - Acute pain of right shoulder   M47.812 (ICD-10-CM) - Spondylosis of cervical region without myelopathy or radiculopathy      Evaluation Date: 11/16/2023  Authorization Period Expiration: 11/7/2024  Plan of Care Expiration: 3/16/2024  Progress Note Due: 2/16/2024  Visit # / Visits authorized: 23/40    Time In: 0855  Time Out: 950  Total Billable Time: 55 minutes    Precautions: Standard and RCR     Date of Surgery: 10/14/2022    Subjective     Pt reports: Sore, I was working in the garden all weekend. He reports the dirt was christopher/rock, lots of manual lifting so he is sore all over    He was compliant with home exercise program.  Response to previous treatment: soreness  Functional change: improved overhead reach    Pain: 0/10  Location: right shoulder      Objective     Sukumar received therapeutic exercises to develop strength, endurance, ROM, flexibility, and posture for 00 minutes including:      Sukumar received the following manual therapy techniques: Joint mobilizations and Soft tissue Mobilization were applied to the: right shoulder for 00 minutes, including:    Cervical flexion segmental opening  Cervical flexion FMP  C5-6 opening on R Gr IV  Scalene 1st rib mob with left rotation    Sukumar participated in neuromuscular re-education activities to improve: Coordination, Proprioception, and Posture for 30 minutes. The following activities were included:    Circuit: 3 rounds  ER GTB to fatigue  OH carries KB 5#  3 laps    Circuit:   Golf  rotation press light band 3 x 15  Golf  overhead lift/lat stretch 3 x 15    Circuit:  TRX rows 3 x  15  TRX fly 3 x 8      NP:  Circuit: 3 rounds  Wall thoracic rotations with RTB x 5-8 namrata  Wall ball ABC's 6#MB x 1  Standing A (45deg ABD) GTB to fatigue  UT press 3# 3 x 12  AA scaption wall slides 4x 2#  Hanging 10# KB 3 laps turf  ER walkouts GTB 3x to fatigue    Sukumar participated in dynamic functional therapeutic activities to improve functional performance for 25 minutes, including:    UBE forward/backward lvl 8 x 10' for cardiovascular endurance training and shoulder ROM    Circuit: 3 rounds   Standing jose to shoulder height OTB to fatigue  Wall slides with YTB for ER 3x to fatigue  OH 20# med ball press    Circuit: 3 rounds  Incline press 25# x 10  Floor press 35# x 10-15     NP:  Circuit: 3 rounds  SB alternating T/Y 4# x 8 each  Standing alternating front/lateral raises 4# x 8 each  Serratus press red sport cord x 12  Bent over rows 25# to fatigue namrata   Bicep curls 20# x 10-15        Home Exercises Provided and Patient Education Provided     Education provided:   - Home Exercise Program, precautions, progress    Written Home Exercises Provided: Patient instructed to cont prior HEP.  Exercises were reviewed and Sukumar was able to demonstrate them prior to the end of the session.  Sukumar demonstrated good  understanding of the education provided.     See EMR under Patient Instructions for exercises provided prior visit.    Assessment     Sukumar is doing very well with improved use of the upper extremity. He is compliant with workouts outside the clinic performing boot camp has his work. Continues to progress thoracic mobility and Oh strengthening, just limited endurance.     Sukumar is progressing well towards his goals.   Pt prognosis is Good.     Pt will continue to benefit from skilled outpatient physical therapy to address the deficits listed in the problem list box on initial evaluation, provide pt/family education and to maximize pt's level of independence in the home and community environment.     Pt's  spiritual, cultural and educational needs considered and pt agreeable to plan of care and goals.     Anticipated barriers to physical therapy: none    GOALS: Short Term Goals: 4 weeks(Progressing, not met)  1.Report decreased neck pain  <   / =  3  /10  to increase tolerance for work  2. Increase cervical ROM by 5-10 degrees in order to perform ADLs with decreased difficulty.  3. Increase strength in B shoulders/scapular stabilizers by 1/3 MMT grade to increase tolerance for ADL and work activities.  4. Pt to tolerate HEP to improve ROM and independence with ADL's     Long Term Goals: 8 weeks(Progressing, not met)  1.Report decreased neck pain  <   / =  0  /10  to increase tolerance for return to exercise  2. Increase UE/neck flexibility in order to improve posture.    3.Increased strength in B shoulders/scapular stabilizers to >/= 4/5 MMT grade to increase tolerance for ADL and work activities.  4.  Pt will report at goals level on FOTO neck score for neck pain disability to demonstrate decrease in disability and improvement in neck pain.     Plan     Plan of care Certification: 11/16/2023 to 3/16/2024.     Improve overhead cuff strength    Therapist: Reji Szymanski, PT

## 2024-04-03 ENCOUNTER — CLINICAL SUPPORT (OUTPATIENT)
Dept: REHABILITATION | Facility: HOSPITAL | Age: 54
End: 2024-04-03
Payer: COMMERCIAL

## 2024-04-03 DIAGNOSIS — M54.2 NECK PAIN: Primary | ICD-10-CM

## 2024-04-03 PROCEDURE — 97112 NEUROMUSCULAR REEDUCATION: CPT | Performed by: PHYSICAL THERAPIST

## 2024-04-03 PROCEDURE — 97530 THERAPEUTIC ACTIVITIES: CPT | Performed by: PHYSICAL THERAPIST

## 2024-04-03 NOTE — PROGRESS NOTES
Physical Therapy Treatment Note     Name: Sukumar Beal  [facility] Number: [id number]    Therapy Diagnosis:   Encounter Diagnosis   Name Primary?    Neck pain Yes     Physician: Marylou Mueller NP    Visit Date: 4/3/2024    Physician Orders: PT Eval and Treat   Medical Diagnosis from Referral:   Diagnosis   R29.898 (ICD-10-CM) - Shoulder weakness   M25.511 (ICD-10-CM) - Acute pain of right shoulder   M47.812 (ICD-10-CM) - Spondylosis of cervical region without myelopathy or radiculopathy      Evaluation Date: 11/16/2023  Authorization Period Expiration: 11/7/2024  Plan of Care Expiration: 3/16/2024  Progress Note Due: 2/16/2024  Visit # / Visits authorized: 24/40    Time In: 945  Time Out: 1030  Total Billable Time: 45 minutes    Precautions: Standard and RCR     Date of Surgery: 10/14/2022    Subjective     Pt reports: My neck is sore today but it doesn't hurt     He was compliant with home exercise program.  Response to previous treatment: soreness  Functional change: improved overhead reach    Pain: 0/10  Location: right shoulder      Objective     Sukumar received therapeutic exercises to develop strength, endurance, ROM, flexibility, and posture for 00 minutes including:      Sukumar received the following manual therapy techniques: Joint mobilizations and Soft tissue Mobilization were applied to the: right shoulder for 00 minutes, including:    Cervical flexion segmental opening  Cervical flexion FMP  C5-6 opening on R Gr IV  Scalene 1st rib mob with left rotation    Sukumar participated in neuromuscular re-education activities to improve: Coordination, Proprioception, and Posture for 30 minutes. The following activities were included:    Circuit: 3 rounds  ER GTB to fatigue  OH carries KB 5#  3 laps - full shoulder extensions    Circuit:   Golf  rotation press light band 3 x 15  Golf  overhead lift/lat stretch 3 x 15    UT press 3# 3 x 12    NP:  Circuit:  TRX rows 3 x 15  TRX fly 3 x 8  Circuit: 3  rounds  Wall thoracic rotations with RTB x 5-8 namrata  Wall ball ABC's 6#MB x 1  Standing A (45deg ABD) GTB to fatigue  AA scaption wall slides 4x 2#  Hanging 10# KB 3 laps turf  ER walkouts GTB 3x to fatigue    Sukumar participated in dynamic functional therapeutic activities to improve functional performance for 15 minutes, including:    UBE forward/backward lvl 8 x 10' for cardiovascular endurance training and shoulder ROM  Prone over swiss ball 5# T 3 x 12    NP:  Circuit: 3 rounds   Standing jose to shoulder height OTB to fatigue  Wall slides with YTB for ER 3x to fatigue  OH 20# med ball press    Circuit: 3 rounds  Incline press 25# x 10  Floor press 35# x 10-15   Circuit: 3 rounds  SB alternating T/Y 4# x 8 each  Standing alternating front/lateral raises 4# x 8 each  Serratus press red sport cord x 12  Bent over rows 25# to fatigue namrata   Bicep curls 20# x 10-15        Home Exercises Provided and Patient Education Provided     Education provided:   - Home Exercise Program, precautions, progress    Written Home Exercises Provided: Patient instructed to cont prior HEP.  Exercises were reviewed and Sukumar was able to demonstrate them prior to the end of the session.  Sukumar demonstrated good  understanding of the education provided.     See EMR under Patient Instructions for exercises provided prior visit.    Assessment     Educated again on return to UT loading with the 2# weight today. He did well with mid trap progression and better tolerance to overhead load with walking weight held comfortably.    Sukumar is progressing well towards his goals.   Pt prognosis is Good.     Pt will continue to benefit from skilled outpatient physical therapy to address the deficits listed in the problem list box on initial evaluation, provide pt/family education and to maximize pt's level of independence in the home and community environment.     Pt's spiritual, cultural and educational needs considered and pt agreeable to plan of care and  goals.     Anticipated barriers to physical therapy: none    GOALS: Short Term Goals: 4 weeks(Progressing, not met)  1.Report decreased neck pain  <   / =  3  /10  to increase tolerance for work  2. Increase cervical ROM by 5-10 degrees in order to perform ADLs with decreased difficulty.  3. Increase strength in B shoulders/scapular stabilizers by 1/3 MMT grade to increase tolerance for ADL and work activities.  4. Pt to tolerate HEP to improve ROM and independence with ADL's     Long Term Goals: 8 weeks(Progressing, not met)  1.Report decreased neck pain  <   / =  0  /10  to increase tolerance for return to exercise  2. Increase UE/neck flexibility in order to improve posture.    3.Increased strength in B shoulders/scapular stabilizers to >/= 4/5 MMT grade to increase tolerance for ADL and work activities.  4.  Pt will report at goals level on FOTO neck score for neck pain disability to demonstrate decrease in disability and improvement in neck pain.     Plan     Plan of care Certification: 11/16/2023 to 3/16/2024.     Improve overhead cuff strength    Therapist: Reji Szymanski, PT

## 2024-04-18 ENCOUNTER — PATIENT MESSAGE (OUTPATIENT)
Dept: UROLOGY | Facility: CLINIC | Age: 54
End: 2024-04-18

## 2024-04-18 ENCOUNTER — OFFICE VISIT (OUTPATIENT)
Dept: UROLOGY | Facility: CLINIC | Age: 54
End: 2024-04-18
Payer: COMMERCIAL

## 2024-04-18 VITALS
HEIGHT: 74 IN | BODY MASS INDEX: 30.21 KG/M2 | DIASTOLIC BLOOD PRESSURE: 87 MMHG | HEART RATE: 71 BPM | SYSTOLIC BLOOD PRESSURE: 139 MMHG | WEIGHT: 235.44 LBS

## 2024-04-18 DIAGNOSIS — R53.83 FATIGUE, UNSPECIFIED TYPE: ICD-10-CM

## 2024-04-18 DIAGNOSIS — E29.1 PRIMARY MALE HYPOGONADISM: Primary | ICD-10-CM

## 2024-04-18 PROCEDURE — 1159F MED LIST DOCD IN RCRD: CPT | Mod: CPTII,S$GLB,,

## 2024-04-18 PROCEDURE — 96372 THER/PROPH/DIAG INJ SC/IM: CPT | Mod: S$GLB,,,

## 2024-04-18 PROCEDURE — 1160F RVW MEDS BY RX/DR IN RCRD: CPT | Mod: CPTII,S$GLB,,

## 2024-04-18 PROCEDURE — 3008F BODY MASS INDEX DOCD: CPT | Mod: CPTII,S$GLB,,

## 2024-04-18 PROCEDURE — 99999 PR PBB SHADOW E&M-EST. PATIENT-LVL IV: CPT | Mod: PBBFAC,,,

## 2024-04-18 PROCEDURE — 99499 UNLISTED E&M SERVICE: CPT | Mod: S$GLB,,,

## 2024-04-18 PROCEDURE — 3075F SYST BP GE 130 - 139MM HG: CPT | Mod: CPTII,S$GLB,,

## 2024-04-18 PROCEDURE — 3079F DIAST BP 80-89 MM HG: CPT | Mod: CPTII,S$GLB,,

## 2024-04-18 NOTE — PROGRESS NOTES
CHIEF COMPLAINT:  Male hypogonadism       HISTORY OF PRESENTING ILLINESS:  Sukumar Beal is a 52 y.o. male who has a history of Low Testosterone. His complaints are fatigue, loss of axillary hair. Although his testosterone level is stable, he still feels fatigued. He is here for his 1st dose of AVEED. He would also like to get an apt for therapeutic phlebotomy.      He's on Androgel 1.62% (1.25 gms) using 3 pumps. While on TRT, While on TRT, he has more energy.    He wanted testopel, but he has [employer].  Last clinic visit was 9/11/23 with ANTONIO Rhodes NP  Switched to AndroGel 1.62% 2.5gm packets; 2 packets daily.   Successfully using the Viagra.      Here today for f/u visit.   Overall feeling good. Prefers the packets; does not run out like the pump.      TRT labs were completed 3/11/24 at 0950  PSA was 1.1 stable  T level 440  HCT was 46.6 (stable).     He is here today for 6 month TRT.   Overall feeling very well, but fatigued.        REVIEW OF SYSTEMS:  Review of Systems   Constitutional:  Negative for chills and fever.   HENT:  Negative for congestion and sore throat.    Respiratory:  Negative for cough and shortness of breath.    Cardiovascular:  Negative for chest pain and palpitations.   Gastrointestinal:  Negative for nausea and vomiting.   Genitourinary:  Negative for dysuria, flank pain, frequency, hematuria and urgency.   Neurological:  Negative for dizziness and headaches.         PATIENT HISTORY:    Past Medical History:   Diagnosis Date    Gout 7/2014    High cholesterol     Hypertension     Hypothyroid     Low testosterone     Staph aureus infection age 13    R leg       Past Surgical History:   Procedure Laterality Date    ARTHROSCOPIC DEBRIDEMENT OF SHOULDER  10/14/2022    Procedure: DEBRIDEMENT, SHOULDER, ARTHROSCOPIC;  Surgeon: LEONIDAS Carr MD;  Location: [facility] OR;  Service: Orthopedics;;    ARTHROSCOPIC REPAIR OF ROTATOR CUFF OF SHOULDER Right 10/14/2022    Procedure: REPAIR, ROTATOR CUFF,  ARTHROSCOPIC - POLAR CARE;  Surgeon: LEONIDAS Carr MD;  Location: [facility] OR;  Service: Orthopedics;  Laterality: Right;  Regional w/o Catheter, Interscalene    ARTHROSCOPY,SHOULDER,WITH BICEPS TENODESIS Right 10/14/2022    Procedure: ARTHROSCOPY,SHOULDER,WITH BICEPS TENODESIS;  Surgeon: LEONIDAS Carr MD;  Location: [facility] OR;  Service: Orthopedics;  Laterality: Right;    COLONOSCOPY N/A 2020    Procedure: COLONOSCOPY;  Surgeon: VANI Newell MD;  Location: [facility] ENDO (4TH FLR);  Service: Endoscopy;  Laterality: N/A;  covid test 2nd floor surgery clinic     COLONOSCOPY N/A 2023    Procedure: COLONOSCOPY;  Surgeon: VANI Newell MD;  Location: [facility] ENDO (4TH FLR);  Service: Endoscopy;  Laterality: N/A;  - referred by Dr. Katherine reynaga/ [facility]  7 day hold/ Prep instructions to the portal. AS  10/31-precall complee-pt states he has not taken [facility] in several months-MS    DECOMPRESSION OF SUBACROMIAL SPACE Right 10/14/2022    Procedure: DECOMPRESSION, SUBACROMIAL SPACE;  Surgeon: LEONIDAS Carr MD;  Location: [facility] OR;  Service: Orthopedics;  Laterality: Right;    FRACTURE SURGERY Left     wrist    Incision and drainage of tibia Right     SINUS SURGERY      x2       Family History   Problem Relation Name Age of Onset    Heart disease Father  73            Hypertension Sister      Hyperlipidemia Sister      Hypertension Brother      Hyperlipidemia Brother      Lupus Sister      Hypertension Mother      Cancer Maternal Grandfather      Cancer Maternal Aunt          lung - smoker    Melanoma Neg Hx         Social History     Socioeconomic History    Marital status: Single   Tobacco Use    Smoking status: Former     Current packs/day: 0.00     Average packs/day: 0.3 packs/day for 15.0 years (3.8 ttl pk-yrs)     Types: Cigarettes     Start date: 1/3/1998     Quit date: 1/3/2013     Years since quittin.3    Smokeless tobacco: Never   Substance and Sexual Activity    Alcohol  use: Yes     Alcohol/week: 5.0 standard drinks of alcohol     Types: 6 Standard drinks or equivalent per week     Comment: weekends    Drug use: No    Sexual activity: Yes     Social Determinants of Health     Financial Resource Strain: Low Risk  (11/29/2023)    Overall Financial Resource Strain (CARDIA)     Difficulty of Paying Living Expenses: Not hard at all   Food Insecurity: No Food Insecurity (11/29/2023)    Hunger Vital Sign     Worried About Running Out of Food in the Last Year: Never true     Ran Out of Food in the Last Year: Never true   Transportation Needs: No Transportation Needs (11/29/2023)    PRAPARE - Transportation     Lack of Transportation (Medical): No     Lack of Transportation (Non-Medical): No   Physical Activity: Sufficiently Active (11/29/2023)    Exercise Vital Sign     Days of Exercise per Week: 3 days     Minutes of Exercise per Session: 60 min   Stress: Stress Concern Present (11/29/2023)    [employer] [place] [employer] - Occupational Stress Questionnaire     Feeling of Stress : Rather much   Social Connections: Unknown (11/29/2023)    Social Connection and Isolation Panel [NHANES]     Frequency of Communication with Friends and Family: More than three times a week     Frequency of Social Gatherings with Friends and Family: More than three times a week     Active Member of Clubs or Organizations: Yes     Attends Club or Organization Meetings: More than 4 times per year     Marital Status: Never    Housing Stability: Unknown (11/29/2023)    Housing Stability Vital Sign     Unable to Pay for Housing in the Last Year: No     Unstable Housing in the Last Year: No       Allergies:  Codeine and Penicillins    Medications:    Current Outpatient Medications:     atorvastatin (LIPITOR) 20 MG tablet, Take 1 tablet (20 mg total) by mouth once daily., Disp: 90 tablet, Rfl: 0    B-complex with vitamin C (Z-BEC OR EQUIV) tablet, Take 1 tablet by mouth once daily., Disp: , Rfl:      celecoxib (CELEBREX) 200 MG capsule, Take 1 capsule (200 mg total) by mouth 2 (two) times daily., Disp: 40 capsule, Rfl: 1    CYANOCOBALAMIN, VITAMIN B-12, ORAL, Take 1 tablet by mouth once daily., Disp: , Rfl:     ergocalciferol, vitamin D2, (VITAMIN D ORAL), Take 2 capsules by mouth once daily., Disp: , Rfl:     levothyroxine (SYNTHROID) 112 MCG tablet, Take 1 tablet (112 mcg total) by mouth once daily., Disp: 90 tablet, Rfl: 1    losartan (COZAAR) 50 MG tablet, Take 1 tablet (50 mg total) by mouth once daily., Disp: 90 tablet, Rfl: 3    methylPREDNISolone (MEDROL DOSEPACK) 4 mg tablet, use as directed, Disp: 21 each, Rfl: 0    sildenafiL (VIAGRA) 100 MG tablet, Take 1 tablet (100 mg total) by mouth daily as needed for Erectile Dysfunction (take on an empty stomach 30-60 minutes before)., Disp: 10 tablet, Rfl: 12    vitamin E acetate (VITAMIN E ORAL), Take 1 tablet by mouth once daily., Disp: , Rfl:     Current Facility-Administered Medications:     [START ON 5/16/2024] testosterone undecanoate injection 750 mg, 750 mg, Intramuscular, Q10 weeks,     PHYSICAL EXAMINATION:  Physical Exam  Constitutional:       Appearance: Normal appearance.   HENT:      Head: Normocephalic and atraumatic.      Right Ear: External ear normal.      Left Ear: External ear normal.      Nose: Nose normal.      Mouth/Throat:      Mouth: Mucous membranes are moist.   Pulmonary:      Effort: Pulmonary effort is normal. No respiratory distress.   Skin:     General: Skin is warm and dry.   Neurological:      General: No focal deficit present.      Mental Status: He is alert and oriented to person, place, and time.   Psychiatric:         Mood and Affect: Mood normal.         Behavior: Behavior normal.             Lab Results   Component Value Date    PSA 0.81 09/22/2021    PSA 0.42 06/11/2018    PSA 0.41 05/10/2017    PSADIAG 1.1 03/11/2024    PSADIAG 0.73 09/07/2023    PSADIAG 0.61 03/10/2023       Lab Results   Component Value Date     CREATININE 1.0 03/11/2024    EGFRNORACEVR >60.0 03/11/2024         IMPRESSION:  Encounter Diagnoses   Name Primary?    Primary male hypogonadism Yes    Fatigue, unspecified type          Assessment:       1. Primary male hypogonadism    2. Fatigue, unspecified type        Plan:   - After obtaining informed consent verbally, 750 mg of Aveed was injected into the RIGHT upper outer gluteal muscle using standard sterile technique.  Was injected over 90 seconds.  He will be observed for 30 minutes.  RTC 4 weeks for repeat injection.  The risks and benefits of AVEED were discussed with the patient in detail to include an allergic reaction/anaphylaxis and POME.      RTC 4 weeks for next injection    I spent 30 minutes with the patient of which more than half was spent in direct consultation with the patient in regards to our treatment and plan.  We addressed the office findings and recent labs; any need to go ER today.   Education and recommendations of today's plan of care including home remedies and needed follow up with PCP.

## 2024-04-18 NOTE — PATIENT INSTRUCTIONS
Here in urology, we have the option of administering AVEED for testosterone replacement therapy. AVEED contains testosterone in castor oil. I will inject AVEED in the muscle of your buttocks over 90 seconds. AVEED is then slowly released from your muscle into your body. You can only get AVEED in your doctors office, clinic, or hospital because of the possible serious side effects.     What are the Most Serious Side Effects with AVEED Treatment?   Pulmonary oil microembolism (POME), a serious lung problem. Tiny droplets of the castor oil in AVEED  could travel from your muscle to your lungs after you get AVEED treatment. You can have symptoms such as  coughing, trouble breathing, chest pain, or other symptoms. POME is not a blood clot that travels to your lungs.   Severe or life-threatening allergic reaction (anaphylaxis). Signs and symptoms of a life-threatening  allergic reaction are listed below.  These reactions can occur with any AVEED injection during your treatment, even if you have not had a  reaction occur before.  Each time you get AVEED, stay in the doctors office, clinic, or hospital for 30 minutes to be sure you  do not have symptoms of POME or a severe allergic reaction.     You will get 1 injection when you start, 1 injection 4 weeks later, and then 1 injection every 10 weeks thereafter.      Labs will be obtained in 3 months after initial injection of AVEED. These labs include: CBC, CMP, lipids, PSA and Testosterone.      If you are interested in this type of testosterone replacement let me know and I will place the orders. It may take a couple of weeks for your insurance to approve.      [email or web address]

## 2024-04-19 ENCOUNTER — CLINICAL SUPPORT (OUTPATIENT)
Dept: REHABILITATION | Facility: HOSPITAL | Age: 54
End: 2024-04-19
Payer: COMMERCIAL

## 2024-04-19 DIAGNOSIS — M54.2 NECK PAIN: Primary | ICD-10-CM

## 2024-04-19 PROCEDURE — 97112 NEUROMUSCULAR REEDUCATION: CPT | Performed by: PHYSICAL THERAPIST

## 2024-04-19 PROCEDURE — 97530 THERAPEUTIC ACTIVITIES: CPT | Performed by: PHYSICAL THERAPIST

## 2024-04-19 NOTE — PROGRESS NOTES
"Physical Therapy Treatment Note     Name: Sukumar Beal  [facility] Number: [id number]    Therapy Diagnosis:   Encounter Diagnosis   Name Primary?    Neck pain Yes       Physician: Marylou Mueller NP    Visit Date: 4/19/2024    Physician Orders: PT Eval and Treat   Medical Diagnosis from Referral:   Diagnosis   R29.898 (ICD-10-CM) - Shoulder weakness   M25.511 (ICD-10-CM) - Acute pain of right shoulder   M47.812 (ICD-10-CM) - Spondylosis of cervical region without myelopathy or radiculopathy      Evaluation Date: 11/16/2023  Authorization Period Expiration: 11/7/2024  Plan of Care Expiration: 3/16/2024  Progress Note Due: 2/16/2024  Visit # / Visits authorized: 25/40    Time In: 1000 am  Time Out: 1055  Total Billable Time: 55 minutes    Precautions: Standard and RCR     Date of Surgery: 10/14/2022    Subjective     Pt reports: he is doing much better. He feels weak in his shoulder with overhead activities like when he is hanging a picture.     He was compliant with home exercise program.  Response to previous treatment: soreness  Functional change: improved overhead reach    Pain: 0/10  Location: right shoulder      Objective     Sukumar received therapeutic exercises to develop strength, endurance, ROM, flexibility, and posture for 00 minutes including:      Sukumar received the following manual therapy techniques: Joint mobilizations and Soft tissue Mobilization were applied to the: right shoulder for 00 minutes, including:    Cervical flexion segmental opening  Cervical flexion FMP  C5-6 opening on R Gr IV  Scalene 1st rib mob with left rotation    Sukumar participated in neuromuscular re-education activities to improve: Coordination, Proprioception, and Posture for 30 minutes. The following activities were included:  OH carries KB 5# 1 lap, 10# 2 laps   UT press 5# 3 x 15   ER walkouts GTB 3 x 10   OH 14# med ball holds at wall 3 x 10 x 5"      NP:  Golf  rotation press light band 3 x 15  Golf  overhead " "lift/lat stretch 3 x 15  Circuit:  TRX rows 3 x 15  TRX fly 3 x 8  Circuit: 3 rounds  Wall thoracic rotations with RTB x 5-8 namrata  Wall ball ABC's 6#MB x 1  Standing A (45deg ABD) GTB to fatigue  AA scaption wall slides 4x 2#  Hanging 10# KB 3 laps turf    Sukumar participated in dynamic functional therapeutic activities to improve functional performance for 25 minutes, including:  UBE forward/backward lvl 8 x 10' for cardiovascular endurance training and shoulder ROM    Circuit:   Farmer's carry 35# 3 x turf lap ea.   Prone T's over swiss ball 2# 3 x 15 x 5"     NP:  Circuit: 3 rounds   Standing jose to shoulder height OTB to fatigue  Wall slides with YTB for ER 3x to fatigue  OH 20# med ball press  Circuit: 3 rounds  Incline press 25# x 10  Floor press 35# x 10-15   Circuit: 3 rounds  SB alternating T/Y 4# x 8 each  Standing alternating front/lateral raises 4# x 8 each  Serratus press red sport cord x 12  Bent over rows 25# to fatigue namrata   Bicep curls 20# x 10-15        Home Exercises Provided and Patient Education Provided     Education provided:   - Home Exercise Program, precautions, progress    Written Home Exercises Provided: Patient instructed to cont prior HEP.  Exercises were reviewed and Sukumar was able to demonstrate them prior to the end of the session.  Sukumar demonstrated good  understanding of the education provided.     See EMR under Patient Instructions for exercises provided prior visit.    Assessment   Sukumar still has difficulty with endurance with overhead activities. Treatment focused mostly on endurance with overhead activities and RTC & periscapular strengthening. Patient tolerated treatment well but fatigued with overhead activities & targeted RTC strengthening.     Sukumar is progressing well towards his goals.   Pt prognosis is Good.     Pt will continue to benefit from skilled outpatient physical therapy to address the deficits listed in the problem list box on initial evaluation, provide pt/family " education and to maximize pt's level of independence in the home and community environment.     Pt's spiritual, cultural and educational needs considered and pt agreeable to plan of care and goals.     Anticipated barriers to physical therapy: none    GOALS: Short Term Goals: 4 weeks(Progressing, not met)  1.Report decreased neck pain  <   / =  3  /10  to increase tolerance for work  2. Increase cervical ROM by 5-10 degrees in order to perform ADLs with decreased difficulty.  3. Increase strength in B shoulders/scapular stabilizers by 1/3 MMT grade to increase tolerance for ADL and work activities.  4. Pt to tolerate HEP to improve ROM and independence with ADL's     Long Term Goals: 8 weeks(Progressing, not met)  1.Report decreased neck pain  <   / =  0  /10  to increase tolerance for return to exercise  2. Increase UE/neck flexibility in order to improve posture.    3.Increased strength in B shoulders/scapular stabilizers to >/= 4/5 MMT grade to increase tolerance for ADL and work activities.  4.  Pt will report at goals level on FOTO neck score for neck pain disability to demonstrate decrease in disability and improvement in neck pain.     Plan     Plan of care Certification: 11/16/2023 to 3/16/2024.     Improve overhead cuff strength    Co-treated with [employer] Vega    I certify that I was present in the room directing the student in service delivery and guiding them using my skilled judgment. As the co-signing therapist I have reviewed the students documentation and am responsible for the treatment, assessment, and plan.     Reji Szymanski, PT DPT OCS FAAOMPT

## 2024-04-29 NOTE — TELEPHONE ENCOUNTER
Care Due:                  Date            Visit Type   Department     Provider  --------------------------------------------------------------------------------                                EP -                              PRIMARY      [facility] PRIMARY  Last Visit: 08-      CARE ([facility])   KEVIN Turner  Next Visit: None Scheduled  None         None Found                                                            Last  Test          Frequency    Reason                     Performed    Due Date  --------------------------------------------------------------------------------    TSH.........  12 months..  levothyroxine............  08- 08-    [facility] Embedded Care Due Messages. Reference number: [id number].   4/29/2024 7:50:39 AM CDT

## 2024-04-30 ENCOUNTER — CLINICAL SUPPORT (OUTPATIENT)
Dept: REHABILITATION | Facility: HOSPITAL | Age: 54
End: 2024-04-30
Payer: COMMERCIAL

## 2024-04-30 DIAGNOSIS — M54.2 NECK PAIN: Primary | ICD-10-CM

## 2024-04-30 PROCEDURE — 97112 NEUROMUSCULAR REEDUCATION: CPT | Performed by: PHYSICAL THERAPIST

## 2024-04-30 PROCEDURE — 97530 THERAPEUTIC ACTIVITIES: CPT | Performed by: PHYSICAL THERAPIST

## 2024-04-30 RX ORDER — LOSARTAN POTASSIUM 50 MG/1
50 TABLET ORAL DAILY
Qty: 90 TABLET | Refills: 1 | Status: SHIPPED | OUTPATIENT
Start: 2024-04-30

## 2024-04-30 NOTE — PROGRESS NOTES
"Physical Therapy Treatment Note     Name: Sukumar Beal  [facility] Number: [id number]    Therapy Diagnosis:   Encounter Diagnosis   Name Primary?    Neck pain Yes       Physician: Marylou Mueller NP    Visit Date: 4/30/2024    Physician Orders: PT Eval and Treat   Medical Diagnosis from Referral:   Diagnosis   R29.898 (ICD-10-CM) - Shoulder weakness   M25.511 (ICD-10-CM) - Acute pain of right shoulder   M47.812 (ICD-10-CM) - Spondylosis of cervical region without myelopathy or radiculopathy      Evaluation Date: 11/16/2023  Authorization Period Expiration: 11/7/2024  Plan of Care Expiration: 3/16/2024  Progress Note Due: 2/16/2024  Visit # / Visits authorized: 26/40    Time In: 900 am  Time Out: 955am  Total Billable Time: 55 minutes    Precautions: Standard and RCR     Date of Surgery: 10/14/2022    Subjective     Pt reports: neck is a little sore today. He reports no pain, motion is better just some soreness    He was compliant with home exercise program.  Response to previous treatment: soreness  Functional change: improved overhead reach    Pain: 0/10  Location: right shoulder      Objective     Sukumar received therapeutic exercises to develop strength, endurance, ROM, flexibility, and posture for 00 minutes including:      Sukumar received the following manual therapy techniques: Joint mobilizations and Soft tissue Mobilization were applied to the: right shoulder for 00 minutes, including:    Cervical flexion segmental opening  Cervical flexion FMP  C5-6 opening on R Gr IV  Scalene 1st rib mob with left rotation    Sukumar participated in neuromuscular re-education activities to improve: Coordination, Proprioception, and Posture for 45 minutes. The following activities were included    OH carries KB 5# 3 laps   OH 14# med ball holds at wall 3 x 10 x 5"  Golf  rotation press light band 3 x 15  Golf  overhead lift/lat stretch 3 x 15  Circuit:  TRX rows 3 x 15  TRX fly 3 x 8      NP:  Circuit: 3 " "rounds  Wall thoracic rotations with RTB x 5-8 namrata  Wall ball ABC's 6#MB x 1  Standing A (45deg ABD) GTB to fatigue  AA scaption wall slides 4x 2#  Hanging 10# KB 3 laps turf    Sukumar participated in dynamic functional therapeutic activities to improve functional performance for 15 minutes, including:  UBE forward/backward lvl 8 x 10' for cardiovascular endurance training and shoulder ROM  5# mini bat scaptions 2 x 15  Sled push 70# 3 laps    NP:  Circuit:   Farmer's carry 35# 3 x turf lap ea.   Prone T's over swiss ball 2# 3 x 15 x 5"   Circuit: 3 rounds   Standing jose to shoulder height OTB to fatigue  Wall slides with YTB for ER 3x to fatigue  OH 20# med ball press  Circuit: 3 rounds  Incline press 25# x 10  Floor press 35# x 10-15   Circuit: 3 rounds  SB alternating T/Y 4# x 8 each  Standing alternating front/lateral raises 4# x 8 each  Serratus press red sport cord x 12  Bent over rows 25# to fatigue namrata   Bicep curls 20# x 10-15        Home Exercises Provided and Patient Education Provided     Education provided:   - Home Exercise Program, precautions, progress    Written Home Exercises Provided: Patient instructed to cont prior HEP.  Exercises were reviewed and Sukumar was able to demonstrate them prior to the end of the session.  Sukumar demonstrated good  understanding of the education provided.     See EMR under Patient Instructions for exercises provided prior visit.    Assessment     Sukumar progressed with strengthening to the RUE, bat to mimic lifting the milk jug at home. He did well with sled press, cue for end range press for serratus    Sukumar is progressing well towards his goals.   Pt prognosis is Good.     Pt will continue to benefit from skilled outpatient physical therapy to address the deficits listed in the problem list box on initial evaluation, provide pt/family education and to maximize pt's level of independence in the home and community environment.     Pt's spiritual, cultural and educational " needs considered and pt agreeable to plan of care and goals.     Anticipated barriers to physical therapy: none    GOALS: Short Term Goals: 4 weeks(Progressing, not met)  1.Report decreased neck pain  <   / =  3  /10  to increase tolerance for work  2. Increase cervical ROM by 5-10 degrees in order to perform ADLs with decreased difficulty.  3. Increase strength in B shoulders/scapular stabilizers by 1/3 MMT grade to increase tolerance for ADL and work activities.  4. Pt to tolerate HEP to improve ROM and independence with ADL's     Long Term Goals: 8 weeks(Progressing, not met)  1.Report decreased neck pain  <   / =  0  /10  to increase tolerance for return to exercise  2. Increase UE/neck flexibility in order to improve posture.    3.Increased strength in B shoulders/scapular stabilizers to >/= 4/5 MMT grade to increase tolerance for ADL and work activities.  4.  Pt will report at goals level on FOTO neck score for neck pain disability to demonstrate decrease in disability and improvement in neck pain.     Plan     Plan of care Certification: 11/16/2023 to 3/16/2024.     Improve overhead cuff strength    Co-treated with [employer] Vega    I certify that I was present in the room directing the student in service delivery and guiding them using my skilled judgment. As the co-signing therapist I have reviewed the students documentation and am responsible for the treatment, assessment, and plan.     Reji Szymanski, PT DPT OCS FAAOMPT

## 2024-04-30 NOTE — TELEPHONE ENCOUNTER
Provider Staff:  Action required for this patient     Please see care gap opportunities below in Care Due Message.    Thanks!  [facility]     Appointments      Date Provider   Last Visit   8/10/2023 Katherine Turner MD   Next Visit   Visit date not found Katherine Tunrer MD      Refill Decision Note   Sukumar Beal  is requesting a refill authorization.  Brief Assessment and Rationale for Refill:  Approve     Medication Therapy Plan:         Comments:     Note composed:3:32 AM 04/30/2024

## 2024-05-03 ENCOUNTER — CLINICAL SUPPORT (OUTPATIENT)
Dept: REHABILITATION | Facility: HOSPITAL | Age: 54
End: 2024-05-03
Payer: COMMERCIAL

## 2024-05-03 DIAGNOSIS — M54.2 NECK PAIN: Primary | ICD-10-CM

## 2024-05-03 PROCEDURE — 97530 THERAPEUTIC ACTIVITIES: CPT | Performed by: PHYSICAL THERAPIST

## 2024-05-03 PROCEDURE — 97112 NEUROMUSCULAR REEDUCATION: CPT | Performed by: PHYSICAL THERAPIST

## 2024-05-04 NOTE — PROGRESS NOTES
"Physical Therapy Treatment Note     Name: Sukumar Beal  [facility] Number: [id number]    Therapy Diagnosis:   Encounter Diagnosis   Name Primary?    Neck pain Yes       Physician: Marylou Mueller NP    Visit Date: 5/3/2024    Physician Orders: PT Eval and Treat   Medical Diagnosis from Referral:   Diagnosis   R29.898 (ICD-10-CM) - Shoulder weakness   M25.511 (ICD-10-CM) - Acute pain of right shoulder   M47.812 (ICD-10-CM) - Spondylosis of cervical region without myelopathy or radiculopathy      Evaluation Date: 11/16/2023  Authorization Period Expiration: 11/7/2024  Plan of Care Expiration: 3/16/2024  Progress Note Due: 2/16/2024  Visit # / Visits authorized: 27/40    Time In: 900 am  Time Out: 1001am  Total Billable Time: 61 minutes    Precautions: Standard and RCR     Date of Surgery: 10/14/2022    Subjective     Pt reports: a little tired today but shoulder is feeling stronger, full motion    He was compliant with home exercise program.  Response to previous treatment: soreness  Functional change: improved overhead reach    Pain: 0/10  Location: right shoulder      Objective     Sukumar received therapeutic exercises to develop strength, endurance, ROM, flexibility, and posture for 00 minutes including:      Sukumar received the following manual therapy techniques: Joint mobilizations and Soft tissue Mobilization were applied to the: right shoulder for 00 minutes, including:    Cervical flexion segmental opening  Cervical flexion FMP  C5-6 opening on R Gr IV  Scalene 1st rib mob with left rotation    Sukumar participated in neuromuscular re-education activities to improve: Coordination, Proprioception, and Posture for 45 minutes. The following activities were included    OH carries KB 5# 3 laps   OH 14# med ball holds at wall 3 x 10 x 5"  Golf  rotation press light band 3 x 15  Golf  overhead lift/lat stretch 3 x 15  Circuit:  TRX rows 3 x 15  TRX fly 3 x 8  Overhead IR/ER walkout press YTB 3 x " "12    NP:  Circuit: 3 rounds  Wall thoracic rotations with RTB x 5-8 namrata  Wall ball ABC's 6#MB x 1  Standing A (45deg ABD) GTB to fatigue  AA scaption wall slides 4x 2#  Hanging 10# KB 3 laps turf    Sukumar participated in dynamic functional therapeutic activities to improve functional performance for 15 minutes, including:  UBE forward/backward lvl 8 x 10' for cardiovascular endurance training and shoulder ROM  Sled push 70# 3 laps    NP:  5# mini bat scaptions 2 x 15  Circuit:   Farmer's carry 35# 3 x turf lap ea.   Prone T's over swiss ball 2# 3 x 15 x 5"   Circuit: 3 rounds   Standing jose to shoulder height OTB to fatigue  Wall slides with YTB for ER 3x to fatigue  OH 20# med ball press  Circuit: 3 rounds  Incline press 25# x 10  Floor press 35# x 10-15   Circuit: 3 rounds  SB alternating T/Y 4# x 8 each  Standing alternating front/lateral raises 4# x 8 each  Serratus press red sport cord x 12  Bent over rows 25# to fatigue namrata   Bicep curls 20# x 10-15        Home Exercises Provided and Patient Education Provided     Education provided:   - Home Exercise Program, precautions, progress    Written Home Exercises Provided: Patient instructed to cont prior HEP.  Exercises were reviewed and Sukumar was able to demonstrate them prior to the end of the session.  Sukumar demonstrated good  understanding of the education provided.     See EMR under Patient Instructions for exercises provided prior visit.    Assessment     Challenged his cuff overhead in front, noted difficulty with ER arm in front. Deltoid dominance noted by patient with new exercise. Progressing well with strengthening above 90 degrees    Sukumar is progressing well towards his goals.   Pt prognosis is Good.     Pt will continue to benefit from skilled outpatient physical therapy to address the deficits listed in the problem list box on initial evaluation, provide pt/family education and to maximize pt's level of independence in the home and community " environment.     Pt's spiritual, cultural and educational needs considered and pt agreeable to plan of care and goals.     Anticipated barriers to physical therapy: none    GOALS: Short Term Goals: 4 weeks(Progressing, not met)  1.Report decreased neck pain  <   / =  3  /10  to increase tolerance for work  2. Increase cervical ROM by 5-10 degrees in order to perform ADLs with decreased difficulty.  3. Increase strength in B shoulders/scapular stabilizers by 1/3 MMT grade to increase tolerance for ADL and work activities.  4. Pt to tolerate HEP to improve ROM and independence with ADL's     Long Term Goals: 8 weeks(Progressing, not met)  1.Report decreased neck pain  <   / =  0  /10  to increase tolerance for return to exercise  2. Increase UE/neck flexibility in order to improve posture.    3.Increased strength in B shoulders/scapular stabilizers to >/= 4/5 MMT grade to increase tolerance for ADL and work activities.  4.  Pt will report at goals level on FOTO neck score for neck pain disability to demonstrate decrease in disability and improvement in neck pain.     Plan     Plan of care Certification: 11/16/2023 to 3/16/2024.     Improve overhead cuff strength    Co-treated with [employer] Vgea    I certify that I was present in the room directing the student in service delivery and guiding them using my skilled judgment. As the co-signing therapist I have reviewed the students documentation and am responsible for the treatment, assessment, and plan.     Reji Szymanski, PT DPT OCS FAAOMPT

## 2024-05-08 ENCOUNTER — CLINICAL SUPPORT (OUTPATIENT)
Dept: REHABILITATION | Facility: HOSPITAL | Age: 54
End: 2024-05-08
Payer: COMMERCIAL

## 2024-05-08 DIAGNOSIS — M54.2 NECK PAIN: Primary | ICD-10-CM

## 2024-05-08 PROCEDURE — 97112 NEUROMUSCULAR REEDUCATION: CPT | Performed by: PHYSICAL THERAPIST

## 2024-05-08 PROCEDURE — 97530 THERAPEUTIC ACTIVITIES: CPT | Performed by: PHYSICAL THERAPIST

## 2024-05-08 NOTE — PROGRESS NOTES
"Physical Therapy Treatment Note     Name: Sukumar Beal  [facility] Number: [id number]    Therapy Diagnosis:   Encounter Diagnosis   Name Primary?    Neck pain Yes       Physician: Marylou Mueller NP    Visit Date: 5/8/2024    Physician Orders: PT Eval and Treat   Medical Diagnosis from Referral:   Diagnosis   R29.898 (ICD-10-CM) - Shoulder weakness   M25.511 (ICD-10-CM) - Acute pain of right shoulder   M47.812 (ICD-10-CM) - Spondylosis of cervical region without myelopathy or radiculopathy      Evaluation Date: 11/16/2023  Authorization Period Expiration: 11/7/2024  Plan of Care Expiration: 8/8/2024  Visit # / Visits authorized: 28/40    Time In: 945 am  Time Out: 1040 am  Total Billable Time: 55 minutes    Precautions: Standard and RCR     Date of Surgery: 10/14/2022    Subjective     Pt reports: felt good after last time, a little sore    He was compliant with home exercise program.  Response to previous treatment: soreness  Functional change: improved overhead reach    Pain: 0/10  Location: right shoulder      Objective     Sukumar received therapeutic exercises to develop strength, endurance, ROM, flexibility, and posture for 00 minutes including:      Sukumar received the following manual therapy techniques: Joint mobilizations and Soft tissue Mobilization were applied to the: right shoulder for 00 minutes, including:    Cervical flexion segmental opening  Cervical flexion FMP  C5-6 opening on R Gr IV  Scalene 1st rib mob with left rotation    Sukumar participated in neuromuscular re-education activities to improve: Coordination, Proprioception, and Posture for 31 minutes. The following activities were included    OH carries KB 5# 3 laps   OH 14# med ball holds at wall 3 x 10 x 5"  Golf  rotation press light band 3 x 15  Golf  overhead lift/lat stretch 3 x 15  Circuit:  TRX rows 3 x 15  TRX fly 3 x 8  Overhead IR/ER walkout press YTB 3 x 12    NP:  Circuit: 3 rounds  Wall thoracic rotations with RTB x " "5-8 namrata  Wall ball ABC's 6#MB x 1  Standing A (45deg ABD) GTB to fatigue  AA scaption wall slides 4x 2#  Hanging 10# KB 3 laps turf    Sukumar participated in dynamic functional therapeutic activities to improve functional performance for 24 minutes, including:  UBE forward/backward lvl 8 x 10' for cardiovascular endurance training and shoulder ROM  Landmine 70# 3 x 15  5# mini bat scaptions 2 x 15    NP:  Sled push 70# 3 laps  Circuit:   Farmer's carry 35# 3 x turf lap ea.   Prone T's over swiss ball 2# 3 x 15 x 5"   Circuit: 3 rounds   Standing jose to shoulder height OTB to fatigue  Wall slides with YTB for ER 3x to fatigue  OH 20# med ball press  Circuit: 3 rounds  Incline press 25# x 10  Floor press 35# x 10-15   Circuit: 3 rounds  SB alternating T/Y 4# x 8 each  Standing alternating front/lateral raises 4# x 8 each  Serratus press red sport cord x 12  Bent over rows 25# to fatigue namrata   Bicep curls 20# x 10-15        Home Exercises Provided and Patient Education Provided     Education provided:   - Home Exercise Program, precautions, progress    Written Home Exercises Provided: Patient instructed to cont prior HEP.  Exercises were reviewed and Sukumar was able to demonstrate them prior to the end of the session.  Sukumar demonstrated good  understanding of the education provided.     See EMR under Patient Instructions for exercises provided prior visit.    Assessment   Progressed OH press with landmine weight and with scaption weight away from the shoulder. He notes being pain free just weak into cuff overhead, especially ER in front    Sukumar is progressing well towards his goals.   Pt prognosis is Good.     Pt will continue to benefit from skilled outpatient physical therapy to address the deficits listed in the problem list box on initial evaluation, provide pt/family education and to maximize pt's level of independence in the home and community environment.     Pt's spiritual, cultural and educational needs " considered and pt agreeable to plan of care and goals.     Anticipated barriers to physical therapy: none    GOALS: Short Term Goals: 4 weeks(met)  1.Report decreased neck pain  <   / =  3  /10  to increase tolerance for work  2. Increase cervical ROM by 5-10 degrees in order to perform ADLs with decreased difficulty.  3. Increase strength in B shoulders/scapular stabilizers by 1/3 MMT grade to increase tolerance for ADL and work activities.  4. Pt to tolerate HEP to improve ROM and independence with ADL's     Long Term Goals: 8 weeks(met)  1.Report decreased neck pain  <   / =  0  /10  to increase tolerance for return to exercise  2. Increase UE/neck flexibility in order to improve posture.    3.Increased strength in B shoulders/scapular stabilizers to >/= 4/5 MMT grade to increase tolerance for ADL and work activities.  4.  Pt will report at goals level on FOTO neck score for neck pain disability to demonstrate decrease in disability and improvement in neck pain.     Plan     Plan of care Certification: 5/8/24 to 8/8/2024    Improve overhead cuff strength        Reji Szymanski, PT DPT OCS FAAOMPT

## 2024-05-16 ENCOUNTER — OFFICE VISIT (OUTPATIENT)
Dept: UROLOGY | Facility: CLINIC | Age: 54
End: 2024-05-16
Payer: COMMERCIAL

## 2024-05-16 VITALS
WEIGHT: 238.31 LBS | HEART RATE: 71 BPM | BODY MASS INDEX: 30.59 KG/M2 | SYSTOLIC BLOOD PRESSURE: 134 MMHG | DIASTOLIC BLOOD PRESSURE: 82 MMHG | HEIGHT: 74 IN

## 2024-05-16 DIAGNOSIS — E34.9 TESTOSTERONE DEFICIENCY: ICD-10-CM

## 2024-05-16 DIAGNOSIS — R53.83 FATIGUE, UNSPECIFIED TYPE: ICD-10-CM

## 2024-05-16 DIAGNOSIS — E29.1 PRIMARY MALE HYPOGONADISM: Primary | ICD-10-CM

## 2024-05-16 DIAGNOSIS — N40.0 BENIGN PROSTATIC HYPERPLASIA WITHOUT LOWER URINARY TRACT SYMPTOMS: ICD-10-CM

## 2024-05-16 DIAGNOSIS — E78.5 DYSLIPIDEMIA: ICD-10-CM

## 2024-05-16 PROCEDURE — 1160F RVW MEDS BY RX/DR IN RCRD: CPT | Mod: CPTII,S$GLB,,

## 2024-05-16 PROCEDURE — 4010F ACE/ARB THERAPY RXD/TAKEN: CPT | Mod: CPTII,S$GLB,,

## 2024-05-16 PROCEDURE — 96372 THER/PROPH/DIAG INJ SC/IM: CPT | Mod: S$GLB,,,

## 2024-05-16 PROCEDURE — 99499 UNLISTED E&M SERVICE: CPT | Mod: S$GLB,,,

## 2024-05-16 PROCEDURE — 3079F DIAST BP 80-89 MM HG: CPT | Mod: CPTII,S$GLB,,

## 2024-05-16 PROCEDURE — 3008F BODY MASS INDEX DOCD: CPT | Mod: CPTII,S$GLB,,

## 2024-05-16 PROCEDURE — 99999 PR PBB SHADOW E&M-EST. PATIENT-LVL III: CPT | Mod: PBBFAC,,,

## 2024-05-16 PROCEDURE — 1159F MED LIST DOCD IN RCRD: CPT | Mod: CPTII,S$GLB,,

## 2024-05-16 PROCEDURE — 3075F SYST BP GE 130 - 139MM HG: CPT | Mod: CPTII,S$GLB,,

## 2024-05-16 NOTE — PROGRESS NOTES
CHIEF COMPLAINT:  Male hypogonadism       HISTORY OF PRESENTING ILLINESS:  Sukumar Beal is a 53 y.o. male who has a history of Low Testosterone. His complaints are fatigue, loss of axillary hair. Although his testosterone level is stable, he still feels fatigued. He is here for his 2nd dose of AVEED. He would also like to get an apt for therapeutic phlebotomy, has not done so yet.     He was on Androgel 1.62% (1.25 gms) using 3 pumps. He did not feel an improvement in symptoms with the gel. He also did not like the application process.   He wanted testopel, but he has [employer].  He was following up with ANTONIO Rhodes NP  Successfully using the Viagra.      Here today for f/u visit.      TRT labs were completed 3/11/24 at 0950  PSA was 1.1 stable  T level 440  HCT was 46.6 (stable).         REVIEW OF SYSTEMS:  Review of Systems   Constitutional:  Negative for chills and fever.   HENT:  Negative for congestion and sore throat.    Respiratory:  Negative for cough and shortness of breath.    Cardiovascular:  Negative for chest pain and palpitations.   Gastrointestinal:  Negative for nausea and vomiting.   Genitourinary:  Negative for dysuria, flank pain, frequency, hematuria and urgency.   Neurological:  Negative for dizziness and headaches.         PATIENT HISTORY:    Past Medical History:   Diagnosis Date    Gout 7/2014    High cholesterol     Hypertension     Hypothyroid     Low testosterone     Staph aureus infection age 13    R leg       Past Surgical History:   Procedure Laterality Date    ARTHROSCOPIC DEBRIDEMENT OF SHOULDER  10/14/2022    Procedure: DEBRIDEMENT, SHOULDER, ARTHROSCOPIC;  Surgeon: LEONIDAS Carr MD;  Location: [facility] OR;  Service: Orthopedics;;    ARTHROSCOPIC REPAIR OF ROTATOR CUFF OF SHOULDER Right 10/14/2022    Procedure: REPAIR, ROTATOR CUFF, ARTHROSCOPIC - [facility];  Surgeon: LEONIDAS Carr MD;  Location: [facility] OR;  Service: Orthopedics;  Laterality: Right;  Regional w/o Catheter,  Interscalene    ARTHROSCOPY,SHOULDER,WITH BICEPS TENODESIS Right 10/14/2022    Procedure: ARTHROSCOPY,SHOULDER,WITH BICEPS TENODESIS;  Surgeon: LEONIDAS Carr MD;  Location: [facility] OR;  Service: Orthopedics;  Laterality: Right;    COLONOSCOPY N/A 2020    Procedure: COLONOSCOPY;  Surgeon: VANI Newell MD;  Location: [facility] ENDO (4TH FLR);  Service: Endoscopy;  Laterality: N/A;  covid test 2nd floor surgery clinic     COLONOSCOPY N/A 2023    Procedure: COLONOSCOPY;  Surgeon: VANI Newell MD;  Location: [facility] ENDO (4TH FLR);  Service: Endoscopy;  Laterality: N/A;  - referred by Dr. Katherine reynaga/ [facility]  7 day hold/ Prep instructions to the portal. AS  10/31-precall complee-pt states he has not taken [facility] in several months-MS    DECOMPRESSION OF SUBACROMIAL SPACE Right 10/14/2022    Procedure: DECOMPRESSION, SUBACROMIAL SPACE;  Surgeon: LEONIDAS Carr MD;  Location: [facility] OR;  Service: Orthopedics;  Laterality: Right;    FRACTURE SURGERY Left     wrist    Incision and drainage of tibia Right     SINUS SURGERY      x2       Family History   Problem Relation Name Age of Onset    Heart disease Father  73            Hypertension Sister      Hyperlipidemia Sister      Hypertension Brother      Hyperlipidemia Brother      Lupus Sister      Hypertension Mother      Cancer Maternal Grandfather      Cancer Maternal Aunt          lung - smoker    Melanoma Neg Hx         Social History     Socioeconomic History    Marital status: Single   Tobacco Use    Smoking status: Former     Current packs/day: 0.00     Average packs/day: 0.3 packs/day for 15.0 years (3.8 ttl pk-yrs)     Types: Cigarettes     Start date: 1/3/1998     Quit date: 1/3/2013     Years since quittin.3    Smokeless tobacco: Never   Substance and Sexual Activity    Alcohol use: Yes     Alcohol/week: 5.0 standard drinks of alcohol     Types: 6 Standard drinks or equivalent per week     Comment: weekends    Drug use: No     Sexual activity: Yes     Social Determinants of Health     Financial Resource Strain: Low Risk  (11/29/2023)    Overall Financial Resource Strain (CARDIA)     Difficulty of Paying Living Expenses: Not hard at all   Food Insecurity: No Food Insecurity (11/29/2023)    Hunger Vital Sign     Worried About Running Out of Food in the Last Year: Never true     Ran Out of Food in the Last Year: Never true   Transportation Needs: No Transportation Needs (11/29/2023)    PRAPARE - Transportation     Lack of Transportation (Medical): No     Lack of Transportation (Non-Medical): No   Physical Activity: Sufficiently Active (11/29/2023)    Exercise Vital Sign     Days of Exercise per Week: 3 days     Minutes of Exercise per Session: 60 min   Stress: Stress Concern Present (11/29/2023)    [employer] - Occupational Stress Questionnaire     Feeling of Stress : Rather much   Housing Stability: Unknown (11/29/2023)    Housing Stability Vital Sign     Unable to Pay for Housing in the Last Year: No     Unstable Housing in the Last Year: No       Allergies:  Codeine and Penicillins    Medications:    Current Outpatient Medications:     atorvastatin (LIPITOR) 20 MG tablet, Take 1 tablet (20 mg total) by mouth once daily., Disp: 90 tablet, Rfl: 0    B-complex with vitamin C (Z-BEC OR EQUIV) tablet, Take 1 tablet by mouth once daily., Disp: , Rfl:     celecoxib (CELEBREX) 200 MG capsule, Take 1 capsule (200 mg total) by mouth 2 (two) times daily., Disp: 40 capsule, Rfl: 1    CYANOCOBALAMIN, VITAMIN B-12, ORAL, Take 1 tablet by mouth once daily., Disp: , Rfl:     ergocalciferol, vitamin D2, (VITAMIN D ORAL), Take 2 capsules by mouth once daily., Disp: , Rfl:     levothyroxine (SYNTHROID) 112 MCG tablet, Take 1 tablet (112 mcg total) by mouth once daily., Disp: 90 tablet, Rfl: 1    losartan (COZAAR) 50 MG tablet, Take 1 tablet (50 mg total) by mouth once daily., Disp: 90 tablet, Rfl: 1    methylPREDNISolone  (MEDROL DOSEPACK) 4 mg tablet, use as directed, Disp: 21 each, Rfl: 0    sildenafiL (VIAGRA) 100 MG tablet, Take 1 tablet (100 mg total) by mouth daily as needed for Erectile Dysfunction (take on an empty stomach 30-60 minutes before)., Disp: 10 tablet, Rfl: 12    vitamin E acetate (VITAMIN E ORAL), Take 1 tablet by mouth once daily., Disp: , Rfl:     Current Facility-Administered Medications:     testosterone undecanoate injection 750 mg, 750 mg, Intramuscular, Q10 weeks, , 750 mg at 05/16/24 1439    PHYSICAL EXAMINATION:  Physical Exam  Constitutional:       Appearance: Normal appearance.   HENT:      Head: Normocephalic and atraumatic.      Right Ear: External ear normal.      Left Ear: External ear normal.      Nose: Nose normal.      Mouth/Throat:      Mouth: Mucous membranes are moist.   Pulmonary:      Effort: Pulmonary effort is normal. No respiratory distress.   Skin:     General: Skin is warm and dry.   Neurological:      General: No focal deficit present.      Mental Status: He is alert and oriented to person, place, and time.   Psychiatric:         Mood and Affect: Mood normal.         Behavior: Behavior normal.           Lab Results   Component Value Date    PSA 0.81 09/22/2021    PSA 0.42 06/11/2018    PSA 0.41 05/10/2017    PSADIAG 1.1 03/11/2024    PSADIAG 0.73 09/07/2023    PSADIAG 0.61 03/10/2023       Lab Results   Component Value Date    CREATININE 1.0 03/11/2024    EGFRNORACEVR >60.0 03/11/2024           IMPRESSION:    Encounter Diagnoses   Name Primary?    Primary male hypogonadism Yes    Dyslipidemia     Fatigue, unspecified type     Testosterone deficiency     Benign prostatic hyperplasia without lower urinary tract symptoms          Assessment:       1. Primary male hypogonadism    2. Dyslipidemia    3. Fatigue, unspecified type    4. Testosterone deficiency    5. Benign prostatic hyperplasia without lower urinary tract symptoms        Plan:   - After obtaining informed consent verbally, 750  mg of Aveed was injected into the LEFT upper outer gluteal muscle using standard sterile technique.  Was injected over 90 seconds.  He will be observed for 30 minutes.  RTC 10 weeks for repeat injection. Goal with AVEED is symptom improvement.  The risks and benefits of AVEED were discussed with the patient in detail to include an allergic reaction/anaphylaxis and POME.    - Schedule therapeutic phlebotomy.     RTC 10 weeks for next injection  Early morning T, lipids, PSA, Cr, Hepatic function due August 2024     I spent 30 minutes with the patient of which more than half was spent in direct consultation with the patient in regards to our treatment and plan.  We addressed the office findings and recent labs; any need to go ER today.   Education and recommendations of today's plan of care including home remedies and needed follow up with PCP.

## 2024-05-20 ENCOUNTER — CLINICAL SUPPORT (OUTPATIENT)
Dept: REHABILITATION | Facility: HOSPITAL | Age: 54
End: 2024-05-20
Payer: COMMERCIAL

## 2024-05-20 DIAGNOSIS — M54.2 NECK PAIN: Primary | ICD-10-CM

## 2024-05-20 PROCEDURE — 97530 THERAPEUTIC ACTIVITIES: CPT | Mod: CQ

## 2024-05-20 PROCEDURE — 97112 NEUROMUSCULAR REEDUCATION: CPT | Mod: CQ

## 2024-05-20 NOTE — PROGRESS NOTES
Physical Therapy Treatment Note     Name: Sukumar Beal  [facility] Number: [id number]    Therapy Diagnosis:   Encounter Diagnosis   Name Primary?    Neck pain Yes     Physician: Marylou Mueller NP    Visit Date: 5/20/2024    Physician Orders: PT Eval and Treat   Medical Diagnosis from Referral:   Diagnosis   R29.898 (ICD-10-CM) - Shoulder weakness   M25.511 (ICD-10-CM) - Acute pain of right shoulder   M47.812 (ICD-10-CM) - Spondylosis of cervical region without myelopathy or radiculopathy      Evaluation Date: 11/16/2023  Authorization Period Expiration: 11/7/2024  Plan of Care Expiration: 8/8/2024  Visit # / Visits authorized: 29/40    Time In: 0955  Time Out: 1056  Total Billable Time: 58 minutes    Precautions: Standard and RCR     Date of Surgery: 10/14/2022    Subjective     Pt reports: he had been out from being sick but has no complaints of pain.     He was compliant with home exercise program.  Response to previous treatment: soreness  Functional change: improved overhead reach    Pain: 0/10  Location: right shoulder      Objective     Sukumar received therapeutic exercises to develop strength, endurance, ROM, flexibility, and posture for 00 minutes including:      Sukumar received the following manual therapy techniques: Joint mobilizations and Soft tissue Mobilization were applied to the: right shoulder for 00 minutes, including:    Cervical flexion segmental opening  Cervical flexion FMP  C5-6 opening on R Gr IV  Scalene 1st rib mob with left rotation    Sukumar participated in neuromuscular re-education activities to improve: Coordination, Proprioception, and Posture for 20 minutes. The following activities were included    Circuit: 3 rounds  Resisted ER into Y OTB x 12  Standing T GTB x 10    Circuit: 3 rounds  TRX rows x 15  TRX fly x 8    NP:  Overhead IR/ER walkout press YTB 3 x 12  Wall thoracic rotations with RTB x 5-8 namrata  Wall ball ABC's 6#MB x 1  Standing A (45deg ABD) GTB to fatigue  AA scaption wall  "slides 4x 2#  Hanging 10# KB 3 laps turf    Sukumar participated in dynamic functional therapeutic activities to improve functional performance for 38 minutes, including:    UBE forward/backward lvl 8 x 10' for cardiovascular endurance training and shoulder ROM  Landmine 60# 3 x 15  's carries 2# x 3 turf laps   OH carries KB 5# 3 turf laps   OH 14# med ball holds at wall 3 x 10 x 5"   Golf  rotation press light band 3 x 15  Golf  overhead lift/lat stretch 3 x 15    NP:  5# mini bat scaptions 2 x 15  Sled push 70# 3 laps  Farmer's carry 35# 3 x turf lap ea.   Prone T's over swiss ball 2# 3 x 15 x 5"   Standing jose to shoulder height OTB to fatigue  Wall slides with YTB for ER 3x to fatigue  OH 20# med ball press  Incline press 25# x 10  Floor press 35# x 10-15   SB alternating T/Y 4# x 8 each  Standing alternating front/lateral raises 4# x 8 each  Serratus press red sport cord x 12  Bent over rows 25# to fatigue namrata   Bicep curls 20# x 10-15        Home Exercises Provided and Patient Education Provided     Education provided:   - Home Exercise Program, precautions, progress    Written Home Exercises Provided: Patient instructed to cont prior HEP.  Exercises were reviewed and Sukumar was able to demonstrate them prior to the end of the session.  Sukumar demonstrated good  understanding of the education provided.     See EMR under Patient Instructions for exercises provided prior visit.    Assessment     Sukumar did well today with fatigue noted throughout session. Challenged with initiation of 's carries with rest breaks needed. Reduced weight for landmines d/t significant muscle fatigue at end of treatment. Assess tolerance to today's treatment and continue to progress as tolerated.    Sukumar is progressing well towards his goals.   Pt prognosis is Good.     Pt will continue to benefit from skilled outpatient physical therapy to address the deficits listed in the problem list box on initial evaluation, " provide pt/family education and to maximize pt's level of independence in the home and community environment.     Pt's spiritual, cultural and educational needs considered and pt agreeable to plan of care and goals.     Anticipated barriers to physical therapy: none    GOALS: Short Term Goals: 4 weeks(met)  1.Report decreased neck pain  <   / =  3  /10  to increase tolerance for work  2. Increase cervical ROM by 5-10 degrees in order to perform ADLs with decreased difficulty.  3. Increase strength in B shoulders/scapular stabilizers by 1/3 MMT grade to increase tolerance for ADL and work activities.  4. Pt to tolerate HEP to improve ROM and independence with ADL's     Long Term Goals: 8 weeks(met)  1.Report decreased neck pain  <   / =  0  /10  to increase tolerance for return to exercise  2. Increase UE/neck flexibility in order to improve posture.    3.Increased strength in B shoulders/scapular stabilizers to >/= 4/5 MMT grade to increase tolerance for ADL and work activities.  4.  Pt will report at goals level on FOTO neck score for neck pain disability to demonstrate decrease in disability and improvement in neck pain.     Plan     Plan of care Certification: 5/8/24 to 8/8/2024    Improve overhead cuff strength    Co-treated by OMER Euceda PTA      I certify that I was present in the room directing the student in service delivery and guiding them using my skilled judgment. As the co-signing therapist I have reviewed the students documentation and am responsible for the treatment and assessment.

## 2024-07-16 RX ORDER — LEVOTHYROXINE SODIUM 112 UG/1
112 TABLET ORAL DAILY
Qty: 90 TABLET | Refills: 0 | Status: CANCELLED | OUTPATIENT
Start: 2024-07-16

## 2024-07-16 NOTE — TELEPHONE ENCOUNTER
Care Due:                  Date            Visit Type   Department     Provider  --------------------------------------------------------------------------------                                EP -                              PRIMARY      [facility] PRIMARY  Last Visit: 08-      CARE ([facility])   KEVIN Turner  Next Visit: None Scheduled  None         None Found                                                            Last  Test          Frequency    Reason                     Performed    Due Date  --------------------------------------------------------------------------------    TSH.........  12 months..  levothyroxine............  08- 08-    [facility] Embedded Care Due Messages. Reference number: [id number].   7/16/2024 8:41:49 AM CDT

## 2024-07-16 NOTE — TELEPHONE ENCOUNTER
Refill Routing Note   Medication(s) are not appropriate for processing by [facility] for the following reason(s):        Required labs outdated    [facility] action(s):  Defer   Requires labs : Yes             Appointments  past 12m or future 3m with PCP    Date Provider   Last Visit   8/10/2023 Katherine Turner MD   Next Visit   Visit date not found Katherine Turner MD   ED visits in past 90 days: 0        Note composed:9:37 AM 07/16/2024

## 2024-07-19 NOTE — TELEPHONE ENCOUNTER
No care due was identified.  [facility] Embedded Care Due Messages. Reference number: [id number].   7/19/2024 11:24:06 AM CDT

## 2024-07-20 RX ORDER — LEVOTHYROXINE SODIUM 112 UG/1
112 TABLET ORAL DAILY
Qty: 90 TABLET | Refills: 0 | Status: SHIPPED | OUTPATIENT
Start: 2024-07-20

## 2024-07-23 ENCOUNTER — PATIENT MESSAGE (OUTPATIENT)
Dept: INTERNAL MEDICINE | Facility: CLINIC | Age: 54
End: 2024-07-23
Payer: COMMERCIAL

## 2024-07-29 ENCOUNTER — LAB VISIT (OUTPATIENT)
Dept: LAB | Facility: HOSPITAL | Age: 54
End: 2024-07-29
Payer: COMMERCIAL

## 2024-07-29 DIAGNOSIS — E29.1 PRIMARY MALE HYPOGONADISM: ICD-10-CM

## 2024-07-29 DIAGNOSIS — E78.5 DYSLIPIDEMIA: ICD-10-CM

## 2024-07-29 LAB
ALBUMIN SERPL BCP-MCNC: 4.1 G/DL (ref 3.5–5.2)
ALP SERPL-CCNC: 76 U/L (ref 55–135)
ALT SERPL W/O P-5'-P-CCNC: 29 U/L (ref 10–44)
AST SERPL-CCNC: 29 U/L (ref 10–40)
BASOPHILS # BLD AUTO: 0.03 K/UL (ref 0–0.2)
BASOPHILS NFR BLD: 0.8 % (ref 0–1.9)
BILIRUB DIRECT SERPL-MCNC: 0.2 MG/DL (ref 0.1–0.3)
BILIRUB SERPL-MCNC: 0.6 MG/DL (ref 0.1–1)
CHOLEST SERPL-MCNC: 210 MG/DL (ref 120–199)
CHOLEST/HDLC SERPL: 3.9 {RATIO} (ref 2–5)
COMPLEXED PSA SERPL-MCNC: 0.89 NG/ML (ref 0–4)
CREAT SERPL-MCNC: 1 MG/DL (ref 0.5–1.4)
DIFFERENTIAL METHOD BLD: NORMAL
EOSINOPHIL # BLD AUTO: 0.1 K/UL (ref 0–0.5)
EOSINOPHIL NFR BLD: 2.6 % (ref 0–8)
ERYTHROCYTE [DISTWIDTH] IN BLOOD BY AUTOMATED COUNT: 13 % (ref 11.5–14.5)
EST. GFR  (NO RACE VARIABLE): >60 ML/MIN/1.73 M^2
HCT VFR BLD AUTO: 50.9 % (ref 40–54)
HDLC SERPL-MCNC: 54 MG/DL (ref 40–75)
HDLC SERPL: 25.7 % (ref 20–50)
HGB BLD-MCNC: 16.4 G/DL (ref 14–18)
IMM GRANULOCYTES # BLD AUTO: 0 K/UL (ref 0–0.04)
IMM GRANULOCYTES NFR BLD AUTO: 0 % (ref 0–0.5)
LDLC SERPL CALC-MCNC: 140.8 MG/DL (ref 63–159)
LYMPHOCYTES # BLD AUTO: 1.6 K/UL (ref 1–4.8)
LYMPHOCYTES NFR BLD: 39.9 % (ref 18–48)
MCH RBC QN AUTO: 30.3 PG (ref 27–31)
MCHC RBC AUTO-ENTMCNC: 32.2 G/DL (ref 32–36)
MCV RBC AUTO: 94 FL (ref 82–98)
MONOCYTES # BLD AUTO: 0.4 K/UL (ref 0.3–1)
MONOCYTES NFR BLD: 10.7 % (ref 4–15)
NEUTROPHILS # BLD AUTO: 1.8 K/UL (ref 1.8–7.7)
NEUTROPHILS NFR BLD: 46 % (ref 38–73)
NONHDLC SERPL-MCNC: 156 MG/DL
NRBC BLD-RTO: 0 /100 WBC
PLATELET # BLD AUTO: 210 K/UL (ref 150–450)
PMV BLD AUTO: 11.1 FL (ref 9.2–12.9)
PROT SERPL-MCNC: 6.8 G/DL (ref 6–8.4)
RBC # BLD AUTO: 5.42 M/UL (ref 4.6–6.2)
TESTOST SERPL-MCNC: 272 NG/DL (ref 304–1227)
TRIGL SERPL-MCNC: 76 MG/DL (ref 30–150)
WBC # BLD AUTO: 3.91 K/UL (ref 3.9–12.7)

## 2024-07-29 PROCEDURE — 84403 ASSAY OF TOTAL TESTOSTERONE: CPT

## 2024-07-29 PROCEDURE — 85025 COMPLETE CBC W/AUTO DIFF WBC: CPT

## 2024-07-29 PROCEDURE — 84153 ASSAY OF PSA TOTAL: CPT

## 2024-07-29 PROCEDURE — 82565 ASSAY OF CREATININE: CPT

## 2024-07-29 PROCEDURE — 80061 LIPID PANEL: CPT | Mod: 91

## 2024-07-29 PROCEDURE — 80076 HEPATIC FUNCTION PANEL: CPT

## 2024-08-01 ENCOUNTER — OFFICE VISIT (OUTPATIENT)
Dept: UROLOGY | Facility: CLINIC | Age: 54
End: 2024-08-01
Payer: COMMERCIAL

## 2024-08-01 VITALS
HEIGHT: 74 IN | SYSTOLIC BLOOD PRESSURE: 129 MMHG | WEIGHT: 238.56 LBS | HEART RATE: 64 BPM | BODY MASS INDEX: 30.62 KG/M2 | DIASTOLIC BLOOD PRESSURE: 81 MMHG

## 2024-08-01 DIAGNOSIS — E78.5 DYSLIPIDEMIA: ICD-10-CM

## 2024-08-01 DIAGNOSIS — R53.83 FATIGUE, UNSPECIFIED TYPE: ICD-10-CM

## 2024-08-01 DIAGNOSIS — E29.1 PRIMARY MALE HYPOGONADISM: Primary | ICD-10-CM

## 2024-08-01 PROCEDURE — 99999 PR PBB SHADOW E&M-EST. PATIENT-LVL III: CPT | Mod: PBBFAC,,,

## 2024-08-01 NOTE — PROGRESS NOTES
CHIEF COMPLAINT:  Male hypogonadism       HISTORY OF PRESENTING ILLINESS:  Sukumar Beal is a 53 y.o. male who has a history of low testosterone. His complaints are fatigue, loss of axillary hair. He was therapeutic with androgel but remained symptomatic of low T. He was transitioned from gel to AVEED in April 2024.    AVEED #1 4/18/2024  AVEED #2 5/16/2024    He is here for maintenance AVEED.      He was on Androgel 1.62% (1.25 gms) using 3 pumps. He did not feel an improvement in symptoms with the gel. He also did not like the application process.   He wanted testopel, but he has BCBS.  He was following up with ANTONIO Rhodes NP  Successfully using the Viagra.      Here today for f/u visit.      TRT labs were completed 7/29/2024  PSA was 0.89 stable  T level 272  HCT was 50.9 (stable).        REVIEW OF SYSTEMS:  Review of Systems   Constitutional:  Positive for malaise/fatigue. Negative for chills and fever.   HENT:  Negative for congestion and sore throat.    Eyes:  Negative for blurred vision.   Respiratory:  Negative for cough and shortness of breath.    Cardiovascular:  Negative for chest pain and palpitations.   Gastrointestinal:  Negative for nausea and vomiting.   Genitourinary:  Negative for dysuria, flank pain, frequency, hematuria and urgency.   Skin:  Negative for rash.   Neurological:  Negative for dizziness and headaches.         PATIENT HISTORY:    Past Medical History:   Diagnosis Date    Gout 7/2014    High cholesterol     Hypertension     Hypothyroid     Low testosterone     Staph aureus infection age 13    R leg       Past Surgical History:   Procedure Laterality Date    ARTHROSCOPIC DEBRIDEMENT OF SHOULDER  10/14/2022    Procedure: DEBRIDEMENT, SHOULDER, ARTHROSCOPIC;  Surgeon: LEONIDAS Carr MD;  Location: [facility] OR;  Service: Orthopedics;;    ARTHROSCOPIC REPAIR OF ROTATOR CUFF OF SHOULDER Right 10/14/2022    Procedure: REPAIR, ROTATOR CUFF, ARTHROSCOPIC - [facility];  Surgeon: LEONIDAS Fernandez  MD Bruno;  Location: [facility] OR;  Service: Orthopedics;  Laterality: Right;  Regional w/o Catheter, Interscalene    ARTHROSCOPY,SHOULDER,WITH BICEPS TENODESIS Right 10/14/2022    Procedure: ARTHROSCOPY,SHOULDER,WITH BICEPS TENODESIS;  Surgeon: LEONIDAS Carr MD;  Location: [facility] OR;  Service: Orthopedics;  Laterality: Right;    COLONOSCOPY N/A 2020    Procedure: COLONOSCOPY;  Surgeon: VANI Newell MD;  Location: [facility] ENDO (4TH FLR);  Service: Endoscopy;  Laterality: N/A;  covid test 2nd floor surgery clinic     COLONOSCOPY N/A 2023    Procedure: COLONOSCOPY;  Surgeon: VANI Newell MD;  Location: [facility] ENDO (4TH FLR);  Service: Endoscopy;  Laterality: N/A;  - referred by Dr. Katherine reynaga/ [facility]  7 day hold/ Prep instructions to the portal. AS  10/31-precall complee-pt states he has not taken WLM in several months-MS    DECOMPRESSION OF SUBACROMIAL SPACE Right 10/14/2022    Procedure: DECOMPRESSION, SUBACROMIAL SPACE;  Surgeon: LEONIDAS Carr MD;  Location: [facility] OR;  Service: Orthopedics;  Laterality: Right;    FRACTURE SURGERY Left     wrist    Incision and drainage of tibia Right     SINUS SURGERY      x2       Family History   Problem Relation Name Age of Onset    Heart disease Father  73            Hypertension Sister      Hyperlipidemia Sister      Hypertension Brother      Hyperlipidemia Brother      Lupus Sister      Hypertension Mother      Cancer Maternal Grandfather      Cancer Maternal Aunt          lung - smoker    Melanoma Neg Hx         Social History     Socioeconomic History    Marital status: Single   Tobacco Use    Smoking status: Former     Current packs/day: 0.00     Average packs/day: 0.3 packs/day for 15.0 years (3.8 ttl pk-yrs)     Types: Cigarettes     Start date: 1/3/1998     Quit date: 1/3/2013     Years since quittin.5    Smokeless tobacco: Never   Substance and Sexual Activity    Alcohol use: Yes     Alcohol/week: 5.0 standard drinks of  alcohol     Types: 6 Standard drinks or equivalent per week     Comment: weekends    Drug use: No    Sexual activity: Yes     Social Determinants of Health     Financial Resource Strain: Low Risk  (11/29/2023)    Overall Financial Resource Strain (CARDIA)     Difficulty of Paying Living Expenses: Not hard at all   Food Insecurity: No Food Insecurity (11/29/2023)    Hunger Vital Sign     Worried About Running Out of Food in the Last Year: Never true     Ran Out of Food in the Last Year: Never true   Transportation Needs: No Transportation Needs (11/29/2023)    PRAPARE - Transportation     Lack of Transportation (Medical): No     Lack of Transportation (Non-Medical): No   Physical Activity: Sufficiently Active (11/29/2023)    Exercise Vital Sign     Days of Exercise per Week: 3 days     Minutes of Exercise per Session: 60 min   Stress: Stress Concern Present (11/29/2023)    [employer] - Occupational Stress Questionnaire     Feeling of Stress : Rather much   Housing Stability: Unknown (11/29/2023)    Housing Stability Vital Sign     Unable to Pay for Housing in the Last Year: No     Unstable Housing in the Last Year: No       Allergies:  Codeine and Penicillins    Medications:    Current Outpatient Medications:     atorvastatin (LIPITOR) 20 MG tablet, Take 1 tablet (20 mg total) by mouth once daily., Disp: 90 tablet, Rfl: 0    B-complex with vitamin C (Z-BEC OR EQUIV) tablet, Take 1 tablet by mouth once daily., Disp: , Rfl:     cefdinir (OMNICEF) 300 MG capsule, Take 1 tablet by mouth twice daily, Disp: 20 capsule, Rfl: 0    celecoxib (CELEBREX) 200 MG capsule, Take 1 capsule (200 mg total) by mouth 2 (two) times daily., Disp: 40 capsule, Rfl: 1    CYANOCOBALAMIN, VITAMIN B-12, ORAL, Take 1 tablet by mouth once daily., Disp: , Rfl:     ergocalciferol, vitamin D2, (VITAMIN D ORAL), Take 2 capsules by mouth once daily., Disp: , Rfl:     fluticasone propionate (FLONASE) 50 mcg/actuation nasal  spray, Spray twice in each nostril once daily for 30 days, Disp: 16 g, Rfl: 3    levothyroxine (SYNTHROID) 112 MCG tablet, Take 1 tablet (112 mcg total) by mouth once daily., Disp: 90 tablet, Rfl: 0    losartan (COZAAR) 50 MG tablet, Take 1 tablet (50 mg total) by mouth once daily., Disp: 90 tablet, Rfl: 1    methylPREDNISolone (MEDROL DOSEPACK) 4 mg tablet, use as directed, Disp: 21 each, Rfl: 0    predniSONE (DELTASONE) 10 MG tablet, Take 3 tablets by mouth once daily for 3 days, 2 daily for 2 days, then 1 daily for 3 days, Disp: 18 tablet, Rfl: 0    sildenafiL (VIAGRA) 100 MG tablet, Take 1 tablet (100 mg total) by mouth daily as needed for Erectile Dysfunction (take on an empty stomach 30-60 minutes before)., Disp: 10 tablet, Rfl: 12    vitamin E acetate (VITAMIN E ORAL), Take 1 tablet by mouth once daily., Disp: , Rfl:     Current Facility-Administered Medications:     testosterone undecanoate injection 750 mg, 750 mg, Intramuscular, Q10 weeks, , 750 mg at 08/01/24 1501    PHYSICAL EXAMINATION:  Physical Exam  Constitutional:       Appearance: Normal appearance.   HENT:      Head: Normocephalic and atraumatic.      Right Ear: External ear normal.      Left Ear: External ear normal.      Nose: Nose normal.      Mouth/Throat:      Mouth: Mucous membranes are moist.   Pulmonary:      Effort: Pulmonary effort is normal. No respiratory distress.   Skin:     General: Skin is warm and dry.   Neurological:      General: No focal deficit present.      Mental Status: He is alert and oriented to person, place, and time.   Psychiatric:         Mood and Affect: Mood normal.         Behavior: Behavior normal.           Lab Results   Component Value Date    PSA 0.81 09/22/2021    PSA 0.42 06/11/2018    PSA 0.41 05/10/2017    PSADIAG 0.89 07/29/2024    PSADIAG 1.1 03/11/2024    PSADIAG 0.73 09/07/2023       Lab Results   Component Value Date    CREATININE 1.0 07/29/2024    CREATININE 1.0 07/29/2024    EGFRNORACEVR >60.0  07/29/2024    EGFRNORACEVR >60.0 07/29/2024         IMPRESSION:  Encounter Diagnoses   Name Primary?    Primary male hypogonadism Yes    Dyslipidemia     Fatigue, unspecified type          Assessment:       1. Primary male hypogonadism    2. Dyslipidemia    3. Fatigue, unspecified type        Plan:   - After obtaining informed consent verbally, 750 mg of Aveed was injected into the RIGHT upper outer gluteal muscle using standard sterile technique.  Was injected over 90 seconds.  He will be observed for 30 minutes.  RTC 10 weeks for repeat injection. Goal with AVEED is symptom improvement.  The risks and benefits of AVEED were discussed with the patient in detail to include an allergic reaction/anaphylaxis and POME.    - Schedule therapeutic phlebotomy.     RTC 10 weeks for next injection  T in 1 month    I spent 30 minutes with the patient of which more than half was spent in direct consultation with the patient in regards to our treatment and plan.  We addressed the office findings and recent labs; any need to go ER today.   Education and recommendations of today's plan of care including home remedies and needed follow up with PCP.

## 2024-09-17 ENCOUNTER — LAB VISIT (OUTPATIENT)
Dept: LAB | Facility: HOSPITAL | Age: 54
End: 2024-09-17
Payer: COMMERCIAL

## 2024-09-17 DIAGNOSIS — E29.1 PRIMARY MALE HYPOGONADISM: ICD-10-CM

## 2024-09-17 LAB — TESTOST SERPL-MCNC: 301 NG/DL (ref 304–1227)

## 2024-09-17 PROCEDURE — 36415 COLL VENOUS BLD VENIPUNCTURE: CPT

## 2024-09-17 PROCEDURE — 84403 ASSAY OF TOTAL TESTOSTERONE: CPT

## 2024-10-07 ENCOUNTER — PATIENT OUTREACH (OUTPATIENT)
Dept: ADMINISTRATIVE | Facility: HOSPITAL | Age: 54
End: 2024-10-07
Payer: COMMERCIAL

## 2024-10-07 DIAGNOSIS — N52.9 ED (ERECTILE DYSFUNCTION) OF ORGANIC ORIGIN: ICD-10-CM

## 2024-10-07 DIAGNOSIS — E29.1 PRIMARY MALE HYPOGONADISM: ICD-10-CM

## 2024-10-07 RX ORDER — ATORVASTATIN CALCIUM 20 MG/1
20 TABLET, FILM COATED ORAL DAILY
Qty: 90 TABLET | Refills: 0 | Status: SHIPPED | OUTPATIENT
Start: 2024-10-07

## 2024-10-07 RX ORDER — SILDENAFIL 100 MG/1
100 TABLET, FILM COATED ORAL DAILY PRN
Qty: 10 TABLET | Refills: 12 | Status: SHIPPED | OUTPATIENT
Start: 2024-10-07 | End: 2025-10-07

## 2024-10-07 RX ORDER — LEVOTHYROXINE SODIUM 112 UG/1
112 TABLET ORAL DAILY
Qty: 90 TABLET | Refills: 0 | Status: SHIPPED | OUTPATIENT
Start: 2024-10-07

## 2024-10-10 ENCOUNTER — OFFICE VISIT (OUTPATIENT)
Dept: UROLOGY | Facility: CLINIC | Age: 54
End: 2024-10-10
Payer: COMMERCIAL

## 2024-10-10 VITALS
DIASTOLIC BLOOD PRESSURE: 78 MMHG | HEIGHT: 74 IN | BODY MASS INDEX: 30.64 KG/M2 | SYSTOLIC BLOOD PRESSURE: 126 MMHG | WEIGHT: 238.75 LBS | HEART RATE: 63 BPM

## 2024-10-10 DIAGNOSIS — E34.9 TESTOSTERONE DEFICIENCY: ICD-10-CM

## 2024-10-10 DIAGNOSIS — N40.0 BENIGN PROSTATIC HYPERPLASIA WITHOUT LOWER URINARY TRACT SYMPTOMS: ICD-10-CM

## 2024-10-10 DIAGNOSIS — E29.1 PRIMARY MALE HYPOGONADISM: Primary | ICD-10-CM

## 2024-10-10 DIAGNOSIS — N52.9 ED (ERECTILE DYSFUNCTION) OF ORGANIC ORIGIN: ICD-10-CM

## 2024-10-10 DIAGNOSIS — E78.5 DYSLIPIDEMIA: ICD-10-CM

## 2024-10-10 PROCEDURE — 3044F HG A1C LEVEL LT 7.0%: CPT | Mod: CPTII,S$GLB,,

## 2024-10-10 PROCEDURE — 3074F SYST BP LT 130 MM HG: CPT | Mod: CPTII,S$GLB,,

## 2024-10-10 PROCEDURE — 4010F ACE/ARB THERAPY RXD/TAKEN: CPT | Mod: CPTII,S$GLB,,

## 2024-10-10 PROCEDURE — 3008F BODY MASS INDEX DOCD: CPT | Mod: CPTII,S$GLB,,

## 2024-10-10 PROCEDURE — 99999 PR PBB SHADOW E&M-EST. PATIENT-LVL III: CPT | Mod: PBBFAC,,,

## 2024-10-10 PROCEDURE — 3078F DIAST BP <80 MM HG: CPT | Mod: CPTII,S$GLB,,

## 2024-10-10 PROCEDURE — 96372 THER/PROPH/DIAG INJ SC/IM: CPT | Mod: S$GLB,,,

## 2024-10-10 PROCEDURE — 99499 UNLISTED E&M SERVICE: CPT | Mod: S$GLB,,,

## 2024-10-10 NOTE — PROGRESS NOTES
CHIEF COMPLAINT:  Male hypogonadism       HISTORY OF PRESENTING ILLINESS:  Sukumar Beal is a 53 y.o. male who has a history of low testosterone. His complaints are fatigue, loss of axillary hair. He was therapeutic with androgel but remained symptomatic of low T. He was transitioned from gel to AVEED in April 2024. Last injection 8/1/2024.      AVEED #1 4/18/2024  AVEED #2 5/16/2024     He is here for maintenance AVEED.      He was on Androgel 1.62% (1.25 gms) using 3 pumps. He did not feel an improvement in symptoms with the gel. He also did not like the application process.   He wanted testopel, but he has BCBS.  He was following up with ANTONIO Rhodes NP  Successfully using the Viagra.      Here today for f/u visit.      TRT labs were completed 7/29/2024  PSA was 0.89 stable  HCT was 50.9 stable  T 301 9/17/2024       REVIEW OF SYSTEMS:  Review of Systems   Constitutional:  Negative for chills and fever.   HENT:  Negative for congestion and sore throat.    Eyes:  Negative for blurred vision.   Respiratory:  Negative for cough and shortness of breath.    Cardiovascular:  Negative for chest pain and palpitations.   Gastrointestinal:  Negative for nausea and vomiting.   Genitourinary:  Negative for dysuria, flank pain, frequency, hematuria and urgency.   Neurological:  Negative for dizziness and headaches.         PATIENT HISTORY:    Past Medical History:   Diagnosis Date    Gout 7/2014    High cholesterol     Hypertension     Hypothyroid     Low testosterone     Staph aureus infection age 13    R leg       Past Surgical History:   Procedure Laterality Date    ARTHROSCOPIC DEBRIDEMENT OF SHOULDER  10/14/2022    Procedure: DEBRIDEMENT, SHOULDER, ARTHROSCOPIC;  Surgeon: LEONIDAS Carr MD;  Location: [facility] OR;  Service: Orthopedics;;    ARTHROSCOPIC REPAIR OF ROTATOR CUFF OF SHOULDER Right 10/14/2022    Procedure: REPAIR, ROTATOR CUFF, ARTHROSCOPIC - [facility];  Surgeon: LEONIDAS Carr MD;  Location: [facility] OR;   Service: Orthopedics;  Laterality: Right;  Regional w/o Catheter, Interscalene    ARTHROSCOPY,SHOULDER,WITH BICEPS TENODESIS Right 10/14/2022    Procedure: ARTHROSCOPY,SHOULDER,WITH BICEPS TENODESIS;  Surgeon: LEONIDAS Carr MD;  Location: [facility] OR;  Service: Orthopedics;  Laterality: Right;    COLONOSCOPY N/A 2020    Procedure: COLONOSCOPY;  Surgeon: VANI Newell MD;  Location: [facility] ENDO (4TH FLR);  Service: Endoscopy;  Laterality: N/A;  covid test 2nd floor surgery clinic     COLONOSCOPY N/A 2023    Procedure: COLONOSCOPY;  Surgeon: VANI Newell MD;  Location: [facility] ENDO (4TH FLR);  Service: Endoscopy;  Laterality: N/A;  - referred by Dr. Katherine reynaga/ [facility]  7 day hold/ Prep instructions to the portal. AS  10/31-precall complee-pt states he has not taken WLM in several months-MS    DECOMPRESSION OF SUBACROMIAL SPACE Right 10/14/2022    Procedure: DECOMPRESSION, SUBACROMIAL SPACE;  Surgeon: LEONIDAS Carr MD;  Location: [facility] OR;  Service: Orthopedics;  Laterality: Right;    FRACTURE SURGERY Left     wrist    Incision and drainage of tibia Right     SINUS SURGERY      x2       Family History   Problem Relation Name Age of Onset    Heart disease Father  73            Hypertension Sister      Hyperlipidemia Sister      Hypertension Brother      Hyperlipidemia Brother      Lupus Sister      Hypertension Mother      Cancer Maternal Grandfather      Cancer Maternal Aunt          lung - smoker    Melanoma Neg Hx         Social History     Socioeconomic History    Marital status: Single   Tobacco Use    Smoking status: Former     Current packs/day: 0.00     Average packs/day: 0.3 packs/day for 15.0 years (3.8 ttl pk-yrs)     Types: Cigarettes     Start date: 1/3/1998     Quit date: 1/3/2013     Years since quittin.7    Smokeless tobacco: Never   Substance and Sexual Activity    Alcohol use: Yes     Alcohol/week: 5.0 standard drinks of alcohol     Types: 6 Standard  drinks or equivalent per week     Comment: weekends    Drug use: No    Sexual activity: Yes     Social Drivers of Health     Financial Resource Strain: Low Risk  (11/29/2023)    Overall Financial Resource Strain (CARDIA)     Difficulty of Paying Living Expenses: Not hard at all   Food Insecurity: No Food Insecurity (11/29/2023)    Hunger Vital Sign     Worried About Running Out of Food in the Last Year: Never true     Ran Out of Food in the Last Year: Never true   Transportation Needs: No Transportation Needs (11/29/2023)    PRAPARE - Transportation     Lack of Transportation (Medical): No     Lack of Transportation (Non-Medical): No   Physical Activity: Sufficiently Active (11/29/2023)    Exercise Vital Sign     Days of Exercise per Week: 3 days     Minutes of Exercise per Session: 60 min   Stress: Stress Concern Present (11/29/2023)    [employer] - Occupational Stress Questionnaire     Feeling of Stress : Rather much   Housing Stability: Unknown (11/29/2023)    Housing Stability Vital Sign     Unable to Pay for Housing in the Last Year: No     Unstable Housing in the Last Year: No       Allergies:  Codeine and Penicillins    Medications:    Current Outpatient Medications:     atorvastatin (LIPITOR) 20 MG tablet, Take 1 tablet (20 mg total) by mouth once daily., Disp: 90 tablet, Rfl: 0    B-complex with vitamin C (Z-BEC OR EQUIV) tablet, Take 1 tablet by mouth once daily., Disp: , Rfl:     cefdinir (OMNICEF) 300 MG capsule, Take 1 tablet by mouth twice daily, Disp: 20 capsule, Rfl: 0    celecoxib (CELEBREX) 200 MG capsule, Take 1 capsule (200 mg total) by mouth 2 (two) times daily., Disp: 40 capsule, Rfl: 1    CYANOCOBALAMIN, VITAMIN B-12, ORAL, Take 1 tablet by mouth once daily., Disp: , Rfl:     ergocalciferol, vitamin D2, (VITAMIN D ORAL), Take 2 capsules by mouth once daily., Disp: , Rfl:     fluticasone propionate (FLONASE) 50 mcg/actuation nasal spray, Spray twice in each nostril  once daily for 30 days, Disp: 16 g, Rfl: 3    levothyroxine (SYNTHROID) 112 MCG tablet, Take 1 tablet (112 mcg total) by mouth once daily., Disp: 90 tablet, Rfl: 0    losartan (COZAAR) 50 MG tablet, Take 1 tablet (50 mg total) by mouth once daily., Disp: 90 tablet, Rfl: 1    methylPREDNISolone (MEDROL DOSEPACK) 4 mg tablet, use as directed, Disp: 21 each, Rfl: 0    predniSONE (DELTASONE) 10 MG tablet, Take 3 tablets by mouth once daily for 3 days, 2 daily for 2 days, then 1 daily for 3 days, Disp: 18 tablet, Rfl: 0    sildenafiL (VIAGRA) 100 MG tablet, Take 1 tablet (100 mg total) by mouth daily as needed for Erectile Dysfunction (take on an empty stomach 30-60 minutes before)., Disp: 10 tablet, Rfl: 12    vitamin E acetate (VITAMIN E ORAL), Take 1 tablet by mouth once daily., Disp: , Rfl:   No current facility-administered medications for this visit.    PHYSICAL EXAMINATION:  Physical Exam  Constitutional:       Appearance: Normal appearance.   HENT:      Head: Normocephalic and atraumatic.      Right Ear: External ear normal.      Left Ear: External ear normal.      Nose: Nose normal.      Mouth/Throat:      Mouth: Mucous membranes are moist.   Pulmonary:      Effort: Pulmonary effort is normal. No respiratory distress.   Skin:     General: Skin is warm and dry.   Neurological:      General: No focal deficit present.      Mental Status: He is alert and oriented to person, place, and time.   Psychiatric:         Mood and Affect: Mood normal.         Behavior: Behavior normal.             Lab Results   Component Value Date    PSA 0.81 09/22/2021    PSA 0.42 06/11/2018    PSA 0.41 05/10/2017    PSADIAG 0.89 07/29/2024    PSADIAG 1.1 03/11/2024    PSADIAG 0.73 09/07/2023       Lab Results   Component Value Date    CREATININE 1.0 07/29/2024    CREATININE 1.0 07/29/2024    EGFRNORACEVR >60.0 07/29/2024    EGFRNORACEVR >60.0 07/29/2024               IMPRESSION:    Encounter Diagnoses   Name Primary?    Primary male  hypogonadism Yes    ED (erectile dysfunction) of organic origin     Dyslipidemia     Testosterone deficiency     Benign prostatic hyperplasia without lower urinary tract symptoms          Assessment:       1. Primary male hypogonadism    2. ED (erectile dysfunction) of organic origin    3. Dyslipidemia    4. Testosterone deficiency    5. Benign prostatic hyperplasia without lower urinary tract symptoms        Plan:   - After obtaining informed consent verbally, 750 mg of Aveed was injected into the LEFT gluteal muscle using standard sterile technique.  Was injected over 90 seconds.  He will be observed for 30 minutes.  RTC 10 weeks for repeat injection.  The risks and benefits of AVEED were discussed with the patient in detail to include an allergic reaction/anaphylaxis and POME.    - Early AM T in 1 month, may consider pellets if he remains symptomatic for low T.     RTC dependent on T lab result     I spent 30 minutes with the patient of which more than half was spent in direct consultation with the patient in regards to our treatment and plan.  We addressed the chief complaint as well as other office findings during the visit. Reviewed the possible contributory factors.

## 2024-10-17 ENCOUNTER — OFFICE VISIT (OUTPATIENT)
Dept: INTERNAL MEDICINE | Facility: CLINIC | Age: 54
End: 2024-10-17
Payer: COMMERCIAL

## 2024-10-17 VITALS
WEIGHT: 244.38 LBS | OXYGEN SATURATION: 98 % | SYSTOLIC BLOOD PRESSURE: 130 MMHG | DIASTOLIC BLOOD PRESSURE: 80 MMHG | HEART RATE: 64 BPM | BODY MASS INDEX: 31.36 KG/M2 | HEIGHT: 74 IN

## 2024-10-17 DIAGNOSIS — R31.29 MICROSCOPIC HEMATURIA: ICD-10-CM

## 2024-10-17 DIAGNOSIS — Z00.00 WELLNESS EXAMINATION: Primary | ICD-10-CM

## 2024-10-17 DIAGNOSIS — Z23 NEED FOR VACCINATION: ICD-10-CM

## 2024-10-17 PROCEDURE — 3075F SYST BP GE 130 - 139MM HG: CPT | Mod: CPTII,S$GLB,, | Performed by: INTERNAL MEDICINE

## 2024-10-17 PROCEDURE — 90714 TD VACC NO PRESV 7 YRS+ IM: CPT | Mod: S$GLB,,, | Performed by: INTERNAL MEDICINE

## 2024-10-17 PROCEDURE — 90471 IMMUNIZATION ADMIN: CPT | Mod: S$GLB,,, | Performed by: INTERNAL MEDICINE

## 2024-10-17 PROCEDURE — 3061F NEG MICROALBUMINURIA REV: CPT | Mod: CPTII,S$GLB,, | Performed by: INTERNAL MEDICINE

## 2024-10-17 PROCEDURE — 3066F NEPHROPATHY DOC TX: CPT | Mod: CPTII,S$GLB,, | Performed by: INTERNAL MEDICINE

## 2024-10-17 PROCEDURE — 1159F MED LIST DOCD IN RCRD: CPT | Mod: CPTII,S$GLB,, | Performed by: INTERNAL MEDICINE

## 2024-10-17 PROCEDURE — 90656 IIV3 VACC NO PRSV 0.5 ML IM: CPT | Mod: S$GLB,,, | Performed by: INTERNAL MEDICINE

## 2024-10-17 PROCEDURE — 99396 PREV VISIT EST AGE 40-64: CPT | Mod: 25,S$GLB,, | Performed by: INTERNAL MEDICINE

## 2024-10-17 PROCEDURE — 90472 IMMUNIZATION ADMIN EACH ADD: CPT | Mod: S$GLB,,, | Performed by: INTERNAL MEDICINE

## 2024-10-17 PROCEDURE — 3044F HG A1C LEVEL LT 7.0%: CPT | Mod: CPTII,S$GLB,, | Performed by: INTERNAL MEDICINE

## 2024-10-17 PROCEDURE — 3079F DIAST BP 80-89 MM HG: CPT | Mod: CPTII,S$GLB,, | Performed by: INTERNAL MEDICINE

## 2024-10-17 PROCEDURE — 99999 PR PBB SHADOW E&M-EST. PATIENT-LVL V: CPT | Mod: PBBFAC,,, | Performed by: INTERNAL MEDICINE

## 2024-10-17 PROCEDURE — 3008F BODY MASS INDEX DOCD: CPT | Mod: CPTII,S$GLB,, | Performed by: INTERNAL MEDICINE

## 2024-10-17 PROCEDURE — 4010F ACE/ARB THERAPY RXD/TAKEN: CPT | Mod: CPTII,S$GLB,, | Performed by: INTERNAL MEDICINE

## 2024-10-17 RX ORDER — LEVOTHYROXINE SODIUM 112 UG/1
112 TABLET ORAL DAILY
Qty: 90 TABLET | Refills: 3 | Status: SHIPPED | OUTPATIENT
Start: 2024-10-17

## 2024-10-21 ENCOUNTER — LAB VISIT (OUTPATIENT)
Dept: LAB | Facility: HOSPITAL | Age: 54
End: 2024-10-21
Attending: INTERNAL MEDICINE
Payer: COMMERCIAL

## 2024-10-21 DIAGNOSIS — Z00.00 WELLNESS EXAMINATION: ICD-10-CM

## 2024-10-21 LAB
ALBUMIN SERPL BCP-MCNC: 4 G/DL (ref 3.5–5.2)
ALP SERPL-CCNC: 77 U/L (ref 40–150)
ALT SERPL W/O P-5'-P-CCNC: 26 U/L (ref 10–44)
ANION GAP SERPL CALC-SCNC: 10 MMOL/L (ref 8–16)
AST SERPL-CCNC: 31 U/L (ref 10–40)
BILIRUB SERPL-MCNC: 0.7 MG/DL (ref 0.1–1)
BUN SERPL-MCNC: 19 MG/DL (ref 6–20)
CALCIUM SERPL-MCNC: 9.3 MG/DL (ref 8.7–10.5)
CHLORIDE SERPL-SCNC: 104 MMOL/L (ref 95–110)
CHOLEST SERPL-MCNC: 151 MG/DL (ref 120–199)
CHOLEST/HDLC SERPL: 3.9 {RATIO} (ref 2–5)
CO2 SERPL-SCNC: 24 MMOL/L (ref 23–29)
CREAT SERPL-MCNC: 0.9 MG/DL (ref 0.5–1.4)
EST. GFR  (NO RACE VARIABLE): >60 ML/MIN/1.73 M^2
GLUCOSE SERPL-MCNC: 94 MG/DL (ref 70–110)
HAV IGG SER QL IA: REACTIVE
HCV AB SERPL QL IA: NORMAL
HDLC SERPL-MCNC: 39 MG/DL (ref 40–75)
HDLC SERPL: 25.8 % (ref 20–50)
HIV 1+2 AB+HIV1 P24 AG SERPL QL IA: NORMAL
LDLC SERPL CALC-MCNC: 93.8 MG/DL (ref 63–159)
NONHDLC SERPL-MCNC: 112 MG/DL
POTASSIUM SERPL-SCNC: 4.1 MMOL/L (ref 3.5–5.1)
PROT SERPL-MCNC: 6.7 G/DL (ref 6–8.4)
SODIUM SERPL-SCNC: 138 MMOL/L (ref 136–145)
TREPONEMA PALLIDUM IGG+IGM AB [PRESENCE] IN SERUM OR PLASMA BY IMMUNOASSAY: NONREACTIVE
TRIGL SERPL-MCNC: 91 MG/DL (ref 30–150)
TSH SERPL DL<=0.005 MIU/L-ACNC: 2.95 UIU/ML (ref 0.4–4)

## 2024-10-21 PROCEDURE — 86593 SYPHILIS TEST NON-TREP QUANT: CPT | Performed by: INTERNAL MEDICINE

## 2024-10-21 PROCEDURE — 80061 LIPID PANEL: CPT | Performed by: INTERNAL MEDICINE

## 2024-10-21 PROCEDURE — 84443 ASSAY THYROID STIM HORMONE: CPT | Performed by: INTERNAL MEDICINE

## 2024-10-21 PROCEDURE — 80053 COMPREHEN METABOLIC PANEL: CPT | Performed by: INTERNAL MEDICINE

## 2024-10-21 PROCEDURE — 36415 COLL VENOUS BLD VENIPUNCTURE: CPT | Performed by: INTERNAL MEDICINE

## 2024-10-21 PROCEDURE — 86790 VIRUS ANTIBODY NOS: CPT | Performed by: INTERNAL MEDICINE

## 2024-10-21 PROCEDURE — 86706 HEP B SURFACE ANTIBODY: CPT | Performed by: INTERNAL MEDICINE

## 2024-10-21 PROCEDURE — 86803 HEPATITIS C AB TEST: CPT | Performed by: INTERNAL MEDICINE

## 2024-10-21 PROCEDURE — 87389 HIV-1 AG W/HIV-1&-2 AB AG IA: CPT | Performed by: INTERNAL MEDICINE

## 2024-10-24 LAB
HBV SURFACE AB SER QL IA: POSITIVE
HBV SURFACE AB SERPL IA-ACNC: 29 MIU/ML

## 2024-11-14 ENCOUNTER — LAB VISIT (OUTPATIENT)
Dept: LAB | Facility: HOSPITAL | Age: 54
End: 2024-11-14
Payer: COMMERCIAL

## 2024-11-14 DIAGNOSIS — E29.1 PRIMARY MALE HYPOGONADISM: ICD-10-CM

## 2024-11-14 LAB — TESTOST SERPL-MCNC: 365 NG/DL (ref 304–1227)

## 2024-11-14 PROCEDURE — 36415 COLL VENOUS BLD VENIPUNCTURE: CPT

## 2024-11-14 PROCEDURE — 84403 ASSAY OF TOTAL TESTOSTERONE: CPT

## 2024-11-17 ENCOUNTER — TELEPHONE (OUTPATIENT)
Dept: INTERNAL MEDICINE | Facility: CLINIC | Age: 54
End: 2024-11-17
Payer: COMMERCIAL

## 2024-11-17 NOTE — PROGRESS NOTES
Subjective:       Patient ID: Sukumar Beal is a 54 y.o. male.    Chief Complaint: Annual Exam    HPIPt is feeling well - No CP or SOB. Mother just passed a way last year.    Review of Systems   Respiratory:  Negative for shortness of breath.    Cardiovascular:  Negative for chest pain.   Gastrointestinal:  Negative for abdominal pain, diarrhea, nausea and vomiting.   Genitourinary:  Negative for dysuria.   Neurological:  Negative for seizures, syncope and headaches.       Objective:      Physical Exam  Constitutional:       General: He is not in acute distress.     Appearance: He is well-developed.   Eyes:      Pupils: Pupils are equal, round, and reactive to light.   Neck:      Thyroid: No thyromegaly.      Vascular: No JVD.   Cardiovascular:      Rate and Rhythm: Normal rate and regular rhythm.      Heart sounds: Normal heart sounds. No murmur heard.     No friction rub. No gallop.   Pulmonary:      Effort: Pulmonary effort is normal.      Breath sounds: Normal breath sounds. No wheezing or rales.   Abdominal:      General: Bowel sounds are normal. There is no distension.      Palpations: Abdomen is soft. There is no mass.      Tenderness: There is no abdominal tenderness. There is no guarding or rebound.   Genitourinary:     Comments: Deferred to   Musculoskeletal:      Cervical back: Neck supple.   Lymphadenopathy:      Cervical: No cervical adenopathy.   Skin:     General: Skin is warm and dry.   Neurological:      Mental Status: He is alert and oriented to person, place, and time.   Psychiatric:         Behavior: Behavior normal.         Thought Content: Thought content normal.         Judgment: Judgment normal.         Assessment:       1. Wellness examination    2. Need for vaccination        Plan:   Wellness examination  -     Urinalysis Microscopic; Future; Expected date: 10/18/2024  -     Urinalysis; Future; Expected date: 10/17/2024  -     Microalbumin/Creatinine Ratio, Urine; Future; Expected  date: 10/17/2024  -     Lipid Panel; Future; Expected date: 10/17/2024  -     TSH; Future; Expected date: 10/17/2024  -     Comprehensive Metabolic Panel; Future; Expected date: 10/17/2024  -     CT Chest Lung Screening Low Dose; Future; Expected date: 10/17/2024  -     HEPATITIS B SURFACE ANTIBODY, QUAL/QUANT; Future; Expected date: 10/17/2024  -     Hepatitis A antibody, IgG; Future; Expected date: 10/17/2024  -     HEPATITIS C ANTIBODY; Future; Expected date: 10/17/2024  -     Td (Tenivac) IM vaccine (>/= 8 yo)  -     HIV 1/2 Ag/Ab (4th Gen); Future; Expected date: 10/17/2024  -     Treponema Pallidium Antibodies IgG, IgM; Future; Expected date: 10/17/2024    Need for vaccination  -     Influenza - Trivalent - PF (ADULT)    Other orders  -     levothyroxine (SYNTHROID) 112 MCG tablet; Take 1 tablet (112 mcg total) by mouth once daily.  Dispense: 90 tablet; Refill: 3

## 2024-12-05 ENCOUNTER — HOSPITAL ENCOUNTER (OUTPATIENT)
Dept: RADIOLOGY | Facility: HOSPITAL | Age: 54
Discharge: HOME | End: 2024-12-05
Attending: INTERNAL MEDICINE
Payer: COMMERCIAL

## 2024-12-05 DIAGNOSIS — R31.29 MICROSCOPIC HEMATURIA: ICD-10-CM

## 2024-12-05 PROCEDURE — 74178 CT ABD&PLV WO CNTR FLWD CNTR: CPT | Mod: TC

## 2024-12-05 PROCEDURE — 74178 CT ABD&PLV WO CNTR FLWD CNTR: CPT | Mod: 26,,, | Performed by: RADIOLOGY

## 2024-12-11 ENCOUNTER — TELEPHONE (OUTPATIENT)
Dept: INTERNAL MEDICINE | Facility: CLINIC | Age: 54
End: 2024-12-11
Payer: COMMERCIAL

## 2024-12-11 ENCOUNTER — PATIENT MESSAGE (OUTPATIENT)
Dept: INTERNAL MEDICINE | Facility: CLINIC | Age: 54
End: 2024-12-11
Payer: COMMERCIAL

## 2024-12-11 DIAGNOSIS — N28.89 RENAL MASS: ICD-10-CM

## 2024-12-11 DIAGNOSIS — R31.9 HEMATURIA, UNSPECIFIED TYPE: Primary | ICD-10-CM

## 2024-12-11 PROCEDURE — 25500020 PHARM REV CODE 255: Performed by: INTERNAL MEDICINE

## 2024-12-11 RX ADMIN — IOHEXOL 100 ML: 350 INJECTION, SOLUTION INTRAVENOUS at 09:12

## 2024-12-11 NOTE — TELEPHONE ENCOUNTER
----- Message from Wilma sent at 12/11/2024  2:59 PM CST -----  Dr Reyna is out on leave.He returns in January. Would you like the Pt to schedule with a different provider?

## 2024-12-11 NOTE — PROGRESS NOTES
You have a small  but new growth on your kidney and there is a concern that it could be a malignancy.  I am referring you to the Urology physician who specializes in kidney growths - we will call you to schedule something with him as soon as possible.

## 2025-01-07 ENCOUNTER — OFFICE VISIT (OUTPATIENT)
Dept: UROLOGY | Facility: CLINIC | Age: 55
End: 2025-01-07
Payer: COMMERCIAL

## 2025-01-07 VITALS
HEIGHT: 74 IN | HEART RATE: 71 BPM | DIASTOLIC BLOOD PRESSURE: 94 MMHG | WEIGHT: 244.69 LBS | BODY MASS INDEX: 31.4 KG/M2 | SYSTOLIC BLOOD PRESSURE: 163 MMHG

## 2025-01-07 DIAGNOSIS — R53.83 FATIGUE, UNSPECIFIED TYPE: ICD-10-CM

## 2025-01-07 DIAGNOSIS — Z79.890 ENCOUNTER FOR MONITORING TESTOSTERONE REPLACEMENT THERAPY: ICD-10-CM

## 2025-01-07 DIAGNOSIS — E29.1 PRIMARY MALE HYPOGONADISM: Primary | ICD-10-CM

## 2025-01-07 DIAGNOSIS — R31.29 MICROSCOPIC HEMATURIA: ICD-10-CM

## 2025-01-07 DIAGNOSIS — Z51.81 ENCOUNTER FOR MONITORING TESTOSTERONE REPLACEMENT THERAPY: ICD-10-CM

## 2025-01-07 DIAGNOSIS — N28.89 RIGHT RENAL MASS: ICD-10-CM

## 2025-01-07 LAB
BACTERIA #/AREA URNS AUTO: NORMAL /HPF
MICROSCOPIC COMMENT: NORMAL
RBC #/AREA URNS AUTO: 3 /HPF (ref 0–4)
WBC #/AREA URNS AUTO: 1 /HPF (ref 0–5)

## 2025-01-07 PROCEDURE — 3077F SYST BP >= 140 MM HG: CPT | Mod: CPTII,S$GLB,, | Performed by: NURSE PRACTITIONER

## 2025-01-07 PROCEDURE — 99215 OFFICE O/P EST HI 40 MIN: CPT | Mod: 25,S$GLB,, | Performed by: NURSE PRACTITIONER

## 2025-01-07 PROCEDURE — 3008F BODY MASS INDEX DOCD: CPT | Mod: CPTII,S$GLB,, | Performed by: NURSE PRACTITIONER

## 2025-01-07 PROCEDURE — 1160F RVW MEDS BY RX/DR IN RCRD: CPT | Mod: CPTII,S$GLB,, | Performed by: NURSE PRACTITIONER

## 2025-01-07 PROCEDURE — 1159F MED LIST DOCD IN RCRD: CPT | Mod: CPTII,S$GLB,, | Performed by: NURSE PRACTITIONER

## 2025-01-07 PROCEDURE — 81001 URINALYSIS AUTO W/SCOPE: CPT | Performed by: NURSE PRACTITIONER

## 2025-01-07 PROCEDURE — 88112 CYTOPATH CELL ENHANCE TECH: CPT | Performed by: PATHOLOGY

## 2025-01-07 PROCEDURE — 3080F DIAST BP >= 90 MM HG: CPT | Mod: CPTII,S$GLB,, | Performed by: NURSE PRACTITIONER

## 2025-01-07 PROCEDURE — 96372 THER/PROPH/DIAG INJ SC/IM: CPT | Mod: S$GLB,,, | Performed by: NURSE PRACTITIONER

## 2025-01-07 PROCEDURE — 99999 PR PBB SHADOW E&M-EST. PATIENT-LVL IV: CPT | Mod: PBBFAC,,, | Performed by: NURSE PRACTITIONER

## 2025-01-07 NOTE — PROGRESS NOTES
CHIEF COMPLAINT:    Sukumar Beal is a 54 y.o. male presents today for Low Testosterone.     HISTORY OF PRESENTING ILLINESS:    Sukumar Beal is a 54 y.o. male who has a history of Low Testosterone. He has complaints are fatigue, loss of axillary hair.     He's on Androgel 1.62% While on TRT, While on TRT, he has more energy.    He wanted testopel, but he has BCBS.  We switched to AndroGel 1.62% 2.5gm packets; 2 packets daily.   Successfully using the Viagra.   Due to fatigue; daily compliance and risks for transference he switched to AVEED.   Last clinic visit & AVEED was 10/10/2024.     Testosterone was 365 on 11/14/2014.  He is feeling fatigued; toward the time for his injection.   His levels are not staying above 300 during treatment.     He also reports that he was told he had blood in his urine.   He has never seen blood in his urine.   PCP noted 10 RBC's on his UA  12/05/2024 CTU:   - 1.6 cm new thick-walled enhancing lesion in the superior pole of the right kidney    He is scheduled to see Dr. Reyna on 01/31/2024.         REVIEW OF SYSTEMS:  Review of Systems   Constitutional:  Positive for malaise/fatigue. Negative for chills and fever.   Eyes:  Negative for double vision.   Respiratory:  Negative for cough and shortness of breath.    Cardiovascular:  Negative for chest pain and palpitations.   Gastrointestinal:  Negative for abdominal pain, constipation, diarrhea, nausea and vomiting.   Genitourinary:  Positive for hematuria (no visible blood noted). Negative for dysuria and urgency.        See HPI   Musculoskeletal:  Negative for falls.   Neurological:  Negative for dizziness and seizures.   Endo/Heme/Allergies:  Negative for polydipsia.   Psychiatric/Behavioral:  The patient is nervous/anxious (concern for renal mass).          PATIENT HISTORY:    Past Medical History:   Diagnosis Date    Gout 7/2014    High cholesterol     Hypertension     Hypothyroid     Low testosterone     Staph aureus  infection age 13    R leg       Past Surgical History:   Procedure Laterality Date    ARTHROSCOPIC DEBRIDEMENT OF SHOULDER  10/14/2022    Procedure: DEBRIDEMENT, SHOULDER, ARTHROSCOPIC;  Surgeon: LEONIDAS Carr MD;  Location: [facility] OR;  Service: Orthopedics;;    ARTHROSCOPIC REPAIR OF ROTATOR CUFF OF SHOULDER Right 10/14/2022    Procedure: REPAIR, ROTATOR CUFF, ARTHROSCOPIC - POLAR CARE;  Surgeon: LEONIDAS Carr MD;  Location: [facility] OR;  Service: Orthopedics;  Laterality: Right;  Regional w/o Catheter, Interscalene    ARTHROSCOPY,SHOULDER,WITH BICEPS TENODESIS Right 10/14/2022    Procedure: ARTHROSCOPY,SHOULDER,WITH BICEPS TENODESIS;  Surgeon: LEONIDAS Carr MD;  Location: [facility] OR;  Service: Orthopedics;  Laterality: Right;    COLONOSCOPY N/A 2020    Procedure: COLONOSCOPY;  Surgeon: VANI Newell MD;  Location: [facility] ENDO (4TH FLR);  Service: Endoscopy;  Laterality: N/A;  covid test 2nd floor surgery clinic     COLONOSCOPY N/A 2023    Procedure: COLONOSCOPY;  Surgeon: VANI Newell MD;  Location: [facility] (4TH FLR);  Service: Endoscopy;  Laterality: N/A;  - referred by Dr. Katherine reynaga/ [facility]  7 day hold/ Prep instructions to the portal. AS  10/31-precall complee-pt states he has not taken [facility] in several months-MS    DECOMPRESSION OF SUBACROMIAL SPACE Right 10/14/2022    Procedure: DECOMPRESSION, SUBACROMIAL SPACE;  Surgeon: LEONIDAS Carr MD;  Location: [facility] OR;  Service: Orthopedics;  Laterality: Right;    FRACTURE SURGERY Left     wrist    Incision and drainage of tibia Right     SINUS SURGERY      x2       Family History   Problem Relation Name Age of Onset    Hypertension Mother      Heart disease Father  73            Cancer Sister  64        colon cancer    Hypertension Sister      Hyperlipidemia Sister      Lupus Sister      Hypertension Brother      Hyperlipidemia Brother      Cancer Maternal Grandfather      Cancer Maternal Aunt          lung - smoker     Melanoma Neg Hx         Social History     Socioeconomic History    Marital status: Single   Tobacco Use    Smoking status: Former     Current packs/day: 0.00     Average packs/day: 0.3 packs/day for 15.0 years (3.8 ttl pk-yrs)     Types: Cigarettes     Start date: 1/3/1998     Quit date: 1/3/2013     Years since quittin.0    Smokeless tobacco: Never   Substance and Sexual Activity    Alcohol use: Yes     Alcohol/week: 5.0 standard drinks of alcohol     Types: 6 Standard drinks or equivalent per week     Comment: weekends    Drug use: No    Sexual activity: Yes     Social Drivers of Health     Financial Resource Strain: Low Risk  (2023)    Overall Financial Resource Strain (CARDIA)     Difficulty of Paying Living Expenses: Not hard at all   Food Insecurity: No Food Insecurity (2023)    Hunger Vital Sign     Worried About Running Out of Food in the Last Year: Never true     Ran Out of Food in the Last Year: Never true   Transportation Needs: No Transportation Needs (2023)    PRAPARE - Transportation     Lack of Transportation (Medical): No     Lack of Transportation (Non-Medical): No   Physical Activity: Sufficiently Active (2023)    Exercise Vital Sign     Days of Exercise per Week: 3 days     Minutes of Exercise per Session: 60 min   Stress: Stress Concern Present (2023)    [employer] - Occupational Stress Questionnaire     Feeling of Stress : Rather much   Housing Stability: Unknown (2023)    Housing Stability Vital Sign     Unable to Pay for Housing in the Last Year: No     Unstable Housing in the Last Year: No       Allergies:  Codeine and Penicillins    Medications:    Current Outpatient Medications:     atorvastatin (LIPITOR) 20 MG tablet, Take 1 tablet (20 mg total) by mouth once daily., Disp: 90 tablet, Rfl: 0    azithromycin (ZITHROMAX Z-MAMADOU) 250 MG tablet, Take as directed: 2 tablets by mouth on day one, then 1 tablet by mouth once  daily on days 2 through 5, Disp: 6 tablet, Rfl: 0    B-complex with vitamin C (Z-BEC OR EQUIV) tablet, Take 1 tablet by mouth once daily., Disp: , Rfl:     cefdinir (OMNICEF) 300 MG capsule, Take 1 tablet by mouth twice daily, Disp: 20 capsule, Rfl: 0    CYANOCOBALAMIN, VITAMIN B-12, ORAL, Take 1 tablet by mouth once daily., Disp: , Rfl:     ergocalciferol, vitamin D2, (VITAMIN D ORAL), Take 2 capsules by mouth once daily., Disp: , Rfl:     fluticasone propionate (FLONASE) 50 mcg/actuation nasal spray, Spray twice in each nostril once daily for 30 days, Disp: 16 g, Rfl: 3    levothyroxine (SYNTHROID) 112 MCG tablet, Take 1 tablet (112 mcg total) by mouth once daily., Disp: 90 tablet, Rfl: 3    losartan (COZAAR) 50 MG tablet, Take 1 tablet (50 mg total) by mouth once daily., Disp: 90 tablet, Rfl: 1    sildenafiL (VIAGRA) 100 MG tablet, Take 1 tablet (100 mg total) by mouth daily as needed for Erectile Dysfunction (take on an empty stomach 30-60 minutes before)., Disp: 10 tablet, Rfl: 12    vitamin E acetate (VITAMIN E ORAL), Take 1 tablet by mouth once daily., Disp: , Rfl:     Current Facility-Administered Medications:     testosterone undecanoate injection 750 mg, 750 mg, Intramuscular, Q10 weeks,     PHYSICAL EXAMINATION:  Physical Exam  Vitals and nursing note reviewed.   Constitutional:       General: He is awake.      Appearance: Normal appearance.   HENT:      Head: Normocephalic.      Right Ear: External ear normal.      Left Ear: External ear normal.      Nose: Nose normal.   Cardiovascular:      Rate and Rhythm: Normal rate.   Pulmonary:      Effort: Pulmonary effort is normal. No respiratory distress.   Abdominal:      Tenderness: There is no abdominal tenderness. There is no right CVA tenderness or left CVA tenderness.   Musculoskeletal:         General: Normal range of motion.      Cervical back: Normal range of motion.   Skin:     General: Skin is warm and dry.   Neurological:      General: No focal  deficit present.      Mental Status: He is alert and oriented to person, place, and time.   Psychiatric:         Mood and Affect: Mood normal.         Behavior: Behavior is cooperative.           LABS:      In office UA today was clear of active infection and blood today.       Lab Results   Component Value Date    PSA 0.81 09/22/2021    PSA 0.42 06/11/2018    PSA 0.41 05/10/2017    PSADIAG 0.89 07/29/2024    PSADIAG 1.1 03/11/2024    PSADIAG 0.73 09/07/2023       Lab Results   Component Value Date    CREATININE 0.9 10/21/2024    EGFRNORACEVR >60.0 10/21/2024               IMPRESSION:    Encounter Diagnoses   Name Primary?    Primary male hypogonadism Yes    Microscopic hematuria     Fatigue, unspecified type     Encounter for monitoring testosterone replacement therapy     Right renal mass          Assessment:       1. Primary male hypogonadism    2. Microscopic hematuria    3. Fatigue, unspecified type    4. Encounter for monitoring testosterone replacement therapy    5. Right renal mass        Plan:         I spent a total of 40 minutes on the day of the visit. This includes face to face time and non-face to face time preparing to see the patient (eg, review of tests/labs), obtaining the AVEED from locked cabinet and gathering injection supplies and preparing injection in syringe.   Recommendations for PCP follow up visit; any need to go to the ER.    Recommended lifestyle modifications with a proper, healthy diet, good hydration but during the day. Reducing bladder irritants.   Benefits of regular exercise and weight loss.   I spent 30 minutes with the patient of which more than half was spent in direct consultation with the patient in regards to our treatment and plan.    5 minutes pre counseling; f/u from previous injection.  15 minutes preparing and giving injection and disposing of injection supplies  10 minutes post installation instructions and shared monitoring. Patient stays 30 minutes after injection.    Education and recommendations of today's plan of care including home remedies and needed follow up with PCP.   We discussed AVEED and the expectations with TRT.  We reviewed AVEED's benefits, expectations, schedule of injections, and the risks.  He was informed of the possible risks especially allergic reactions and POME.  Discussed the steps we take for prevention and if something was to happen.  Discussed the injection is given in the muscle over 90 seconds.  He will wait in lobby for 30 minutes to make sure there was no issue with the injection.  We reviewed the patient education pamphlet together.   He voiced understanding; he gave a verbal consent to receive AVEED.   I injected AVEED 750mg IM over 90 seconds at the Right Vent/Glut; tolerated well.  15 minutes after injection, he voiced no complaints.  30 minutes after injection, he voiced no complaints  RTC 10 week for AVEED  We also discussed other option for TRT. Will check into [facility] for him.   Reviewed his Ct results and findings. Reassurance that the mass is small; in great hands with Dr. Reyna.

## 2025-01-08 LAB
FINAL PATHOLOGIC DIAGNOSIS: NORMAL
Lab: NORMAL

## 2025-01-09 ENCOUNTER — PATIENT MESSAGE (OUTPATIENT)
Dept: UROLOGY | Facility: CLINIC | Age: 55
End: 2025-01-09
Payer: COMMERCIAL

## 2025-01-17 ENCOUNTER — OFFICE VISIT (OUTPATIENT)
Dept: UROLOGY | Facility: CLINIC | Age: 55
End: 2025-01-17
Payer: COMMERCIAL

## 2025-01-17 VITALS
BODY MASS INDEX: 30.67 KG/M2 | HEIGHT: 74 IN | WEIGHT: 239 LBS | SYSTOLIC BLOOD PRESSURE: 161 MMHG | HEART RATE: 70 BPM | DIASTOLIC BLOOD PRESSURE: 101 MMHG

## 2025-01-17 DIAGNOSIS — R31.9 HEMATURIA, UNSPECIFIED TYPE: ICD-10-CM

## 2025-01-17 DIAGNOSIS — N28.89 RENAL MASS: ICD-10-CM

## 2025-01-17 PROCEDURE — 3008F BODY MASS INDEX DOCD: CPT | Mod: CPTII,S$GLB,, | Performed by: STUDENT IN AN ORGANIZED HEALTH CARE EDUCATION/TRAINING PROGRAM

## 2025-01-17 PROCEDURE — 99999 PR PBB SHADOW E&M-EST. PATIENT-LVL IV: CPT | Mod: PBBFAC,,, | Performed by: STUDENT IN AN ORGANIZED HEALTH CARE EDUCATION/TRAINING PROGRAM

## 2025-01-17 PROCEDURE — 3080F DIAST BP >= 90 MM HG: CPT | Mod: CPTII,S$GLB,, | Performed by: STUDENT IN AN ORGANIZED HEALTH CARE EDUCATION/TRAINING PROGRAM

## 2025-01-17 PROCEDURE — 1159F MED LIST DOCD IN RCRD: CPT | Mod: CPTII,S$GLB,, | Performed by: STUDENT IN AN ORGANIZED HEALTH CARE EDUCATION/TRAINING PROGRAM

## 2025-01-17 PROCEDURE — 99215 OFFICE O/P EST HI 40 MIN: CPT | Mod: S$GLB,,, | Performed by: STUDENT IN AN ORGANIZED HEALTH CARE EDUCATION/TRAINING PROGRAM

## 2025-01-17 PROCEDURE — 3077F SYST BP >= 140 MM HG: CPT | Mod: CPTII,S$GLB,, | Performed by: STUDENT IN AN ORGANIZED HEALTH CARE EDUCATION/TRAINING PROGRAM

## 2025-01-17 NOTE — PROGRESS NOTES
[facility]  Urologic Oncology Clinic Note        Date of Service: 1/17/2025        Chief Complaint/Reason for Consultation: Renal Mass     Requesting Provider:   Katherine Turner MD  [address]  7th [place] [address]      History of Present Illness:   Patient 54 y.o. male presents with hx of renal mass here for further evaluation and management.    He is also known to have microscopic hematuria. Denies flank pain or gross hematuria.    Former smoker and quit many years ago.        Blood thinners:  None    No Hx of TIA, MI, and CVA   Abdominal surgeries:  None        Urologic History:     1/7/2025 -- UA microscopy -- 3 rbcs  12/5/2024 -- CT Urogram -- 1.5 cm right renal mass that is enhancing and concerning for neoplasm    Patient Active Problem List    Diagnosis Date Noted    Neck pain 11/16/2023    Decreased range of motion of right shoulder 11/01/2022    Decreased muscle strength 11/01/2022    Nontraumatic tear of right rotator cuff 10/14/2022    Biceps tendinitis of right upper extremity 10/14/2022    Pain in both feet 03/15/2022    GI bleed 09/22/2020    Acute lower GI bleeding 09/22/2020    Hematochezia 09/22/2020    Colon polyps 09/22/2020    Acute blood loss anemia 09/22/2020    Screen for colon cancer 09/21/2020    BPH with urinary obstruction 07/26/2018    Vitamin D deficiency 07/19/2018    Male hypogonadism 08/19/2015    Gout 03/03/2015    High cholesterol     Hypothyroid        Review of patient's allergies indicates:   Allergen Reactions    Codeine Rash    Penicillins Rash        Past Medical History:   Diagnosis Date    Gout 7/2014    High cholesterol     Hypertension     Hypothyroid     Low testosterone     Staph aureus infection age 13    R leg      Past Surgical History:   Procedure Laterality Date    ARTHROSCOPIC DEBRIDEMENT OF SHOULDER  10/14/2022    Procedure: DEBRIDEMENT, SHOULDER, ARTHROSCOPIC;  Surgeon: LEONIDAS Carr MD;  Location: [facility] OR;  Service:  Orthopedics;;    ARTHROSCOPIC REPAIR OF ROTATOR CUFF OF SHOULDER Right 10/14/2022    Procedure: REPAIR, ROTATOR CUFF, ARTHROSCOPIC - POLAR CARE;  Surgeon: LEONIDAS Carr MD;  Location: [facility] OR;  Service: Orthopedics;  Laterality: Right;  Regional w/o Catheter, Interscalene    ARTHROSCOPY,SHOULDER,WITH BICEPS TENODESIS Right 10/14/2022    Procedure: ARTHROSCOPY,SHOULDER,WITH BICEPS TENODESIS;  Surgeon: LEONIDAS Carr MD;  Location: [facility] OR;  Service: Orthopedics;  Laterality: Right;    COLONOSCOPY N/A 2020    Procedure: COLONOSCOPY;  Surgeon: VANI Newell MD;  Location: [facility] ENDO (4TH FLR);  Service: Endoscopy;  Laterality: N/A;  covid test 2nd floor surgery clinic     COLONOSCOPY N/A 2023    Procedure: COLONOSCOPY;  Surgeon: VANI Newell MD;  Location: [facility] ENDO (4TH FLR);  Service: Endoscopy;  Laterality: N/A;  - referred by Dr. Katherine reynaga/ [facility]  7 day hold/ Prep instructions to the portal. AS  10/31-precall complee-pt states he has not taken [facility] in several months-MS    DECOMPRESSION OF SUBACROMIAL SPACE Right 10/14/2022    Procedure: DECOMPRESSION, SUBACROMIAL SPACE;  Surgeon: LEONIDAS Carr MD;  Location: [facility] OR;  Service: Orthopedics;  Laterality: Right;    FRACTURE SURGERY Left     wrist    Incision and drainage of tibia Right     SINUS SURGERY      x2      Family History   Problem Relation Name Age of Onset    Hypertension Mother      Heart disease Father  73            Cancer Sister  64        colon cancer    Hypertension Sister      Hyperlipidemia Sister      Lupus Sister      Hypertension Brother      Hyperlipidemia Brother      Cancer Maternal Grandfather      Cancer Maternal Aunt          lung - smoker    Melanoma Neg Hx        Social History     Tobacco Use    Smoking status: Former     Current packs/day: 0.00     Average packs/day: 0.3 packs/day for 15.0 years (3.8 ttl pk-yrs)     Types: Cigarettes     Start date: 1/3/1998     Quit date:  "1/3/2013     Years since quittin.0    Smokeless tobacco: Never   Substance Use Topics    Alcohol use: Yes     Alcohol/week: 5.0 standard drinks of alcohol     Types: 6 Standard drinks or equivalent per week     Comment: weekends        Review of Systems   Constitutional:  Negative for activity change.   HENT:  Negative for congestion.    Respiratory:  Negative for cough.    Genitourinary:  Negative for hematuria.             OBJECTIVE:     Vitals:    25 0934   BP: (!) 161/101   BP Location: Right arm   Patient Position: Sitting   Pulse: 70   Weight: 108.4 kg (238 lb 15.7 oz)   Height: 6' 2" (1.88 m)          General Appearance: Alert, cooperative, no distress  Head: Normocephalic  Eyes: Clear conjunctiva  Neck: No obvious LND or JVD  Lungs: Normal chest excursion, no accessory muscle use  Chest: Regular rate rhythm by palpation, no pedal edema  Abdomen: Soft, non-tender, non-distended  Male Genitalia: Deferred  Extremities: Atraumatic   Lymph Nodes: No appreciable lymph adenopathy  Neurologic: No gross gait, motor or sensory deficits            LAB:      All laboratory values listed below was/were independently reviewed with patient at this clinic visit.    CMP  Sodium   Date Value Ref Range Status   10/21/2024 138 136 - 145 mmol/L Final     Potassium   Date Value Ref Range Status   10/21/2024 4.1 3.5 - 5.1 mmol/L Final     Chloride   Date Value Ref Range Status   10/21/2024 104 95 - 110 mmol/L Final     CO2   Date Value Ref Range Status   10/21/2024 24 23 - 29 mmol/L Final     Glucose   Date Value Ref Range Status   10/21/2024 94 70 - 110 mg/dL Final     BUN   Date Value Ref Range Status   10/21/2024 19 6 - 20 mg/dL Final     Creatinine   Date Value Ref Range Status   10/21/2024 0.9 0.5 - 1.4 mg/dL Final     Calcium   Date Value Ref Range Status   10/21/2024 9.3 8.7 - 10.5 mg/dL Final     Total Protein   Date Value Ref Range Status   10/21/2024 6.7 6.0 - 8.4 g/dL Final     Albumin   Date Value Ref Range " Status   10/21/2024 4.0 3.5 - 5.2 g/dL Final   07/20/2018 4.3 3.6 - 5.1 g/dL Final     Comment:     @ Test Performed By:  [facility]  Chase Chanel M.D., Ph.D.,   [address] [place] [address]  [facility]  [id number]       Total Bilirubin   Date Value Ref Range Status   10/21/2024 0.7 0.1 - 1.0 mg/dL Final     Comment:     For infants and newborns, interpretation of results should be based  on gestational age, weight and in agreement with clinical  observations.    Premature Infant recommended reference ranges:  Up to 24 hours.............<8.0 mg/dL  Up to 48 hours............<12.0 mg/dL  3-5 days..................<15.0 mg/dL  6-29 days.................<15.0 mg/dL       Alkaline Phosphatase   Date Value Ref Range Status   10/21/2024 77 40 - 150 U/L Final     AST   Date Value Ref Range Status   10/21/2024 31 10 - 40 U/L Final     ALT   Date Value Ref Range Status   10/21/2024 26 10 - 44 U/L Final     Anion Gap   Date Value Ref Range Status   10/21/2024 10 8 - 16 mmol/L Final     eGFR   Date Value Ref Range Status   10/21/2024 >60.0 >60 mL/min/1.73 m^2 Final     Lab Results   Component Value Date    WBC 3.91 07/29/2024    HGB 16.4 07/29/2024    HCT 50.9 07/29/2024    MCV 94 07/29/2024     07/29/2024           IMAGING:      All imaging listed below was/were independently reviewed with patient at this clinic visit.    12/5/2024  CT UROGRAM ABD PELVIS W WO     CLINICAL HISTORY:  Hematuria, microscopic, no increased risk for urinary tract malignancy; Other microscopic hematuria     TECHNIQUE:  Low dose axial, sagittal and coronal reformations were obtained from the lung bases to the pubic symphysis before and following the IV administration of 100 mL of Omnipaque 350.  Timing was optimized for nephrogram and excretory renal phases.     COMPARISON:  CT 09/22/2020     FINDINGS:  Heart: No cardiomegaly or pericardial effusion.     Lung Bases: Symmetrically expanded  and clear.     Liver: Normal in size and attenuation without focal hepatic abnormality.     Gallbladder: The gallbladder is contracted.     Bile Ducts: No intra or extrahepatic biliary ductal dilation.     Pancreas: No pancreatic mass lesion or peripancreatic inflammatory change.     Spleen: Normal.     Adrenals: Normal.     Genitourinary: Normal in size and location.  In the right upper pole there is an exophytic hypodensity measuring 1.5 x 1.6 cm (series 4, image 64), which is new when compared to a CTA 09/22/2020.  This lesion demonstrates enhancement concerning for renal cell carcinoma.  No evidence of extension into the renal vein.  Right simple renal cyst.  No renal stones bilaterally.  No hydroureteronephrosis.  Bladder demonstrates smooth contours without bladder wall thickening.     Reproductive organs: Normal.     GI tract/Mesentery: Stomach is normal appearance.  Visualized loops of small and large bowel are normal in caliber without evidence for inflammation or obstruction.     Peritoneal Space: No abdominopelvic ascites or intraperitoneal free air.     Retroperitoneum: No significant adenopathy.     Abdominal wall: Normal.     Vasculature: Abdominal aorta is normal in caliber with mild calcific atherosclerosis throughout the aorta and iliac arteries.     Bones: Normal appearance without acute fracture or bony destructive process.     Impression:     1. New thick-walled enhancing lesion in the superior pole of the right kidney concerning for renal cell carcinoma.  Two  2. No nephrolithiasis or hydroureteronephrosis bilaterally.     Electronically signed by resident: George Rizvi  Date:                                            12/11/2024  Time:                                           09:40     Electronically signed by:Jim Tran MD  Date:                                            12/11/2024  Time:                                           11:45      ASSESSMENT/PLAN:     53 yo M here for  evaluation and management of right renal mass.     Plan:    Right Renal Mass  The patient presents today for discussion and management of a right renal mass. Given the size of the mass and its appearance on imaging, we estimate its probability of being malignant to be 80%.     We discussed the role of additional studies to give further resolution as to the nature of this mass.  One approach would be renal mass biopsy.  This would be done with image guidance by our radiologist and would hopefully give direct pathologic assessment of the mass.  The risk of tumor seeding is thought to be low from this with modern techniques.  However there is an ~10 to 15% chance of a nondiagnostic biopsy, particularly in the setting of small renal masses.  There are also procedural risks associated with the biopsy.  The decision to proceed with a biopsy would be relevant in the case where a patient is not comfortable with active surveillance without knowing the pathology of the mass.     For equivocal small renal masses, there is some support relatively recently for the use of sestamibi scans in evaluating small renal masses, which are specialized scans that are specific for oncocytoma and hybrid oncocytoma/chromophobe renal masses.  These are classically benign/indolent renal masses with minimal malignant potential.  The scans are reported in the literature to be greater than 95% specific.  While the scans can be useful to identify certain benign tumors, there are other benign tumors which we may not detect using sestamibi, and there are false positive scans.  Moreover, interpretation of a sestamibi scan is not always straightforward.  I would consider this diagnostic approach an evolving modality.     We discussed that in general there are three broad management options for small renal masses:         1) Active surveillance     Active surveillance implies no treatment for curative intent but the use of careful surveillance to  monitor the lesion for growth.  The probability of metastasis developing while on surveillance of a small renal lesion is felt to be very low (1-3%), but is not impossible. The average growth rate for small renal tumors is 2-4 mm per year. We generally recommend imaging annually or every other year while on surveillance.  In some instances we consider obtaining a tumor biopsy prior to performing active surveillance, though this is not a universal practice.        2) Percutaneous ablation      Percutaneous ablation involves the placement of a needle within the lesion and the use of radiofrequency heating or cryoablation cooling to destroy the tumor.  Generally a biopsy of the tumor is obtained at the time of ablation to document histology.  At [facility] our preference is generally in favor of cryoablation because we can image the ice ball forming better and because efficacy seems better with larger tumors.  Retrospective studies showed that cryoablation is only slightly less efficacious than surgery, but has the advantage of less treatment related morbidity.  In most cases the procedure can be performed as an outpatient.  We explained that this is done by an interventional radiologist at our institution and that post-procedure imaging follow-up is organized by the urology service and continues for about 5 years to ensure the absence of a tumor recurrence.        3) Surgical removal      Surgery is another way to treat small renal masses.  In general, two forms of surgery are possible, radical nephrectomy and partial nephrectomy.  For most small renal masses, we recommend partial nephrectomy because it allows preservation of renal function from the affected kidney.  However, in patients with kidneys that are poorly functional, in tumors that are invading critical structures, or in situations where the kidney tumor is located at an anatomical site within the kidney that makes renal reconstruction impossible, then radical  nephrectomy may be required.  We discussed that surgery could be performed using an open technique or minimally invasive technique which could be either laparoscopic or robotic.  The choice of each technique depends on the patient's prior surgical history, body habitus, and location and size of the tumor. The patient is a GOOD candidate for a robotic partial nephrectomy. The pros and cons of these different techniques were discussed.  I explained that, occasionally, we may have to place a ureteral stent and maintain a urethral catheter to optimize urine drainage away from the site of reconstruction depending on the tumor location. The perioperative details of surgery and the postoperative recovery were discussed.  Potential risks of surgery were discussed, which include but are not limited to risks of anesthesia, bleeding, infection, adjacent organ injury, renal dysfunction, urine leak, medical complications of surgery, and death (although this is rare in modern partial nephrectomy).         The patient asked questions and all of them were answered.  After this discussion the patient elected to undergo active surveillance. I think this is the most reasonable option for the patient based on the size of the mass and low metastatic potential.    Will complete staging w/ CXR     Surveillance imaging w/ CT AP in 6 months      Microscopic hematuria  Discussed benign and malignant etiologies  CTU complete and negative for upper tract abnormality other than renal mass  Plan for cystoscopy       Kenneth Reyna MD  Urologic Oncology  P: [phone]

## 2025-01-29 ENCOUNTER — HOSPITAL ENCOUNTER (OUTPATIENT)
Dept: RADIOLOGY | Facility: HOSPITAL | Age: 55
Discharge: HOME | End: 2025-01-29
Attending: STUDENT IN AN ORGANIZED HEALTH CARE EDUCATION/TRAINING PROGRAM
Payer: COMMERCIAL

## 2025-01-29 ENCOUNTER — TELEPHONE (OUTPATIENT)
Dept: UROLOGY | Facility: CLINIC | Age: 55
End: 2025-01-29
Payer: COMMERCIAL

## 2025-01-29 DIAGNOSIS — N28.89 RENAL MASS: ICD-10-CM

## 2025-01-29 PROCEDURE — 71046 X-RAY EXAM CHEST 2 VIEWS: CPT | Mod: TC

## 2025-01-29 PROCEDURE — 71046 X-RAY EXAM CHEST 2 VIEWS: CPT | Mod: 26,,, | Performed by: RADIOLOGY

## 2025-01-29 NOTE — TELEPHONE ENCOUNTER
Confirmed appt. Instructions given to park on the [facility] parking lot. Check in on the 2nd floor of the [facility] [[email or web address] Please arrive 15 minutes prior to procedure start time and be ready to provide urine specimen. Thank you!

## 2025-01-30 ENCOUNTER — PROCEDURE VISIT (OUTPATIENT)
Dept: UROLOGY | Facility: CLINIC | Age: 55
End: 2025-01-30
Payer: COMMERCIAL

## 2025-01-30 VITALS
TEMPERATURE: 97 F | HEART RATE: 63 BPM | WEIGHT: 242.75 LBS | RESPIRATION RATE: 17 BRPM | BODY MASS INDEX: 31.16 KG/M2 | DIASTOLIC BLOOD PRESSURE: 106 MMHG | SYSTOLIC BLOOD PRESSURE: 174 MMHG

## 2025-01-30 DIAGNOSIS — R31.9 HEMATURIA, UNSPECIFIED TYPE: ICD-10-CM

## 2025-01-30 PROCEDURE — 52000 CYSTOURETHROSCOPY: CPT | Mod: S$GLB,,, | Performed by: STUDENT IN AN ORGANIZED HEALTH CARE EDUCATION/TRAINING PROGRAM

## 2025-01-30 RX ORDER — LIDOCAINE HYDROCHLORIDE 20 MG/ML
JELLY TOPICAL
Status: COMPLETED | OUTPATIENT
Start: 2025-01-30 | End: 2025-01-30

## 2025-01-30 RX ADMIN — LIDOCAINE HYDROCHLORIDE 10 ML: 20 JELLY TOPICAL at 02:01

## 2025-01-30 NOTE — PROCEDURES
Office Cystoscopy Procedure Note    Date of Procedure: 01/30/2025    Indication:  Hematuria      Informed consent:  The risks, benefits, complications, treatment options, and expected outcomes were discussed with the patient. The patient concurred with the proposed plan and provided informed consent.     Anesthesia: Lidocaine jelly 2%     Antibiotic prophylaxis: None     Procedure:  The patient was placed in the lithotomy position, was prepped and draped in the usual manner using sterile technique, and 2% lidocaine jelly instilled into the urethra.  A 17 F flexible cystoscope was then inserted into the urethra and the urethra and bladder carefully examined.  The following findings were noted:       Findings:   Urethra: normal     Prostate: no hypertrophy    Bladder: normal    Ureteral orifices:       -Right: Normal       -Left: Normal     Other findings:     None        Specimens: None                   Complications: None; patient tolerated the procedure well            Disposition: Home after brief observation     Condition: Stable     Plan: No lesions. Microscopic hematuria work up negative.      Attending Attestation:      I personally performed the procedure.       Kenneth Reyna MD  Urologic Oncology  P: [phone]

## 2025-01-30 NOTE — PATIENT INSTRUCTIONS
What to Expect After a Cystoscopy  For the next 24-48 hours, you may feel a mild burning when you urinate. This burning is normal and expected. Drink plenty of water to dilute the urine to help relieve the burning sensation. You may also see a small amount of blood in your urine after the procedure.    Unless you are already taking antibiotics, you may be given an antibiotic after the test to prevent infection.    Signs and Symptoms to Report  Call the [facility] at [phone] if you develop any of the following:  Fever of 101 degrees or higher  Chills or persistent bleeding  Inability to urinate

## 2025-02-06 ENCOUNTER — TELEPHONE (OUTPATIENT)
Dept: PHARMACY | Facility: CLINIC | Age: 55
End: 2025-02-06
Payer: COMMERCIAL

## 2025-02-06 NOTE — TELEPHONE ENCOUNTER
[facility]/Population Health Chart Review & Patient Outreach Details For Medication Adherence Project    Reason for Outreach Encounter: 3rd Party payor non-compliance report ([employer], [employer], C, etc)  2.  Patient Outreach Method: Reviewed patient chart  and [employer] message  3.   Medication in question:    Hypertension Medications               losartan (COZAAR) 50 MG tablet Take 1 tablet (50 mg total) by mouth once daily.                   last filled  7/31/24 for 90 day supply      4.  Reviewed and or Updates Made To: Patient Chart  5. Outreach Outcomes and/or actions taken: Sent inquiry to patient: Waiting for response and Patient reminded to  prescription  Additional Notes:

## 2025-02-13 ENCOUNTER — PATIENT MESSAGE (OUTPATIENT)
Dept: INTERNAL MEDICINE | Facility: CLINIC | Age: 55
End: 2025-02-13
Payer: COMMERCIAL

## 2025-02-21 ENCOUNTER — TELEPHONE (OUTPATIENT)
Dept: ORTHOPEDICS | Facility: CLINIC | Age: 55
End: 2025-02-21
Payer: COMMERCIAL

## 2025-02-21 ENCOUNTER — OFFICE VISIT (OUTPATIENT)
Dept: INTERNAL MEDICINE | Facility: CLINIC | Age: 55
End: 2025-02-21
Payer: COMMERCIAL

## 2025-02-21 ENCOUNTER — TELEPHONE (OUTPATIENT)
Dept: INTERNAL MEDICINE | Facility: CLINIC | Age: 55
End: 2025-02-21
Payer: COMMERCIAL

## 2025-02-21 ENCOUNTER — PATIENT MESSAGE (OUTPATIENT)
Dept: INTERNAL MEDICINE | Facility: CLINIC | Age: 55
End: 2025-02-21
Payer: COMMERCIAL

## 2025-02-21 ENCOUNTER — HOSPITAL ENCOUNTER (OUTPATIENT)
Dept: RADIOLOGY | Facility: HOSPITAL | Age: 55
Discharge: HOME | End: 2025-02-21
Attending: STUDENT IN AN ORGANIZED HEALTH CARE EDUCATION/TRAINING PROGRAM
Payer: COMMERCIAL

## 2025-02-21 VITALS
WEIGHT: 244.69 LBS | SYSTOLIC BLOOD PRESSURE: 142 MMHG | HEART RATE: 54 BPM | BODY MASS INDEX: 31.4 KG/M2 | DIASTOLIC BLOOD PRESSURE: 82 MMHG | OXYGEN SATURATION: 100 % | HEIGHT: 74 IN

## 2025-02-21 DIAGNOSIS — S60.922D: Primary | ICD-10-CM

## 2025-02-21 DIAGNOSIS — L08.9: ICD-10-CM

## 2025-02-21 DIAGNOSIS — L08.9: Primary | ICD-10-CM
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
## 2025-02-21 DIAGNOSIS — S60.922D: ICD-10-CM

## 2025-02-21 DIAGNOSIS — Z88.0 PENICILLIN ALLERGY: ICD-10-CM

## 2025-02-21 PROCEDURE — 73130 X-RAY EXAM OF HAND: CPT | Mod: 26,LT,, | Performed by: RADIOLOGY

## 2025-02-21 PROCEDURE — 73130 X-RAY EXAM OF HAND: CPT | Mod: TC,LT

## 2025-02-21 RX ORDER — SULFAMETHOXAZOLE AND TRIMETHOPRIM 800; 160 MG/1; MG/1
2 TABLET ORAL 2 TIMES DAILY
Qty: 28 TABLET | Refills: 0 | Status: SHIPPED | OUTPATIENT
Start: 2025-02-21 | End: 2025-02-28

## 2025-02-21 RX ORDER — DOXYCYCLINE HYCLATE 100 MG
100 TABLET ORAL 2 TIMES DAILY
Qty: 14 TABLET | Refills: 0 | Status: SHIPPED | OUTPATIENT
Start: 2025-02-21 | End: 2025-02-21

## 2025-02-21 NOTE — TELEPHONE ENCOUNTER
Please see if any providers have time today - [facility] or [facility] (I know we are closing early for [facility] [employer]).  He has a hand infection that needs to be evaluated today - he has already gone to UC.  Thank you!

## 2025-02-21 NOTE — TELEPHONE ENCOUNTER
Pt has concerns about hand and would like to know if you could see him or could recommend him to someone else

## 2025-02-21 NOTE — PROGRESS NOTES
"[facility]      CHIEF COMPLAINT:   Chief Complaint   Patient presents with    Follow-up     Hand swelling cactus went in his hand several weeks    Cliyndmazin        HISTORY OF PRESENT ILLNESS: Sukumar Beal is a 54 y.o. male who presents here today for follow-up of hand infection. He went to urgent care at outside facility 2/12/25 and was prescribed clindamycin 300 mg x 7 days. He has completed the clindamycin course and has noticed no change in symptoms. He denies fever or chills. His primary concerns are swelling and tenderness both of which worsen at night. He has minimal warmth or erythema. Function of hand is not limited.  About 2 weeks preceding infection, patient had been moving a cactus when it fell on his left hand resulting in him getting several thorns stuck in dorsal surface of left hand. He removed these thorns with his fingers. He didn't think much of it until a couple weeks later he noticed swelling and tenderness in this region of his hand.     In discussion of antibiotic choices patient mentions he has always been told he has a penicillin allergy but he is unsure of the reaction or events leading to this as it happened when he was a baby. Thus he's not sure if its a true allergy and would like to be tested.       REVIEW OF SYSTEMS:    ROS as in HPI.       MEDICAL HISTORY:    Past Medical History:   Diagnosis Date    Gout 7/2014    High cholesterol     Hypertension     Hypothyroid     Low testosterone     Staph aureus infection age 13    R leg       MEDICATIONS:    Medications Ordered Prior to Encounter[1]      PHYSICAL EXAM:    BP (!) 142/82 (BP Location: Left arm, Patient Position: Sitting)   Pulse (!) 54   Ht 6' 2" (1.88 m)   Wt 111 kg (244 lb 11.4 oz)   SpO2 100%   BMI 31.42 kg/m²     Physical Exam  Vitals and nursing note reviewed.   Constitutional:       General: He is not in acute distress.     Appearance: Normal appearance. He is not ill-appearing, " toxic-appearing or diaphoretic.   HENT:      Head: Normocephalic and atraumatic.      Nose: Nose normal.   Eyes:      Extraocular Movements: Extraocular movements intact.      Conjunctiva/sclera: Conjunctivae normal.      Pupils: Pupils are equal, round, and reactive to light.   Cardiovascular:      Rate and Rhythm: Normal rate.   Pulmonary:      Effort: Pulmonary effort is normal. No respiratory distress.   Musculoskeletal:         General: No deformity. Normal range of motion.   Skin:     Comments: slight swelling dorsal surface of left hand near knuckles of 3rd and 4th fingers. Tenderness to palpation. Also slight tenderness on palmar surface of 3rd and 4th finger pads. No excessive edema, erythema, wounds, drainage, discharge noted.    Neurological:      General: No focal deficit present.      Mental Status: He is alert.      Motor: No weakness.      Gait: Gait normal.   Psychiatric:         Mood and Affect: Mood normal.         Behavior: Behavior normal.         Thought Content: Thought content normal.         Judgment: Judgment normal.          Media Information                    ASSESSMENT & PLAN:    Sukumar was seen today for follow-up.    Diagnoses and all orders for this visit:    Superficial injury of hand, infected, left, subsequent encounter  Contact with nonvenomous plant thorns and spines and sharp leaves, subsequent encounter  Patient p/w swelling and tenderness of left hand after cactus fell on dorsal surface of hand about 3 weeks ago, causing several plant thorns to be stuck in skin which he removed with his fingers. He went to urgent care at outside facility 2/12 (no records available) and was prescribed clindamycin x 7 days which he completed with no relief although symptoms have not worsened either.  On exam, slight swelling dorsal surface of left hand near knuckles of 3rd and 4th fingers. Tenderness to palpation. Also slight tenderness on palmar surface of 3rd and 4th finger pads. No excessive  edema, erythema, wounds, drainage, discharge noted. Normal ROM/function.  Ddx: persistent infection, resolving infection, retained foreign body, soft tissue injury, bony injury; no evidence of tenosynovitis  XR to evaluate further  Bactrim x 7 days given lack of response to Clindamycin   Referral to hand surgery should symptoms persist  Strict ER precautions - discussed hand infections can be serious and progress rapidly  -     sulfamethoxazole-trimethoprim 800-160mg (BACTRIM DS) 800-160 mg Tab; Take 2 tablets by mouth 2 (two) times daily. for 7 days  Dispense: 28 tablet; Refill: 0  -     Ambulatory referral/consult to Hand Surgery; Future  -     X-Ray Hand 3 view Left; Future      Penicillin allergy  In discussion of antibiotic choices patient mentions he has always been told he has a penicillin allergy but he is unsure of the reaction or events leading to this as it happened when he was a baby. Thus he's not sure if its a true allergy and would like to be tested.   Referral to Allergy  -     Ambulatory referral/consult to Allergy; Future            Anastasiia Spence MD  [facility]         [1]   Current Outpatient Medications on File Prior to Visit   Medication Sig Dispense Refill    atorvastatin (LIPITOR) 20 MG tablet Take 1 tablet (20 mg total) by mouth once daily. 90 tablet 0    azithromycin (ZITHROMAX Z-MAMADOU) 250 MG tablet Take as directed: 2 tablets by mouth on day one, then 1 tablet by mouth once daily on days 2 through 5 6 tablet 0    B-complex with vitamin C (Z-BEC OR EQUIV) tablet Take 1 tablet by mouth once daily.      cefdinir (OMNICEF) 300 MG capsule Take 1 tablet by mouth twice daily 20 capsule 0    clindamycin (CLEOCIN) 300 MG capsule Take 1 capsule by mouth 3 times per day for 7 days 21 capsule 0    CYANOCOBALAMIN, VITAMIN B-12, ORAL Take 1 tablet by mouth once daily.      ergocalciferol, vitamin D2, (VITAMIN D ORAL) Take 2 capsules by mouth once daily.      fluticasone propionate (FLONASE)  50 mcg/actuation nasal spray Spray twice in each nostril once daily for 30 days 16 g 3    levothyroxine (SYNTHROID) 112 MCG tablet Take 1 tablet (112 mcg total) by mouth once daily. 90 tablet 3    losartan (COZAAR) 50 MG tablet Take 1 tablet (50 mg total) by mouth once daily. 90 tablet 1    sildenafiL (VIAGRA) 100 MG tablet Take 1 tablet (100 mg total) by mouth daily as needed for Erectile Dysfunction (take on an empty stomach 30-60 minutes before). 10 tablet 12    vitamin E acetate (VITAMIN E ORAL) Take 1 tablet by mouth once daily.       Current Facility-Administered Medications on File Prior to Visit   Medication Dose Route Frequency Provider Last Rate Last Admin    [START ON 3/18/2025] testosterone undecanoate injection 750 mg  750 mg Intramuscular Q10 weeks

## 2025-02-21 NOTE — PATIENT INSTRUCTIONS
Imaging studies as detailed have been ordered. Please complete today.     Bactrim x 7 days    Advil + Tylenol twice daily.     OK to ice.     Referrals to Hand Surgery and Allergy have been ordered. You may schedule appointments when you check out today, online, or by calling [phone].

## 2025-02-24 ENCOUNTER — OFFICE VISIT (OUTPATIENT)
Dept: ORTHOPEDICS | Facility: CLINIC | Age: 55
End: 2025-02-24
Payer: COMMERCIAL
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
## 2025-02-24 DIAGNOSIS — S60.922D: ICD-10-CM

## 2025-02-24 DIAGNOSIS — M79.642 LEFT HAND PAIN: Primary | ICD-10-CM

## 2025-02-24 DIAGNOSIS — L08.9: ICD-10-CM

## 2025-02-24 PROCEDURE — 99999 PR PBB SHADOW E&M-EST. PATIENT-LVL IV: CPT | Mod: PBBFAC,,,

## 2025-02-24 NOTE — PROGRESS NOTES
[facility]  History & Physical  Orthopedics    SUBJECTIVE:      Chief Complaint:  Left hand pain    Referring Provider: Allison Snell Dr. is the supervising physician for this encounter/patient    History of Present Illness:  Patient is a 54 y.o. right hand dominant male who presents today with complaints of left hand swelling since approximately 4 weeks ago when a cactus fell on the dorsum of his left hand near the 3rd and 4th MP joints.  The patient reports he removed all the visible thorns, but is unsure if he retrieved all of the foreign objects from the dorsum of the left hand.  The patient initially presented to an outside urgent care on February 12th 2025, and he was prescribed clindamycin.  He followed up with his primary care as he noticed his swelling and pain did not resolve with oral antibiotics.  A left hand x-ray was obtained and revealed no foreign body or acute fracture.  He was placed on Bactrim and kindly referred to our care.  Today, the patient reports his swelling to the left dorsal hand has gone down tremendously, but he does experience some soreness/pain at night.  He notes decreased  strength with left hand range-of-motion.  He reports compliance with his oral Bactrim, and he notes he has 4 more days of the Bactrim.  He reports a 1/10 pain today, and reports he previously had a left distal radius ORIF at [facility] several years ago with no further complaints.  He denies numbness and tingling.  He presents today for initial evaluation no further complaints.    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    Onset of symptoms/DOI was approximately 4 weeks ago.    Symptoms are aggravated by with range-of-motion and at night.    Symptoms are alleviated by rest and inactivity.    Symptoms consist of decreased range of motion to the left hand with flexion, swelling to the dorsum of the left hand.    The patient rates their pain as a  1/10.    Attempted treatment(s) and/or interventions include activity modifications, rest, oral clindamycin, oral Bactrim, oral anti-inflammatories.     The patient denies any fevers, chills, N/V, D/C and presents for evaluation.       Past Medical History:   Diagnosis Date    Gout 7/2014    High cholesterol     Hypertension     Hypothyroid     Low testosterone     Staph aureus infection age 13    R leg     Past Surgical History:   Procedure Laterality Date    ARTHROSCOPIC DEBRIDEMENT OF SHOULDER  10/14/2022    Procedure: DEBRIDEMENT, SHOULDER, ARTHROSCOPIC;  Surgeon: LEONIDAS Carr MD;  Location: [facility] OR;  Service: Orthopedics;;    ARTHROSCOPIC REPAIR OF ROTATOR CUFF OF SHOULDER Right 10/14/2022    Procedure: REPAIR, ROTATOR CUFF, ARTHROSCOPIC - POLAR CARE;  Surgeon: LEONIDAS Carr MD;  Location: [facility] OR;  Service: Orthopedics;  Laterality: Right;  Regional w/o Catheter, Interscalene    ARTHROSCOPY,SHOULDER,WITH BICEPS TENODESIS Right 10/14/2022    Procedure: ARTHROSCOPY,SHOULDER,WITH BICEPS TENODESIS;  Surgeon: LEONIDAS Carr MD;  Location: [facility] OR;  Service: Orthopedics;  Laterality: Right;    COLONOSCOPY N/A 9/21/2020    Procedure: COLONOSCOPY;  Surgeon: VANI Newell MD;  Location: [facility] ENDO (4TH FLR);  Service: Endoscopy;  Laterality: N/A;  covid test 2nd floor surgery clinic 9/18    COLONOSCOPY N/A 11/7/2023    Procedure: COLONOSCOPY;  Surgeon: VANI Newell MD;  Location: [facility] ENDO (4TH FLR);  Service: Endoscopy;  Laterality: N/A;  8/14- referred by Dr. Katherine reynaga/ [facility]  7 day hold/ Prep instructions to the portal. AS  10/31-precall complee-pt states he has not taken [facility] in several months-MS    DECOMPRESSION OF SUBACROMIAL SPACE Right 10/14/2022    Procedure: DECOMPRESSION, SUBACROMIAL SPACE;  Surgeon: LEONIDAS Carr MD;  Location: [facility] OR;  Service: Orthopedics;  Laterality: Right;    FRACTURE SURGERY Left 2004    wrist    Incision and drainage of tibia Right 1983    SINUS SURGERY       x2     Review of patient's allergies indicates:   Allergen Reactions    Codeine Rash    Penicillins Rash     Social History     Social History Narrative    Not on file     Family History   Problem Relation Name Age of Onset    Hypertension Mother      Heart disease Father  73            Cancer Sister  64        colon cancer    Hypertension Sister      Hyperlipidemia Sister      Lupus Sister      Hypertension Brother      Hyperlipidemia Brother      Cancer Maternal Grandfather      Cancer Maternal Aunt          lung - smoker    Melanoma Neg Hx         Current Medications[1]    Review of Systems:  Constitutional: no fever or chills  Eyes: no visual changes  ENT: no nasal congestion or sore throat  Respiratory: no cough or shortness of breath  Cardiovascular: no chest pain  Gastrointestinal: no nausea or vomiting, tolerating diet  Musculoskeletal: pain, soreness, decreased ROM, and dorsal hand edema    OBJECTIVE:      Vital Signs (Most Recent):  There were no vitals filed for this visit.  There is no height or weight on file to calculate BMI.      Physical Exam:  Constitutional: The patient appears well-developed and well-nourished. No distress.   Skin: No lesions appreciated  Head: Normocephalic and atraumatic.   Nose: Nose normal.   Ears: No deformities seen  Eyes: Conjunctivae and EOM are normal.   Neck: No tracheal deviation present.   Cardiovascular: Normal rate and intact distal pulses.    Pulmonary/Chest: Effort normal. No respiratory distress.   Abdominal: There is no guarding.   Neurological: The patient is alert.   Psychiatric: The patient has a normal mood and affect.     Left Hand/Wrist Examination:    Observation/Inspection:  Swelling  mild to the dorsum of the left hand MP joints    Deformity  none  Discoloration  none     Scars   Previous ORIF distal radius incision well healed to the volar distal forearm   Atrophy  none    HAND/WRIST EXAMINATION:  Finkelstein's Test   Neg  WHAT  Test    Neg  Snuff box tenderness   Neg  Frias's Test    Neg  Hook of Hamate Tenderness  Neg  CMC grind    Neg  Circumduction test   Neg    Neurovascular Exam:  Digits WWP, brisk CR < 3s throughout  NVI motor/LTS to M/R/U nerves, radial pulse 2+  Tinel's Test - Carpal Tunnel  Neg  Tinel's Test - Cubital Tunnel  Neg  Phalen's Test    Neg  Median Nerve Compression Test Neg    ROM hand full, with mild-to-moderate pain upon finger flexion of the left hand; patient reports soreness upon finger flexion near the dorsal 3rd and 4th MP joints    ROM wrist full, painless    ROM elbow full, painless    Abdomen not guarded  Respirations nonlabored  Perfusion intact    Diagnostic Results:     Imaging - I independently viewed the patient's imaging as well as the radiology report.  Xrays of the patient's left hand demonstrate previous internal fixation of a left distal radius with hardware intact.  Alignment is satisfactory.  There are no notable acute fractures or dislocations or retained foreign objects on x-ray today.    FINDINGS:  Postoperative changes of internal fixation of the distal radius identified.  The position alignment is satisfactory.     No definite acute fracture or dislocation.  No bone destruction identified.    EMG - 2020  Normal     ASSESSMENT/PLAN:      54 y.o. yo male with left dorsal hand pain status post cactus injury    Plan: The patient and I had a thorough discussion today.  We discussed the working diagnosis as well as several other potential alternative diagnoses.  Treatment options were discussed, both conservative and surgical.  Conservative treatment options would include things such as activity modifications, workplace modifications, a period of rest, oral vs topical OTC and prescription anti-inflammatory medications, occupational therapy, splinting/bracing, immobilization, corticosteroid injections, and others.  Surgical options were discussed as well.     At this time, the patient is now  approximately 4 weeks from his cactus injury, and he reports significant improvement in his symptoms since initial injury.  The patient reports compliance with his oral antibiotic of Bactrim, and I advised him to complete this antibiotic in its entirety.  He is to continue anti-inflammatory medications morning and at night to help reduce his posttrauma edema.  We will placed occupational therapy orders today to help treat and evaluate his left hand range-of-motion and for left hand strengthening.  He also would like to proceed with an ultrasound of the left upper extremity to evaluate for possible retained foreign object to the left hand.  Ultrasound order placed today.  He will follow up with our clinic after his ultrasound or sooner for any problems.    Should the patient's symptoms worsen, persist, or fail to improve they should return for reevaluation and I would be happy to see them back anytime.    Please do not hesitate to reach out to us via email, phone, or MyChart with any questions, concerns, or feedback.    Disclaimer:  This note is prepared using voice recognition software and as such is likely to have errors and has not been proof read. Please contact me for questions.     Maria Teresa Sullivan PA-C  Orthopedic/Hand Surgery                            [1]   Current Outpatient Medications:     atorvastatin (LIPITOR) 20 MG tablet, Take 1 tablet (20 mg total) by mouth once daily., Disp: 90 tablet, Rfl: 0    azithromycin (ZITHROMAX Z-MAMADOU) 250 MG tablet, Take as directed: 2 tablets by mouth on day one, then 1 tablet by mouth once daily on days 2 through 5, Disp: 6 tablet, Rfl: 0    B-complex with vitamin C (Z-BEC OR EQUIV) tablet, Take 1 tablet by mouth once daily., Disp: , Rfl:     cefdinir (OMNICEF) 300 MG capsule, Take 1 tablet by mouth twice daily, Disp: 20 capsule, Rfl: 0    clindamycin (CLEOCIN) 300 MG capsule, Take 1 capsule by mouth 3 times per day for 7 days, Disp: 21 capsule, Rfl: 0    CYANOCOBALAMIN,  VITAMIN B-12, ORAL, Take 1 tablet by mouth once daily., Disp: , Rfl:     ergocalciferol, vitamin D2, (VITAMIN D ORAL), Take 2 capsules by mouth once daily., Disp: , Rfl:     fluticasone propionate (FLONASE) 50 mcg/actuation nasal spray, Spray twice in each nostril once daily for 30 days, Disp: 16 g, Rfl: 3    levothyroxine (SYNTHROID) 112 MCG tablet, Take 1 tablet (112 mcg total) by mouth once daily., Disp: 90 tablet, Rfl: 3    losartan (COZAAR) 50 MG tablet, Take 1 tablet (50 mg total) by mouth once daily., Disp: 90 tablet, Rfl: 1    sildenafiL (VIAGRA) 100 MG tablet, Take 1 tablet (100 mg total) by mouth daily as needed for Erectile Dysfunction (take on an empty stomach 30-60 minutes before)., Disp: 10 tablet, Rfl: 12    sulfamethoxazole-trimethoprim 800-160mg (BACTRIM DS) 800-160 mg Tab, Take 2 tablets by mouth 2 (two) times daily. for 7 days, Disp: 28 tablet, Rfl: 0    vitamin E acetate (VITAMIN E ORAL), Take 1 tablet by mouth once daily., Disp: , Rfl:     Current Facility-Administered Medications:     [START ON 3/18/2025] testosterone undecanoate injection 750 mg, 750 mg, Intramuscular, Q10 weeks,

## 2025-02-25 ENCOUNTER — HOSPITAL ENCOUNTER (OUTPATIENT)
Dept: RADIOLOGY | Facility: HOSPITAL | Age: 55
Discharge: HOME | End: 2025-02-25
Payer: COMMERCIAL

## 2025-02-25 DIAGNOSIS — L08.9: ICD-10-CM

## 2025-02-25 DIAGNOSIS — S60.922D: ICD-10-CM

## 2025-02-25 PROCEDURE — 76882 US LMTD JT/FCL EVL NVASC XTR: CPT | Mod: 26,LT,, | Performed by: RADIOLOGY

## 2025-02-25 PROCEDURE — 76882 US LMTD JT/FCL EVL NVASC XTR: CPT | Mod: TC,LT

## 2025-02-28 ENCOUNTER — PATIENT MESSAGE (OUTPATIENT)
Dept: INTERNAL MEDICINE | Facility: CLINIC | Age: 55
End: 2025-02-28
Payer: COMMERCIAL

## 2025-02-28 DIAGNOSIS — L08.9: Primary | ICD-10-CM
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
## 2025-02-28 DIAGNOSIS — S60.922D: Primary | ICD-10-CM

## 2025-02-28 RX ORDER — SULFAMETHOXAZOLE AND TRIMETHOPRIM 800; 160 MG/1; MG/1
2 TABLET ORAL 2 TIMES DAILY
Qty: 28 TABLET | Refills: 0 | Status: SHIPPED | OUTPATIENT
Start: 2025-02-28 | End: 2025-03-07

## 2025-03-09 ENCOUNTER — TELEPHONE (OUTPATIENT)
Dept: PHARMACY | Facility: CLINIC | Age: 55
End: 2025-03-09
Payer: COMMERCIAL

## 2025-03-09 NOTE — TELEPHONE ENCOUNTER
[facility] Chart Review & Patient Outreach Details For Medication Adherence Project    Reason for Outreach Encounter: 3rd Party payor non-compliance report ([employer], [employer], [facility], etc)  2.  Patient Outreach Method: Well Donehart message  3.   Medication in question: losartan    LAST FILLED: 7/31/24 for 90 day supply  Hypertension Medications              losartan (COZAAR) 50 MG tablet Take 1 tablet (50 mg total) by mouth once daily.              4.  Reviewed and or Updates Made To: Patient Chart  5. Outreach Outcomes and/or actions taken: Sent inquiry to patient: Waiting for response.

## 2025-03-13 ENCOUNTER — TELEPHONE (OUTPATIENT)
Dept: UROLOGY | Facility: CLINIC | Age: 55
End: 2025-03-13
Payer: COMMERCIAL

## 2025-03-13 NOTE — TELEPHONE ENCOUNTER
Patient scheduled with Dr. Carey for Aveed injection  so care will not be delayed,  his next appointment can be scheduled with Sherlyn

## 2025-03-21 RX ORDER — ATORVASTATIN CALCIUM 20 MG/1
20 TABLET, FILM COATED ORAL DAILY
Qty: 90 TABLET | Refills: 1 | Status: SHIPPED | OUTPATIENT
Start: 2025-03-21

## 2025-03-21 NOTE — TELEPHONE ENCOUNTER
No care due was identified.  [facility] Embedded Care Due Messages. Reference number: [id number].   3/21/2025 7:58:29 AM CDT

## 2025-03-21 NOTE — TELEPHONE ENCOUNTER
Refill Decision Note   Sukumar Emigdio  is requesting a refill authorization.  Brief Assessment and Rationale for Refill:  Approve     Medication Therapy Plan:        Comments:     Note composed:8:01 AM 03/21/2025

## 2025-04-02 ENCOUNTER — OFFICE VISIT (OUTPATIENT)
Dept: UROLOGY | Facility: CLINIC | Age: 55
End: 2025-04-02
Payer: COMMERCIAL

## 2025-04-02 VITALS
BODY MASS INDEX: 30.25 KG/M2 | SYSTOLIC BLOOD PRESSURE: 128 MMHG | DIASTOLIC BLOOD PRESSURE: 92 MMHG | HEIGHT: 74 IN | HEART RATE: 60 BPM | WEIGHT: 235.69 LBS

## 2025-04-02 DIAGNOSIS — N40.1 BPH WITH URINARY OBSTRUCTION: ICD-10-CM

## 2025-04-02 DIAGNOSIS — N13.8 BPH WITH URINARY OBSTRUCTION: ICD-10-CM

## 2025-04-02 DIAGNOSIS — E29.1 PRIMARY MALE HYPOGONADISM: Primary | ICD-10-CM

## 2025-04-02 PROCEDURE — 99999 PR PBB SHADOW E&M-EST. PATIENT-LVL III: CPT | Mod: PBBFAC,,,

## 2025-04-02 NOTE — PROGRESS NOTES
Hospitalist Admission History and Physical     Active Problems:    Diabetic ulcer of toe of right foot associated with type 2 diabetes mellitus, with fat layer exposed (CMS/[facility])    Hyponatremia    Plan:  Diabetic ulcer of the right hallux  -has type 2 diabetes on oral meds however noncompliant with medications  -presented with pain and redness around the right hallux consistent with foot ulcer with surrounding cellulitis x-ray no suggestive for osteo  -admit with tele monitoring  -will continue with IV cefepime and vancomycin  -will get MRI of the foot to further evaluate for osteomyelitis  -will consult Podiatry    Hyponatremia-mild and continue to monitor  Type 2 diabetes mellitus-has been noncompliant with oral meds.  Last A1c 11.7 on 9 09/07/2020  -hold oral hypoglycemic agents and start sliding scale insulin        Code Status:  full code  ----------------------------------------------------------------------------------------------    Patient name: Emerson Baron  YOB: 1964  MRN: [id number]    Date of admission:  9/18/2021    Primary care physician:  Mandy Kinsey MD    Chief Complaint:  Emerson is a 56 year old male who presents with  right foot pain    HPI:    This is 56-year-old male with past medical problems including type 2 diabetes mellitus with who has been noncompliant with oral medications presented to the emergency department accompanied by his girl friend for ulcer in the right foot.  Patient is not providing detailed history he stated that he has small ulcers over the right has for some time however the last few days has been having some pain as well as redness surrounding that area.  Denies any associated fever, chills, nausea, vomiting, cough, diarrhea.  Patient was recent visit with his family doctor on 09/12/2021 for dizziness as well as cough and shortness of breath and was tested positive for COVID.  Currently he denies any shortness of breath or fever.  In the emergency  CHIEF COMPLAINT:  Low testosterone     HISTORY OF PRESENTING ILLINESS:  Sukumar Beal is a 54 y.o. male who has a history of Low Testosterone. He has complaints are fatigue, loss of axillary hair.     He's on Androgel 1.62% While on TRT, While on TRT, he has more energy.    He wanted testopel, but he has BCBS.  We switched to AndroGel 1.62% 2.5gm packets; 2 packets daily.   Successfully using the Viagra.   Due to fatigue; daily compliance and risks for transference he switched to AVEED.   Last clinic visit & AVEED was on 1/7/2025.       He also reports that he was told he had blood in his urine.   He has never seen blood in his urine.   PCP noted 10 RBC's on his UA  12/05/2024 CTU:   - 1.6 cm new thick-walled enhancing lesion in the superior pole of the right kidney- **he is getting a second opinion on this.     He had a negative microscopic hematuria workup with Dr Reyna on 1/30/2025.    He is here today for his maintenance Aveed injection. He reports he feels better on this but has noticed a big decline in how he feels lately.    REVIEW OF SYSTEMS:  Review of Systems   Constitutional:  Positive for malaise/fatigue. Negative for chills and fever.   Eyes:  Negative for double vision.   Respiratory:  Negative for cough and shortness of breath.    Cardiovascular:  Negative for chest pain and palpitations.   Gastrointestinal:  Negative for abdominal pain, constipation, diarrhea, nausea and vomiting.   Genitourinary:  Negative for dysuria, hematuria (no visible blood noted) and urgency.   Musculoskeletal:  Negative for falls.   Neurological:  Negative for dizziness.   Endo/Heme/Allergies:  Negative for polydipsia.   Psychiatric/Behavioral:  The patient is not nervous/anxious (concern for renal mass).          PATIENT HISTORY:    Past Medical History:   Diagnosis Date    Gout 7/2014    High cholesterol     Hypertension     Hypothyroid     Low testosterone     Staph aureus infection age 13    R leg       Past Surgical  History:   Procedure Laterality Date    ARTHROSCOPIC DEBRIDEMENT OF SHOULDER  10/14/2022    Procedure: DEBRIDEMENT, SHOULDER, ARTHROSCOPIC;  Surgeon: LEONIDAS Carr MD;  Location: [facility] OR;  Service: Orthopedics;;    ARTHROSCOPIC REPAIR OF ROTATOR CUFF OF SHOULDER Right 10/14/2022    Procedure: REPAIR, ROTATOR CUFF, ARTHROSCOPIC - POLAR CARE;  Surgeon: LEONIDAS Carr MD;  Location: [facility] OR;  Service: Orthopedics;  Laterality: Right;  Regional w/o Catheter, Interscalene    ARTHROSCOPY,SHOULDER,WITH BICEPS TENODESIS Right 10/14/2022    Procedure: ARTHROSCOPY,SHOULDER,WITH BICEPS TENODESIS;  Surgeon: LEONIDAS Carr MD;  Location: [facility] OR;  Service: Orthopedics;  Laterality: Right;    COLONOSCOPY N/A 2020    Procedure: COLONOSCOPY;  Surgeon: VANI Newell MD;  Location: [facility] ENDO (4TH FLR);  Service: Endoscopy;  Laterality: N/A;  covid test 2nd floor surgery clinic     COLONOSCOPY N/A 2023    Procedure: COLONOSCOPY;  Surgeon: VANI Newell MD;  Location: [facility] (4TH FLR);  Service: Endoscopy;  Laterality: N/A;  - referred by Dr. Katherine reynaga/ [facility]  7 day hold/ Prep instructions to the portal. AS  10/31-precall complee-pt states he has not taken [facility] in several months-MS    DECOMPRESSION OF SUBACROMIAL SPACE Right 10/14/2022    Procedure: DECOMPRESSION, SUBACROMIAL SPACE;  Surgeon: LEONIDAS Carr MD;  Location: [facility] OR;  Service: Orthopedics;  Laterality: Right;    FRACTURE SURGERY Left     wrist    Incision and drainage of tibia Right     SINUS SURGERY      x2       Family History   Problem Relation Name Age of Onset    Hypertension Mother      Heart disease Father  73            Cancer Sister  64        colon cancer    Hypertension Sister      Hyperlipidemia Sister      Lupus Sister      Hypertension Brother      Hyperlipidemia Brother      Cancer Maternal Grandfather      Cancer Maternal Aunt          lung - smoker    Melanoma Neg Hx         Social History  department his MAXIMUM TEMPERATURE was 99.7°, heart rate 112, respiratory 18, blood pressure 129/75 in pulse ox 94% on room air.  Labs showed sodium of 127, blood sugar 286, WBC 7.9, ESR at 57 and CRP at 5.7.  X-ray of the right toe did show soft tissue ulcer otherwise no involvement of the underlying bone.  Patient received IV cefepime and vancomycin and is admitted for further evaluation and treatment      Past Medical History  Non-insulin-requiring type 2 diabetes mellitus     Home Medications  Prior to Admission medications    Medication Sig Start Date End Date Taking? Authorizing Provider   albuterol 108 (90 Base) MCG/ACT inhaler Inhale 2 puffs into the lungs every 4 hours as needed for Shortness of Breath or Wheezing. 9/12/21   Omer Ferreira CNP   metFORMIN (GLUCOPHAGE-XR) 500 MG 24 hr tablet Take 2 tablets by mouth daily (with breakfast). 2/4/20   Mandy Kinsey MD   glipiZIDE (GLUCOTROL) 5 MG tablet Take 1 tablet by mouth daily (before breakfast). 2/4/20   Mandy Kinsey MD       Social History  Emerson  reports that he has been smoking cigarettes. He started smoking about 46 years ago. He has a 80.00 pack-year smoking history. He has never used smokeless tobacco. He reports current alcohol use of about 12.0 standard drinks of alcohol per week. He reports previous drug use.    Family History  Emerson's family history includes Cancer (age of onset: 40) in his sister.    Allergies  ALLERGIES:  No Known Allergies     Review of Systems  A complete review of systems is performed and negative other than the pertinent positives and negatives reported in HPI.  All other systems are reviewed and are negative.      Physical Exam  Visit Vitals  /84 (BP Location: LUE - Left upper extremity, Patient Position: Sitting)   Pulse 87   Temp 99.7 °F (37.6 °C)   Resp 18   SpO2 95%     General NAD. Well nourished. Hydrated. Alert.   Eyes Non-icteric. Pink Conjunctiva.  PERRLA.  EOM intact.   ENT External ears/nose without      Socioeconomic History    Marital status: Single   Tobacco Use    Smoking status: Former     Current packs/day: 0.00     Average packs/day: 0.3 packs/day for 15.0 years (3.8 ttl pk-yrs)     Types: Cigarettes     Start date: 1/3/1998     Quit date: 1/3/2013     Years since quittin.2    Smokeless tobacco: Never   Substance and Sexual Activity    Alcohol use: Yes     Alcohol/week: 5.0 standard drinks of alcohol     Types: 6 Standard drinks or equivalent per week     Comment: weekends    Drug use: No    Sexual activity: Yes     Social Drivers of Health     Financial Resource Strain: Low Risk  (2025)    Overall Financial Resource Strain (CARDIA)     Difficulty of Paying Living Expenses: Not very hard   Food Insecurity: No Food Insecurity (2025)    Hunger Vital Sign     Worried About Running Out of Food in the Last Year: Never true     Ran Out of Food in the Last Year: Never true   Transportation Needs: No Transportation Needs (2023)    PRAPARE - Transportation     Lack of Transportation (Medical): No     Lack of Transportation (Non-Medical): No   Physical Activity: Sufficiently Active (2025)    Exercise Vital Sign     Days of Exercise per Week: 4 days     Minutes of Exercise per Session: 50 min   Stress: Stress Concern Present (2025)    [place] [employer] - Occupational Stress Questionnaire     Feeling of Stress : Rather much   Housing Stability: Unknown (2023)    Housing Stability Vital Sign     Unable to Pay for Housing in the Last Year: No     Unstable Housing in the Last Year: No       Allergies:  Codeine and Penicillins    Medications:    Current Outpatient Medications:     atorvastatin (LIPITOR) 20 MG tablet, Take 1 tablet (20 mg total) by mouth once daily., Disp: 90 tablet, Rfl: 1    azithromycin (ZITHROMAX Z-MAMADOU) 250 MG tablet, Take as directed: 2 tablets by mouth on day one, then 1 tablet by mouth once daily on days 2 through 5, Disp: 6 tablet, Rfl:  abnormalities.  Throat clear. Moist oral mucosa.   Respiratory Good respiratory effort. No wheezing/ronchi/rales appreciated.   Cardiac RRR. No murmurs/gallops/rubs.  Distal (DP and PT) pulses intact.  No edema.   GI Soft. Non-distended. Non-tender. No masses appreciated.  No HSM.    Extremities Not cyanotic. Full ROM on both upper and lower extremities. Strength 5/5 in both upper and lower extremities.    MSK Has ulcer over the lateral aspect of the distal right hallux with surrounding redness. Has tenderness. No significant swelling in the foot     Neuro Sensation to light touch is intact in both upper and lower extremities.CN II-XII WNL. Attention and concentration good.   Skin No rashes or lesions noted. Warm and dry to touch.   Psych Alert and oriented x3. Appropriate mood and affect.        Labs:  Labs reviewed and pertinent findings as follows:  Hyponatremia and hyperglycemia    Imaging:   He x-ray of right toe: RIGHT FIRST DIGIT:     Soft tissue ulceration is noted along the medial aspect of the first digit.  No retained radiopaque foreign body. No osteolysis or periosteal reaction  within the first digit to suggest osteomyelitis. If there is high clinical  suspicion for osteomyelitis, dedicated MRI could be obtained for further  evaluation. No acute fracture or dislocation.    Is the patient expected to require at least a two midnight stay in the hospital? Yes -  I certify that I expect inpatient services for greater than two midnights are medically necessary for this patient.  Please see H&P and MD Progress Notes for additional information about the patient's course of treatment.  Goals of Care Note    Patient has one or more life-limiting conditions: no    A discussion of the patient's Goals of Care has been completed with the patient regarding the following:  (1) Current Serious Conditions: no terminal diagnosis   (2) Prognosis:          Function: Decline in function  Time: Would you be surprised if this  0    B-complex with vitamin C (Z-BEC OR EQUIV) tablet, Take 1 tablet by mouth once daily., Disp: , Rfl:     cefdinir (OMNICEF) 300 MG capsule, Take 1 tablet by mouth twice daily, Disp: 20 capsule, Rfl: 0    clindamycin (CLEOCIN) 300 MG capsule, Take 1 capsule by mouth 3 times per day for 7 days, Disp: 21 capsule, Rfl: 0    CYANOCOBALAMIN, VITAMIN B-12, ORAL, Take 1 tablet by mouth once daily., Disp: , Rfl:     ergocalciferol, vitamin D2, (VITAMIN D ORAL), Take 2 capsules by mouth once daily., Disp: , Rfl:     fluticasone propionate (FLONASE) 50 mcg/actuation nasal spray, Spray twice in each nostril once daily for 30 days, Disp: 16 g, Rfl: 3    levothyroxine (SYNTHROID) 112 MCG tablet, Take 1 tablet (112 mcg total) by mouth once daily., Disp: 90 tablet, Rfl: 3    losartan (COZAAR) 50 MG tablet, Take 1 tablet (50 mg total) by mouth once daily., Disp: 90 tablet, Rfl: 1    sildenafiL (VIAGRA) 100 MG tablet, Take 1 tablet (100 mg total) by mouth daily as needed for Erectile Dysfunction (take on an empty stomach 30-60 minutes before)., Disp: 10 tablet, Rfl: 12    vitamin E acetate (VITAMIN E ORAL), Take 1 tablet by mouth once daily., Disp: , Rfl:     Current Facility-Administered Medications:     testosterone undecanoate injection 750 mg, 750 mg, Intramuscular, Q10 weeks,     PHYSICAL EXAMINATION:  Physical Exam  Vitals and nursing note reviewed.   Constitutional:       General: He is awake.      Appearance: Normal appearance.   HENT:      Head: Normocephalic.      Right Ear: External ear normal.      Left Ear: External ear normal.      Nose: Nose normal.   Cardiovascular:      Rate and Rhythm: Normal rate.   Pulmonary:      Effort: Pulmonary effort is normal. No respiratory distress.   Abdominal:      Tenderness: There is no abdominal tenderness. There is no right CVA tenderness or left CVA tenderness.   Musculoskeletal:         General: Normal range of motion.      Cervical back: Normal range of motion.   Skin:      patient  within the next year?  Yes  (3) Patient-Centered Goals:  Aggressive, usual medical care, Live as long as possible and Return home  (4) Plan for Future Deterioration: Would be ok with returning to hospital for care.  Would be ok with returning to ICU for care.    The patient has a Fair understanding of everything discussed above.  Patient Seen and Examined on: 2021 10:02 PM      Jaziel Chicas MD  [facility] [place]  Pager: [phone]       General: Skin is warm and dry.   Neurological:      General: No focal deficit present.      Mental Status: He is alert and oriented to person, place, and time.   Psychiatric:         Mood and Affect: Mood normal.         Behavior: Behavior is cooperative.           LABS:      In office UA today was clear of active infection and blood today.       Lab Results   Component Value Date    PSA 0.81 09/22/2021    PSA 0.42 06/11/2018    PSA 0.41 05/10/2017    PSADIAG 0.89 07/29/2024    PSADIAG 1.1 03/11/2024    PSADIAG 0.73 09/07/2023       Lab Results   Component Value Date    CREATININE 0.9 10/21/2024    EGFRNORACEVR >60.0 10/21/2024               IMPRESSION:    Encounter Diagnoses   Name Primary?    Primary male hypogonadism Yes         Assessment:       1. Primary male hypogonadism        Plan:         I spent a total of 40 minutes on the day of the visit. This includes face to face time and non-face to face time preparing to see the patient (eg, review of tests/labs), obtaining the AVEED from locked cabinet and gathering injection supplies and preparing injection in syringe.   Recommendations for PCP follow up visit; any need to go to the ER.    Recommended lifestyle modifications with a proper, healthy diet, good hydration but during the day. Reducing bladder irritants.   Benefits of regular exercise and weight loss.   I spent 30 minutes with the patient of which more than half was spent in direct consultation with the patient in regards to our treatment and plan.    5 minutes pre counseling; f/u from previous injection.  15 minutes preparing and giving injection and disposing of injection supplies  10 minutes post installation instructions and shared monitoring. Patient stays 30 minutes after injection.   Education and recommendations of today's plan of care including home remedies and needed follow up with PCP.   We discussed AVEED and the expectations with TRT.  We reviewed AVEED's benefits, expectations, schedule  of injections, and the risks.  He was informed of the possible risks especially allergic reactions and POME.  Discussed the steps we take for prevention and if something was to happen.  Discussed the injection is given in the muscle over 90 seconds.  He will wait in lobby for 30 minutes to make sure there was no issue with the injection.  We reviewed the patient education pamphlet together.   He voiced understanding; he gave a verbal consent to receive AVEED.   I injected AVEED 750mg IM over 90 seconds at the LEFT  Vent/Glut; tolerated well.  15 minutes after injection, he voiced no complaints.  30 minutes after injection, he voiced no complaints  -RTC 10 week for AVEED  -6 month TRT labs for tomorrow   -Discussed TP pending CBC results

## 2025-04-08 ENCOUNTER — LAB VISIT (OUTPATIENT)
Dept: LAB | Facility: HOSPITAL | Age: 55
End: 2025-04-08
Payer: COMMERCIAL

## 2025-04-08 DIAGNOSIS — E29.1 PRIMARY MALE HYPOGONADISM: ICD-10-CM

## 2025-04-08 LAB
ABSOLUTE EOSINOPHIL (OHS): 0.1 K/UL
ABSOLUTE MONOCYTE (OHS): 0.44 K/UL (ref 0.3–1)
ABSOLUTE NEUTROPHIL COUNT (OHS): 2.12 K/UL (ref 1.8–7.7)
ALBUMIN SERPL BCP-MCNC: 4.1 G/DL (ref 3.5–5.2)
ALP SERPL-CCNC: 81 UNIT/L (ref 40–150)
ALT SERPL W/O P-5'-P-CCNC: 28 UNIT/L (ref 10–44)
ANION GAP (OHS): 8 MMOL/L (ref 8–16)
AST SERPL-CCNC: 25 UNIT/L (ref 11–45)
BASOPHILS # BLD AUTO: 0.03 K/UL
BASOPHILS NFR BLD AUTO: 0.7 %
BILIRUB SERPL-MCNC: 0.8 MG/DL (ref 0.1–1)
BUN SERPL-MCNC: 18 MG/DL (ref 6–20)
CALCIUM SERPL-MCNC: 9.2 MG/DL (ref 8.7–10.5)
CHLORIDE SERPL-SCNC: 104 MMOL/L (ref 95–110)
CHOLEST SERPL-MCNC: 165 MG/DL (ref 120–199)
CHOLEST/HDLC SERPL: 3.6 {RATIO} (ref 2–5)
CO2 SERPL-SCNC: 27 MMOL/L (ref 23–29)
CREAT SERPL-MCNC: 1.1 MG/DL (ref 0.5–1.4)
ERYTHROCYTE [DISTWIDTH] IN BLOOD BY AUTOMATED COUNT: 12.5 % (ref 11.5–14.5)
GFR SERPLBLD CREATININE-BSD FMLA CKD-EPI: >60 ML/MIN/1.73/M2
GLUCOSE SERPL-MCNC: 90 MG/DL (ref 70–110)
HCT VFR BLD AUTO: 46.4 % (ref 40–54)
HDLC SERPL-MCNC: 46 MG/DL (ref 40–75)
HDLC SERPL: 27.9 % (ref 20–50)
HGB BLD-MCNC: 15.4 GM/DL (ref 14–18)
IMM GRANULOCYTES # BLD AUTO: 0.01 K/UL (ref 0–0.04)
IMM GRANULOCYTES NFR BLD AUTO: 0.2 % (ref 0–0.5)
LDLC SERPL CALC-MCNC: 90.6 MG/DL (ref 63–159)
LYMPHOCYTES # BLD AUTO: 1.71 K/UL (ref 1–4.8)
MCH RBC QN AUTO: 30.7 PG (ref 27–31)
MCHC RBC AUTO-ENTMCNC: 33.2 G/DL (ref 32–36)
MCV RBC AUTO: 92 FL (ref 82–98)
NONHDLC SERPL-MCNC: 119 MG/DL
NUCLEATED RBC (/100WBC) (OHS): 0 /100 WBC
PLATELET # BLD AUTO: 188 K/UL (ref 150–450)
PMV BLD AUTO: 11.1 FL (ref 9.2–12.9)
POTASSIUM SERPL-SCNC: 3.5 MMOL/L (ref 3.5–5.1)
PROT SERPL-MCNC: 6.9 GM/DL (ref 6–8.4)
PSA SERPL-MCNC: 1.19 NG/ML
RBC # BLD AUTO: 5.02 M/UL (ref 4.6–6.2)
RELATIVE EOSINOPHIL (OHS): 2.3 %
RELATIVE LYMPHOCYTE (OHS): 38.8 % (ref 18–48)
RELATIVE MONOCYTE (OHS): 10 % (ref 4–15)
RELATIVE NEUTROPHIL (OHS): 48 % (ref 38–73)
SODIUM SERPL-SCNC: 139 MMOL/L (ref 136–145)
TESTOST SERPL-MCNC: 908 NG/DL (ref 304–1227)
TRIGL SERPL-MCNC: 142 MG/DL (ref 30–150)
WBC # BLD AUTO: 4.41 K/UL (ref 3.9–12.7)

## 2025-04-08 PROCEDURE — 84403 ASSAY OF TOTAL TESTOSTERONE: CPT

## 2025-04-08 PROCEDURE — 85025 COMPLETE CBC W/AUTO DIFF WBC: CPT

## 2025-04-08 PROCEDURE — 82040 ASSAY OF SERUM ALBUMIN: CPT

## 2025-04-08 PROCEDURE — 36415 COLL VENOUS BLD VENIPUNCTURE: CPT

## 2025-04-08 PROCEDURE — 84153 ASSAY OF PSA TOTAL: CPT

## 2025-04-08 PROCEDURE — 80061 LIPID PANEL: CPT

## 2025-04-13 ENCOUNTER — TELEPHONE (OUTPATIENT)
Dept: PHARMACY | Facility: CLINIC | Age: 55
End: 2025-04-13
Payer: COMMERCIAL

## 2025-04-13 NOTE — TELEPHONE ENCOUNTER
[facility]/Population Health Chart Review & Patient Outreach Details For Medication Adherence Project    Reason for Outreach Encounter: 3rd Party payor non-compliance report ([employer], [employer], [facility], etc)  2.  Patient Outreach Method: [employer] message  3.   Medication in question: losartan   LAST FILLED: 7/31/24 for 90 day supply  Hypertension Medications              losartan (COZAAR) 50 MG tablet Take 1 tablet (50 mg total) by mouth once daily.              4.  Reviewed and or Updates Made To: Patient Chart  5. Outreach Outcomes and/or actions taken: Sent inquiry to patient: Waiting for response.

## 2025-05-01 ENCOUNTER — PATIENT MESSAGE (OUTPATIENT)
Dept: INTERNAL MEDICINE | Facility: CLINIC | Age: 55
End: 2025-05-01
Payer: COMMERCIAL

## 2025-05-01 DIAGNOSIS — R31.29 MICROSCOPIC HEMATURIA: Primary | ICD-10-CM

## 2025-05-01 NOTE — TELEPHONE ENCOUNTER
LOV with Katherine Turner MD , 10/17/2024    Pt had an office visit on 1/17/2025 with Dr. Reyna in urology who recommended a CAT scan in a year    Pt seeking second opinion and is requesting a referral to oncologist Dr. Chauncey Graves or provider that PCP recommends    Pended in encounter for review/completion

## 2025-05-08 ENCOUNTER — PATIENT MESSAGE (OUTPATIENT)
Dept: INTERNAL MEDICINE | Facility: CLINIC | Age: 55
End: 2025-05-08
Payer: COMMERCIAL

## 2025-05-15 ENCOUNTER — TELEPHONE (OUTPATIENT)
Dept: PHARMACY | Facility: CLINIC | Age: 55
End: 2025-05-15
Payer: COMMERCIAL

## 2025-05-15 NOTE — TELEPHONE ENCOUNTER
[facility]/Population Health Chart Review & Patient Outreach Details For Medication Adherence Project    Reason for Outreach Encounter: 3rd Party payor non-compliance report ([employer], [employer], [facility], etc)  2.  Patient Outreach Method: [employer] message  3.   Medication in question: losartan    LAST FILLED: 7/31/24 for 90 day supply  Hypertension Medications              losartan (COZAAR) 50 MG tablet Take 1 tablet (50 mg total) by mouth once daily.              4.  Reviewed and or Updates Made To: Patient Chart  5. Outreach Outcomes and/or actions taken: Sent inquiry to patient: Waiting for response.

## 2025-05-16 ENCOUNTER — PATIENT MESSAGE (OUTPATIENT)
Dept: INTERNAL MEDICINE | Facility: CLINIC | Age: 55
End: 2025-05-16
Payer: COMMERCIAL

## 2025-05-16 DIAGNOSIS — R31.29 MICROSCOPIC HEMATURIA: Primary | ICD-10-CM

## 2025-05-19 ENCOUNTER — TELEPHONE (OUTPATIENT)
Dept: HEMATOLOGY/ONCOLOGY | Facility: CLINIC | Age: 55
End: 2025-05-19
Payer: COMMERCIAL

## 2025-05-19 NOTE — TELEPHONE ENCOUNTER
Called and spoke to pt. Explained that Dr Reyna has him scheduled for a repeat CT in July, pt was not aware. Pt states he really wants a second opinion and was highly recommended to oncologist Dr Chauncey Graves. Pt states he is already scheduled for an appt but requesting PCP send referral. Pt very anxious, states he has a sister currently dying of ca and he just has a lot going on.    Pended if appropriate     LOV with Katherine Turner MD , 10/17/2024

## 2025-05-24 ENCOUNTER — TELEPHONE (OUTPATIENT)
Dept: PHARMACY | Facility: CLINIC | Age: 55
End: 2025-05-24
Payer: COMMERCIAL

## 2025-05-24 NOTE — TELEPHONE ENCOUNTER
[facility]/Population Health Chart Review & Patient Outreach Details For Medication Adherence Project    Reason for Outreach Encounter: 3rd Party payor non-compliance report ([employer], [employer], [facility], etc)  2.  Patient Outreach Method: Reviewed patient chart   3.   Medication in question:    Hyperlipidemia Medications              atorvastatin (LIPITOR) 20 MG tablet Take 1 tablet (20 mg total) by mouth once daily.                  atorvastatin  last filled  4/8/25 for 90 day supply      4.  Reviewed and or Updates Made To: Patient Chart  5. Outreach Outcomes and/or actions taken: Patient filled medication and is on track to be adherent  Additional Notes:

## 2025-06-11 DIAGNOSIS — E29.1 PRIMARY MALE HYPOGONADISM: Primary | ICD-10-CM

## 2025-06-19 ENCOUNTER — OFFICE VISIT (OUTPATIENT)
Dept: UROLOGY | Facility: CLINIC | Age: 55
End: 2025-06-19
Payer: COMMERCIAL

## 2025-06-19 VITALS
HEART RATE: 63 BPM | BODY MASS INDEX: 31.1 KG/M2 | SYSTOLIC BLOOD PRESSURE: 122 MMHG | WEIGHT: 242.31 LBS | HEIGHT: 74 IN | DIASTOLIC BLOOD PRESSURE: 84 MMHG

## 2025-06-19 DIAGNOSIS — Z79.890 ENCOUNTER FOR MONITORING TESTOSTERONE REPLACEMENT THERAPY: ICD-10-CM

## 2025-06-19 DIAGNOSIS — N28.89 RIGHT RENAL MASS: ICD-10-CM

## 2025-06-19 DIAGNOSIS — E29.1 PRIMARY MALE HYPOGONADISM: Primary | ICD-10-CM

## 2025-06-19 DIAGNOSIS — Z51.81 ENCOUNTER FOR MONITORING TESTOSTERONE REPLACEMENT THERAPY: ICD-10-CM

## 2025-06-19 PROCEDURE — 99999 PR PBB SHADOW E&M-EST. PATIENT-LVL IV: CPT | Mod: PBBFAC,,, | Performed by: NURSE PRACTITIONER

## 2025-06-19 NOTE — PROGRESS NOTES
CHIEF COMPLAINT:    Sukumar Beal is a 54 y.o. male presents today for Low Testosterone     HISTORY OF PRESENTING ILLINESS:    Sukumar Beal is a 54 y.o. male who has a history of Low Testosterone. He has complaints are fatigue, loss of axillary hair.     He's on Androgel 1.62% While on TRT, While on TRT, he has more energy.    He wanted testopel, but he has BCBS.  We switched to AndroGel 1.62% 2.5gm packets; 2 packets daily.   Successfully using the Viagra.   Due to fatigue; daily compliance and risks for transference he switched to AVEED.     Last clinic visit & AVEED was 04/02/2025 04/08/2025 TRT LABS:            He also reports that he was told he had blood in his urine.   He has never seen blood in his urine.   PCP noted 10 RBC's on his UA  12/05/2024 CTU:   - 1.6 cm new thick-walled enhancing lesion in the superior pole of the right kidney     He is scheduled to see Dr. Reyna on 01/31/2024.         REVIEW OF SYSTEMS:  Review of Systems   Constitutional: Negative.  Negative for chills, fever and malaise/fatigue.   Eyes:  Negative for double vision.   Respiratory:  Negative for cough and shortness of breath.    Cardiovascular:  Negative for chest pain and palpitations.   Gastrointestinal:  Negative for abdominal pain, constipation, diarrhea, nausea and vomiting.   Genitourinary: Negative.  Negative for dysuria, frequency, hematuria and urgency.        See HPI   Musculoskeletal:  Negative for falls.   Neurological:  Negative for dizziness and seizures.   Endo/Heme/Allergies:  Negative for polydipsia.         PATIENT HISTORY:    Past Medical History:   Diagnosis Date    Gout 7/2014    High cholesterol     Hypertension     Hypothyroid     Low testosterone     Staph aureus infection age 13    R leg       Past Surgical History:   Procedure Laterality Date    ARTHROSCOPIC DEBRIDEMENT OF SHOULDER  10/14/2022    Procedure: DEBRIDEMENT, SHOULDER, ARTHROSCOPIC;  Surgeon: LEONIDAS Carr MD;  Location: [facility] OR;   Service: Orthopedics;;    ARTHROSCOPIC REPAIR OF ROTATOR CUFF OF SHOULDER Right 10/14/2022    Procedure: REPAIR, ROTATOR CUFF, ARTHROSCOPIC - POLAR CARE;  Surgeon: LENOIDAS Carr MD;  Location: [facility] OR;  Service: Orthopedics;  Laterality: Right;  Regional w/o Catheter, Interscalene    ARTHROSCOPY,SHOULDER,WITH BICEPS TENODESIS Right 10/14/2022    Procedure: ARTHROSCOPY,SHOULDER,WITH BICEPS TENODESIS;  Surgeon: LEONIDAS Carr MD;  Location: [facility] OR;  Service: Orthopedics;  Laterality: Right;    COLONOSCOPY N/A 2020    Procedure: COLONOSCOPY;  Surgeon: VANI Newell MD;  Location: [facility] ENDO (4TH FLR);  Service: Endoscopy;  Laterality: N/A;  covid test 2nd floor surgery clinic     COLONOSCOPY N/A 2023    Procedure: COLONOSCOPY;  Surgeon: VANI Newell MD;  Location: [facility] ENDO (4TH FLR);  Service: Endoscopy;  Laterality: N/A;  - referred by Dr. Katherine reynaga/ [facility]  7 day hold/ Prep instructions to the portal. AS  10/31-precall complee-pt states he has not taken [facility] in several months-MS    DECOMPRESSION OF SUBACROMIAL SPACE Right 10/14/2022    Procedure: DECOMPRESSION, SUBACROMIAL SPACE;  Surgeon: LEONIDAS Carr MD;  Location: [facility] OR;  Service: Orthopedics;  Laterality: Right;    FRACTURE SURGERY Left     wrist    Incision and drainage of tibia Right     SINUS SURGERY      x2       Family History   Problem Relation Name Age of Onset    Hypertension Mother      Heart disease Father  73            Cancer Sister  64        colon cancer    Hypertension Sister      Hyperlipidemia Sister      Lupus Sister      Hypertension Brother      Hyperlipidemia Brother      Cancer Maternal Grandfather      Cancer Maternal Aunt          lung - smoker    Melanoma Neg Hx         Social History[1]    Allergies:  Codeine and Penicillins    Medications:  Current Medications[2]    PHYSICAL EXAMINATION:  Physical Exam  Vitals and nursing note reviewed.   Constitutional:       General: He is  awake.      Appearance: Normal appearance.   HENT:      Head: Normocephalic.      Right Ear: External ear normal.      Left Ear: External ear normal.      Nose: Nose normal.   Cardiovascular:      Rate and Rhythm: Normal rate.   Pulmonary:      Effort: Pulmonary effort is normal. No respiratory distress.   Abdominal:      Palpations: Abdomen is soft.      Tenderness: There is no abdominal tenderness. There is no right CVA tenderness or left CVA tenderness.   Musculoskeletal:         General: Normal range of motion.      Cervical back: Normal range of motion.   Skin:     General: Skin is warm and dry.   Neurological:      General: No focal deficit present.      Mental Status: He is alert and oriented to person, place, and time.   Psychiatric:         Mood and Affect: Mood normal.         Behavior: Behavior is cooperative.           LABS:          Lab Results   Component Value Date    PSA 1.19 04/08/2025    PSA 0.81 09/22/2021    PSA 0.42 06/11/2018    PSADIAG 0.89 07/29/2024    PSADIAG 1.1 03/11/2024    PSADIAG 0.73 09/07/2023       Lab Results   Component Value Date    CREATININE 1.1 04/08/2025    EGFRNORACEVR >60 04/08/2025               IMPRESSION:    Encounter Diagnoses   Name Primary?    Primary male hypogonadism Yes    Encounter for monitoring testosterone replacement therapy     Right renal mass          Assessment:       1. Primary male hypogonadism    2. Encounter for monitoring testosterone replacement therapy    3. Right renal mass        Plan:         I spent a total of 40 minutes on the day of the visit. This includes face to face time and non-face to face time preparing to see the patient (eg, review of tests/labs), obtaining the AVEED from locked cabinet and gathering injection supplies and preparing injection in syringe.   Recommendations for PCP follow up visit regarding TRT Labs; as well as any need to go to the ER.    Recommended lifestyle modifications with a proper, healthy diet, good hydration  but during the day. Reducing bladder irritants.   Benefits of regular exercise and maintaining a healthy weight.   5 minutes pre counseling; f/u from previous injection.  15 minutes preparing and giving injection and disposing of injection supplies  10 minutes post installation instructions and shared monitoring. Patient stays 30 minutes after injection.   We discussed AVEED and the expectations with TRT.  We reviewed AVEED's benefits, expectations, schedule of injections, and the risks.  He was informed of the possible risks especially allergic reactions and POME.  Discussed the steps we take for prevention and if something was to happen.  Discussed the injection is given in the muscle over 90 seconds.  He will wait in lobby for 30 minutes to make sure there was no issue with the injection.  We reviewed the patient education pamphlet together.   He voiced understanding; he gave a verbal consent to receive AVEED.   I injected AVEED 750mg IM over 90 seconds at the Right Vent/Glut; tolerated well.  15 minutes after injection, he voiced no complaints.  30 minutes after injection, he voiced no complaints  RTC 10 weeks; TRT labs due 10/2025    We discussed his recent visit with Dr. Reyna; (-) cysto and AS with right renal mass.   Normal CXR; low risks with renal mass that small. Reassurance.   Next CTAP is scheduled for 7/16.   I set reminder to look at CT results.       Visit today included increased complexity associated with the care of the episodic problem of hypogonadism addressed and managing the longitudinal care of the patient due to the serious and/or complex managed problem(s) requiring high risk medication use (testosterone).              [1]   Social History  Socioeconomic History    Marital status: Single   Tobacco Use    Smoking status: Former     Current packs/day: 0.00     Average packs/day: 0.3 packs/day for 15.0 years (3.8 ttl pk-yrs)     Types: Cigarettes     Start date: 1/3/1998     Quit date:  1/3/2013     Years since quittin.4    Smokeless tobacco: Never   Substance and Sexual Activity    Alcohol use: Yes     Alcohol/week: 5.0 standard drinks of alcohol     Types: 6 Standard drinks or equivalent per week     Comment: weekends    Drug use: No    Sexual activity: Yes     Social Drivers of Health     Financial Resource Strain: Low Risk  (2025)    Overall Financial Resource Strain (CARDIA)     Difficulty of Paying Living Expenses: Not very hard   Food Insecurity: No Food Insecurity (2025)    Hunger Vital Sign     Worried About Running Out of Food in the Last Year: Never true     Ran Out of Food in the Last Year: Never true   Transportation Needs: No Transportation Needs (2023)    PRAPARE - Transportation     Lack of Transportation (Medical): No     Lack of Transportation (Non-Medical): No   Physical Activity: Sufficiently Active (2025)    Exercise Vital Sign     Days of Exercise per Week: 4 days     Minutes of Exercise per Session: 50 min   Stress: Stress Concern Present (2025)    [employer] [place] [employer] - Occupational Stress Questionnaire     Feeling of Stress : Rather much   Housing Stability: Unknown (2023)    Housing Stability Vital Sign     Unable to Pay for Housing in the Last Year: No     Unstable Housing in the Last Year: No   [2]   Current Outpatient Medications:     atorvastatin (LIPITOR) 20 MG tablet, Take 1 tablet (20 mg total) by mouth once daily., Disp: 90 tablet, Rfl: 1    azithromycin (Z-MAMADOU) 250 MG tablet, Take as directed, Disp: 6 tablet, Rfl: 0    azithromycin (ZITHROMAX Z-MAMADOU) 250 MG tablet, Take as directed: 2 tablets by mouth on day one, then 1 tablet by mouth once daily on days 2 through 5, Disp: 6 tablet, Rfl: 0    B-complex with vitamin C (Z-BEC OR EQUIV) tablet, Take 1 tablet by mouth once daily., Disp: , Rfl:     cefdinir (OMNICEF) 300 MG capsule, Take 1 tablet by mouth twice daily, Disp: 20 capsule, Rfl: 0    clindamycin  (CLEOCIN) 300 MG capsule, Take 1 capsule by mouth 3 times per day for 7 days, Disp: 21 capsule, Rfl: 0    CYANOCOBALAMIN, VITAMIN B-12, ORAL, Take 1 tablet by mouth once daily., Disp: , Rfl:     ergocalciferol, vitamin D2, (VITAMIN D ORAL), Take 2 capsules by mouth once daily., Disp: , Rfl:     fluticasone propionate (FLONASE) 50 mcg/actuation nasal spray, Spray twice in each nostril once daily for 30 days, Disp: 16 g, Rfl: 3    levothyroxine (SYNTHROID) 112 MCG tablet, Take 1 tablet (112 mcg total) by mouth once daily., Disp: 90 tablet, Rfl: 3    losartan (COZAAR) 50 MG tablet, Take 1 tablet (50 mg total) by mouth once daily., Disp: 90 tablet, Rfl: 1    sildenafiL (VIAGRA) 100 MG tablet, Take 1 tablet (100 mg total) by mouth daily as needed for Erectile Dysfunction (take on an empty stomach 30-60 minutes before)., Disp: 10 tablet, Rfl: 12    traMADoL (ULTRAM) 50 mg tablet, Take 1 tablet (50 mg total) by mouth every 6 (six) hours as needed for pain., Disp: 10 tablet, Rfl: 0    vitamin E acetate (VITAMIN E ORAL), Take 1 tablet by mouth once daily., Disp: , Rfl:     Current Facility-Administered Medications:     testosterone undecanoate injection 750 mg, 750 mg, Intramuscular, Q10 weeks, , 750 mg at 06/19/25 1013

## 2025-07-16 ENCOUNTER — HOSPITAL ENCOUNTER (OUTPATIENT)
Dept: RADIOLOGY | Facility: HOSPITAL | Age: 55
Discharge: HOME | End: 2025-07-16
Attending: STUDENT IN AN ORGANIZED HEALTH CARE EDUCATION/TRAINING PROGRAM
Payer: COMMERCIAL

## 2025-07-16 DIAGNOSIS — N28.89 RENAL MASS: ICD-10-CM

## 2025-07-16 PROCEDURE — 74178 CT ABD&PLV WO CNTR FLWD CNTR: CPT | Mod: 26,,, | Performed by: RADIOLOGY

## 2025-07-16 PROCEDURE — 25500020 PHARM REV CODE 255: Performed by: STUDENT IN AN ORGANIZED HEALTH CARE EDUCATION/TRAINING PROGRAM

## 2025-07-16 PROCEDURE — 74178 CT ABD&PLV WO CNTR FLWD CNTR: CPT | Mod: TC

## 2025-07-16 RX ADMIN — IOHEXOL 100 ML: 350 INJECTION, SOLUTION INTRAVENOUS at 10:07

## 2025-07-16 NOTE — PROGRESS NOTES
[facility]  Urologic Oncology Clinic Note        Date of Service: 7/17/2025        Chief Complaint/Reason for Consultation: Renal Mass     Requesting Provider:   No referring provider defined for this encounter.      History of Present Illness:   Patient 54 y.o. male presents with hx of renal mass here for further evaluation and management.    He is also known to have microscopic hematuria. Denies flank pain or gross hematuria.    Former smoker and quit many years ago.        Blood thinners: None   No Hx of TIA, MI, and CVA   Abdominal surgeries: None     Here today in f/u w/ surveillance for renal mass.     Urologic History:     1/7/2025 -- UA microscopy -- 3 rbcs  12/5/2024 -- CT Urogram -- 1.5 cm right renal mass that is enhancing and concerning for neoplasm  12/11/2024 -- CT A/P -- New thick-walled enhancing lesion in the superior pole of the right kidney concerning for renal cell carcinoma   1/30/2025 -- CXR -- negative  1/30/2025 -- Cystoscopy -- hematuria workup negative  7/16/2025 -- CT A/P --Exophytic enhancing lesion of superior pole of right kidney unchanged       Patient Active Problem List    Diagnosis Date Noted    Neck pain 11/16/2023    Decreased range of motion of right shoulder 11/01/2022    Decreased muscle strength 11/01/2022    Nontraumatic tear of right rotator cuff 10/14/2022    Biceps tendinitis of right upper extremity 10/14/2022    Pain in both feet 03/15/2022    GI bleed 09/22/2020    Acute lower GI bleeding 09/22/2020    Hematochezia 09/22/2020    Colon polyps 09/22/2020    Acute blood loss anemia 09/22/2020    Screen for colon cancer 09/21/2020    BPH with urinary obstruction 07/26/2018    Vitamin D deficiency 07/19/2018    Male hypogonadism 08/19/2015    Gout 03/03/2015    High cholesterol     Hypothyroid        Review of patient's allergies indicates:   Allergen Reactions    Codeine Rash    Penicillins Rash        Past Medical History:   Diagnosis Date    Gout 7/2014    High  cholesterol     Hypertension     Hypothyroid     Low testosterone     Staph aureus infection age 13    R leg      Past Surgical History:   Procedure Laterality Date    ARTHROSCOPIC DEBRIDEMENT OF SHOULDER  10/14/2022    Procedure: DEBRIDEMENT, SHOULDER, ARTHROSCOPIC;  Surgeon: LEONIDAS Carr MD;  Location: [facility] OR;  Service: Orthopedics;;    ARTHROSCOPIC REPAIR OF ROTATOR CUFF OF SHOULDER Right 10/14/2022    Procedure: REPAIR, ROTATOR CUFF, ARTHROSCOPIC - POLAR CARE;  Surgeon: LEONIDAS Carr MD;  Location: [facility] OR;  Service: Orthopedics;  Laterality: Right;  Regional w/o Catheter, Interscalene    ARTHROSCOPY,SHOULDER,WITH BICEPS TENODESIS Right 10/14/2022    Procedure: ARTHROSCOPY,SHOULDER,WITH BICEPS TENODESIS;  Surgeon: LEONIDAS Carr MD;  Location: [facility] OR;  Service: Orthopedics;  Laterality: Right;    COLONOSCOPY N/A 2020    Procedure: COLONOSCOPY;  Surgeon: VANI Newell MD;  Location: [facility] ENDO (4TH FLR);  Service: Endoscopy;  Laterality: N/A;  covid test 2nd floor surgery clinic     COLONOSCOPY N/A 2023    Procedure: COLONOSCOPY;  Surgeon: VANI Newell MD;  Location: [facility] ENDO (4TH FLR);  Service: Endoscopy;  Laterality: N/A;  - referred by Dr. Katherine reynaga/ [facility]  7 day hold/ Prep instructions to the portal. AS  10/31-precall complee-pt states he has not taken [facility] in several months-MS    DECOMPRESSION OF SUBACROMIAL SPACE Right 10/14/2022    Procedure: DECOMPRESSION, SUBACROMIAL SPACE;  Surgeon: LEONIDAS Carr MD;  Location: [facility] OR;  Service: Orthopedics;  Laterality: Right;    FRACTURE SURGERY Left     wrist    Incision and drainage of tibia Right     SINUS SURGERY      x2      Family History   Problem Relation Name Age of Onset    Hypertension Mother      Heart disease Father  73            Cancer Sister  64        colon cancer    Hypertension Sister      Hyperlipidemia Sister      Lupus Sister      Hypertension Brother      Hyperlipidemia Brother   "    Cancer Maternal Grandfather      Cancer Maternal Aunt          lung - smoker    Melanoma Neg Hx        Social History     Tobacco Use    Smoking status: Former     Current packs/day: 0.00     Average packs/day: 0.3 packs/day for 15.0 years (3.8 ttl pk-yrs)     Types: Cigarettes     Start date: 1/3/1998     Quit date: 1/3/2013     Years since quittin.5    Smokeless tobacco: Never   Substance Use Topics    Alcohol use: Yes     Alcohol/week: 5.0 standard drinks of alcohol     Types: 6 Standard drinks or equivalent per week     Comment: weekends        Review of Systems   Constitutional:  Negative for activity change.   HENT:  Negative for congestion.    Respiratory:  Negative for cough.    Genitourinary:  Negative for hematuria.             OBJECTIVE:     Vitals:    25 0939   BP: (!) 153/88   BP Location: Left arm   Patient Position: Sitting   Pulse: 60   Weight: 109.9 kg (242 lb 4.6 oz)   Height: 6' 2" (1.88 m)            General Appearance: Alert, cooperative, no distress  Head: Normocephalic  Eyes: Clear conjunctiva  Neck: No obvious LND or JVD  Lungs: Normal chest excursion, no accessory muscle use  Chest: Regular rate rhythm by palpation, no pedal edema  Abdomen: Soft, non-tender, non-distended  Male Genitalia: Deferred  Extremities: Atraumatic   Lymph Nodes: No appreciable lymph adenopathy  Neurologic: No gross gait, motor or sensory deficits            LAB:      All laboratory values listed below was/were independently reviewed with patient at this clinic visit.    CMP  Sodium   Date Value Ref Range Status   2025 139 136 - 145 mmol/L Final   10/21/2024 138 136 - 145 mmol/L Final     Potassium   Date Value Ref Range Status   2025 3.5 3.5 - 5.1 mmol/L Final   10/21/2024 4.1 3.5 - 5.1 mmol/L Final     Chloride   Date Value Ref Range Status   2025 104 95 - 110 mmol/L Final   10/21/2024 104 95 - 110 mmol/L Final     CO2   Date Value Ref Range Status   2025 27 23 - 29 mmol/L " Final   10/21/2024 24 23 - 29 mmol/L Final     Glucose   Date Value Ref Range Status   04/08/2025 90 70 - 110 mg/dL Final   10/21/2024 94 70 - 110 mg/dL Final     BUN   Date Value Ref Range Status   04/08/2025 18 6 - 20 mg/dL Final     Creatinine   Date Value Ref Range Status   04/08/2025 1.1 0.5 - 1.4 mg/dL Final     Calcium   Date Value Ref Range Status   04/08/2025 9.2 8.7 - 10.5 mg/dL Final   10/21/2024 9.3 8.7 - 10.5 mg/dL Final     Protein Total   Date Value Ref Range Status   04/08/2025 6.9 6.0 - 8.4 gm/dL Final     Total Protein   Date Value Ref Range Status   10/21/2024 6.7 6.0 - 8.4 g/dL Final     Albumin   Date Value Ref Range Status   04/08/2025 4.1 3.5 - 5.2 g/dL Final   10/21/2024 4.0 3.5 - 5.2 g/dL Final   07/20/2018 4.3 3.6 - 5.1 g/dL Final     Comment:     @ Test Performed By:  [facility]  Chase Chanel M.D., Ph.D.,   [address] [place] [id number]  [facility]  [id number]       Total Bilirubin   Date Value Ref Range Status   10/21/2024 0.7 0.1 - 1.0 mg/dL Final     Comment:     For infants and newborns, interpretation of results should be based  on gestational age, weight and in agreement with clinical  observations.    Premature Infant recommended reference ranges:  Up to 24 hours.............<8.0 mg/dL  Up to 48 hours............<12.0 mg/dL  3-5 days..................<15.0 mg/dL  6-29 days.................<15.0 mg/dL       Bilirubin Total   Date Value Ref Range Status   04/08/2025 0.8 0.1 - 1.0 mg/dL Final     Comment:     For infants and newborns, interpretation of results should be based   on gestational age, weight and in agreement with clinical   observations.    Premature Infant recommended reference ranges:   0-24 hours:  <8.0 mg/dL   24-48 hours: <12.0 mg/dL   3-5 days:    <15.0 mg/dL   6-29 days:   <15.0 mg/dL     Alkaline Phosphatase   Date Value Ref Range Status   10/21/2024 77 40 - 150 U/L Final     ALP   Date Value Ref Range Status    04/08/2025 81 40 - 150 unit/L Final     AST   Date Value Ref Range Status   04/08/2025 25 11 - 45 unit/L Final   10/21/2024 31 10 - 40 U/L Final     ALT   Date Value Ref Range Status   04/08/2025 28 10 - 44 unit/L Final   10/21/2024 26 10 - 44 U/L Final     Anion Gap   Date Value Ref Range Status   04/08/2025 8 8 - 16 mmol/L Final     eGFR   Date Value Ref Range Status   04/08/2025 >60 >60 mL/min/1.73/m2 Final     Comment:     Estimated GFR calculated using the CKD-EPI creatinine (2021) equation.   10/21/2024 >60.0 >60 mL/min/1.73 m^2 Final     Lab Results   Component Value Date    WBC 4.41 04/08/2025    HGB 15.4 04/08/2025    HCT 46.4 04/08/2025    MCV 92 04/08/2025     04/08/2025           IMAGING:      All imaging listed below was/were independently reviewed with patient at this clinic visit.    1/29/2025  Chest CXR  EXAMINATION:  XR CHEST PA AND LATERAL     CLINICAL HISTORY:  Other specified disorders of kidney and ureter     TECHNIQUE:  PA and lateral views of the chest were performed.     COMPARISON:  Non 03/20/2022 e     FINDINGS:  Heart size normal.  The lungs are clear.  No pleural effusion     Impression:     See above        Electronically signed by:Jose J Kaur MD  Date:                                            01/29/2025  Time:                                           10:07      7/16/2025  CT A/P  EXAMINATION:  CT ABDOMEN PELVIS W WO CONTRAST     CLINICAL HISTORY:  right renal mass; Other specified disorders of kidney and ureter     TECHNIQUE:  Axial images of the abdomen and pelvis were acquired before and after the use of 100 cc Aubx452 IV contrast.  Coronal and sagittal reconstructions were also obtained     COMPARISON:  CT urogram from 12/05/2024 and CTA abdomen pelvis from 09/22/2020     FINDINGS:  Heart: Normal in size. No pericardial effusion. No significant calcific coronary atherosclerosis.     Lungs: Visualized portions of the lungs are clear.     Liver: Normal in size and contour.   No focal hepatic lesion.     Gallbladder: No calcified gallstones.     Bile Ducts: No evidence of dilated ducts.     Pancreas: No mass or peripancreatic fat stranding.     Spleen: Unremarkable.     Stomach and duodenum: Unremarkable.     Adrenals: Unremarkable.     Kidneys/ Ureters: Normal in size and location. Normal enhancement. 1.8 x 1.3 cm exophytic enhancing lesion in the superior pole of the right kidney abutting the right hepatic lobe, previously measuring 1.6 x 1.5 cm (series 3, image 64).  Additional right renal simple cyst.  No hydronephrosis or renal stones.  No ureteral dilatation.     Bladder: No evidence of wall thickening.     Reproductive organs: Unremarkable.     Bowel/Mesentery: Small bowel is normal in caliber with no evidence of obstruction. No evidence of inflammation or wall thickening.  Colon demonstrates no focal wall thickening.     Lymph nodes: No lymphadenapathy.     Vasculature: No aneurysm. Moderate calcific atherosclerosis.     Abdominal wall:  Unremarkable.     Bones: No acute fracture. No suspicious osseous lesions.     Impression:     Enhancing exophytic lesion in the superior pole of the right kidney similar in size compared to prior exam from 12/05/2024 concerning for renal cell carcinoma.  This lesion was not present on exam from 09/22/2020.        Electronically signed by:Jim Tran MD  Date:                                            07/16/2025  Time:                                           11:10    ASSESSMENT/PLAN:       55 yo M here for evaluation and management of right renal mass.     Plan:    Right Renal Mass  The patient presents today for discussion and management of a right renal mass. Given the size of the mass and its appearance on imaging, we estimate its probability of being malignant to be 80%.     We discussed the role of additional studies to give further resolution as to the nature of this mass.  One approach would be renal mass biopsy.  This would be  done with image guidance by our radiologist and would hopefully give direct pathologic assessment of the mass.  The risk of tumor seeding is thought to be low from this with modern techniques.  However there is an ~10 to 15% chance of a nondiagnostic biopsy, particularly in the setting of small renal masses.  There are also procedural risks associated with the biopsy.  The decision to proceed with a biopsy would be relevant in the case where a patient is not comfortable with active surveillance without knowing the pathology of the mass.     For equivocal small renal masses, there is some support relatively recently for the use of sestamibi scans in evaluating small renal masses, which are specialized scans that are specific for oncocytoma and hybrid oncocytoma/chromophobe renal masses.  These are classically benign/indolent renal masses with minimal malignant potential.  The scans are reported in the literature to be greater than 95% specific.  While the scans can be useful to identify certain benign tumors, there are other benign tumors which we may not detect using sestamibi, and there are false positive scans.  Moreover, interpretation of a sestamibi scan is not always straightforward.  I would consider this diagnostic approach an evolving modality.     We discussed that in general there are three broad management options for small renal masses:         1) Active surveillance     Active surveillance implies no treatment for curative intent but the use of careful surveillance to monitor the lesion for growth.  The probability of metastasis developing while on surveillance of a small renal lesion is felt to be very low (1-3%), but is not impossible. The average growth rate for small renal tumors is 2-4 mm per year. We generally recommend imaging annually or every other year while on surveillance.  In some instances we consider obtaining a tumor biopsy prior to performing active surveillance, though this is not a  universal practice.        2) Percutaneous ablation      Percutaneous ablation involves the placement of a needle within the lesion and the use of radiofrequency heating or cryoablation cooling to destroy the tumor.  Generally a biopsy of the tumor is obtained at the time of ablation to document histology.  At [facility] our preference is generally in favor of cryoablation because we can image the ice ball forming better and because efficacy seems better with larger tumors.  Retrospective studies showed that cryoablation is only slightly less efficacious than surgery, but has the advantage of less treatment related morbidity.  In most cases the procedure can be performed as an outpatient.  We explained that this is done by an interventional radiologist at our institution and that post-procedure imaging follow-up is organized by the urology service and continues for about 5 years to ensure the absence of a tumor recurrence.        3) Surgical removal      Surgery is another way to treat small renal masses.  In general, two forms of surgery are possible, radical nephrectomy and partial nephrectomy.  For most small renal masses, we recommend partial nephrectomy because it allows preservation of renal function from the affected kidney.  However, in patients with kidneys that are poorly functional, in tumors that are invading critical structures, or in situations where the kidney tumor is located at an anatomical site within the kidney that makes renal reconstruction impossible, then radical nephrectomy may be required.  We discussed that surgery could be performed using an open technique or minimally invasive technique which could be either laparoscopic or robotic.  The choice of each technique depends on the patient's prior surgical history, body habitus, and location and size of the tumor. The patient is a GOOD candidate for a robotic partial nephrectomy. The pros and cons of these different techniques were discussed.  I  explained that, occasionally, we may have to place a ureteral stent and maintain a urethral catheter to optimize urine drainage away from the site of reconstruction depending on the tumor location. The perioperative details of surgery and the postoperative recovery were discussed.  Potential risks of surgery were discussed, which include but are not limited to risks of anesthesia, bleeding, infection, adjacent organ injury, renal dysfunction, urine leak, medical complications of surgery, and death (although this is rare in modern partial nephrectomy).         The patient asked questions and all of them were answered.  After this discussion the patient elected to continue active surveillance. I think this is the most reasonable option for the patient based on the size of the mass and low metastatic potential.      Surveillance imaging w/ CT AP in 6 months      Microscopic hematuria  Discussed benign and malignant etiologies  CTU complete and negative for upper tract abnormality other than renal mass  Had cystoscopy 1/30/2025 -- negative         PSA Screening  PSA < 1   Continue to screen yearly   Needs to be seen by Urology if PSA every goes to 4         - Visit today included increased complexity associated with the ongoing care of the episodic problem renal mass which has been addressed and requires the longitudinal care of the patient due to the serious and/or complex managed problem of renal mass.         Kenneth Reyna MD  Urologic Oncology  P: [phone]

## 2025-07-17 ENCOUNTER — PATIENT MESSAGE (OUTPATIENT)
Dept: UROLOGY | Facility: CLINIC | Age: 55
End: 2025-07-17

## 2025-07-17 ENCOUNTER — OFFICE VISIT (OUTPATIENT)
Dept: UROLOGY | Facility: CLINIC | Age: 55
End: 2025-07-17
Payer: COMMERCIAL

## 2025-07-17 VITALS
SYSTOLIC BLOOD PRESSURE: 153 MMHG | DIASTOLIC BLOOD PRESSURE: 88 MMHG | HEIGHT: 74 IN | WEIGHT: 242.31 LBS | HEART RATE: 60 BPM | BODY MASS INDEX: 31.1 KG/M2

## 2025-07-17 DIAGNOSIS — N28.89 RIGHT RENAL MASS: Primary | ICD-10-CM

## 2025-07-17 PROCEDURE — 3008F BODY MASS INDEX DOCD: CPT | Mod: CPTII,S$GLB,, | Performed by: STUDENT IN AN ORGANIZED HEALTH CARE EDUCATION/TRAINING PROGRAM

## 2025-07-17 PROCEDURE — 99214 OFFICE O/P EST MOD 30 MIN: CPT | Mod: S$GLB,,, | Performed by: STUDENT IN AN ORGANIZED HEALTH CARE EDUCATION/TRAINING PROGRAM

## 2025-07-17 PROCEDURE — G2211 COMPLEX E/M VISIT ADD ON: HCPCS | Mod: S$GLB,,, | Performed by: STUDENT IN AN ORGANIZED HEALTH CARE EDUCATION/TRAINING PROGRAM

## 2025-07-17 PROCEDURE — 99999 PR PBB SHADOW E&M-EST. PATIENT-LVL III: CPT | Mod: PBBFAC,,, | Performed by: STUDENT IN AN ORGANIZED HEALTH CARE EDUCATION/TRAINING PROGRAM

## 2025-07-17 PROCEDURE — 3079F DIAST BP 80-89 MM HG: CPT | Mod: CPTII,S$GLB,, | Performed by: STUDENT IN AN ORGANIZED HEALTH CARE EDUCATION/TRAINING PROGRAM

## 2025-07-17 PROCEDURE — 3077F SYST BP >= 140 MM HG: CPT | Mod: CPTII,S$GLB,, | Performed by: STUDENT IN AN ORGANIZED HEALTH CARE EDUCATION/TRAINING PROGRAM

## 2025-07-17 PROCEDURE — 1159F MED LIST DOCD IN RCRD: CPT | Mod: CPTII,S$GLB,, | Performed by: STUDENT IN AN ORGANIZED HEALTH CARE EDUCATION/TRAINING PROGRAM

## 2025-08-11 ENCOUNTER — TELEPHONE (OUTPATIENT)
Dept: PHARMACY | Facility: CLINIC | Age: 55
End: 2025-08-11
Payer: COMMERCIAL

## 2025-08-15 ENCOUNTER — PATIENT MESSAGE (OUTPATIENT)
Dept: DERMATOLOGY | Facility: CLINIC | Age: 55
End: 2025-08-15
Payer: COMMERCIAL

## 2025-08-15 RX ORDER — ATORVASTATIN CALCIUM 10 MG/1
10 TABLET, FILM COATED ORAL DAILY
Qty: 90 TABLET | Refills: 3 | Status: SHIPPED | OUTPATIENT
Start: 2025-08-15 | End: 2026-08-15

## 2025-08-28 ENCOUNTER — OFFICE VISIT (OUTPATIENT)
Dept: UROLOGY | Facility: CLINIC | Age: 55
End: 2025-08-28
Payer: COMMERCIAL

## 2025-08-28 VITALS
HEIGHT: 74 IN | DIASTOLIC BLOOD PRESSURE: 98 MMHG | HEART RATE: 57 BPM | WEIGHT: 242.31 LBS | BODY MASS INDEX: 31.1 KG/M2 | SYSTOLIC BLOOD PRESSURE: 151 MMHG

## 2025-08-28 DIAGNOSIS — N40.1 BPH WITH URINARY OBSTRUCTION: ICD-10-CM

## 2025-08-28 DIAGNOSIS — Z51.81 ENCOUNTER FOR MONITORING TESTOSTERONE REPLACEMENT THERAPY: ICD-10-CM

## 2025-08-28 DIAGNOSIS — N52.9 ED (ERECTILE DYSFUNCTION) OF ORGANIC ORIGIN: ICD-10-CM

## 2025-08-28 DIAGNOSIS — R53.83 FATIGUE, UNSPECIFIED TYPE: ICD-10-CM

## 2025-08-28 DIAGNOSIS — N13.8 BPH WITH URINARY OBSTRUCTION: ICD-10-CM

## 2025-08-28 DIAGNOSIS — Z79.890 ENCOUNTER FOR MONITORING TESTOSTERONE REPLACEMENT THERAPY: ICD-10-CM

## 2025-08-28 DIAGNOSIS — E66.811 CLASS 1 OBESITY WITH SERIOUS COMORBIDITY AND BODY MASS INDEX (BMI) OF 31.0 TO 31.9 IN ADULT, UNSPECIFIED OBESITY TYPE: ICD-10-CM

## 2025-08-28 DIAGNOSIS — E29.1 PRIMARY MALE HYPOGONADISM: Primary | ICD-10-CM

## 2025-08-28 DIAGNOSIS — N28.89 RIGHT RENAL MASS: ICD-10-CM

## 2025-08-28 DIAGNOSIS — E78.5 DYSLIPIDEMIA: ICD-10-CM

## 2025-08-28 DIAGNOSIS — E34.9 TESTOSTERONE DEFICIENCY: ICD-10-CM

## 2025-08-28 PROCEDURE — 99999 PR PBB SHADOW E&M-EST. PATIENT-LVL IV: CPT | Mod: PBBFAC,,, | Performed by: NURSE PRACTITIONER

## (undated) DEVICE — SYR 30CC LUER LOCK

## (undated) DEVICE — TUBING SUC UNIV W/CONN 12FT

## (undated) DEVICE — DRESSING AQUACEL AG SILVER 4X4

## (undated) DEVICE — TOWEL OR DISP STRL BLUE 4/PK

## (undated) DEVICE — PENCIL ROCKER SWITCH 10FT CORD

## (undated) DEVICE — GLOVE BIOGEL PI MICRO INDIC 7

## (undated) DEVICE — SYS CLSR DERMABOND PRINEO 22CM

## (undated) DEVICE — BLADE POWERASP 4.0MMX13CM

## (undated) DEVICE — NDL ARTHSCP MF SCORPION

## (undated) DEVICE — BLADE SHAVER 4.5 6/BX

## (undated) DEVICE — SOL IRR NACL .9% 3000ML

## (undated) DEVICE — BLADE SHAVER LANZA 4.2X13CM

## (undated) DEVICE — SUT FIBERLINK TAPE BLU WHT 1.3

## (undated) DEVICE — CANNULA PASSPORT 8 MM X 4CM.

## (undated) DEVICE — TUBE SET INFLOW/OUTFLOW

## (undated) DEVICE — BNDG COFLEX FOAM LF2 ST 6X5YD

## (undated) DEVICE — NDL SURGEON MAYO #7 2/PK 72PKS

## (undated) DEVICE — UNDERGLOVES BIOGEL PI SIZE 7.5

## (undated) DEVICE — PROBE ARTHO ENERGY 90 DEG

## (undated) DEVICE — PAD SHOULDER POLAR CARE XL

## (undated) DEVICE — APPLICATOR CHLORAPREP ORN 26ML

## (undated) DEVICE — SUT PDS II 0 CT-1 VIL MONO

## (undated) DEVICE — SUT FIBERWIRE 2 CLLGN COAT

## (undated) DEVICE — YANKAUER OPEN TIP W/O VENT

## (undated) DEVICE — DRAPE STERI-DRAPE 1000 17X11IN

## (undated) DEVICE — DRAPE STERI INSTRUMENT 1018

## (undated) DEVICE — GLOVE BIOGEL SKINSENSE PI 8.0

## (undated) DEVICE — SUT VICRYL 3-0 27 SH

## (undated) DEVICE — SUPPORT SLING SHOT II MEDIUM

## (undated) DEVICE — KIT SHOULDER POSITIONER SPIDER

## (undated) DEVICE — SUT SUTURETAPE X 1.3MM 40IN

## (undated) DEVICE — COVER LIGHT HANDLE 80/CA

## (undated) DEVICE — WRAP SHLDR HIP ACCU THRM PACK

## (undated) DEVICE — CANNULA TWIST IN 7MM X 7CM

## (undated) DEVICE — NDL 18GA X1 1/2 REG BEVEL

## (undated) DEVICE — DRAPE STERI U-SHAPED 47X51IN

## (undated) DEVICE — Device

## (undated) DEVICE — UNDERGLOVES BIOGEL PI SIZE 8.5